# Patient Record
Sex: MALE | Race: WHITE | NOT HISPANIC OR LATINO | Employment: OTHER | URBAN - METROPOLITAN AREA
[De-identification: names, ages, dates, MRNs, and addresses within clinical notes are randomized per-mention and may not be internally consistent; named-entity substitution may affect disease eponyms.]

---

## 2017-01-09 ENCOUNTER — AMBULATORY - HEALTHEAST (OUTPATIENT)
Dept: CARDIOLOGY | Facility: CLINIC | Age: 77
End: 2017-01-09

## 2017-01-09 DIAGNOSIS — I48.92 ATRIAL FLUTTER, UNSPECIFIED TYPE (H): ICD-10-CM

## 2017-01-21 ENCOUNTER — COMMUNICATION - HEALTHEAST (OUTPATIENT)
Dept: CARDIOLOGY | Facility: CLINIC | Age: 77
End: 2017-01-21

## 2017-01-21 DIAGNOSIS — I42.8 OTHER PRIMARY CARDIOMYOPATHIES: ICD-10-CM

## 2017-02-07 ENCOUNTER — AMBULATORY - HEALTHEAST (OUTPATIENT)
Dept: CARDIOLOGY | Facility: CLINIC | Age: 77
End: 2017-02-07

## 2017-02-07 DIAGNOSIS — I48.92 ATRIAL FLUTTER, UNSPECIFIED TYPE (H): ICD-10-CM

## 2017-03-01 ENCOUNTER — AMBULATORY - HEALTHEAST (OUTPATIENT)
Dept: CARDIOLOGY | Facility: CLINIC | Age: 77
End: 2017-03-01

## 2017-03-01 DIAGNOSIS — Z95.0 PACEMAKER: ICD-10-CM

## 2017-03-02 ENCOUNTER — COMMUNICATION - HEALTHEAST (OUTPATIENT)
Dept: CARDIOLOGY | Facility: CLINIC | Age: 77
End: 2017-03-02

## 2017-03-02 ENCOUNTER — AMBULATORY - HEALTHEAST (OUTPATIENT)
Dept: CARDIOLOGY | Facility: CLINIC | Age: 77
End: 2017-03-02

## 2017-03-03 ENCOUNTER — AMBULATORY - HEALTHEAST (OUTPATIENT)
Dept: CARDIOLOGY | Facility: CLINIC | Age: 77
End: 2017-03-03

## 2017-03-03 DIAGNOSIS — I48.0 PAROXYSMAL ATRIAL FIBRILLATION (H): ICD-10-CM

## 2017-03-03 DIAGNOSIS — I34.0 MITRAL VALVE INSUFFICIENCY, UNSPECIFIED ETIOLOGY: ICD-10-CM

## 2017-03-03 DIAGNOSIS — Z95.0 BIVENTRICULAR CARDIAC PACEMAKER IN SITU: ICD-10-CM

## 2017-03-07 ENCOUNTER — AMBULATORY - HEALTHEAST (OUTPATIENT)
Dept: CARDIOLOGY | Facility: CLINIC | Age: 77
End: 2017-03-07

## 2017-03-07 DIAGNOSIS — I48.92 ATRIAL FLUTTER, UNSPECIFIED TYPE (H): ICD-10-CM

## 2017-04-04 ENCOUNTER — AMBULATORY - HEALTHEAST (OUTPATIENT)
Dept: CARDIOLOGY | Facility: CLINIC | Age: 77
End: 2017-04-04

## 2017-04-04 DIAGNOSIS — I48.92 ATRIAL FLUTTER, UNSPECIFIED TYPE (H): ICD-10-CM

## 2017-04-24 ENCOUNTER — COMMUNICATION - HEALTHEAST (OUTPATIENT)
Dept: CARDIOLOGY | Facility: CLINIC | Age: 77
End: 2017-04-24

## 2017-04-24 DIAGNOSIS — I48.91 ATRIAL FIBRILLATION (H): ICD-10-CM

## 2017-05-02 ENCOUNTER — AMBULATORY - HEALTHEAST (OUTPATIENT)
Dept: CARDIOLOGY | Facility: CLINIC | Age: 77
End: 2017-05-02

## 2017-05-02 DIAGNOSIS — I48.92 ATRIAL FLUTTER, UNSPECIFIED TYPE (H): ICD-10-CM

## 2017-05-30 ENCOUNTER — AMBULATORY - HEALTHEAST (OUTPATIENT)
Dept: CARDIOLOGY | Facility: CLINIC | Age: 77
End: 2017-05-30

## 2017-05-30 DIAGNOSIS — I48.92 ATRIAL FLUTTER, UNSPECIFIED TYPE (H): ICD-10-CM

## 2017-05-31 ENCOUNTER — HOSPITAL ENCOUNTER (OUTPATIENT)
Dept: CARDIOLOGY | Facility: HOSPITAL | Age: 77
Discharge: HOME | End: 2017-05-31
Attending: INTERNAL MEDICINE

## 2017-05-31 DIAGNOSIS — I34.0 MITRAL VALVE INSUFFICIENCY, UNSPECIFIED ETIOLOGY: ICD-10-CM

## 2017-05-31 DIAGNOSIS — Z95.0 BIVENTRICULAR CARDIAC PACEMAKER IN SITU: ICD-10-CM

## 2017-05-31 DIAGNOSIS — I48.0 PAROXYSMAL ATRIAL FIBRILLATION (H): ICD-10-CM

## 2017-05-31 LAB
AORTIC ROOT: 3.4 CM
AORTIC VALVE MEAN VELOCITY: 87.2 CM/S
AV DIMENSIONLESS INDEX VTI: 0.6
AV MEAN GRADIENT: 3 MMHG
AV PEAK GRADIENT: 6.9 MMHG
AV VALVE AREA: 2.7 CM2
AV VELOCITY RATIO: 0.6
BSA FOR ECHO PROCEDURE: 2.29 M2
CV BLOOD PRESSURE: NORMAL MMHG
CV ECHO HEIGHT: 71.5 IN
CV ECHO WEIGHT: 230 LBS
DOP CALC AO PEAK VEL: 131 CM/S
DOP CALC AO VTI: 26.6 CM
DOP CALC LVOT AREA: 4.15 CM2
DOP CALC LVOT DIAMETER: 2.3 CM
DOP CALC LVOT PEAK VEL: 84.1 CM/S
DOP CALC LVOT STROKE VOLUME: 71.4 CM3
DOP CALCLVOT PEAK VEL VTI: 17.2 CM
EJECTION FRACTION: 60 % (ref 55–75)
FRACTIONAL SHORTENING: 37.1 % (ref 28–44)
INTERVENTRICULAR SEPTUM IN END DIASTOLE: 1.4 CM (ref 0.6–1)
IVS/PW RATIO: 1
LA AREA 1: 28.2 CM2
LA AREA 2: 29.7 CM2
LEFT ATRIUM LENGTH: 5.88 CM
LEFT ATRIUM SIZE: 5.6 CM
LEFT ATRIUM TO AORTIC ROOT RATIO: 1.65 NO UNITS
LEFT ATRIUM VOLUME INDEX: 52.9 ML/M2
LEFT ATRIUM VOLUME: 121.1 CM3
LEFT VENTRICLE CARDIAC INDEX: 2.3 L/MIN/M2
LEFT VENTRICLE CARDIAC OUTPUT: 5.4 L/MIN
LEFT VENTRICLE DIASTOLIC VOLUME INDEX: 46.3 CM3/M2 (ref 34–74)
LEFT VENTRICLE DIASTOLIC VOLUME: 106 CM3 (ref 62–150)
LEFT VENTRICLE HEART RATE: 75 BPM
LEFT VENTRICLE MASS INDEX: 178.5 G/M2
LEFT VENTRICLE SYSTOLIC VOLUME INDEX: 18.3 CM3/M2 (ref 11–31)
LEFT VENTRICLE SYSTOLIC VOLUME: 42 CM3 (ref 21–61)
LEFT VENTRICULAR INTERNAL DIMENSION IN DIASTOLE: 6.2 CM (ref 4.2–5.8)
LEFT VENTRICULAR INTERNAL DIMENSION IN SYSTOLE: 3.9 CM (ref 2.5–4)
LEFT VENTRICULAR MASS: 408.8 G
LEFT VENTRICULAR OUTFLOW TRACT MEAN GRADIENT: 1 MMHG
LEFT VENTRICULAR OUTFLOW TRACT MEAN VELOCITY: 54.3 CM/S
LEFT VENTRICULAR OUTFLOW TRACT PEAK GRADIENT: 3 MMHG
LEFT VENTRICULAR POSTERIOR WALL IN END DIASTOLE: 1.4 CM (ref 0.6–1)
LV STROKE VOLUME INDEX: 31.2 ML/M2
MITRAL REGURGITANT VELOCITY TIME INTEGRAL: 123 CM
MR FLOW: 28 CM3
MR MEAN GRADIENT: 40 MMHG
MR MEAN VELOCITY: 307 CM/S
MR PEAK GRADIENT: 58.4 MMHG
MR PISA EROA: 0.2 CM2
MR PISA RADIUS: 0.6 CM
MR PISA VN NYQUIST: 38.8 CM/S
MV DECELERATION TIME: 296 MS
MV PEAK E VELOCITY: 189 CM/S
MV REGURGITANT VOLUME: 28.2 CC
NUC REST DIASTOLIC VOLUME INDEX: 3680 LBS
NUC REST SYSTOLIC VOLUME INDEX: 71.5 IN
PISA MR PEAK VEL: 382 CM/S
TRICUSPID REGURGITATION PEAK PRESSURE GRADIENT: 39.4 MMHG
TRICUSPID VALVE ANULAR PLANE SYSTOLIC EXCURSION: 1.2 CM
TRICUSPID VALVE PEAK REGURGITANT VELOCITY: 314 CM/S

## 2017-05-31 ASSESSMENT — MIFFLIN-ST. JEOR: SCORE: 1788.33

## 2017-06-06 ENCOUNTER — AMBULATORY - HEALTHEAST (OUTPATIENT)
Dept: CARDIOLOGY | Facility: CLINIC | Age: 77
End: 2017-06-06

## 2017-06-07 ENCOUNTER — AMBULATORY - HEALTHEAST (OUTPATIENT)
Dept: CARDIOLOGY | Facility: CLINIC | Age: 77
End: 2017-06-07

## 2017-06-07 ENCOUNTER — OFFICE VISIT - HEALTHEAST (OUTPATIENT)
Dept: CARDIOLOGY | Facility: CLINIC | Age: 77
End: 2017-06-07

## 2017-06-07 DIAGNOSIS — I47.29 NSVT (NONSUSTAINED VENTRICULAR TACHYCARDIA) (H): ICD-10-CM

## 2017-06-07 DIAGNOSIS — R94.31 ABNORMAL EKG: ICD-10-CM

## 2017-06-07 ASSESSMENT — MIFFLIN-ST. JEOR: SCORE: 1752.05

## 2017-06-13 ENCOUNTER — AMBULATORY - HEALTHEAST (OUTPATIENT)
Dept: CARDIOLOGY | Facility: CLINIC | Age: 77
End: 2017-06-13

## 2017-06-13 ENCOUNTER — COMMUNICATION - HEALTHEAST (OUTPATIENT)
Dept: CARDIOLOGY | Facility: CLINIC | Age: 77
End: 2017-06-13

## 2017-06-13 DIAGNOSIS — Z95.0 PACEMAKER: ICD-10-CM

## 2017-06-14 LAB — HCC DEVICE COMMENTS: NORMAL

## 2017-06-20 ENCOUNTER — HOSPITAL ENCOUNTER (OUTPATIENT)
Dept: NUCLEAR MEDICINE | Facility: HOSPITAL | Age: 77
Discharge: HOME | End: 2017-06-20
Attending: INTERNAL MEDICINE

## 2017-06-20 ENCOUNTER — HOSPITAL ENCOUNTER (OUTPATIENT)
Dept: CARDIOLOGY | Facility: HOSPITAL | Age: 77
Discharge: HOME | End: 2017-06-20
Attending: INTERNAL MEDICINE

## 2017-06-20 DIAGNOSIS — I47.29 NSVT (NONSUSTAINED VENTRICULAR TACHYCARDIA) (H): ICD-10-CM

## 2017-06-20 DIAGNOSIS — R94.31 ABNORMAL EKG: ICD-10-CM

## 2017-06-20 LAB
CV STRESS CURRENT BP HE: NORMAL
CV STRESS CURRENT HR HE: 71
CV STRESS CURRENT HR HE: 73
CV STRESS CURRENT HR HE: 75
CV STRESS CURRENT HR HE: 76
CV STRESS CURRENT HR HE: 76
CV STRESS CURRENT HR HE: 78
CV STRESS CURRENT HR HE: 81
CV STRESS CURRENT HR HE: 81
CV STRESS CURRENT HR HE: 85
CV STRESS CURRENT HR HE: 87
CV STRESS DEVIATION TIME HE: NORMAL
CV STRESS ECHO PERCENT HR HE: NORMAL
CV STRESS EXERCISE STAGE HE: NORMAL
CV STRESS FINAL RESTING BP HE: NORMAL
CV STRESS FINAL RESTING HR HE: 76
CV STRESS MAX HR HE: 82
CV STRESS MAX TREADMILL GRADE HE: 0
CV STRESS MAX TREADMILL SPEED HE: 0
CV STRESS PEAK DIA BP HE: NORMAL
CV STRESS PEAK SYS BP HE: NORMAL
CV STRESS PHASE HE: NORMAL
CV STRESS PROTOCOL HE: NORMAL
CV STRESS RESTING PT POSITION HE: NORMAL
CV STRESS ST DEVIATION AMOUNT HE: NORMAL
CV STRESS ST DEVIATION ELEVATION HE: NORMAL
CV STRESS ST EVELATION AMOUNT HE: NORMAL
CV STRESS TEST TYPE HE: NORMAL
CV STRESS TOTAL STAGE TIME MIN 1 HE: NORMAL
NUC STRESS EJECTION FRACTION: 52 %
STRESS ECHO BASELINE BP: NORMAL
STRESS ECHO BASELINE HR: 83
STRESS ECHO CALCULATED PERCENT HR: 57 %
STRESS ECHO LAST STRESS BP: NORMAL
STRESS ECHO LAST STRESS HR: 71

## 2017-06-27 ENCOUNTER — AMBULATORY - HEALTHEAST (OUTPATIENT)
Dept: CARDIOLOGY | Facility: CLINIC | Age: 77
End: 2017-06-27

## 2017-06-27 DIAGNOSIS — I48.92 ATRIAL FLUTTER, UNSPECIFIED TYPE (H): ICD-10-CM

## 2017-07-11 ENCOUNTER — AMBULATORY - HEALTHEAST (OUTPATIENT)
Dept: CARDIOLOGY | Facility: CLINIC | Age: 77
End: 2017-07-11

## 2017-07-11 DIAGNOSIS — I48.92 ATRIAL FLUTTER, UNSPECIFIED TYPE (H): ICD-10-CM

## 2017-07-24 ENCOUNTER — COMMUNICATION - HEALTHEAST (OUTPATIENT)
Dept: CARDIOLOGY | Facility: CLINIC | Age: 77
End: 2017-07-24

## 2017-07-24 DIAGNOSIS — I48.91 ATRIAL FIBRILLATION (H): ICD-10-CM

## 2017-08-08 ENCOUNTER — AMBULATORY - HEALTHEAST (OUTPATIENT)
Dept: CARDIOLOGY | Facility: CLINIC | Age: 77
End: 2017-08-08

## 2017-08-08 DIAGNOSIS — I48.92 ATRIAL FLUTTER, UNSPECIFIED TYPE (H): ICD-10-CM

## 2017-09-05 ENCOUNTER — AMBULATORY - HEALTHEAST (OUTPATIENT)
Dept: CARDIOLOGY | Facility: CLINIC | Age: 77
End: 2017-09-05

## 2017-09-05 DIAGNOSIS — I48.92 ATRIAL FLUTTER, UNSPECIFIED TYPE (H): ICD-10-CM

## 2017-09-20 ENCOUNTER — AMBULATORY - HEALTHEAST (OUTPATIENT)
Dept: CARDIOLOGY | Facility: CLINIC | Age: 77
End: 2017-09-20

## 2017-09-20 DIAGNOSIS — Z95.0 PACEMAKER: ICD-10-CM

## 2017-09-22 LAB — HCC DEVICE COMMENTS: NORMAL

## 2017-10-03 ENCOUNTER — AMBULATORY - HEALTHEAST (OUTPATIENT)
Dept: CARDIOLOGY | Facility: CLINIC | Age: 77
End: 2017-10-03

## 2017-10-03 DIAGNOSIS — I48.92 ATRIAL FLUTTER, UNSPECIFIED TYPE (H): ICD-10-CM

## 2017-10-14 ENCOUNTER — COMMUNICATION - HEALTHEAST (OUTPATIENT)
Dept: CARDIOLOGY | Facility: CLINIC | Age: 77
End: 2017-10-14

## 2017-10-14 DIAGNOSIS — I25.10 CAD (CORONARY ARTERY DISEASE): ICD-10-CM

## 2017-10-17 ENCOUNTER — COMMUNICATION - HEALTHEAST (OUTPATIENT)
Dept: CARDIOLOGY | Facility: CLINIC | Age: 77
End: 2017-10-17

## 2017-10-17 DIAGNOSIS — I48.91 ATRIAL FIBRILLATION (H): ICD-10-CM

## 2017-10-31 ENCOUNTER — AMBULATORY - HEALTHEAST (OUTPATIENT)
Dept: CARDIOLOGY | Facility: CLINIC | Age: 77
End: 2017-10-31

## 2017-10-31 DIAGNOSIS — I48.92 ATRIAL FLUTTER, UNSPECIFIED TYPE (H): ICD-10-CM

## 2017-11-20 ENCOUNTER — RECORDS - HEALTHEAST (OUTPATIENT)
Dept: LAB | Facility: CLINIC | Age: 77
End: 2017-11-20

## 2017-11-20 LAB
CHOLEST SERPL-MCNC: 152 MG/DL
FASTING STATUS PATIENT QL REPORTED: YES
HDLC SERPL-MCNC: 36 MG/DL
LDLC SERPL CALC-MCNC: 91 MG/DL
PSA SERPL-MCNC: 2.6 NG/ML (ref 0–6.5)
TRIGL SERPL-MCNC: 123 MG/DL

## 2017-11-28 ENCOUNTER — AMBULATORY - HEALTHEAST (OUTPATIENT)
Dept: CARDIOLOGY | Facility: CLINIC | Age: 77
End: 2017-11-28

## 2017-11-28 DIAGNOSIS — I48.92 ATRIAL FLUTTER, UNSPECIFIED TYPE (H): ICD-10-CM

## 2017-11-29 ENCOUNTER — AMBULATORY - HEALTHEAST (OUTPATIENT)
Dept: CARDIOLOGY | Facility: CLINIC | Age: 77
End: 2017-11-29

## 2017-11-29 ENCOUNTER — RECORDS - HEALTHEAST (OUTPATIENT)
Dept: ADMINISTRATIVE | Facility: OTHER | Age: 77
End: 2017-11-29

## 2017-12-04 ENCOUNTER — AMBULATORY - HEALTHEAST (OUTPATIENT)
Dept: CARDIOLOGY | Facility: CLINIC | Age: 77
End: 2017-12-04

## 2017-12-04 DIAGNOSIS — I48.0 PAROXYSMAL ATRIAL FIBRILLATION (H): ICD-10-CM

## 2017-12-04 DIAGNOSIS — Z95.0 PRESENCE OF BIVENTRICULAR CARDIAC PACEMAKER: ICD-10-CM

## 2017-12-04 DIAGNOSIS — Z86.79 STATUS POST ABLATION OF ATRIAL FIBRILLATION: ICD-10-CM

## 2017-12-04 DIAGNOSIS — Z98.890 STATUS POST ABLATION OF ATRIAL FIBRILLATION: ICD-10-CM

## 2017-12-04 DIAGNOSIS — I10 ESSENTIAL HYPERTENSION: ICD-10-CM

## 2017-12-04 DIAGNOSIS — I42.8 NONISCHEMIC CARDIOMYOPATHY (H): ICD-10-CM

## 2017-12-04 DIAGNOSIS — I48.92 ATRIAL FLUTTER, UNSPECIFIED TYPE (H): ICD-10-CM

## 2017-12-04 LAB — HCC DEVICE COMMENTS: NORMAL

## 2017-12-04 ASSESSMENT — MIFFLIN-ST. JEOR: SCORE: 1761.12

## 2017-12-27 ENCOUNTER — AMBULATORY - HEALTHEAST (OUTPATIENT)
Dept: CARDIOLOGY | Facility: CLINIC | Age: 77
End: 2017-12-27

## 2017-12-27 DIAGNOSIS — I48.92 ATRIAL FLUTTER, UNSPECIFIED TYPE (H): ICD-10-CM

## 2018-01-17 ENCOUNTER — COMMUNICATION - HEALTHEAST (OUTPATIENT)
Dept: CARDIOLOGY | Facility: CLINIC | Age: 78
End: 2018-01-17

## 2018-01-17 DIAGNOSIS — I48.91 ATRIAL FIBRILLATION (H): ICD-10-CM

## 2018-01-24 ENCOUNTER — AMBULATORY - HEALTHEAST (OUTPATIENT)
Dept: CARDIOLOGY | Facility: CLINIC | Age: 78
End: 2018-01-24

## 2018-01-24 DIAGNOSIS — I48.92 ATRIAL FLUTTER, UNSPECIFIED TYPE (H): ICD-10-CM

## 2018-01-24 LAB — INTERNATIONAL NORMALIZATION RATIO, POC - HISTORICAL: 2.3

## 2018-02-27 ENCOUNTER — AMBULATORY - HEALTHEAST (OUTPATIENT)
Dept: CARDIOLOGY | Facility: CLINIC | Age: 78
End: 2018-02-27

## 2018-02-27 DIAGNOSIS — I48.92 ATRIAL FLUTTER, UNSPECIFIED TYPE (H): ICD-10-CM

## 2018-02-27 LAB — INTERNATIONAL NORMALIZATION RATIO, POC - HISTORICAL: 2.1

## 2018-03-02 ENCOUNTER — COMMUNICATION - HEALTHEAST (OUTPATIENT)
Dept: CARDIOLOGY | Facility: CLINIC | Age: 78
End: 2018-03-02

## 2018-03-02 DIAGNOSIS — I48.91 ATRIAL FIBRILLATION (H): ICD-10-CM

## 2018-03-06 ENCOUNTER — COMMUNICATION - HEALTHEAST (OUTPATIENT)
Dept: CARDIOLOGY | Facility: CLINIC | Age: 78
End: 2018-03-06

## 2018-03-06 DIAGNOSIS — I48.91 ATRIAL FIBRILLATION (H): ICD-10-CM

## 2018-03-07 ENCOUNTER — AMBULATORY - HEALTHEAST (OUTPATIENT)
Dept: CARDIOLOGY | Facility: CLINIC | Age: 78
End: 2018-03-07

## 2018-03-07 DIAGNOSIS — Z95.0 PACEMAKER: ICD-10-CM

## 2018-03-08 ENCOUNTER — COMMUNICATION - HEALTHEAST (OUTPATIENT)
Dept: CARDIOLOGY | Facility: CLINIC | Age: 78
End: 2018-03-08

## 2018-03-08 LAB — HCC DEVICE COMMENTS: NORMAL

## 2018-03-12 ENCOUNTER — COMMUNICATION - HEALTHEAST (OUTPATIENT)
Dept: CARDIOLOGY | Facility: CLINIC | Age: 78
End: 2018-03-12

## 2018-03-16 ENCOUNTER — COMMUNICATION - HEALTHEAST (OUTPATIENT)
Dept: CARDIOLOGY | Facility: CLINIC | Age: 78
End: 2018-03-16

## 2018-03-16 DIAGNOSIS — I48.91 ATRIAL FIBRILLATION (H): ICD-10-CM

## 2018-03-27 ENCOUNTER — AMBULATORY - HEALTHEAST (OUTPATIENT)
Dept: CARDIOLOGY | Facility: CLINIC | Age: 78
End: 2018-03-27

## 2018-03-27 DIAGNOSIS — I48.92 ATRIAL FLUTTER, UNSPECIFIED TYPE (H): ICD-10-CM

## 2018-03-27 LAB — INTERNATIONAL NORMALIZATION RATIO, POC - HISTORICAL: 2.6

## 2018-04-24 ENCOUNTER — AMBULATORY - HEALTHEAST (OUTPATIENT)
Dept: CARDIOLOGY | Facility: CLINIC | Age: 78
End: 2018-04-24

## 2018-04-24 DIAGNOSIS — I48.92 ATRIAL FLUTTER, UNSPECIFIED TYPE (H): ICD-10-CM

## 2018-04-24 LAB — INTERNATIONAL NORMALIZATION RATIO, POC - HISTORICAL: 2

## 2018-05-22 ENCOUNTER — COMMUNICATION - HEALTHEAST (OUTPATIENT)
Dept: CARDIOLOGY | Facility: CLINIC | Age: 78
End: 2018-05-22

## 2018-05-22 DIAGNOSIS — I48.91 ATRIAL FIBRILLATION (H): ICD-10-CM

## 2018-05-29 ENCOUNTER — AMBULATORY - HEALTHEAST (OUTPATIENT)
Dept: CARDIOLOGY | Facility: CLINIC | Age: 78
End: 2018-05-29

## 2018-05-29 DIAGNOSIS — I48.92 ATRIAL FLUTTER, UNSPECIFIED TYPE (H): ICD-10-CM

## 2018-05-29 LAB — INTERNATIONAL NORMALIZATION RATIO, POC - HISTORICAL: 2

## 2018-06-01 ENCOUNTER — RECORDS - HEALTHEAST (OUTPATIENT)
Dept: ADMINISTRATIVE | Facility: OTHER | Age: 78
End: 2018-06-01

## 2018-06-08 ENCOUNTER — OFFICE VISIT - HEALTHEAST (OUTPATIENT)
Dept: CARDIOLOGY | Facility: CLINIC | Age: 78
End: 2018-06-08

## 2018-06-08 DIAGNOSIS — Z98.890 HISTORY OF MITRAL VALVE REPAIR: ICD-10-CM

## 2018-06-08 ASSESSMENT — MIFFLIN-ST. JEOR: SCORE: 1788.33

## 2018-06-12 ENCOUNTER — AMBULATORY - HEALTHEAST (OUTPATIENT)
Dept: CARDIOLOGY | Facility: CLINIC | Age: 78
End: 2018-06-12

## 2018-06-12 ENCOUNTER — COMMUNICATION - HEALTHEAST (OUTPATIENT)
Dept: CARDIOLOGY | Facility: CLINIC | Age: 78
End: 2018-06-12

## 2018-06-12 DIAGNOSIS — Z95.0 PACEMAKER: ICD-10-CM

## 2018-06-13 LAB
HCC DEVICE COMMENTS: NORMAL
HCC DEVICE IMPLANTING PROVIDER: NORMAL
HCC DEVICE MANUFACTURE: NORMAL
HCC DEVICE MODEL: NORMAL
HCC DEVICE SERIAL NUMBER: NORMAL
HCC DEVICE TYPE: NORMAL

## 2018-06-26 ENCOUNTER — AMBULATORY - HEALTHEAST (OUTPATIENT)
Dept: CARDIOLOGY | Facility: CLINIC | Age: 78
End: 2018-06-26

## 2018-06-26 DIAGNOSIS — I48.92 ATRIAL FLUTTER, UNSPECIFIED TYPE (H): ICD-10-CM

## 2018-06-26 LAB — INTERNATIONAL NORMALIZATION RATIO, POC - HISTORICAL: 2.5

## 2018-07-23 ENCOUNTER — AMBULATORY - HEALTHEAST (OUTPATIENT)
Dept: CARDIOLOGY | Facility: CLINIC | Age: 78
End: 2018-07-23

## 2018-07-23 DIAGNOSIS — Z95.0 PRESENCE OF BIVENTRICULAR CARDIAC PACEMAKER: ICD-10-CM

## 2018-07-23 DIAGNOSIS — I48.0 PAROXYSMAL ATRIAL FIBRILLATION (H): ICD-10-CM

## 2018-07-23 DIAGNOSIS — I48.92 ATRIAL FLUTTER, UNSPECIFIED TYPE (H): ICD-10-CM

## 2018-07-23 DIAGNOSIS — I10 ESSENTIAL HYPERTENSION: ICD-10-CM

## 2018-07-23 LAB
ATRIAL RATE - MUSE: 47 BPM
DIASTOLIC BLOOD PRESSURE - MUSE: NORMAL MMHG
HCC DEVICE COMMENTS: NORMAL
HCC DEVICE IMPLANTING PROVIDER: NORMAL
HCC DEVICE MANUFACTURE: NORMAL
HCC DEVICE MODEL: NORMAL
HCC DEVICE SERIAL NUMBER: NORMAL
HCC DEVICE TYPE: NORMAL
INTERPRETATION ECG - MUSE: NORMAL
P AXIS - MUSE: NORMAL DEGREES
POC INR - HE - HISTORICAL: 2.8 (ref 0.9–1.1)
PR INTERVAL - MUSE: NORMAL MS
QRS DURATION - MUSE: 154 MS
QT - MUSE: 478 MS
QTC - MUSE: 519 MS
R AXIS - MUSE: 135 DEGREES
SYSTOLIC BLOOD PRESSURE - MUSE: NORMAL MMHG
T AXIS - MUSE: 57 DEGREES
VENTRICULAR RATE- MUSE: 71 BPM

## 2018-07-23 ASSESSMENT — MIFFLIN-ST. JEOR: SCORE: 1762.26

## 2018-07-24 ENCOUNTER — AMBULATORY - HEALTHEAST (OUTPATIENT)
Dept: CARDIOLOGY | Facility: CLINIC | Age: 78
End: 2018-07-24

## 2018-07-24 ENCOUNTER — ANESTHESIA - HEALTHEAST (OUTPATIENT)
Dept: CARDIOLOGY | Facility: CLINIC | Age: 78
End: 2018-07-24

## 2018-07-24 DIAGNOSIS — Z95.0 PRESENCE OF BIVENTRICULAR CARDIAC PACEMAKER: ICD-10-CM

## 2018-08-03 ENCOUNTER — RECORDS - HEALTHEAST (OUTPATIENT)
Dept: LAB | Facility: CLINIC | Age: 78
End: 2018-08-03

## 2018-08-03 LAB
ALBUMIN SERPL-MCNC: 4.3 G/DL (ref 3.5–5)
ALP SERPL-CCNC: 66 U/L (ref 45–120)
ALT SERPL W P-5'-P-CCNC: 12 U/L (ref 0–45)
ANION GAP SERPL CALCULATED.3IONS-SCNC: 10 MMOL/L (ref 5–18)
AST SERPL W P-5'-P-CCNC: 17 U/L (ref 0–40)
BILIRUB SERPL-MCNC: 1.3 MG/DL (ref 0–1)
BUN SERPL-MCNC: 10 MG/DL (ref 8–28)
CALCIUM SERPL-MCNC: 9.3 MG/DL (ref 8.5–10.5)
CHLORIDE BLD-SCNC: 108 MMOL/L (ref 98–107)
CHOLEST SERPL-MCNC: 140 MG/DL
CO2 SERPL-SCNC: 23 MMOL/L (ref 22–31)
CREAT SERPL-MCNC: 1.18 MG/DL (ref 0.7–1.3)
FASTING STATUS PATIENT QL REPORTED: YES
GFR SERPL CREATININE-BSD FRML MDRD: 60 ML/MIN/1.73M2
GLUCOSE BLD-MCNC: 91 MG/DL (ref 70–125)
HDLC SERPL-MCNC: 34 MG/DL
LDLC SERPL CALC-MCNC: 88 MG/DL
POTASSIUM BLD-SCNC: 3.9 MMOL/L (ref 3.5–5)
PROT SERPL-MCNC: 7.3 G/DL (ref 6–8)
SODIUM SERPL-SCNC: 141 MMOL/L (ref 136–145)
TRIGL SERPL-MCNC: 91 MG/DL
TSH SERPL DL<=0.005 MIU/L-ACNC: 2.52 UIU/ML (ref 0.3–5)

## 2018-08-08 ENCOUNTER — COMMUNICATION - HEALTHEAST (OUTPATIENT)
Dept: CARDIOLOGY | Facility: CLINIC | Age: 78
End: 2018-08-08

## 2018-08-08 DIAGNOSIS — I48.91 ATRIAL FIBRILLATION (H): ICD-10-CM

## 2018-08-16 ENCOUNTER — RECORDS - HEALTHEAST (OUTPATIENT)
Dept: ADMINISTRATIVE | Facility: OTHER | Age: 78
End: 2018-08-16

## 2018-08-20 ENCOUNTER — AMBULATORY - HEALTHEAST (OUTPATIENT)
Dept: CARDIOLOGY | Facility: CLINIC | Age: 78
End: 2018-08-20

## 2018-08-20 DIAGNOSIS — Z79.01 LONG-TERM (CURRENT) USE OF ANTICOAGULANTS: ICD-10-CM

## 2018-08-20 DIAGNOSIS — I25.10 ATHEROSCLEROSIS OF CORONARY ARTERY OF NATIVE HEART WITHOUT ANGINA PECTORIS, UNSPECIFIED VESSEL OR LESION TYPE: ICD-10-CM

## 2018-08-20 DIAGNOSIS — I10 ESSENTIAL HYPERTENSION: ICD-10-CM

## 2018-08-20 DIAGNOSIS — I48.92 ATRIAL FLUTTER, UNSPECIFIED TYPE (H): ICD-10-CM

## 2018-08-20 DIAGNOSIS — Z95.0 PRESENCE OF BIVENTRICULAR CARDIAC PACEMAKER: ICD-10-CM

## 2018-08-20 DIAGNOSIS — I48.0 PAROXYSMAL ATRIAL FIBRILLATION (H): ICD-10-CM

## 2018-08-20 LAB
HCC DEVICE COMMENTS: NORMAL
HCC DEVICE IMPLANTING PROVIDER: NORMAL
HCC DEVICE MANUFACTURE: NORMAL
HCC DEVICE MODEL: NORMAL
HCC DEVICE SERIAL NUMBER: NORMAL
HCC DEVICE TYPE: NORMAL
POC INR - HE - HISTORICAL: 2.2 (ref 0.9–1.1)

## 2018-08-20 ASSESSMENT — MIFFLIN-ST. JEOR: SCORE: 1771.33

## 2018-09-18 ENCOUNTER — AMBULATORY - HEALTHEAST (OUTPATIENT)
Dept: CARDIOLOGY | Facility: CLINIC | Age: 78
End: 2018-09-18

## 2018-09-18 DIAGNOSIS — I48.92 ATRIAL FLUTTER, UNSPECIFIED TYPE (H): ICD-10-CM

## 2018-09-18 LAB — INTERNATIONAL NORMALIZATION RATIO, POC - HISTORICAL: 2.4

## 2018-10-16 ENCOUNTER — AMBULATORY - HEALTHEAST (OUTPATIENT)
Dept: CARDIOLOGY | Facility: CLINIC | Age: 78
End: 2018-10-16

## 2018-10-16 ENCOUNTER — COMMUNICATION - HEALTHEAST (OUTPATIENT)
Dept: CARDIOLOGY | Facility: CLINIC | Age: 78
End: 2018-10-16

## 2018-10-16 DIAGNOSIS — I48.91 ATRIAL FIBRILLATION (H): ICD-10-CM

## 2018-10-16 DIAGNOSIS — I48.92 ATRIAL FLUTTER, UNSPECIFIED TYPE (H): ICD-10-CM

## 2018-10-16 LAB — INTERNATIONAL NORMALIZATION RATIO, POC - HISTORICAL: 1.8

## 2018-10-30 ENCOUNTER — AMBULATORY - HEALTHEAST (OUTPATIENT)
Dept: CARDIOLOGY | Facility: CLINIC | Age: 78
End: 2018-10-30

## 2018-10-30 DIAGNOSIS — I48.92 ATRIAL FLUTTER, UNSPECIFIED TYPE (H): ICD-10-CM

## 2018-10-30 LAB — INTERNATIONAL NORMALIZATION RATIO, POC - HISTORICAL: 2.2

## 2018-11-20 ENCOUNTER — AMBULATORY - HEALTHEAST (OUTPATIENT)
Dept: CARDIOLOGY | Facility: CLINIC | Age: 78
End: 2018-11-20

## 2018-11-20 DIAGNOSIS — Z95.0 BIVENTRICULAR CARDIAC PACEMAKER IN SITU: ICD-10-CM

## 2018-11-27 ENCOUNTER — AMBULATORY - HEALTHEAST (OUTPATIENT)
Dept: CARDIOLOGY | Facility: CLINIC | Age: 78
End: 2018-11-27

## 2018-11-27 DIAGNOSIS — I48.92 ATRIAL FLUTTER, UNSPECIFIED TYPE (H): ICD-10-CM

## 2018-11-27 LAB — INTERNATIONAL NORMALIZATION RATIO, POC - HISTORICAL: 2.3

## 2018-12-12 ENCOUNTER — RECORDS - HEALTHEAST (OUTPATIENT)
Dept: LAB | Facility: CLINIC | Age: 78
End: 2018-12-12

## 2018-12-12 LAB
ALBUMIN SERPL-MCNC: 4.2 G/DL (ref 3.5–5)
ALP SERPL-CCNC: 71 U/L (ref 45–120)
ALT SERPL W P-5'-P-CCNC: 19 U/L (ref 0–45)
ANION GAP SERPL CALCULATED.3IONS-SCNC: 10 MMOL/L (ref 5–18)
AST SERPL W P-5'-P-CCNC: 19 U/L (ref 0–40)
BILIRUB SERPL-MCNC: 1.2 MG/DL (ref 0–1)
BUN SERPL-MCNC: 10 MG/DL (ref 8–28)
CALCIUM SERPL-MCNC: 9.3 MG/DL (ref 8.5–10.5)
CHLORIDE BLD-SCNC: 107 MMOL/L (ref 98–107)
CHOLEST SERPL-MCNC: 138 MG/DL
CO2 SERPL-SCNC: 24 MMOL/L (ref 22–31)
CREAT SERPL-MCNC: 1.13 MG/DL (ref 0.7–1.3)
FASTING STATUS PATIENT QL REPORTED: YES
GFR SERPL CREATININE-BSD FRML MDRD: >60 ML/MIN/1.73M2
GLUCOSE BLD-MCNC: 108 MG/DL (ref 70–125)
HDLC SERPL-MCNC: 38 MG/DL
LDLC SERPL CALC-MCNC: 82 MG/DL
POTASSIUM BLD-SCNC: 4 MMOL/L (ref 3.5–5)
PROT SERPL-MCNC: 7.2 G/DL (ref 6–8)
PSA SERPL-MCNC: 2.3 NG/ML (ref 0–6.5)
SODIUM SERPL-SCNC: 141 MMOL/L (ref 136–145)
TRIGL SERPL-MCNC: 90 MG/DL
TSH SERPL DL<=0.005 MIU/L-ACNC: 6.62 UIU/ML (ref 0.3–5)

## 2018-12-27 ENCOUNTER — AMBULATORY - HEALTHEAST (OUTPATIENT)
Dept: CARDIOLOGY | Facility: CLINIC | Age: 78
End: 2018-12-27

## 2018-12-27 DIAGNOSIS — I48.92 ATRIAL FLUTTER, UNSPECIFIED TYPE (H): ICD-10-CM

## 2018-12-27 LAB — INTERNATIONAL NORMALIZATION RATIO, POC - HISTORICAL: 2.4

## 2019-01-06 ENCOUNTER — COMMUNICATION - HEALTHEAST (OUTPATIENT)
Dept: CARDIOLOGY | Facility: CLINIC | Age: 79
End: 2019-01-06

## 2019-01-06 DIAGNOSIS — I42.8 NONISCHEMIC CARDIOMYOPATHY (H): ICD-10-CM

## 2019-01-24 ENCOUNTER — COMMUNICATION - HEALTHEAST (OUTPATIENT)
Dept: CARDIOLOGY | Facility: CLINIC | Age: 79
End: 2019-01-24

## 2019-01-24 DIAGNOSIS — I48.91 ATRIAL FIBRILLATION (H): ICD-10-CM

## 2019-01-29 ENCOUNTER — AMBULATORY - HEALTHEAST (OUTPATIENT)
Dept: CARDIOLOGY | Facility: CLINIC | Age: 79
End: 2019-01-29

## 2019-01-29 DIAGNOSIS — I48.92 ATRIAL FLUTTER, UNSPECIFIED TYPE (H): ICD-10-CM

## 2019-01-29 LAB — INTERNATIONAL NORMALIZATION RATIO, POC - HISTORICAL: 2.1

## 2019-02-07 ENCOUNTER — RECORDS - HEALTHEAST (OUTPATIENT)
Dept: ADMINISTRATIVE | Facility: OTHER | Age: 79
End: 2019-02-07

## 2019-02-07 ENCOUNTER — AMBULATORY - HEALTHEAST (OUTPATIENT)
Dept: CARDIOLOGY | Facility: CLINIC | Age: 79
End: 2019-02-07

## 2019-02-10 ENCOUNTER — AMBULATORY - HEALTHEAST (OUTPATIENT)
Dept: CARDIOLOGY | Facility: CLINIC | Age: 79
End: 2019-02-10

## 2019-02-10 DIAGNOSIS — Z95.0 BIVENTRICULAR CARDIAC PACEMAKER IN SITU: ICD-10-CM

## 2019-02-25 ENCOUNTER — RECORDS - HEALTHEAST (OUTPATIENT)
Dept: ADMINISTRATIVE | Facility: OTHER | Age: 79
End: 2019-02-25

## 2019-02-25 ENCOUNTER — AMBULATORY - HEALTHEAST (OUTPATIENT)
Dept: CARDIOLOGY | Facility: CLINIC | Age: 79
End: 2019-02-25

## 2019-02-26 ENCOUNTER — AMBULATORY - HEALTHEAST (OUTPATIENT)
Dept: CARDIOLOGY | Facility: CLINIC | Age: 79
End: 2019-02-26

## 2019-02-26 DIAGNOSIS — I48.92 ATRIAL FLUTTER, UNSPECIFIED TYPE (H): ICD-10-CM

## 2019-02-26 LAB — INTERNATIONAL NORMALIZATION RATIO, POC - HISTORICAL: 2.6

## 2019-02-28 ENCOUNTER — AMBULATORY - HEALTHEAST (OUTPATIENT)
Dept: CARDIOLOGY | Facility: CLINIC | Age: 79
End: 2019-02-28

## 2019-02-28 ENCOUNTER — OFFICE VISIT - HEALTHEAST (OUTPATIENT)
Dept: CARDIOLOGY | Facility: CLINIC | Age: 79
End: 2019-02-28

## 2019-02-28 DIAGNOSIS — I50.30 HEART FAILURE WITH PRESERVED EJECTION FRACTION (H): ICD-10-CM

## 2019-02-28 DIAGNOSIS — I48.19 PERSISTENT ATRIAL FIBRILLATION (H): ICD-10-CM

## 2019-02-28 DIAGNOSIS — I50.41 ACUTE COMBINED SYSTOLIC AND DIASTOLIC CONGESTIVE HEART FAILURE (H): ICD-10-CM

## 2019-02-28 DIAGNOSIS — I10 PRIMARY HYPERTENSION: ICD-10-CM

## 2019-02-28 LAB
ANION GAP SERPL CALCULATED.3IONS-SCNC: 9 MMOL/L (ref 5–18)
BUN SERPL-MCNC: 11 MG/DL (ref 8–28)
CALCIUM SERPL-MCNC: 9.1 MG/DL (ref 8.5–10.5)
CHLORIDE BLD-SCNC: 106 MMOL/L (ref 98–107)
CO2 SERPL-SCNC: 25 MMOL/L (ref 22–31)
CREAT SERPL-MCNC: 1.22 MG/DL (ref 0.7–1.3)
GFR SERPL CREATININE-BSD FRML MDRD: 58 ML/MIN/1.73M2
GLUCOSE BLD-MCNC: 94 MG/DL (ref 70–125)
POTASSIUM BLD-SCNC: 4 MMOL/L (ref 3.5–5)
SODIUM SERPL-SCNC: 140 MMOL/L (ref 136–145)

## 2019-02-28 ASSESSMENT — MIFFLIN-ST. JEOR: SCORE: 1762.26

## 2019-03-26 ENCOUNTER — AMBULATORY - HEALTHEAST (OUTPATIENT)
Dept: CARDIOLOGY | Facility: CLINIC | Age: 79
End: 2019-03-26

## 2019-03-26 ENCOUNTER — OFFICE VISIT - HEALTHEAST (OUTPATIENT)
Dept: CARDIOLOGY | Facility: CLINIC | Age: 79
End: 2019-03-26

## 2019-03-26 DIAGNOSIS — I48.19 PERSISTENT ATRIAL FIBRILLATION (H): ICD-10-CM

## 2019-03-26 DIAGNOSIS — I48.92 ATRIAL FLUTTER, UNSPECIFIED TYPE (H): ICD-10-CM

## 2019-03-26 DIAGNOSIS — I25.10 CORONARY ARTERY DISEASE INVOLVING NATIVE CORONARY ARTERY OF NATIVE HEART WITHOUT ANGINA PECTORIS: ICD-10-CM

## 2019-03-26 DIAGNOSIS — I50.32 CHRONIC DIASTOLIC HEART FAILURE (H): ICD-10-CM

## 2019-03-26 LAB — INTERNATIONAL NORMALIZATION RATIO, POC - HISTORICAL: 3

## 2019-03-26 ASSESSMENT — MIFFLIN-ST. JEOR: SCORE: 1744.8

## 2019-04-03 ENCOUNTER — AMBULATORY - HEALTHEAST (OUTPATIENT)
Dept: CARDIOLOGY | Facility: CLINIC | Age: 79
End: 2019-04-03

## 2019-04-03 DIAGNOSIS — Z95.0 PRESENCE OF BIVENTRICULAR CARDIAC PACEMAKER: ICD-10-CM

## 2019-04-03 DIAGNOSIS — I34.0 NON-RHEUMATIC MITRAL REGURGITATION: ICD-10-CM

## 2019-04-03 ASSESSMENT — MIFFLIN-ST. JEOR: SCORE: 1740.71

## 2019-04-14 ENCOUNTER — COMMUNICATION - HEALTHEAST (OUTPATIENT)
Dept: CARDIOLOGY | Facility: CLINIC | Age: 79
End: 2019-04-14

## 2019-04-14 DIAGNOSIS — I48.91 ATRIAL FIBRILLATION (H): ICD-10-CM

## 2019-04-23 ENCOUNTER — AMBULATORY - HEALTHEAST (OUTPATIENT)
Dept: CARDIOLOGY | Facility: CLINIC | Age: 79
End: 2019-04-23

## 2019-04-23 DIAGNOSIS — I48.92 ATRIAL FLUTTER, UNSPECIFIED TYPE (H): ICD-10-CM

## 2019-04-23 LAB — INTERNATIONAL NORMALIZATION RATIO, POC - HISTORICAL: 2.5

## 2019-05-21 ENCOUNTER — AMBULATORY - HEALTHEAST (OUTPATIENT)
Dept: LAB | Facility: CLINIC | Age: 79
End: 2019-05-21

## 2019-05-21 DIAGNOSIS — I48.92 ATRIAL FLUTTER, UNSPECIFIED TYPE (H): ICD-10-CM

## 2019-05-21 LAB — INR PPP: 2.3 (ref 0.9–1.1)

## 2019-05-22 ENCOUNTER — COMMUNICATION - HEALTHEAST (OUTPATIENT)
Dept: SCHEDULING | Facility: CLINIC | Age: 79
End: 2019-05-22

## 2019-05-22 ENCOUNTER — COMMUNICATION - HEALTHEAST (OUTPATIENT)
Dept: ANTICOAGULATION | Facility: CLINIC | Age: 79
End: 2019-05-22

## 2019-05-22 DIAGNOSIS — I48.92 ATRIAL FLUTTER, UNSPECIFIED TYPE (H): ICD-10-CM

## 2019-05-22 DIAGNOSIS — I48.19 PERSISTENT ATRIAL FIBRILLATION (H): ICD-10-CM

## 2019-05-31 ENCOUNTER — OFFICE VISIT - HEALTHEAST (OUTPATIENT)
Dept: SLEEP MEDICINE | Facility: CLINIC | Age: 79
End: 2019-05-31

## 2019-05-31 DIAGNOSIS — E66.09 CLASS 1 OBESITY DUE TO EXCESS CALORIES WITH SERIOUS COMORBIDITY AND BODY MASS INDEX (BMI) OF 31.0 TO 31.9 IN ADULT: ICD-10-CM

## 2019-05-31 DIAGNOSIS — I48.19 PERSISTENT ATRIAL FIBRILLATION (H): ICD-10-CM

## 2019-05-31 DIAGNOSIS — E03.9 HYPOTHYROIDISM, UNSPECIFIED TYPE: ICD-10-CM

## 2019-05-31 DIAGNOSIS — E66.811 CLASS 1 OBESITY DUE TO EXCESS CALORIES WITH SERIOUS COMORBIDITY AND BODY MASS INDEX (BMI) OF 31.0 TO 31.9 IN ADULT: ICD-10-CM

## 2019-05-31 DIAGNOSIS — I27.20 PULMONARY HYPERTENSION (H): ICD-10-CM

## 2019-05-31 ASSESSMENT — MIFFLIN-ST. JEOR: SCORE: 1754.32

## 2019-06-11 ENCOUNTER — HOSPITAL ENCOUNTER (OUTPATIENT)
Dept: CARDIOLOGY | Facility: HOSPITAL | Age: 79
Discharge: HOME | End: 2019-06-11
Attending: INTERNAL MEDICINE

## 2019-06-11 DIAGNOSIS — Z98.890 HISTORY OF MITRAL VALVE REPAIR: ICD-10-CM

## 2019-06-11 LAB
AORTIC ROOT: 4.1 CM
AORTIC VALVE MEAN VELOCITY: 90.8 CM/S
ASCENDING AORTA: 3.9 CM
AV DIMENSIONLESS INDEX VTI: 0.8
AV MEAN GRADIENT: 4 MMHG
AV PEAK GRADIENT: 8.8 MMHG
AV VALVE AREA: 3.7 CM2
BSA FOR ECHO PROCEDURE: 2.26 M2
CV BLOOD PRESSURE: ABNORMAL MMHG
CV ECHO HEIGHT: 70.5 IN
CV ECHO WEIGHT: 226 LBS
DOP CALC AO PEAK VEL: 148 CM/S
DOP CALC AO VTI: 31.2 CM
DOP CALC LVOT AREA: 4.91 CM2
DOP CALC LVOT DIAMETER: 2.5 CM
DOP CALC LVOT STROKE VOLUME: 116.8 CM3
DOP CALC MV VTI: 52.1 CM
DOP CALCLVOT PEAK VEL VTI: 23.8 CM
EJECTION FRACTION: 53 % (ref 55–75)
FRACTIONAL SHORTENING: 27.6 % (ref 28–44)
INTERVENTRICULAR SEPTUM IN END DIASTOLE: 1.3 CM (ref 0.6–1)
IVS/PW RATIO: 1.1
LA AREA 1: 34.1 CM2
LA AREA 2: 32.3 CM2
LEFT ATRIUM LENGTH: 6.84 CM
LEFT ATRIUM SIZE: 4.6 CM
LEFT ATRIUM VOLUME INDEX: 60.6 ML/M2
LEFT ATRIUM VOLUME: 136.9 ML
LEFT VENTRICLE CARDIAC INDEX: 2.7 L/MIN/M2
LEFT VENTRICLE CARDIAC OUTPUT: 6.2 L/MIN
LEFT VENTRICLE DIASTOLIC VOLUME INDEX: 76.5 CM3/M2 (ref 34–74)
LEFT VENTRICLE DIASTOLIC VOLUME: 173 CM3 (ref 62–150)
LEFT VENTRICLE HEART RATE: 53 BPM
LEFT VENTRICLE MASS INDEX: 138.9 G/M2
LEFT VENTRICLE SYSTOLIC VOLUME INDEX: 36.3 CM3/M2 (ref 11–31)
LEFT VENTRICLE SYSTOLIC VOLUME: 82 CM3 (ref 21–61)
LEFT VENTRICULAR INTERNAL DIMENSION IN DIASTOLE: 5.8 CM (ref 4.2–5.8)
LEFT VENTRICULAR INTERNAL DIMENSION IN SYSTOLE: 4.2 CM (ref 2.5–4)
LEFT VENTRICULAR MASS: 314 G
LEFT VENTRICULAR OUTFLOW TRACT MEAN GRADIENT: 2 MMHG
LEFT VENTRICULAR OUTFLOW TRACT MEAN VELOCITY: 64.9 CM/S
LEFT VENTRICULAR POSTERIOR WALL IN END DIASTOLE: 1.2 CM (ref 0.6–1)
LV STROKE VOLUME INDEX: 51.7 ML/M2
MITRAL REGURGITANT VELOCITY TIME INTEGRAL: 136 CM
MITRAL VALVE MEAN INFLOW VELOCITY: 109 CM/S
MITRAL VALVE PEAK VELOCITY: 223 CM/S
MR MEAN GRADIENT: 48 MMHG
MR MEAN VELOCITY: 324 CM/S
MR PEAK GRADIENT: 71.2 MMHG
MV AREA VTI: 2.24 CM2
MV AVERAGE E/E' RATIO: 20.8 CM/S
MV DECELERATION TIME: 303 MS
MV E'TISSUE VEL-LAT: 11.4 CM/S
MV E'TISSUE VEL-MED: 7.8 CM/S
MV LATERAL E/E' RATIO: 17.5
MV MEAN GRADIENT: 6 MMHG
MV MEDIAL E/E' RATIO: 25.6
MV PEAK E VELOCITY: 200 CM/S
MV PEAK GRADIENT: 19.9 MMHG
MV VALVE AREA BY CONTINUITY EQUATION: 2.2 CM2
NUC REST DIASTOLIC VOLUME INDEX: 3616 LBS
NUC REST SYSTOLIC VOLUME INDEX: 70.5 IN
PISA MR PEAK VEL: 422 CM/S
PR MAX PG: 4 MMHG
PR PEAK VELOCITY: 103 CM/S
TRICUSPID REGURGITATION PEAK PRESSURE GRADIENT: 34.8 MMHG
TRICUSPID VALVE ANULAR PLANE SYSTOLIC EXCURSION: 1 CM
TRICUSPID VALVE PEAK REGURGITANT VELOCITY: 295 CM/S

## 2019-06-11 ASSESSMENT — MIFFLIN-ST. JEOR: SCORE: 1754.32

## 2019-06-17 ENCOUNTER — OFFICE VISIT - HEALTHEAST (OUTPATIENT)
Dept: CARDIOLOGY | Facility: CLINIC | Age: 79
End: 2019-06-17

## 2019-06-17 DIAGNOSIS — I48.19 PERSISTENT ATRIAL FIBRILLATION (H): ICD-10-CM

## 2019-06-17 DIAGNOSIS — I25.10 CORONARY ARTERY DISEASE INVOLVING NATIVE CORONARY ARTERY OF NATIVE HEART WITHOUT ANGINA PECTORIS: ICD-10-CM

## 2019-06-17 ASSESSMENT — MIFFLIN-ST. JEOR: SCORE: 1740.71

## 2019-06-22 ENCOUNTER — COMMUNICATION - HEALTHEAST (OUTPATIENT)
Dept: CARDIOLOGY | Facility: CLINIC | Age: 79
End: 2019-06-22

## 2019-06-22 DIAGNOSIS — I25.10 CAD (CORONARY ARTERY DISEASE): ICD-10-CM

## 2019-06-25 ENCOUNTER — AMBULATORY - HEALTHEAST (OUTPATIENT)
Dept: CARDIOLOGY | Facility: CLINIC | Age: 79
End: 2019-06-25

## 2019-06-25 DIAGNOSIS — Z95.0 PRESENCE OF BIVENTRICULAR CARDIAC PACEMAKER: ICD-10-CM

## 2019-06-28 ENCOUNTER — RECORDS - HEALTHEAST (OUTPATIENT)
Dept: SLEEP MEDICINE | Facility: CLINIC | Age: 79
End: 2019-06-28

## 2019-06-28 DIAGNOSIS — I27.20 PULMONARY HYPERTENSION, UNSPECIFIED (H): ICD-10-CM

## 2019-06-28 DIAGNOSIS — I48.19 PERSISTENT ATRIAL FIBRILLATION (H): ICD-10-CM

## 2019-06-28 DIAGNOSIS — E66.09 OTHER OBESITY DUE TO EXCESS CALORIES: ICD-10-CM

## 2019-06-28 DIAGNOSIS — E03.9 HYPOTHYROIDISM, UNSPECIFIED: ICD-10-CM

## 2019-07-02 ENCOUNTER — COMMUNICATION - HEALTHEAST (OUTPATIENT)
Dept: ANTICOAGULATION | Facility: CLINIC | Age: 79
End: 2019-07-02

## 2019-07-02 ENCOUNTER — AMBULATORY - HEALTHEAST (OUTPATIENT)
Dept: CARDIOLOGY | Facility: CLINIC | Age: 79
End: 2019-07-02

## 2019-07-02 DIAGNOSIS — I48.92 ATRIAL FLUTTER, UNSPECIFIED TYPE (H): ICD-10-CM

## 2019-07-02 DIAGNOSIS — I48.19 PERSISTENT ATRIAL FIBRILLATION (H): ICD-10-CM

## 2019-07-02 LAB — POC INR - HE - HISTORICAL: 2.1 (ref 0.9–1.1)

## 2019-07-13 ENCOUNTER — COMMUNICATION - HEALTHEAST (OUTPATIENT)
Dept: CARDIOLOGY | Facility: CLINIC | Age: 79
End: 2019-07-13

## 2019-07-13 ENCOUNTER — COMMUNICATION - HEALTHEAST (OUTPATIENT)
Dept: SLEEP MEDICINE | Facility: CLINIC | Age: 79
End: 2019-07-13

## 2019-07-13 DIAGNOSIS — I48.91 ATRIAL FIBRILLATION (H): ICD-10-CM

## 2019-08-13 ENCOUNTER — AMBULATORY - HEALTHEAST (OUTPATIENT)
Dept: CARDIOLOGY | Facility: CLINIC | Age: 79
End: 2019-08-13

## 2019-08-13 ENCOUNTER — COMMUNICATION - HEALTHEAST (OUTPATIENT)
Dept: ANTICOAGULATION | Facility: CLINIC | Age: 79
End: 2019-08-13

## 2019-08-13 DIAGNOSIS — I48.92 ATRIAL FLUTTER, UNSPECIFIED TYPE (H): ICD-10-CM

## 2019-08-13 DIAGNOSIS — I48.19 PERSISTENT ATRIAL FIBRILLATION (H): ICD-10-CM

## 2019-08-13 LAB — POC INR - HE - HISTORICAL: 2.4 (ref 0.9–1.1)

## 2019-09-13 ENCOUNTER — COMMUNICATION - HEALTHEAST (OUTPATIENT)
Dept: SLEEP MEDICINE | Facility: CLINIC | Age: 79
End: 2019-09-13

## 2019-09-24 ENCOUNTER — AMBULATORY - HEALTHEAST (OUTPATIENT)
Dept: CARDIOLOGY | Facility: CLINIC | Age: 79
End: 2019-09-24

## 2019-09-24 ENCOUNTER — COMMUNICATION - HEALTHEAST (OUTPATIENT)
Dept: ANTICOAGULATION | Facility: CLINIC | Age: 79
End: 2019-09-24

## 2019-09-24 DIAGNOSIS — I48.92 ATRIAL FLUTTER, UNSPECIFIED TYPE (H): ICD-10-CM

## 2019-09-24 DIAGNOSIS — I48.91 ATRIAL FIBRILLATION (H): ICD-10-CM

## 2019-09-24 DIAGNOSIS — I48.19 PERSISTENT ATRIAL FIBRILLATION (H): ICD-10-CM

## 2019-09-24 LAB — POC INR - HE - HISTORICAL: 2.3 (ref 0.9–1.1)

## 2019-10-03 ENCOUNTER — AMBULATORY - HEALTHEAST (OUTPATIENT)
Dept: CARDIOLOGY | Facility: CLINIC | Age: 79
End: 2019-10-03

## 2019-10-03 DIAGNOSIS — Z95.0 PRESENCE OF BIVENTRICULAR CARDIAC PACEMAKER: ICD-10-CM

## 2019-10-03 DIAGNOSIS — I50.41 ACUTE COMBINED SYSTOLIC AND DIASTOLIC CONGESTIVE HEART FAILURE (H): ICD-10-CM

## 2019-10-03 DIAGNOSIS — I48.92 ATRIAL FLUTTER, UNSPECIFIED TYPE (H): ICD-10-CM

## 2019-10-03 DIAGNOSIS — I50.32 CHRONIC HEART FAILURE WITH PRESERVED EJECTION FRACTION (H): ICD-10-CM

## 2019-10-03 DIAGNOSIS — I48.0 PAROXYSMAL ATRIAL FIBRILLATION (H): ICD-10-CM

## 2019-10-03 DIAGNOSIS — I47.29 NSVT (NONSUSTAINED VENTRICULAR TACHYCARDIA) (H): ICD-10-CM

## 2019-10-03 LAB
ANION GAP SERPL CALCULATED.3IONS-SCNC: 11 MMOL/L (ref 5–18)
BUN SERPL-MCNC: 14 MG/DL (ref 8–28)
CALCIUM SERPL-MCNC: 9.2 MG/DL (ref 8.5–10.5)
CHLORIDE BLD-SCNC: 106 MMOL/L (ref 98–107)
CO2 SERPL-SCNC: 24 MMOL/L (ref 22–31)
CREAT SERPL-MCNC: 1.23 MG/DL (ref 0.7–1.3)
GFR SERPL CREATININE-BSD FRML MDRD: 57 ML/MIN/1.73M2
GLUCOSE BLD-MCNC: 101 MG/DL (ref 70–125)
HCC DEVICE COMMENTS: NORMAL
HCC DEVICE IMPLANTING PROVIDER: NORMAL
HCC DEVICE MANUFACTURE: NORMAL
HCC DEVICE MODEL: NORMAL
HCC DEVICE SERIAL NUMBER: NORMAL
HCC DEVICE TYPE: NORMAL
POTASSIUM BLD-SCNC: 4.1 MMOL/L (ref 3.5–5)
SODIUM SERPL-SCNC: 141 MMOL/L (ref 136–145)

## 2019-10-03 ASSESSMENT — MIFFLIN-ST. JEOR: SCORE: 1740.71

## 2019-10-04 ENCOUNTER — COMMUNICATION - HEALTHEAST (OUTPATIENT)
Dept: CARDIOLOGY | Facility: CLINIC | Age: 79
End: 2019-10-04

## 2019-10-07 ENCOUNTER — COMMUNICATION - HEALTHEAST (OUTPATIENT)
Dept: CARDIOLOGY | Facility: CLINIC | Age: 79
End: 2019-10-07

## 2019-10-07 DIAGNOSIS — I25.83 CORONARY ATHEROSCLEROSIS DUE TO LIPID RICH PLAQUE: ICD-10-CM

## 2019-10-07 DIAGNOSIS — I47.29 NSVT (NONSUSTAINED VENTRICULAR TACHYCARDIA) (H): ICD-10-CM

## 2019-11-05 ENCOUNTER — AMBULATORY - HEALTHEAST (OUTPATIENT)
Dept: ANTICOAGULATION | Facility: CLINIC | Age: 79
End: 2019-11-05

## 2019-11-05 ENCOUNTER — AMBULATORY - HEALTHEAST (OUTPATIENT)
Dept: CARDIOLOGY | Facility: CLINIC | Age: 79
End: 2019-11-05

## 2019-11-05 ENCOUNTER — COMMUNICATION - HEALTHEAST (OUTPATIENT)
Dept: ANTICOAGULATION | Facility: CLINIC | Age: 79
End: 2019-11-05

## 2019-11-05 DIAGNOSIS — I48.92 ATRIAL FLUTTER, UNSPECIFIED TYPE (H): ICD-10-CM

## 2019-11-05 DIAGNOSIS — I48.19 PERSISTENT ATRIAL FIBRILLATION (H): ICD-10-CM

## 2019-11-05 DIAGNOSIS — I48.0 PAROXYSMAL ATRIAL FIBRILLATION (H): ICD-10-CM

## 2019-11-05 LAB — POC INR - HE - HISTORICAL: 2.3 (ref 0.9–1.1)

## 2019-12-16 ENCOUNTER — RECORDS - HEALTHEAST (OUTPATIENT)
Dept: LAB | Facility: CLINIC | Age: 79
End: 2019-12-16

## 2019-12-16 LAB
ALBUMIN SERPL-MCNC: 4.1 G/DL (ref 3.5–5)
ALP SERPL-CCNC: 76 U/L (ref 45–120)
ALT SERPL W P-5'-P-CCNC: 17 U/L (ref 0–45)
ANION GAP SERPL CALCULATED.3IONS-SCNC: 11 MMOL/L (ref 5–18)
AST SERPL W P-5'-P-CCNC: 16 U/L (ref 0–40)
BILIRUB SERPL-MCNC: 1.5 MG/DL (ref 0–1)
BUN SERPL-MCNC: 12 MG/DL (ref 8–28)
CALCIUM SERPL-MCNC: 9.4 MG/DL (ref 8.5–10.5)
CHLORIDE BLD-SCNC: 107 MMOL/L (ref 98–107)
CHOLEST SERPL-MCNC: 139 MG/DL
CO2 SERPL-SCNC: 24 MMOL/L (ref 22–31)
CREAT SERPL-MCNC: 1.28 MG/DL (ref 0.7–1.3)
FASTING STATUS PATIENT QL REPORTED: YES
GFR SERPL CREATININE-BSD FRML MDRD: 54 ML/MIN/1.73M2
GLUCOSE BLD-MCNC: 100 MG/DL (ref 70–125)
HDLC SERPL-MCNC: 36 MG/DL
LDLC SERPL CALC-MCNC: 86 MG/DL
POTASSIUM BLD-SCNC: 4.2 MMOL/L (ref 3.5–5)
PROT SERPL-MCNC: 7.3 G/DL (ref 6–8)
PSA SERPL-MCNC: 1.9 NG/ML (ref 0–6.5)
SODIUM SERPL-SCNC: 142 MMOL/L (ref 136–145)
TRIGL SERPL-MCNC: 87 MG/DL
TSH SERPL DL<=0.005 MIU/L-ACNC: 6.75 UIU/ML (ref 0.3–5)

## 2019-12-17 ENCOUNTER — COMMUNICATION - HEALTHEAST (OUTPATIENT)
Dept: ANTICOAGULATION | Facility: CLINIC | Age: 79
End: 2019-12-17

## 2019-12-17 ENCOUNTER — AMBULATORY - HEALTHEAST (OUTPATIENT)
Dept: CARDIOLOGY | Facility: CLINIC | Age: 79
End: 2019-12-17

## 2019-12-17 DIAGNOSIS — I48.0 PAROXYSMAL ATRIAL FIBRILLATION (H): ICD-10-CM

## 2019-12-17 DIAGNOSIS — I48.19 PERSISTENT ATRIAL FIBRILLATION (H): ICD-10-CM

## 2019-12-17 DIAGNOSIS — I48.92 ATRIAL FLUTTER, UNSPECIFIED TYPE (H): ICD-10-CM

## 2019-12-17 LAB — POC INR - HE - HISTORICAL: 2.1 (ref 0.9–1.1)

## 2019-12-30 ENCOUNTER — COMMUNICATION - HEALTHEAST (OUTPATIENT)
Dept: CARDIOLOGY | Facility: CLINIC | Age: 79
End: 2019-12-30

## 2019-12-30 DIAGNOSIS — I25.10 CAD (CORONARY ARTERY DISEASE): ICD-10-CM

## 2019-12-31 ENCOUNTER — AMBULATORY - HEALTHEAST (OUTPATIENT)
Dept: CARDIOLOGY | Facility: CLINIC | Age: 79
End: 2019-12-31

## 2019-12-31 DIAGNOSIS — Z95.0 PRESENCE OF BIVENTRICULAR CARDIAC PACEMAKER: ICD-10-CM

## 2019-12-31 DIAGNOSIS — I48.19 PERSISTENT ATRIAL FIBRILLATION (H): ICD-10-CM

## 2020-01-28 ENCOUNTER — AMBULATORY - HEALTHEAST (OUTPATIENT)
Dept: CARDIOLOGY | Facility: CLINIC | Age: 80
End: 2020-01-28

## 2020-01-28 ENCOUNTER — COMMUNICATION - HEALTHEAST (OUTPATIENT)
Dept: ANTICOAGULATION | Facility: CLINIC | Age: 80
End: 2020-01-28

## 2020-01-28 DIAGNOSIS — I48.92 ATRIAL FLUTTER, UNSPECIFIED TYPE (H): ICD-10-CM

## 2020-01-28 DIAGNOSIS — I48.19 PERSISTENT ATRIAL FIBRILLATION (H): ICD-10-CM

## 2020-01-28 DIAGNOSIS — I48.0 PAROXYSMAL ATRIAL FIBRILLATION (H): ICD-10-CM

## 2020-01-28 LAB — POC INR - HE - HISTORICAL: 2.2 (ref 0.9–1.1)

## 2020-03-10 ENCOUNTER — COMMUNICATION - HEALTHEAST (OUTPATIENT)
Dept: ANTICOAGULATION | Facility: CLINIC | Age: 80
End: 2020-03-10

## 2020-03-10 ENCOUNTER — AMBULATORY - HEALTHEAST (OUTPATIENT)
Dept: CARDIOLOGY | Facility: CLINIC | Age: 80
End: 2020-03-10

## 2020-03-10 DIAGNOSIS — I48.0 PAROXYSMAL ATRIAL FIBRILLATION (H): ICD-10-CM

## 2020-03-10 DIAGNOSIS — I48.19 PERSISTENT ATRIAL FIBRILLATION (H): ICD-10-CM

## 2020-03-10 DIAGNOSIS — I48.92 ATRIAL FLUTTER, UNSPECIFIED TYPE (H): ICD-10-CM

## 2020-03-10 LAB — POC INR - HE - HISTORICAL: 2.5 (ref 0.9–1.1)

## 2020-03-31 ENCOUNTER — AMBULATORY - HEALTHEAST (OUTPATIENT)
Dept: CARDIOLOGY | Facility: CLINIC | Age: 80
End: 2020-03-31

## 2020-03-31 DIAGNOSIS — I48.0 PAROXYSMAL ATRIAL FIBRILLATION (H): ICD-10-CM

## 2020-03-31 DIAGNOSIS — Z95.0 PRESENCE OF BIVENTRICULAR CARDIAC PACEMAKER: ICD-10-CM

## 2020-04-04 ENCOUNTER — COMMUNICATION - HEALTHEAST (OUTPATIENT)
Dept: CARDIOLOGY | Facility: CLINIC | Age: 80
End: 2020-04-04

## 2020-04-04 DIAGNOSIS — I48.91 ATRIAL FIBRILLATION (H): ICD-10-CM

## 2020-04-04 DIAGNOSIS — I50.41 ACUTE COMBINED SYSTOLIC AND DIASTOLIC CONGESTIVE HEART FAILURE (H): ICD-10-CM

## 2020-05-07 ENCOUNTER — AMBULATORY - HEALTHEAST (OUTPATIENT)
Dept: CARDIOLOGY | Facility: CLINIC | Age: 80
End: 2020-05-07

## 2020-05-07 ENCOUNTER — RECORDS - HEALTHEAST (OUTPATIENT)
Dept: ADMINISTRATIVE | Facility: OTHER | Age: 80
End: 2020-05-07

## 2020-05-08 ENCOUNTER — OFFICE VISIT - HEALTHEAST (OUTPATIENT)
Dept: CARDIOLOGY | Facility: CLINIC | Age: 80
End: 2020-05-08

## 2020-05-08 DIAGNOSIS — I47.29 NSVT (NONSUSTAINED VENTRICULAR TACHYCARDIA) (H): ICD-10-CM

## 2020-05-08 DIAGNOSIS — I25.83 CORONARY ATHEROSCLEROSIS DUE TO LIPID RICH PLAQUE: ICD-10-CM

## 2020-05-11 ENCOUNTER — COMMUNICATION - HEALTHEAST (OUTPATIENT)
Dept: CARDIOLOGY | Facility: CLINIC | Age: 80
End: 2020-05-11

## 2020-05-12 ENCOUNTER — AMBULATORY - HEALTHEAST (OUTPATIENT)
Dept: CARDIOLOGY | Facility: CLINIC | Age: 80
End: 2020-05-12

## 2020-05-12 ENCOUNTER — COMMUNICATION - HEALTHEAST (OUTPATIENT)
Dept: ANTICOAGULATION | Facility: CLINIC | Age: 80
End: 2020-05-12

## 2020-05-12 DIAGNOSIS — I48.19 PERSISTENT ATRIAL FIBRILLATION (H): ICD-10-CM

## 2020-05-12 DIAGNOSIS — I48.92 ATRIAL FLUTTER, UNSPECIFIED TYPE (H): ICD-10-CM

## 2020-05-12 DIAGNOSIS — I48.0 PAROXYSMAL ATRIAL FIBRILLATION (H): ICD-10-CM

## 2020-05-12 LAB — POC INR - HE - HISTORICAL: 3.2 (ref 0.9–1.1)

## 2020-06-02 ENCOUNTER — COMMUNICATION - HEALTHEAST (OUTPATIENT)
Dept: SCHEDULING | Facility: CLINIC | Age: 80
End: 2020-06-02

## 2020-06-02 ENCOUNTER — COMMUNICATION - HEALTHEAST (OUTPATIENT)
Dept: CARDIOLOGY | Facility: CLINIC | Age: 80
End: 2020-06-02

## 2020-06-03 ENCOUNTER — COMMUNICATION - HEALTHEAST (OUTPATIENT)
Dept: ANTICOAGULATION | Facility: CLINIC | Age: 80
End: 2020-06-03

## 2020-06-03 ENCOUNTER — AMBULATORY - HEALTHEAST (OUTPATIENT)
Dept: CARDIOLOGY | Facility: CLINIC | Age: 80
End: 2020-06-03

## 2020-06-03 DIAGNOSIS — Z92.29 HX OF LONG TERM USE OF BLOOD THINNERS: ICD-10-CM

## 2020-06-03 DIAGNOSIS — Z79.01 LONG TERM (CURRENT) USE OF ANTICOAGULANTS: ICD-10-CM

## 2020-06-03 DIAGNOSIS — I48.0 PAROXYSMAL ATRIAL FIBRILLATION (H): ICD-10-CM

## 2020-06-03 DIAGNOSIS — I48.92 ATRIAL FLUTTER, UNSPECIFIED TYPE (H): ICD-10-CM

## 2020-06-03 LAB — POC INR - HE - HISTORICAL: 2.8 (ref 0.9–1.1)

## 2020-06-27 ENCOUNTER — COMMUNICATION - HEALTHEAST (OUTPATIENT)
Dept: CARDIOLOGY | Facility: CLINIC | Age: 80
End: 2020-06-27

## 2020-06-27 DIAGNOSIS — Z79.01 LONG TERM (CURRENT) USE OF ANTICOAGULANTS: ICD-10-CM

## 2020-06-27 DIAGNOSIS — I50.41 ACUTE COMBINED SYSTOLIC AND DIASTOLIC CONGESTIVE HEART FAILURE (H): ICD-10-CM

## 2020-06-27 DIAGNOSIS — I48.91 ATRIAL FIBRILLATION (H): ICD-10-CM

## 2020-06-29 RX ORDER — WARFARIN SODIUM 1 MG/1
TABLET ORAL
Qty: 90 TABLET | Refills: 1 | Status: SHIPPED | OUTPATIENT
Start: 2020-06-29 | End: 2022-01-01

## 2020-06-30 ENCOUNTER — AMBULATORY - HEALTHEAST (OUTPATIENT)
Dept: CARDIOLOGY | Facility: CLINIC | Age: 80
End: 2020-06-30

## 2020-06-30 ENCOUNTER — COMMUNICATION - HEALTHEAST (OUTPATIENT)
Dept: ANTICOAGULATION | Facility: CLINIC | Age: 80
End: 2020-06-30

## 2020-06-30 DIAGNOSIS — Z79.01 LONG TERM (CURRENT) USE OF ANTICOAGULANTS: ICD-10-CM

## 2020-06-30 DIAGNOSIS — I48.0 PAROXYSMAL ATRIAL FIBRILLATION (H): ICD-10-CM

## 2020-06-30 DIAGNOSIS — I48.92 ATRIAL FLUTTER, UNSPECIFIED TYPE (H): ICD-10-CM

## 2020-06-30 DIAGNOSIS — Z92.29 HX OF LONG TERM USE OF BLOOD THINNERS: ICD-10-CM

## 2020-06-30 LAB — POC INR - HE - HISTORICAL: 2.5 (ref 0.9–1.1)

## 2020-07-02 ENCOUNTER — AMBULATORY - HEALTHEAST (OUTPATIENT)
Dept: CARDIOLOGY | Facility: CLINIC | Age: 80
End: 2020-07-02

## 2020-07-02 DIAGNOSIS — Z95.0 PRESENCE OF BIVENTRICULAR CARDIAC PACEMAKER: ICD-10-CM

## 2020-07-02 DIAGNOSIS — I42.8 NONISCHEMIC CARDIOMYOPATHY (H): ICD-10-CM

## 2020-07-07 ENCOUNTER — AMBULATORY - HEALTHEAST (OUTPATIENT)
Dept: ANTICOAGULATION | Facility: CLINIC | Age: 80
End: 2020-07-07

## 2020-07-07 DIAGNOSIS — I48.92 ATRIAL FLUTTER, UNSPECIFIED TYPE (H): ICD-10-CM

## 2020-07-07 DIAGNOSIS — I48.0 PAROXYSMAL ATRIAL FIBRILLATION (H): ICD-10-CM

## 2020-08-10 ENCOUNTER — COMMUNICATION - HEALTHEAST (OUTPATIENT)
Dept: CARDIOLOGY | Facility: CLINIC | Age: 80
End: 2020-08-10

## 2020-08-11 ENCOUNTER — AMBULATORY - HEALTHEAST (OUTPATIENT)
Dept: CARDIOLOGY | Facility: CLINIC | Age: 80
End: 2020-08-11

## 2020-08-11 ENCOUNTER — COMMUNICATION - HEALTHEAST (OUTPATIENT)
Dept: ANTICOAGULATION | Facility: CLINIC | Age: 80
End: 2020-08-11

## 2020-08-11 DIAGNOSIS — I48.92 ATRIAL FLUTTER, UNSPECIFIED TYPE (H): ICD-10-CM

## 2020-08-11 DIAGNOSIS — I48.0 PAROXYSMAL ATRIAL FIBRILLATION (H): ICD-10-CM

## 2020-08-11 LAB — POC INR - HE - HISTORICAL: 2.9 (ref 0.9–1.1)

## 2020-09-21 ENCOUNTER — COMMUNICATION - HEALTHEAST (OUTPATIENT)
Dept: CARDIOLOGY | Facility: CLINIC | Age: 80
End: 2020-09-21

## 2020-09-22 ENCOUNTER — AMBULATORY - HEALTHEAST (OUTPATIENT)
Dept: CARDIOLOGY | Facility: CLINIC | Age: 80
End: 2020-09-22

## 2020-09-22 ENCOUNTER — COMMUNICATION - HEALTHEAST (OUTPATIENT)
Dept: ANTICOAGULATION | Facility: CLINIC | Age: 80
End: 2020-09-22

## 2020-09-22 DIAGNOSIS — I48.92 ATRIAL FLUTTER, UNSPECIFIED TYPE (H): ICD-10-CM

## 2020-09-22 DIAGNOSIS — I48.91 ATRIAL FIBRILLATION (H): ICD-10-CM

## 2020-09-22 DIAGNOSIS — I48.0 PAROXYSMAL ATRIAL FIBRILLATION (H): ICD-10-CM

## 2020-09-22 LAB — POC INR - HE - HISTORICAL: 2.9 (ref 0.9–1.1)

## 2020-10-03 ENCOUNTER — COMMUNICATION - HEALTHEAST (OUTPATIENT)
Dept: CARDIOLOGY | Facility: CLINIC | Age: 80
End: 2020-10-03

## 2020-10-03 DIAGNOSIS — I25.10 CAD (CORONARY ARTERY DISEASE): ICD-10-CM

## 2020-10-03 DIAGNOSIS — I48.91 ATRIAL FIBRILLATION (H): ICD-10-CM

## 2020-10-07 ENCOUNTER — COMMUNICATION - HEALTHEAST (OUTPATIENT)
Dept: CARDIOLOGY | Facility: CLINIC | Age: 80
End: 2020-10-07

## 2020-10-08 ENCOUNTER — AMBULATORY - HEALTHEAST (OUTPATIENT)
Dept: CARDIOLOGY | Facility: CLINIC | Age: 80
End: 2020-10-08

## 2020-10-08 DIAGNOSIS — I48.92 ATRIAL FLUTTER, UNSPECIFIED TYPE (H): ICD-10-CM

## 2020-10-08 DIAGNOSIS — Z95.0 PRESENCE OF BIVENTRICULAR CARDIAC PACEMAKER: ICD-10-CM

## 2020-10-08 DIAGNOSIS — I47.29 NSVT (NONSUSTAINED VENTRICULAR TACHYCARDIA) (H): ICD-10-CM

## 2020-10-08 DIAGNOSIS — I48.19 PERSISTENT ATRIAL FIBRILLATION (H): ICD-10-CM

## 2020-10-08 DIAGNOSIS — I42.8 NONISCHEMIC CARDIOMYOPATHY (H): ICD-10-CM

## 2020-10-08 DIAGNOSIS — I50.30 HEART FAILURE WITH PRESERVED EJECTION FRACTION, UNSPECIFIED HF CHRONICITY (H): ICD-10-CM

## 2020-10-08 DIAGNOSIS — I48.0 PAROXYSMAL ATRIAL FIBRILLATION (H): ICD-10-CM

## 2020-10-08 ASSESSMENT — MIFFLIN-ST. JEOR: SCORE: 1745.25

## 2020-11-02 ENCOUNTER — COMMUNICATION - HEALTHEAST (OUTPATIENT)
Dept: CARDIOLOGY | Facility: CLINIC | Age: 80
End: 2020-11-02

## 2020-11-03 ENCOUNTER — COMMUNICATION - HEALTHEAST (OUTPATIENT)
Dept: ANTICOAGULATION | Facility: CLINIC | Age: 80
End: 2020-11-03

## 2020-11-03 ENCOUNTER — AMBULATORY - HEALTHEAST (OUTPATIENT)
Dept: CARDIOLOGY | Facility: CLINIC | Age: 80
End: 2020-11-03

## 2020-11-03 DIAGNOSIS — I48.0 PAROXYSMAL ATRIAL FIBRILLATION (H): ICD-10-CM

## 2020-11-03 DIAGNOSIS — I48.92 ATRIAL FLUTTER, UNSPECIFIED TYPE (H): ICD-10-CM

## 2020-11-03 LAB — POC INR - HE - HISTORICAL: 2.5 (ref 0.9–1.1)

## 2020-12-18 ENCOUNTER — RECORDS - HEALTHEAST (OUTPATIENT)
Dept: LAB | Facility: CLINIC | Age: 80
End: 2020-12-18

## 2020-12-18 LAB
ALBUMIN SERPL-MCNC: 4.3 G/DL (ref 3.5–5)
ALP SERPL-CCNC: 79 U/L (ref 45–120)
ALT SERPL W P-5'-P-CCNC: 14 U/L (ref 0–45)
ANION GAP SERPL CALCULATED.3IONS-SCNC: 12 MMOL/L (ref 5–18)
AST SERPL W P-5'-P-CCNC: 16 U/L (ref 0–40)
BILIRUB SERPL-MCNC: 1.2 MG/DL (ref 0–1)
BUN SERPL-MCNC: 17 MG/DL (ref 8–28)
CALCIUM SERPL-MCNC: 8.8 MG/DL (ref 8.5–10.5)
CHLORIDE BLD-SCNC: 106 MMOL/L (ref 98–107)
CHOLEST SERPL-MCNC: 127 MG/DL
CO2 SERPL-SCNC: 21 MMOL/L (ref 22–31)
CREAT SERPL-MCNC: 1.22 MG/DL (ref 0.7–1.3)
FASTING STATUS PATIENT QL REPORTED: YES
GFR SERPL CREATININE-BSD FRML MDRD: 57 ML/MIN/1.73M2
GLUCOSE BLD-MCNC: 96 MG/DL (ref 70–125)
HDLC SERPL-MCNC: 35 MG/DL
LDLC SERPL CALC-MCNC: 76 MG/DL
POTASSIUM BLD-SCNC: 4 MMOL/L (ref 3.5–5)
PROT SERPL-MCNC: 7.2 G/DL (ref 6–8)
PSA SERPL-MCNC: 2.7 NG/ML (ref 0–6.5)
SODIUM SERPL-SCNC: 139 MMOL/L (ref 136–145)
TRIGL SERPL-MCNC: 78 MG/DL
TSH SERPL DL<=0.005 MIU/L-ACNC: 1.24 UIU/ML (ref 0.3–5)

## 2020-12-30 ENCOUNTER — COMMUNICATION - HEALTHEAST (OUTPATIENT)
Dept: ANTICOAGULATION | Facility: CLINIC | Age: 80
End: 2020-12-30

## 2020-12-30 ENCOUNTER — AMBULATORY - HEALTHEAST (OUTPATIENT)
Dept: CARDIOLOGY | Facility: CLINIC | Age: 80
End: 2020-12-30

## 2020-12-30 DIAGNOSIS — I48.0 PAROXYSMAL ATRIAL FIBRILLATION (H): ICD-10-CM

## 2020-12-30 DIAGNOSIS — I48.92 ATRIAL FLUTTER, UNSPECIFIED TYPE (H): ICD-10-CM

## 2020-12-30 LAB — POC INR - HE - HISTORICAL: 3.5 (ref 0.9–1.1)

## 2021-01-05 ENCOUNTER — COMMUNICATION - HEALTHEAST (OUTPATIENT)
Dept: CARDIOLOGY | Facility: CLINIC | Age: 81
End: 2021-01-05

## 2021-01-05 DIAGNOSIS — I25.10 CAD (CORONARY ARTERY DISEASE): ICD-10-CM

## 2021-01-05 DIAGNOSIS — I50.41 ACUTE COMBINED SYSTOLIC AND DIASTOLIC CONGESTIVE HEART FAILURE (H): ICD-10-CM

## 2021-01-06 RX ORDER — LOSARTAN POTASSIUM 50 MG/1
TABLET ORAL
Qty: 90 TABLET | Refills: 1 | Status: SHIPPED | OUTPATIENT
Start: 2021-01-06 | End: 2021-12-20

## 2021-01-08 ENCOUNTER — AMBULATORY - HEALTHEAST (OUTPATIENT)
Dept: CARDIOLOGY | Facility: CLINIC | Age: 81
End: 2021-01-08

## 2021-01-08 DIAGNOSIS — Z95.0 CARDIAC PACEMAKER IN SITU: ICD-10-CM

## 2021-01-08 DIAGNOSIS — I42.8 NONISCHEMIC CARDIOMYOPATHY (H): ICD-10-CM

## 2021-01-08 DIAGNOSIS — I48.0 PAROXYSMAL ATRIAL FIBRILLATION (H): ICD-10-CM

## 2021-01-08 DIAGNOSIS — I48.19 PERSISTENT ATRIAL FIBRILLATION (H): ICD-10-CM

## 2021-01-08 DIAGNOSIS — I47.29 NSVT (NONSUSTAINED VENTRICULAR TACHYCARDIA) (H): ICD-10-CM

## 2021-01-18 ENCOUNTER — COMMUNICATION - HEALTHEAST (OUTPATIENT)
Dept: CARDIOLOGY | Facility: CLINIC | Age: 81
End: 2021-01-18

## 2021-01-19 ENCOUNTER — COMMUNICATION - HEALTHEAST (OUTPATIENT)
Dept: ANTICOAGULATION | Facility: CLINIC | Age: 81
End: 2021-01-19

## 2021-01-19 ENCOUNTER — AMBULATORY - HEALTHEAST (OUTPATIENT)
Dept: CARDIOLOGY | Facility: CLINIC | Age: 81
End: 2021-01-19

## 2021-01-19 DIAGNOSIS — I48.92 ATRIAL FLUTTER, UNSPECIFIED TYPE (H): ICD-10-CM

## 2021-01-19 DIAGNOSIS — I48.0 PAROXYSMAL ATRIAL FIBRILLATION (H): ICD-10-CM

## 2021-01-19 LAB — POC INR - HE - HISTORICAL: 2.8 (ref 0.9–1.1)

## 2021-02-08 ENCOUNTER — COMMUNICATION - HEALTHEAST (OUTPATIENT)
Dept: CARDIOLOGY | Facility: CLINIC | Age: 81
End: 2021-02-08

## 2021-02-09 ENCOUNTER — COMMUNICATION - HEALTHEAST (OUTPATIENT)
Dept: ANTICOAGULATION | Facility: CLINIC | Age: 81
End: 2021-02-09

## 2021-02-09 ENCOUNTER — AMBULATORY - HEALTHEAST (OUTPATIENT)
Dept: CARDIOLOGY | Facility: CLINIC | Age: 81
End: 2021-02-09

## 2021-02-09 DIAGNOSIS — I48.92 ATRIAL FLUTTER, UNSPECIFIED TYPE (H): ICD-10-CM

## 2021-02-09 DIAGNOSIS — I48.0 PAROXYSMAL ATRIAL FIBRILLATION (H): ICD-10-CM

## 2021-02-09 LAB — POC INR - HE - HISTORICAL: 3.2 (ref 0.9–1.1)

## 2021-02-22 ENCOUNTER — COMMUNICATION - HEALTHEAST (OUTPATIENT)
Dept: CARDIOLOGY | Facility: CLINIC | Age: 81
End: 2021-02-22

## 2021-02-23 ENCOUNTER — COMMUNICATION - HEALTHEAST (OUTPATIENT)
Dept: ANTICOAGULATION | Facility: CLINIC | Age: 81
End: 2021-02-23

## 2021-02-23 ENCOUNTER — AMBULATORY - HEALTHEAST (OUTPATIENT)
Dept: CARDIOLOGY | Facility: CLINIC | Age: 81
End: 2021-02-23

## 2021-02-23 DIAGNOSIS — I48.0 PAROXYSMAL ATRIAL FIBRILLATION (H): ICD-10-CM

## 2021-02-23 DIAGNOSIS — I48.92 ATRIAL FLUTTER, UNSPECIFIED TYPE (H): ICD-10-CM

## 2021-02-23 LAB — POC INR - HE - HISTORICAL: 2.3 (ref 0.9–1.1)

## 2021-03-08 ENCOUNTER — COMMUNICATION - HEALTHEAST (OUTPATIENT)
Dept: CARDIOLOGY | Facility: CLINIC | Age: 81
End: 2021-03-08

## 2021-03-09 ENCOUNTER — COMMUNICATION - HEALTHEAST (OUTPATIENT)
Dept: ANTICOAGULATION | Facility: CLINIC | Age: 81
End: 2021-03-09

## 2021-03-09 ENCOUNTER — AMBULATORY - HEALTHEAST (OUTPATIENT)
Dept: CARDIOLOGY | Facility: CLINIC | Age: 81
End: 2021-03-09

## 2021-03-09 DIAGNOSIS — I48.0 PAROXYSMAL ATRIAL FIBRILLATION (H): ICD-10-CM

## 2021-03-09 DIAGNOSIS — I48.92 ATRIAL FLUTTER, UNSPECIFIED TYPE (H): ICD-10-CM

## 2021-03-09 LAB — POC INR - HE - HISTORICAL: 2.2 (ref 0.9–1.1)

## 2021-04-03 ENCOUNTER — COMMUNICATION - HEALTHEAST (OUTPATIENT)
Dept: CARDIOLOGY | Facility: CLINIC | Age: 81
End: 2021-04-03

## 2021-04-03 DIAGNOSIS — I50.41 ACUTE COMBINED SYSTOLIC AND DIASTOLIC CONGESTIVE HEART FAILURE (H): ICD-10-CM

## 2021-04-03 DIAGNOSIS — I48.91 ATRIAL FIBRILLATION (H): ICD-10-CM

## 2021-04-03 DIAGNOSIS — I25.83 CORONARY ATHEROSCLEROSIS DUE TO LIPID RICH PLAQUE: ICD-10-CM

## 2021-04-03 DIAGNOSIS — I47.29 NSVT (NONSUSTAINED VENTRICULAR TACHYCARDIA) (H): ICD-10-CM

## 2021-04-05 RX ORDER — METOPROLOL SUCCINATE 50 MG/1
TABLET, EXTENDED RELEASE ORAL
Qty: 90 TABLET | Refills: 1 | Status: SHIPPED | OUTPATIENT
Start: 2021-04-05 | End: 2021-09-07

## 2021-04-05 RX ORDER — WARFARIN SODIUM 3 MG/1
TABLET ORAL
Qty: 90 TABLET | Refills: 1 | Status: SHIPPED | OUTPATIENT
Start: 2021-04-05 | End: 2021-09-07

## 2021-04-05 RX ORDER — FUROSEMIDE 20 MG
TABLET ORAL
Qty: 180 TABLET | Refills: 1 | Status: SHIPPED | OUTPATIENT
Start: 2021-04-05 | End: 2021-09-07

## 2021-04-09 ENCOUNTER — AMBULATORY - HEALTHEAST (OUTPATIENT)
Dept: CARDIOLOGY | Facility: CLINIC | Age: 81
End: 2021-04-09

## 2021-04-09 ENCOUNTER — RECORDS - HEALTHEAST (OUTPATIENT)
Dept: ADMINISTRATIVE | Facility: OTHER | Age: 81
End: 2021-04-09

## 2021-04-09 DIAGNOSIS — I48.0 PAROXYSMAL ATRIAL FIBRILLATION (H): ICD-10-CM

## 2021-04-09 DIAGNOSIS — Z95.0 PRESENCE OF BIVENTRICULAR CARDIAC PACEMAKER: ICD-10-CM

## 2021-04-14 ENCOUNTER — OFFICE VISIT - HEALTHEAST (OUTPATIENT)
Dept: CARDIOLOGY | Facility: CLINIC | Age: 81
End: 2021-04-14

## 2021-04-14 ENCOUNTER — AMBULATORY - HEALTHEAST (OUTPATIENT)
Dept: CARDIOLOGY | Facility: CLINIC | Age: 81
End: 2021-04-14

## 2021-04-14 ENCOUNTER — COMMUNICATION - HEALTHEAST (OUTPATIENT)
Dept: ANTICOAGULATION | Facility: CLINIC | Age: 81
End: 2021-04-14

## 2021-04-14 DIAGNOSIS — I42.9 SECONDARY CARDIOMYOPATHY (H): ICD-10-CM

## 2021-04-14 DIAGNOSIS — I48.0 PAROXYSMAL ATRIAL FIBRILLATION (H): ICD-10-CM

## 2021-04-14 DIAGNOSIS — I48.92 ATRIAL FLUTTER, UNSPECIFIED TYPE (H): ICD-10-CM

## 2021-04-14 LAB — POC INR - HE - HISTORICAL: 2.5 (ref 0.9–1.1)

## 2021-04-14 RX ORDER — LEVOTHYROXINE SODIUM 175 UG/1
175 TABLET ORAL
Status: SHIPPED | COMMUNITY
Start: 2020-11-20

## 2021-04-14 ASSESSMENT — MIFFLIN-ST. JEOR: SCORE: 1756.32

## 2021-05-05 ENCOUNTER — HOSPITAL ENCOUNTER (OUTPATIENT)
Dept: CARDIOLOGY | Facility: HOSPITAL | Age: 81
Discharge: HOME | End: 2021-05-05
Attending: INTERNAL MEDICINE

## 2021-05-05 LAB
AORTIC ROOT: 3.1 CM
AORTIC VALVE MEAN VELOCITY: 106 CM/S
ASCENDING AORTA: 3.7 CM
AV DIMENSIONLESS INDEX VTI: 0.7
AV MEAN GRADIENT: 5 MMHG
AV PEAK GRADIENT: 10.5 MMHG
AV VALVE AREA: 3.2 CM2
AV VELOCITY RATIO: 0.6
BSA FOR ECHO PROCEDURE: 2.25 M2
CV BLOOD PRESSURE: ABNORMAL MMHG
CV ECHO HEIGHT: 70 IN
CV ECHO WEIGHT: 226 LBS
DOP CALC AO PEAK VEL: 162 CM/S
DOP CALC AO VTI: 30.8 CM
DOP CALC LVOT AREA: 4.52 CM2
DOP CALC LVOT DIAMETER: 2.4 CM
DOP CALC LVOT PEAK VEL: 96.3 CM/S
DOP CALC LVOT STROKE VOLUME: 97.7 CM3
DOP CALC MV VTI: 49.7 CM
DOP CALCLVOT PEAK VEL VTI: 21.6 CM
EJECTION FRACTION: 61 % (ref 55–75)
FRACTIONAL SHORTENING: 32.7 % (ref 28–44)
INTERVENTRICULAR SEPTUM IN END DIASTOLE: 1.2 CM (ref 0.6–1)
IVS/PW RATIO: 0.8
LA AREA 1: 32.4 CM2
LA AREA 2: 33.9 CM2
LEFT ATRIUM LENGTH: 6.41 CM
LEFT ATRIUM SIZE: 6 CM
LEFT ATRIUM TO AORTIC ROOT RATIO: 1.94 NO UNITS
LEFT ATRIUM VOLUME INDEX: 64.7 ML/M2
LEFT ATRIUM VOLUME: 145.6 ML
LEFT VENTRICLE CARDIAC INDEX: 3.5 L/MIN/M2
LEFT VENTRICLE CARDIAC OUTPUT: 7.8 L/MIN
LEFT VENTRICLE DIASTOLIC VOLUME INDEX: 62.2 CM3/M2 (ref 34–74)
LEFT VENTRICLE DIASTOLIC VOLUME: 140 CM3 (ref 62–150)
LEFT VENTRICLE HEART RATE: 80 BPM
LEFT VENTRICLE MASS INDEX: 130.6 G/M2
LEFT VENTRICLE SYSTOLIC VOLUME INDEX: 24 CM3/M2 (ref 11–31)
LEFT VENTRICLE SYSTOLIC VOLUME: 54 CM3 (ref 21–61)
LEFT VENTRICULAR INTERNAL DIMENSION IN DIASTOLE: 5.2 CM (ref 4.2–5.8)
LEFT VENTRICULAR INTERNAL DIMENSION IN SYSTOLE: 3.5 CM (ref 2.5–4)
LEFT VENTRICULAR MASS: 293.8 G
LEFT VENTRICULAR OUTFLOW TRACT MEAN GRADIENT: 2 MMHG
LEFT VENTRICULAR OUTFLOW TRACT MEAN VELOCITY: 64.6 CM/S
LEFT VENTRICULAR OUTFLOW TRACT PEAK GRADIENT: 4 MMHG
LEFT VENTRICULAR POSTERIOR WALL IN END DIASTOLE: 1.5 CM (ref 0.6–1)
LV STROKE VOLUME INDEX: 43.4 ML/M2
MITRAL REGURGITANT VELOCITY TIME INTEGRAL: 132 CM
MITRAL VALVE DECELERATION SLOPE: 6210 MM/S2
MITRAL VALVE E/A RATIO: 5.8
MITRAL VALVE MEAN INFLOW VELOCITY: 98.6 CM/S
MITRAL VALVE PEAK VELOCITY: 212 CM/S
MITRAL VALVE PRESSURE HALF-TIME: 101 MS
MR MEAN GRADIENT: 46 MMHG
MR MEAN VELOCITY: 317 CM/S
MR PEAK GRADIENT: 66.6 MMHG
MV AREA VTI: 1.97 CM2
MV DECELERATION TIME: 294 MS
MV MEAN GRADIENT: 6 MMHG
MV PEAK A VELOCITY: 32.9 CM/S
MV PEAK E VELOCITY: 191 CM/S
MV PEAK GRADIENT: 18 MMHG
MV VALVE AREA BY CONTINUITY EQUATION: 2 CM2
MV VALVE AREA PRESSURE 1/2 METHOD: 2.2 CM2
NUC REST DIASTOLIC VOLUME INDEX: 3616 LBS
NUC REST SYSTOLIC VOLUME INDEX: 70 IN
PISA MR PEAK VEL: 408 CM/S
PV ACCELERATION TIME: 67 MS
TRICUSPID REGURGITATION PEAK PRESSURE GRADIENT: 55.1 MMHG
TRICUSPID VALVE ANULAR PLANE SYSTOLIC EXCURSION: 1.3 CM
TRICUSPID VALVE PEAK REGURGITANT VELOCITY: 371 CM/S

## 2021-05-05 ASSESSMENT — MIFFLIN-ST. JEOR: SCORE: 1746.38

## 2021-05-12 ENCOUNTER — COMMUNICATION - HEALTHEAST (OUTPATIENT)
Dept: ANTICOAGULATION | Facility: CLINIC | Age: 81
End: 2021-05-12

## 2021-05-12 ENCOUNTER — AMBULATORY - HEALTHEAST (OUTPATIENT)
Dept: CARDIOLOGY | Facility: CLINIC | Age: 81
End: 2021-05-12

## 2021-05-12 DIAGNOSIS — I48.0 PAROXYSMAL ATRIAL FIBRILLATION (H): ICD-10-CM

## 2021-05-12 DIAGNOSIS — I48.92 ATRIAL FLUTTER, UNSPECIFIED TYPE (H): ICD-10-CM

## 2021-05-12 DIAGNOSIS — I48.91 ATRIAL FIBRILLATION (H): ICD-10-CM

## 2021-05-12 LAB — POC INR - HE - HISTORICAL: 2.5 (ref 0.9–1.1)

## 2021-05-24 ENCOUNTER — RECORDS - HEALTHEAST (OUTPATIENT)
Dept: ADMINISTRATIVE | Facility: CLINIC | Age: 81
End: 2021-05-24

## 2021-05-27 VITALS — WEIGHT: 226 LBS | HEIGHT: 70 IN | BODY MASS INDEX: 32.35 KG/M2

## 2021-05-27 NOTE — PROGRESS NOTES
INR 3.0 increase greens. Continue current management dosing of Warfarin. Continue  diet of moderate Vitamin K intake. Discussed with pt the need to call with questions or concerns or any change in medication especially herbal medication or OTC. Call with increased bleeding or bruising or any upcoming procedures.

## 2021-05-27 NOTE — PATIENT INSTRUCTIONS - HE
INR 3.0 Continue current management dosing of Warfarin. Continue  diet of moderate Vitamin K intake. Discussed with pt the need to call with questions or concerns or any change in medication especially herbal medication or OTC. Call with increased bleeding or bruising or any upcoming procedures.

## 2021-05-27 NOTE — PROGRESS NOTES
In clinic device check with Device RN followed by office visit with Too Allen.  Please see link for full device report.  Patient was informed of results and next follow up during today's visit.

## 2021-05-27 NOTE — PROGRESS NOTES
"[facility]  Arrhythmia Clinic  Kyle Gage    Referring:      Assessment:         Paroxysmal atrial fibrillation: The patient is over 6 years out from his complex ablation procedure to treat his atrial fibrillation.  The patient atrial fibrillation burden currently is quite low (<1%).  Most recent sustained episode lasting 55 minutes occurred in the setting of diastolic heart failure exacerbation.  The patient remains off membrane active antiarrhythmic drug therapy.  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  This residual cuff may be amenable to closure and will be reviewed by the closure working group.    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    Pulmonary hypertension: As noted by Dr. Janis Boo the patient has moderate pulmonary hypertension which may be secondary to his left-sided diastolic heart disease but also may be in part due to obstructive sleep apnea.  The patient wanted my opinion about the necessity of moving forward with a formal sleep study.  I strongly endorse the need for that study and the full discussion of treatment options should he have significant obstructive sleep apnea.  Long-term the patient is also a potential candidate to be seen by Dr. Amato for for further evaluation of his right-sided heart disease    Coronary artery disease: No current signs or symptoms suggesting coronary ischemia.      Recommendations:    No change in the patient's device prescription today    No change in patient's current medications.    Strongly encouraged the patient to follow through with formal sleep study.    Follow-up in the device clinic with the EP nurse practitioner to reassess his atrial fibrillation in 6 months.      Patient Active Problem List   Diagnosis     Hypothyroidism     " Non-rheumatic mitral regurgitation     Paroxysmal Atrial Fibrillation     Premature Ventricular Contractions     Dyslipidemia     Primary hypertension     Coronary Artery Disease     Atrial flutter, unspecified type (H)     Peripheral Vascular Disease     Status post ablation of atrial fibrillation     Presence of biventricular cardiac pacemaker     Mitral regurgitation and mitral stenosis     Postablative hypothyroidism     Status post aorto-coronary artery bypass graft     Acute hypoxemic respiratory failure (H)     Heart failure with preserved ejection fraction (H)       Subjective:  Ady Farley (78 y.o. male) returns to the arrhythmia clinic for interval reevaluation of his atrial fibrillation, and device function.  The patient reports that following his recent hospitalization for exacerbation of his chronic diastolic heart failure he has done extremely well.  He is lost over 15 pounds and is being quite diligent about his sodium intake.  He is reading labels and avoiding salt rich foods.  The patient has resumed all of his normal activities and reports no dyspnea on exertion, orthopnea, PND or ankle edema.  He remains on diuretic therapy but this is been altered with his p.m. dose now as needed.  The patient was referred for formal sleep study given the findings on his recent echo particularly in regards to the pulmonary hypertension and right-sided changes.  Patient did want to discuss whether or not I felt that this was needed.  We had an extensive discussion regarding the rationale, data that would be obtained, and the therapeutic options should he have evidence of obstructive sleep apnea.    Current Outpatient Medications   Medication Sig Dispense Refill     amLODIPine (NORVASC) 10 MG tablet Take 10 mg by mouth daily.       aspirin 81 MG EC tablet Take 81 mg by mouth daily.       atorvastatin (LIPITOR) 20 MG tablet Take 20 mg by mouth bedtime.  1     docoshexanoic acid-eicosapent 500 mg (FISH OIL)  500-100 mg cap capsule Take 1,000 mg by mouth daily.       furosemide (LASIX) 20 MG tablet Take 1 tablet (20 mg total) by mouth 2 (two) times a day at 9am and 6pm. 180 tablet 3     KLOR-CON M20 20 mEq tablet Take 1 tablet by mouth daily.       levothyroxine (SYNTHROID, LEVOTHROID) 150 MCG tablet Take 150 mcg by mouth daily.       losartan (COZAAR) 50 MG tablet Take 50 mg by mouth daily.       MULTIVITAMIN ORAL Take 1 tablet by mouth daily.       warfarin (COUMADIN/JANTOVEN) 1 MG tablet Take 1 mg by mouth See Admin Instructions. Take 4 tablets (4 mg) by mouth daily on Tuesday, Thursday, and Saturday.   Take 3 tablets (3mg) by mouth daily on Monday, Wednesday, Friday, and Sunday.  Adjust dose based on INR results as directed.       No current facility-administered medications for this visit.        Review of Systems:   General: WNL  Eyes: WNL  Ears/Nose/Throat: WNL  Lungs: WNL  Heart: WNL  Stomach: WNL  Bladder: WNL  Muscle/Joints: WNL  Skin: WNL  Nervous System: WNL  Mental Health: WNL     Blood: WNL    Family History  Family History   Problem Relation Age of Onset     No Medical Problems Mother      No Medical Problems Father      No Medical Problems Sister      No Medical Problems Brother      No Medical Problems Daughter      No Medical Problems Son      Acute Myocardial Infarction Neg Hx      Alcohol abuse Neg Hx      Alzheimer's disease Neg Hx      Amputation of Leg Below Knee Neg Hx      Aortic Valve Replacement Neg Hx      Arrhythmogenic Right Ventricular Cardiomyopathy Neg Hx      Atrial fibrillation Neg Hx      Atrial Flutter Neg Hx      Breast cancer Neg Hx      CABG Neg Hx      Cancer Neg Hx      Cardiomyopathy Neg Hx      Carotid Endarterectomy Neg Hx      Cerebral aneurysm Neg Hx      Chronic Kidney Disease Neg Hx      COPD Neg Hx      Colon cancer Neg Hx      Congenital heart disease Neg Hx      Coronary artery disease Neg Hx      Coronary Stenting Neg Hx      Dementia Neg Hx      Diabetes type I Neg Hx  "     Diabetes type II Neg Hx      Dialysis Neg Hx      Dyslipidemia Neg Hx      Heart failure Neg Hx      Hypertension Neg Hx      Hypertrophic cardiomyopathy Neg Hx      ICD Neg Hx      Long QT syndrome Neg Hx      Mitral Regurgitation Neg Hx      Mitral Valve Replacement Neg Hx      Morbid Obesity Neg Hx      Heart attack Neg Hx      Obstructive Sleep Apnea Neg Hx      Ovarian cancer Neg Hx      Pacemaker Neg Hx      Pancreatic cancer Neg Hx      Peripheral vascular disease Neg Hx      Pulmonary embolism Neg Hx      Pulmonary Hypertension Neg Hx      Rheumatic Heart Disease Neg Hx      Stroke Neg Hx      Sudden death Neg Hx      Transient ischemic attack Neg Hx      Valvular heart disease Neg Hx        Social History   reports that he quit smoking about 29 years ago. He smoked 1.00 pack per day. he has never used smokeless tobacco. He reports that he drinks about 1.2 oz of alcohol per week. He reports that he does not use drugs.    Objective:   Vital signs in last 24 hours:  /52 (Patient Site: Left Arm, Patient Position: Sitting, Cuff Size: Adult Large)   Pulse 80   Resp 16   Ht 5' 10.5\" (1.791 m)   Wt (!) 223 lb (101.2 kg)   BMI 31.54 kg/m    Weight: Weight: (!) 223 lb (101.2 kg)     Physical Exam:  General: The patient is alert oriented to person place and situation.  The patient is in no acute distress at the time of my evaluation.  Eyes: Pupils are equal, round, and reactive to light.  Conjunctiva and sclera are clear.  ENT: Oral mucosa is moist and without redness. No evident nasal discharge.  Pulmonary: Lungs are clear bilaterally with no rales, rhonchi, or wheezes.    Cardiovascular exam: Rhythm is regular. S1 and S2 are normal.  There is a 3/6 holosystolic murmur heard at the apex.  There is no S3 gallop.  JVP is normal. Lower extremities demonstrate no significant edema. Distal pulses are intact bilaterally.  Abdomen is obese, soft, and nontender.  Musculoskeletal: Spine is straight. " Extremities without deformity.  Neuro: Gait is normal.   Skin is warm, dry, and otherwise intact.      Cardiographics:   CRT-P (biventricular) pacemaker interrogation: Normal battery monitoring voltage.  The right atrial, right ventricular and left ventricular lead status are all stable and normal.  Device diagnostics show 99% atrial pacing and 96% biventricular pacing.  Atrial fibrillation burden is 0.2%.  Longest episode was 55 minutes.  No significant ventricular arrhythmias.    Lab Results:   Lab Results   Component Value Date     02/28/2019    K 4.0 02/28/2019     02/28/2019    CO2 25 02/28/2019    BUN 11 02/28/2019    CREATININE 1.22 02/28/2019    CALCIUM 9.1 02/28/2019     Lab Results   Component Value Date    WBC 9.1 02/10/2019    HGB 13.7 (L) 02/10/2019    HCT 41.3 02/10/2019    MCV 95 02/10/2019     02/10/2019     Lab Results   Component Value Date    INR 3.0 03/26/2019    INR 2.18 (H) 02/13/2019    INR 2.20 (!) 08/20/2018         Outside record review:

## 2021-05-28 NOTE — PROGRESS NOTES
INR 2.5 Continue current management dosing of Warfarin. Continue  diet of moderate Vitamin K intake. Discussed with pt the need to call with questions or concerns or any change in medication especially herbal medication or OTC. Call with increased bleeding or bruising or any upcoming procedures.  Pt will return to PCP.

## 2021-05-29 NOTE — TELEPHONE ENCOUNTER
FYI - Status Update  Who is Calling: Patient  Update: Patient said that they had their INR done yesterday but has received no call as to when they should come back in for another INR draw. Please call the patient back, to assist in scheduling, thank you.  Okay to leave a detailed message?:  Yes

## 2021-05-29 NOTE — TELEPHONE ENCOUNTER
Triage call:   Calling to set up an appointment for his next INR. Drawn yesterday at 11:10am- was never called to schedule next appointment. Antcoag managed through Cardiology. Transferred to anticoag line to assist with scheduling.     Piper Britt RN BS [facility] Triage/Med Refill 5/22/2019 4:16 PM

## 2021-05-29 NOTE — TELEPHONE ENCOUNTER
Anticoagulation Transition of Care    Ady Farley was referred to transition management to [facility].    Warfarin indication: Atrial Fibrillation and Atrial Flutter   Current INR goal 2.0-3.0   Date of last cardiology office visit 3/26/19     INR goal range standard for indication(s). Updated INR standing orders and anticoagulation referral renewal placed. Please contact the anticoagulation clinic if you have questions on the parameters of the renewal.    Salma Marques RN

## 2021-05-29 NOTE — PROGRESS NOTES
Dear  Janis Townsend Md  [address]  [address]  [place] [address]    Thank you for the opportunity to participate in the care of  Ady Farley.    Ady Farley is sent by Janis Townsend,* for a sleep consultation regarding elevated right ventricular pressure.     He has a history of atrial flutter s/p multiple procedures, CAD s/p 1vCABG, PPM, hypothyroidism, mitral repair, and obesity.  Patient is confident all of his heart and blood pressure issues are related to fluid consumption.      Schedule - Retired [employer].  In bed around 10:45 and asleep within 30 minutes.  Up for the day at 6:30 and feels ok.    Sleep Disordered Breathing - Wife is adamant he doesn't snore or have witnessed apneas.     Has nocturia once per night (more with diuretic).   He denies morning headaches.  No morning dry mouth.  No morning sinus congestion.   Patient was counseled on the importance of driving while alert, to pull over if drowsy, or nap before getting into the vehicle if sleepy.    Movement/Behaviors - No somniloquy.  No somnambulism.  No sleep related eating.  No dream enactment behavior.   He denies bruxism.    Patient denies typical restless legs syndrome symptoms.    Alcohol use - Will have 2 beers on social occasions (about twice per month).  Caffeine intake - coffee 4 /day. Last caffeine intake is usually in the morning.  Tobacco exposure - Both remote former smokers.  Illicit substances - none    Lives with  60 years ago.  4 adult children.  Has no pets.     STOP BANG 5/31/2019   Do you snore loudly (louder than talking or loud enough to be heard through closed doors)? 0   Do you often feel tired, fatigued, or sleepy during daytime? 0   Has anyone observed you stop breathing in your sleep? 0   Do you have or are you being treated for high blood pressure? 1   BMI more than 35 kg/m2 0   Age over 50 years old? 1   Neck circumference greater than 16 inches? 1   Gender male? 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  Echo 2011 LVEF 45% and mild MR Echo 2012 LVEF 40% mild MS, Mod MR  NELY Jan 2013 mild MS and mild/mod MR Echo Jan 2015 Mild MS/ mild MR NELY 2016 small/mod cuff remains (recommend OAC) Replacement Utility updated for latest IMO load      PVC (premature ventricular contraction)      PVD (peripheral vascular disease) (H)      Status post ablation of atrial fibrillation 11/18/2015    MAZE May 2007 PVI May 31, 2011 (RF-PVI + TIAN line) PVI Oct 30, 2012 (Cryo-PVI + redo TIAN line + roof line + RA-scar flutter line)       Problem List:  Patient Active Problem List    Diagnosis Date Noted     Heart failure with preserved ejection fraction (H) 02/28/2019     Acute hypoxemic respiratory failure (H)      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     Overview Note:     Overview:   On treatment since 1998       Status post aorto-coronary artery bypass graft 02/10/2019     Overview Note:     Overview:   One Vs CABG in 05/2007       Presence of biventricular cardiac pacemaker 12/02/2016     Status post ablation of atrial fibrillation 11/18/2015     Overview Note:     MAZE May 2007  PVI May 31, 2011 (RF-PVI + TIAN line)  PVI Oct 30, 2012 (Cryo-PVI + redo TIAN line + roof line + RA-scar flutter line)       Hypothyroidism      Overview Note:     Treated Graves disease    Replacement Utility updated for latest IMO load       Non-rheumatic mitral  regurgitation      Overview Note:     MVP s/p Mitral valve repair May 15, 2007  MAZE   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   Echo 2011 LVEF 45% and mild MR  Echo 2012 LVEF 40% mild MS, Mod MR   NELY Jan 2013 mild MS and mild/mod MR  Echo Jan 2015 Mild MS/ mild MR  NELY 2016 small/mod cuff remains (recommend OAC)           Paroxysmal Atrial Fibrillation      Overview Note:     [id number] score = 4 (Age/ CAD/ HTN) and on warfarin  MAZE May 2007  FELIBERTO amputation May 2007 (NELY 2016 large FELIBERTO remains)  Redo PVI + TIAN May 31, 2011  ACT Jun 22 2011 neg for A fib  Pacer proven atypical flutter Feb 2012  Repeat PVI + LA lines + RA lines Oct 2012  Asymptomatic and having recurrence per device         Premature Ventricular Contractions      Overview Note:     Created by Conversion         Dyslipidemia      Overview Note:     Created by Conversion         Primary hypertension      Coronary Artery Disease      Overview Note:     Created by Conversion    Replacement Utility updated for latest IMO load       Atrial flutter, unspecified type (H)      Overview Note:     Sustained typical RA flutter s/p RFA Sept 2007  Atypical flutter confirmed Feb 2012  Redo LA and RA flutter lines Oct 2012         Peripheral Vascular Disease      Overview Note:     Clinical dx at time of EP/ablation arterial line L fem artery          Mitral regurgitation and mitral stenosis 08/10/2007     Overview Note:     Overview:   3/14/2007 NELY Marked Prolapse EF 65%, RBBB.  S/P Valve replacement.          Past Surgical History:  Past Surgical History:   Procedure Laterality Date     CARDIOVERSION  07/24/2018     INSERT / REPLACE / REMOVE PACEMAKER       MITRAL VALVE REPAIR       GA ABLATE HEART DYSRHYTHM FOCUS      Description: Catheter Ablation Atrial Flutter;  Recorded: 10/30/2012;  Comments: Typical RA CTI flutter Sept 2007; ; Multiple atypical flutter Oct 2012 (Roof line + reinforce TIAN line + RA scar line)     GA ABLATE HEART DYSRHYTHM FOCUS       Description: Catheter Ablation Atrial Fibrillation;  Recorded: 10/24/2013;  Comments: May 2011 (PVI > 90% recurrence post MAZE + TIAN line); ; Re do PVI Oct 2012 (Cryo PVI + roof line + TIAN touch up + RA scar flutter); ; No recurrence by device check at 12 months     VA ABLATE HEART DYSRHYTHM FOCUS      Description: Catheter Ablation Atrial Fibrillation;  Recorded: 10/30/2014;  Comments: May 2011 (PVI > 90% recurrence post MAZE + TIAN line); ; Re do PVI Oct 2012 (Cryo PVI + roof line + TIAN touch up + RA scar flutter); ; No recurrence by device check at 12 months     VA ABLATE HEART DYSRHYTHM FOCUS      Description: Catheter Ablation Atrial Flutter;  Recorded: 10/30/2014;  Comments: Typical RA CTI flutter Sept 2007; ; Multiple atypical flutter Oct 2012 (Roof line + reinforce TIAN line + RA scar line)     VA ABLATE/ RECONSTUCT ATRIA, LIMITED      Description: Maze Procedure;  Proc Date: 05/01/2007;  Comments: May 2007 at time of valve repair     VA CABG, VEIN, SINGLE      Description: CABG (CABG);  Recorded: 01/18/2012;  Comments: ONE VESSEL RCA     VA ICAR CATHETER ABLATION ATRIOVENTR NODE FUNCTION      Description: Catheter Ablation Atrioventricular Node;  Recorded: 08/13/2012;  Comments: Mar 2008 performed due to inability to biventricular with conduction abnormality     VA ICAR CATHETER ABLATION ATRIOVENTR NODE FUNCTION      Description: Catheter Ablation Atrioventricular Node;  Recorded: 10/30/2014;  Comments: Mar 2008 performed due to inability to biventricular with conduction abnormality        Meds:  Current Outpatient Medications   Medication Sig Dispense Refill     amLODIPine (NORVASC) 10 MG tablet Take 10 mg by mouth daily.       aspirin 81 MG EC tablet Take 81 mg by mouth daily.       atorvastatin (LIPITOR) 20 MG tablet Take 20 mg by mouth bedtime.  1     docoshexanoic acid-eicosapent 500 mg (FISH OIL) 500-100 mg cap capsule Take 1,000 mg by mouth daily.       furosemide (LASIX) 20 MG tablet Take 1 tablet (20  mg total) by mouth 2 (two) times a day at 9am and 6pm. 180 tablet 3     KLOR-CON M20 20 mEq tablet Take 1 tablet by mouth daily.       levothyroxine (SYNTHROID, LEVOTHROID) 150 MCG tablet Take 150 mcg by mouth daily.       losartan (COZAAR) 50 MG tablet Take 50 mg by mouth daily.       MULTIVITAMIN ORAL Take 1 tablet by mouth daily.       warfarin (COUMADIN/JANTOVEN) 1 MG tablet Take 1 mg by mouth See Admin Instructions. Take 4 tablets (4 mg) by mouth daily on Tuesday, Thursday, and Saturday.   Take 3 tablets (3mg) by mouth daily on Monday, Wednesday, Friday, and .  Adjust dose based on INR results as directed.       warfarin (COUMADIN/JANTOVEN) 3 MG tablet TAKE 1 TABLET WITH 1 MG TABLET ( 4 MG TOTAL ) ON TUESDAY, THURSDAY, AND SATURDAY AND TAKE 1 TABLET ALL OTHER DAYS 90 tablet 3     No current facility-administered medications for this visit.         Allergies:  Carvedilol and Lisinopril     Social History:  Social History     Socioeconomic History     Marital status:      Spouse name: Not on file     Number of children: Not on file     Years of education: Not on file     Highest education level: Not on file   Occupational History     Occupation: retired   Social Needs     Financial resource strain: Not on file     Food insecurity:     Worry: Not on file     Inability: Not on file     Transportation needs:     Medical: Not on file     Non-medical: Not on file   Tobacco Use     Smoking status: Former Smoker     Packs/day: 1.00     Last attempt to quit: 1990     Years since quittin.4     Smokeless tobacco: Never Used   Substance and Sexual Activity     Alcohol use: Yes     Alcohol/week: 1.2 oz     Types: 2 Cans of beer per week     Drug use: No     Sexual activity: Not on file   Lifestyle     Physical activity:     Days per week: Not on file     Minutes per session: Not on file     Stress: Not on file   Relationships     Social connections:     Talks on phone: Not on file     Gets together: Not  on file     Attends Hindu service: Not on file     Active member of club or organization: Not on file     Attends meetings of clubs or organizations: Not on file     Relationship status: Not on file     Intimate partner violence:     Fear of current or ex partner: Not on file     Emotionally abused: Not on file     Physically abused: Not on file     Forced sexual activity: Not on file   Other Topics Concern     Not on file   Social History Narrative     Not on file        Family History:  Family History   Problem Relation Age of Onset     No Medical Problems Mother      No Medical Problems Father      No Medical Problems Sister      No Medical Problems Brother      No Medical Problems Daughter      No Medical Problems Son      Acute Myocardial Infarction Neg Hx      Alcohol abuse Neg Hx      Alzheimer's disease Neg Hx      Amputation of Leg Below Knee Neg Hx      Aortic Valve Replacement Neg Hx      Arrhythmogenic Right Ventricular Cardiomyopathy Neg Hx      Atrial fibrillation Neg Hx      Atrial Flutter Neg Hx      Breast cancer Neg Hx      CABG Neg Hx      Cancer Neg Hx      Cardiomyopathy Neg Hx      Carotid Endarterectomy Neg Hx      Cerebral aneurysm Neg Hx      Chronic Kidney Disease Neg Hx      COPD Neg Hx      Colon cancer Neg Hx      Congenital heart disease Neg Hx      Coronary artery disease Neg Hx      Coronary Stenting Neg Hx      Dementia Neg Hx      Diabetes type I Neg Hx      Diabetes type II Neg Hx      Dialysis Neg Hx      Dyslipidemia Neg Hx      Heart failure Neg Hx      Hypertension Neg Hx      Hypertrophic cardiomyopathy Neg Hx      ICD Neg Hx      Long QT syndrome Neg Hx      Mitral Regurgitation Neg Hx      Mitral Valve Replacement Neg Hx      Morbid Obesity Neg Hx      Heart attack Neg Hx      Obstructive Sleep Apnea Neg Hx      Ovarian cancer Neg Hx      Pacemaker Neg Hx      Pancreatic cancer Neg Hx      Peripheral vascular disease Neg Hx      Pulmonary embolism Neg Hx      Pulmonary  "Hypertension Neg Hx      Rheumatic Heart Disease Neg Hx      Stroke Neg Hx      Sudden death Neg Hx      Transient ischemic attack Neg Hx      Valvular heart disease Neg Hx         Review of Systems:   A complete review of systems reviewed by me is negative with the exeption of what has been mentioned in the history of present illness.    Physical Exam:  /68   Pulse 70   Ht 5' 10.5\" (1.791 m)   Wt (!) 226 lb (102.5 kg)   SpO2 98%   BMI 31.97 kg/m    General appearance: No apparent distress, well groomed.    HEENT:   Head: Normocephalic, atraumatic.  Eyes: PERRL  Nose: Nares patent.  No exudate.  No septal deviation noted.  Mouth: Teeth: Some wear   Tongue: Normal  Oropharynx:  Mallampati Classification: III    Tonsils: Grade 0    Uvula: Elongated    Neck: Supple without bruit.      Cardiac: Regular rate and rhythm.  Slight murmur.  Radial pulses are strong and symmetric.  Pulmonary: Symmetric air movement, lungs clear to auscultation bilaterally.  Musculoskeletal: No edema noted.  No clubbing or cyanosis.  Skin: Warm, dry, intact.  Neurologic: Alert and oriented to person, place and time   Gait is normal.  Psychiatric: Affect pleasant.  Mood good.     Labs/Studies:  Lab Results   Component Value Date    WBC 9.1 02/10/2019    HGB 13.7 (L) 02/10/2019    HCT 41.3 02/10/2019    MCV 95 02/10/2019     02/10/2019         Chemistry        Component Value Date/Time     02/28/2019 1037    K 4.0 02/28/2019 1037     02/28/2019 1037    CO2 25 02/28/2019 1037    BUN 11 02/28/2019 1037    CREATININE 1.22 02/28/2019 1037    GLU 94 02/28/2019 1037        Component Value Date/Time    CALCIUM 9.1 02/28/2019 1037    ALKPHOS 72 02/10/2019 0754    AST 37 02/10/2019 0754    ALT 31 02/10/2019 0754    BILITOT 0.9 02/10/2019 0754            No results found for: FERRITIN  Lab Results   Component Value Date    TSH 6.62 (H) 12/12/2018     No results found for: HGBA1C    2/10/2019 2D ECHO reviewed:  eLVEF 45 - 50% " with elevated RV.      Assessment and Plan:  1. Pulmonary hypertension (H)  zolpidem (AMBIEN) 5 MG tablet    Split Night Sleep Study   2. Persistent atrial fibrillation (H)  zolpidem (AMBIEN) 5 MG tablet    Split Night Sleep Study   3. Hypothyroidism, unspecified type  zolpidem (AMBIEN) 5 MG tablet    Split Night Sleep Study   4. Class 1 obesity due to excess calories with serious comorbidity and body mass index (BMI) of 31.0 to 31.9 in adult  Split Night Sleep Study     I have a high suspicion for SUBHASH based on presence of obesity, elevated neck circumference, gender and HTN. We discussed pathophysiology of obstructive sleep apnea, testing with overnight polysomnography, and treatment modalities (CPAP only discussed at this visit). We discussed consequences of untreated Obstructive Sleep Apnea, such as markedly elevated risk for heart attack or stroke. Patient is interested in treatment and willing to undergo overnight sleep testing.  Home sleep testing is inappropriate for this patient due to reduced ejection fraction (45% on recent EF).  Study has been ordered today.      In case of insomnia during the study:  one-time medication has been ordered.     Patient verbalized understanding of these issues, agrees with the plan and all questions were answered today. Patient was given an opportuntity to voice any other symptoms or concerns not listed above. Patient did not have any other symptoms or concerns.      Tai Boudreaux MD  [facility] Board Certified in Internal Medicine and Sleep Medicine  [facility].

## 2021-05-29 NOTE — TELEPHONE ENCOUNTER
ACN called and spoke with patient.  See INR check encounter dated 5/22/2019      Salma Marques RN

## 2021-05-29 NOTE — TELEPHONE ENCOUNTER
ANTICOAGULATION  MANAGEMENT    Assessment     5/21/19INR result of 2.3 is Therapeutic (goal INR of 2.0-3.0)        Warfarin taken as previously instructed    No new diet changes affecting INR    No new medication/supplements affecting INR    Continues to tolerate warfarin with no reported s/s of bleeding or thromboembolism     Previous INR was Therapeutic     Patient stated that he wants to stay with [facility] to manage his anticoagulation.    Patient introduced to [facility].    Plan:     Spoke with Ady regarding INR result and instructed:     Warfarin Dosing Instructions:  Continue current warfarin dose    4 mg every Tue, Thu, Sat; 3 mg all other days      (0 % change)    Instructed patient to follow up no later than: 6 weeks, appointment made at [facility].    Education provided: importance of therapeutic range, target INR goal and significance of current INR result and importance of notifying clinic for changes in medications    Ady verbalizes understanding and agrees to warfarin dosing plan.    Instructed to call the [facility] for any changes, questions or concerns. (#[phone])   ?   Salma Marques RN    Subjective/Objective:      Ady BRE Farley, a 78 y.o. male is on warfarin.     Ady reports:     Home warfarin dose: as updated on anticoagulation calendar per template     Missed doses: No     Medication changes:  No     S/S of bleeding or thromboembolism:  No     New Injury or illness:  No     Changes in diet or alcohol consumption:  No     Upcoming surgery, procedure or cardioversion:  No    Anticoagulation Episode Summary     Current INR goal:   2.0-3.0   TTR:   97.1 % (4.4 y)   Next INR check:   7/2/2019   INR from last check:   2.30 (5/21/2019)   Weekly max warfarin dose:      Target end date:      INR check location:      Preferred lab:      Send INR reminders to:    [facility] SUPPORT POOL    Indications    Atrial flutter  unspecified type (H) [I48.92]           Comments:             Anticoagulation Care Providers     Provider Role Specialty Phone number    Janis Townsend MD Referring Cardiology [phone]

## 2021-05-30 NOTE — TELEPHONE ENCOUNTER
Lab Results   Component Value Date    INR 2.10 (!) 07/02/2019    INR 2.30 (H) 05/21/2019    INR 2.5 04/23/2019       Patient's current Warfarin doses:    4 mg every Tue, Thu, Sat; 3 mg all other days     Next INR check is on 8/13/19      Patient's last OV with PCP was on 6/17/19    Warfarin prescription 3 month supply and one refill sent to patient's pharmacy today.    Salma Marques RN

## 2021-05-30 NOTE — TELEPHONE ENCOUNTER
ANTICOAGULATION  MANAGEMENT    Assessment     Today's INR result of 2.1 is Therapeutic (goal INR of 2.0-3.0)        Warfarin taken as previously instructed    No new diet changes affecting INR    No new medication/supplements affecting INR    Continues to tolerate warfarin with no reported s/s of bleeding or thromboembolism     Previous INR was Therapeutic     Patient introduced to [facility] Program    Plan:     Spoke with Ady regarding INR result and instructed:     Warfarin Dosing Instructions:  Continue current warfarin dose    4 mg every Tue, Thu, Sat; 3 mg all other days      (0 % change)    Instructed patient to follow up no later than: 4-6 weeks - Appointment made    Education provided: importance of therapeutic range, target INR goal and significance of current INR result and importance of notifying clinic for changes in medications    Ady verbalizes understanding and agrees to warfarin dosing plan.    Instructed to call the [facility] for any changes, questions or concerns. (#[phone])   ?   Per [facility] Protocol/ Salma Marques RN ,ACN / Dr. Janis Townsend      Subjective/Objective:      Ady Farley, a 78 y.o. male is on warfarin.     Ady reports:     Home warfarin dose: as updated on anticoagulation calendar per template     Missed doses: No     Medication changes:  No     S/S of bleeding or thromboembolism:  No     New Injury or illness:  No     Changes in diet or alcohol consumption:  No     Upcoming surgery, procedure or cardioversion:  No    Anticoagulation Episode Summary     Current INR goal:   2.0-3.0   TTR:   97.2 % (4.5 y)   Next INR check:   8/13/2019   INR from last check:   2.10 (7/2/2019)   Weekly max warfarin dose:      Target end date:      INR check location:      Preferred lab:      Send INR reminders to:   [facility]    Indications    Atrial flutter  unspecified type (H) [I48.92]  Paroxysmal Atrial Fibrillation [I48.91]           Comments:             Anticoagulation Care Providers     Provider Role Specialty Phone number    Janis Townsend MD Referring Cardiology [phone]

## 2021-05-31 VITALS — BODY MASS INDEX: 31.15 KG/M2 | WEIGHT: 230 LBS | HEIGHT: 72 IN

## 2021-05-31 VITALS — HEIGHT: 72 IN | BODY MASS INDEX: 30.07 KG/M2 | WEIGHT: 222 LBS

## 2021-05-31 VITALS — HEIGHT: 72 IN | BODY MASS INDEX: 30.34 KG/M2 | WEIGHT: 224 LBS

## 2021-05-31 NOTE — TELEPHONE ENCOUNTER
ANTICOAGULATION  MANAGEMENT    Assessment     Today's INR result of 2.4 is Therapeutic (goal INR of 2.0-3.0)        Warfarin taken as previously instructed    No new diet changes affecting INR    No new medication/supplements affecting INR    Continues to tolerate warfarin with no reported s/s of bleeding or thromboembolism     Previous INR was Therapeutic    Plan:     Spoke with Ady regarding INR result and instructed:     Warfarin Dosing Instructions:  Continue current warfarin dose    4 mg every Tue, Thu, Sat; 3 mg all other days      (0 % change)    Instructed patient to follow up no later than: 6 weeks  - Appointment made.    Education provided: importance of therapeutic range and target INR goal and significance of current INR result    Ady verbalizes understanding and agrees to warfarin dosing plan.    Instructed to call the [facility] for any changes, questions or concerns. (#[phone])   ?   Salma Marques RN    Subjective/Objective:      Ady Farley, a 79 y.o. male is on warfarin.     Ady reports:     Home warfarin dose: as updated on anticoagulation calendar per template     Missed doses: No     Medication changes:  No     S/S of bleeding or thromboembolism:  No     New Injury or illness:  No     Changes in diet or alcohol consumption:  No     Upcoming surgery, procedure or cardioversion:  No    Anticoagulation Episode Summary     Current INR goal:   2.0-3.0   TTR:   97.3 % (4.6 y)   Next INR check:   9/24/2019   INR from last check:   2.40 (8/13/2019)   Weekly max warfarin dose:      Target end date:      INR check location:      Preferred lab:      Send INR reminders to:   [facility]    Indications    Atrial flutter  unspecified type (H) [I48.92]  Paroxysmal Atrial Fibrillation [I48.91]           Comments:            Anticoagulation Care Providers     Provider Role Specialty Phone number    Janis Townsend MD Referring Cardiology [phone]

## 2021-06-01 VITALS — WEIGHT: 226 LBS | BODY MASS INDEX: 31.64 KG/M2 | HEIGHT: 71 IN

## 2021-06-01 VITALS — BODY MASS INDEX: 31.15 KG/M2 | HEIGHT: 72 IN | WEIGHT: 230 LBS

## 2021-06-01 VITALS — WEIGHT: 228 LBS | BODY MASS INDEX: 31.92 KG/M2 | HEIGHT: 71 IN

## 2021-06-01 NOTE — TELEPHONE ENCOUNTER
ANTICOAGULATION  MANAGEMENT    Assessment     Today's INR result of 2.3 is Therapeutic (goal INR of 2.0-3.0)        Warfarin taken as previously instructed    No new diet changes affecting INR    No new medication/supplements affecting INR    Continues to tolerate warfarin with no reported s/s of bleeding or thromboembolism     Previous INR was Therapeutic    Plan:     Spoke with Ady regarding INR result and instructed:     Warfarin Dosing Instructions:  Continue current warfarin dose 4 mg daily on Tues/Thurs/Sat; and 3 mg daily rest of week  (0 % change)    Instructed patient to follow up no later than: 4-6 weeks    Education provided: importance of therapeutic range    Ady verbalizes understanding and agrees to warfarin dosing plan.    Instructed to call the [facility] for any changes, questions or concerns. (#[phone])   ?   Chelsy Joel RN    Subjective/Objective:      Ady Farley, a 79 y.o. male is on warfarin.     Ady reports:     Home warfarin dose: as updated on anticoagulation calendar per template     Missed doses: No     Medication changes:  No     S/S of bleeding or thromboembolism:  No     New Injury or illness:  No     Changes in diet or alcohol consumption:  No     Upcoming surgery, procedure or cardioversion:  No    Anticoagulation Episode Summary     Current INR goal:   2.0-3.0   TTR:   95.3 %   Next INR check:   11/5/2019   INR from last check:   2.30 (9/24/2019)   Weekly max warfarin dose:      Target end date:      INR check location:      Preferred lab:      Send INR reminders to:   [facility]    Indications    Atrial flutter  unspecified type (H) [I48.92]  Paroxysmal Atrial Fibrillation [I48.91]           Comments:            Anticoagulation Care Providers     Provider Role Specialty Phone number    Janis Townsend MD Referring Cardiology [phone]

## 2021-06-02 ENCOUNTER — RECORDS - HEALTHEAST (OUTPATIENT)
Dept: ADMINISTRATIVE | Facility: CLINIC | Age: 81
End: 2021-06-02

## 2021-06-02 VITALS — HEIGHT: 71 IN | BODY MASS INDEX: 31.22 KG/M2 | WEIGHT: 223 LBS

## 2021-06-02 VITALS — BODY MASS INDEX: 31.64 KG/M2 | HEIGHT: 71 IN | WEIGHT: 226 LBS

## 2021-06-02 VITALS — HEIGHT: 70 IN | WEIGHT: 224 LBS | BODY MASS INDEX: 32.07 KG/M2

## 2021-06-02 NOTE — PROGRESS NOTES
In clinic device check with Cisco Perez RN, CNP.  Please see link for full device report.  Patient was informed of results and next follow up during today's visit.     Type: In clinic CRT-P check. Seen with Cisco Perez RN, CNP.   Presenting Rhythm: AP BiVP 76bpm  Lead/Battery status: Stable  Arrhythmias: 61 mode switches, burden 0.7%. Longest~9hrs on 9/10/2019. V-rates >/=120bpm ~5%. 7 NSVT episodes, EGMs show 5-20bt in duration. Longest being 12sec VT (at least 20 bts) 180bpm. Asymptomatic. Reviewed with Cisco, previous cardiac work up for NSVTs completed.   Anticoagulant: warfarin.   Comments: Normal device function. BiV pacing 96.4%. Pt notes an increase in PNS over the last year, he said it used to be occassional and now it is every day, but positional. Threshold is stable. Tested other configurations (see below), but ultimately changed configuration from LVring >>Can to LVtip>>LVring, LV output from 2.75V to 2.5V  and atrial ATP updated to ALMITA protocol. BK  LVring to Can (current configuration) 1.75V@1.5ms, PNS stops at 2.25V  LVring to RVring 2.25V@1.5ms, PNS until 3V  LVtip to Can 1V@1.5ms, PNS until 2.5V  LVtip to RVring 1.5V@1.5ms, PNS until 3V  LVtip to LVring 1.5V@1.5ms, no PNS

## 2021-06-02 NOTE — TELEPHONE ENCOUNTER
----- Message from Chelsea Kennedy CNP sent at 10/4/2019  3:37 PM CDT -----  Patient was previously trialed on carvedilol but had severe shortness of breath, as patient if he is willing to try Toprol-XL 25 mg daily for nonsustained VT. please initiate if he is willing to do so and we can evaluate for improvement in nonsustained VT at time of next device interrogation which is 12/31/2019.  He should have a follow-up with either Dr. Boo or MICHAEL NICOLAS after that.  ----- Message -----  From: Janis Townsend MD  Sent: 10/4/2019   3:24 PM CDT  To: Victoria Perez CNP    Ischemic work-up was unremarkable.  Is there a reason he is not on a beta blocker?    ----- Message -----  From: Victoria Perez CNP  Sent: 10/3/2019  11:58 AM CDT  To: MD Janis Cunningham- I just wanted to let you know that Ady is still having short bursts of nonsustained VT.  You did a ischemic work-up in February 2019, which was negative.  Is there anything else you want?

## 2021-06-02 NOTE — TELEPHONE ENCOUNTER
Pt was called, corresponding information/recommendations reviewed, verbalized understanding, has no further questions at this time, contact information was given for further concerns/questions, scheduling notified to contact pt, order(s) were placed, RX was sent to pt pharmacy and medication list updated   10/8/2019 9:21 AM  Lili Fuentes RN

## 2021-06-02 NOTE — TELEPHONE ENCOUNTER
----- Message from Chelsea Kennedy CNP sent at 10/4/2019 11:40 AM CDT -----  Normal kidney function and electro lites, continue Lasix as discussed with Cisco at office visit yesterday.

## 2021-06-02 NOTE — PATIENT INSTRUCTIONS - HE
Ady Farley,    It was a pleasure to see you today at the [facility].     My recommendations after this visit include:    If weight is up, short of breath, or retaining fluid, take furosemide 20 mg 2 tab instead of 1 that day.    To followup with device check and see me in 1 year.  See Dr. Townsend in 6 months.  Will alternate visits between cardiology and EP.      My contact information:  Cisco Perez, DENA  After Hours or Scheduling  [phone]  My Nurse---Ana Hatfield [phone]

## 2021-06-02 NOTE — TELEPHONE ENCOUNTER
Pt called per Chelsea Kennedy CNP reviewed message for lab results with pt he understands and agrees to plan

## 2021-06-03 ENCOUNTER — RECORDS - HEALTHEAST (OUTPATIENT)
Dept: ADMINISTRATIVE | Facility: CLINIC | Age: 81
End: 2021-06-03

## 2021-06-03 VITALS
WEIGHT: 223 LBS | DIASTOLIC BLOOD PRESSURE: 56 MMHG | RESPIRATION RATE: 16 BRPM | HEART RATE: 84 BPM | BODY MASS INDEX: 31.22 KG/M2 | HEIGHT: 71 IN | SYSTOLIC BLOOD PRESSURE: 138 MMHG

## 2021-06-03 VITALS — WEIGHT: 226 LBS | HEIGHT: 71 IN | BODY MASS INDEX: 31.64 KG/M2

## 2021-06-03 VITALS — BODY MASS INDEX: 31.22 KG/M2 | HEIGHT: 71 IN | WEIGHT: 223 LBS

## 2021-06-03 VITALS — HEIGHT: 71 IN | WEIGHT: 226 LBS | BODY MASS INDEX: 31.64 KG/M2

## 2021-06-03 NOTE — TELEPHONE ENCOUNTER
ANTICOAGULATION  MANAGEMENT    Assessment     Today's INR result of 2.3 is Therapeutic (goal INR of 2.0-3.0)        Warfarin taken as previously instructed    No new diet changes affecting INR    No interaction expected between metoprolol and warfarin    Continues to tolerate warfarin with no reported s/s of bleeding or thromboembolism     Previous INR was Therapeutic    Plan:     Spoke with Ady regarding INR result and instructed:     Warfarin Dosing Instructions:  Continue current warfarin dose    4 mg every Tue, Thu, Sat; 3 mg all other days        (0 % change)    Instructed patient to follow up no later than: 6 weeks - appointment made.    Education provided: importance of therapeutic range and target INR goal and significance of current INR result    Ady verbalizes understanding and agrees to warfarin dosing plan.    Instructed to call the [facility] for any changes, questions or concerns. (#[phone])   ?   Salma Marques RN    Subjective/Objective:      Ady Farley, a 79 y.o. male is on warfarin.     Ady reports:     Home warfarin dose: as updated on anticoagulation calendar per template     Missed doses: No     Medication changes:  Yes, metoprolol added     S/S of bleeding or thromboembolism:  No     New Injury or illness:  No     Changes in diet or alcohol consumption:  No     Upcoming surgery, procedure or cardioversion:  No    Anticoagulation Episode Summary     Current INR goal:   2.0-3.0   TTR:   100.0 %   Next INR check:   12/17/2019   INR from last check:   2.30 (11/5/2019)   Weekly max warfarin dose:      Target end date:      INR check location:      Preferred lab:      Send INR reminders to:   [facility]    Indications    Atrial flutter  unspecified type (H) [I48.92]  Paroxysmal Atrial Fibrillation [I48.91]           Comments:            Anticoagulation Care Providers     Provider Role Specialty Phone number    Janis Townsend MD Referring Cardiology  [phone]

## 2021-06-04 ENCOUNTER — AMBULATORY - HEALTHEAST (OUTPATIENT)
Dept: ANTICOAGULATION | Facility: CLINIC | Age: 81
End: 2021-06-04

## 2021-06-04 VITALS — DIASTOLIC BLOOD PRESSURE: 58 MMHG | BODY MASS INDEX: 31.43 KG/M2 | SYSTOLIC BLOOD PRESSURE: 118 MMHG | WEIGHT: 222.2 LBS

## 2021-06-04 DIAGNOSIS — I48.92 ATRIAL FLUTTER, UNSPECIFIED TYPE (H): ICD-10-CM

## 2021-06-04 DIAGNOSIS — I48.91 ATRIAL FIBRILLATION (H): ICD-10-CM

## 2021-06-04 NOTE — TELEPHONE ENCOUNTER
ANTICOAGULATION  MANAGEMENT    Assessment     Today's INR result of 2.1 is Therapeutic (goal INR of 2.0-3.0)        Warfarin taken as previously instructed    No new diet changes affecting INR    No new medication/supplements affecting INR    Continues to tolerate warfarin with no reported s/s of bleeding or thromboembolism     Previous INR was Therapeutic    Plan:     Spoke with Ady regarding INR result and instructed:     Warfarin Dosing Instructions:  Continue current warfarin dose    4 mg every Tue, Thu, Sat; 3 mg all other days      (0 % change)    Instructed patient to follow up no later than: 6 weeks, appointment made.    Education provided: importance of therapeutic range, target INR goal and significance of current INR result and importance of following up for INR monitoring at instructed interval    Ady verbalizes understanding and agrees to warfarin dosing plan.    Instructed to call the [facility] for any changes, questions or concerns. (#[phone])   ?   Salma Marques RN    Subjective/Objective:      Ady Farley, a 79 y.o. male is on warfarin.     Ady reports:     Home warfarin dose: as updated on anticoagulation calendar per template     Missed doses: No     Medication changes:  No     S/S of bleeding or thromboembolism:  No     New Injury or illness:  No     Changes in diet or alcohol consumption:  No     Upcoming surgery, procedure or cardioversion:  No    Anticoagulation Episode Summary     Current INR goal:   2.0-3.0   TTR:   100.0 % (11.6 mo)   Next INR check:   1/28/2020   INR from last check:   2.10 (12/17/2019)   Weekly max warfarin dose:      Target end date:      INR check location:      Preferred lab:      Send INR reminders to:   [facility]    Indications    Atrial flutter  unspecified type (H) [I48.92]  Paroxysmal Atrial Fibrillation [I48.91]           Comments:            Anticoagulation Care Providers     Provider Role Specialty Phone number     Janis Townsend MD Referring Cardiology [phone]

## 2021-06-05 VITALS
SYSTOLIC BLOOD PRESSURE: 124 MMHG | BODY MASS INDEX: 31.36 KG/M2 | HEIGHT: 71 IN | WEIGHT: 224 LBS | RESPIRATION RATE: 14 BRPM | DIASTOLIC BLOOD PRESSURE: 54 MMHG | HEART RATE: 77 BPM

## 2021-06-05 VITALS
WEIGHT: 226.4 LBS | RESPIRATION RATE: 12 BRPM | SYSTOLIC BLOOD PRESSURE: 140 MMHG | HEIGHT: 71 IN | BODY MASS INDEX: 31.69 KG/M2 | DIASTOLIC BLOOD PRESSURE: 64 MMHG | HEART RATE: 80 BPM

## 2021-06-05 NOTE — TELEPHONE ENCOUNTER
ANTICOAGULATION  MANAGEMENT    Assessment     Today's INR result of 2.2 is Therapeutic (goal INR of 2.0-3.0)        Warfarin taken as previously instructed    No new diet changes affecting INR    No new medication/supplements affecting INR    Continues to tolerate warfarin with no reported s/s of bleeding or thromboembolism     Previous INR was Therapeutic    Plan:     Left a detailed message for Ady regarding INR result and instructed:     Warfarin Dosing Instructions:  Continue current warfarin dose    4 mg every Tue, Thu, Sat; 3 mg all other days       (0 % change)    Instructed patient to follow up no later than: 6 weeks    Education provided: importance of therapeutic range and target INR goal and significance of current INR result        Instructed to call the [facility] for any changes, questions or concerns. (#[phone])   ?   Salma Marques RN    Subjective/Objective:      Ady Farley, a 79 y.o. male is on warfarin.     Ady reports:     Home warfarin dose: as updated on anticoagulation calendar per template     Missed doses: No     Medication changes:  No     S/S of bleeding or thromboembolism:  No     New Injury or illness:  No     Changes in diet or alcohol consumption:  No     Upcoming surgery, procedure or cardioversion:  No    Anticoagulation Episode Summary     Current INR goal:   2.0-3.0   TTR:   100.0 % (11.6 mo)   Next INR check:   3/10/2020   INR from last check:   2.20 (1/28/2020)   Weekly max warfarin dose:      Target end date:      INR check location:      Preferred lab:      Send INR reminders to:   [facility]    Indications    Atrial flutter  unspecified type (H) [I48.92]  Paroxysmal Atrial Fibrillation [I48.91]           Comments:            Anticoagulation Care Providers     Provider Role Specialty Phone number    Janis Townsend MD Referring Cardiology [phone]

## 2021-06-06 NOTE — TELEPHONE ENCOUNTER
ANTICOAGULATION  MANAGEMENT    Assessment     Today's INR result of 2.5 is Therapeutic (goal INR of 2.0-3.0)        Warfarin taken as previously instructed    No new diet changes affecting INR    No new medication/supplements affecting INR    Continues to tolerate warfarin with no reported s/s of bleeding or thromboembolism     Previous INR was Therapeutic     Tentatively going on a bus trip 4/13-4/28.    Plan:     Spoke with Ady regarding INR result and instructed:     Warfarin Dosing Instructions:  Continue current warfarin dose    4 mg every Tue, Thu, Sat; 3 mg all other days     (0 % change)    Instructed patient to follow up no later than: 6-8 weeks made aware that INR will be checked with OV with [facility] on 5/8    Education provided: importance of therapeutic range and target INR goal and significance of current INR result    Ady verbalizes understanding and agrees to warfarin dosing plan.    Instructed to call the [facility] for any changes, questions or concerns. (#[phone])   ?   Salma Marques RN    Subjective/Objective:      Ady Farley, a 79 y.o. male is on warfarin.     Ady reports: bus trip might be cancelled depending on covid    Home warfarin dose: as updated on anticoagulation calendar per template     Missed doses: No     Medication changes:  No     S/S of bleeding or thromboembolism:  No     New Injury or illness:  No     Changes in diet or alcohol consumption:  No     Upcoming surgery, procedure or cardioversion:  No    Anticoagulation Episode Summary     Current INR goal:   2.0-3.0   TTR:   100.0 % (1 y)   Next INR check:   5/5/2020   INR from last check:   2.50 (3/10/2020)   Weekly max warfarin dose:      Target end date:      INR check location:      Preferred lab:      Send INR reminders to:   [facility]    Indications    Atrial flutter  unspecified type (H) [I48.92]  Paroxysmal Atrial Fibrillation [I48.91]           Comments:            Anticoagulation Care  Providers     Provider Role Specialty Phone number    Janis Townsend MD Referring Cardiology [phone]

## 2021-06-07 NOTE — TELEPHONE ENCOUNTER
Lab Results   Component Value Date    INR 2.50 (!) 03/10/2020    INR 2.20 (!) 01/28/2020    INR 2.10 (!) 12/17/2019       Patient's current Warfarin doses:     4 mg every Tue, Thu, Sat; 3 mg all other days           Next INR check is on 5/5      Patient's last OV with [facility] was on 6/17/2019    Warfarin prescription 3 month supply and one refill sent to patient's pharmacy today.    Salma Marques RN

## 2021-06-08 NOTE — TELEPHONE ENCOUNTER
Wellness Screening Tool  Symptom Screening:  Do you have one of the following NEW symptoms:    Fever (subjective or >100.0)?  No    A new cough?  No    Shortness of breath?  No     Chills? No     New loss of taste or smell? No     Generalized body aches? No     New persistent headache? No     New sore throat? No     Nausea, vomiting, or diarrhea?  No    Within the past 3 weeks, have you been exposed to someone with a known positive illness below:    COVID-19 (known or suspected)?  No    Chicken pox?  No    Mealses?  No    Pertussis?  No    Patient notified of visitor restrictions: Yes  Pt informed to wear a mask: Yes  Patient's appointment status: Patient will be seen in clinic as scheduled on 6/3/20

## 2021-06-08 NOTE — PROGRESS NOTES
Discussed continuing dose of Warfarin and returning in one month . No changes to diet. Continue moderate intake of Vitamin K and call if increase bruising or unexplained bleeding. Call with medication changes or upcoming procedures.

## 2021-06-08 NOTE — TELEPHONE ENCOUNTER
ANTICOAGULATION  MANAGEMENT    Assessment     Today's INR result of 2.8 is Therapeutic (goal INR of 2.0-3.0)        Warfarin taken as previously instructed    No new diet changes affecting INR    No new medication/supplements affecting INR    Continues to tolerate warfarin with no reported s/s of bleeding or thromboembolism     Previous INR was Supratherapeutic at 3.2 on 5/12 most likely due to interaction between warfarin and alcohol - patient had a drink of beer 2 days prior to appointment.    Plan:     Spoke with Ady regarding INR result and instructed:     Warfarin Dosing Instructions:  Continue current warfarin dose    4 mg every Tue, Thu, Sat; 3 mg all other days      (0 % change)    Instructed patient to follow up no later than:  4 weeks appointment made.    Education provided: impact of vitamin K foods on INR, potential interaction between warfarin and alcohol, importance of therapeutic range and importance of following up for INR monitoring at instructed interval    Ady verbalizes understanding and agrees to warfarin dosing plan.    Instructed to call the [facility] for any changes, questions or concerns. (#[phone])   ?   Salma Marques RN    Subjective/Objective:      Ady Farley, a 79 y.o. male is on warfarin.     Ady reports:     Home warfarin dose: as updated on anticoagulation calendar per template     Missed doses: No     Medication changes:  No     S/S of bleeding or thromboembolism:  No     New Injury or illness:  No     Changes in diet or alcohol consumption:  No     Upcoming surgery, procedure or cardioversion:  No    Anticoagulation Episode Summary     Current INR goal:   2.0-3.0   TTR:   92.1 % (1 y)   Next INR check:   7/1/2020   INR from last check:   2.80 (6/3/2020)   Weekly max warfarin dose:      Target end date:      INR check location:      Preferred lab:      Send INR reminders to:   [facility]    Indications    Atrial flutter  unspecified type (H)  [I48.92]  Paroxysmal Atrial Fibrillation [I48.91]           Comments:            Anticoagulation Care Providers     Provider Role Specialty Phone number    Janis Townsend MD Referring Cardiology [phone]

## 2021-06-08 NOTE — TELEPHONE ENCOUNTER
Who is calling:  Patient  Reason for Call:  Please see INR encounter from 05/12/20. FYI - Patient states he checked in for INR appointment at [facility] clinic today 06/02/20 and was advised that he did not have appointment scheduled , patient thought [facility] had scheduled this appointment for him on 05/12. Writer assisted patient with scheduling for 06/03. No need for call back, patient just wanted to let [facility] know that he was not scheduled.  Date of last appointment with primary care: na  Okay to leave a detailed message: Yes

## 2021-06-08 NOTE — TELEPHONE ENCOUNTER
Wellness Screening Tool  Symptom Screening:  Do you have one of the following new symptoms:    Fever or reported chills?  No    A new cough (started within the past 14 days)?  No    Shortness of breath (started within the past 14 days) ?  No     Nausea, vomiting, or diarrhea?  No    Within the past 3 weeks, have you been exposed to someone with a known positive illness below:    COVID-19 (known or suspected)?  No    Chicken pox?  No    Mealses?  No    Pertussis?  No    Patient notified of visitor restrictions: Yes  If pt asymptomatic, please remind patient to bring in and wear their own mask if they have one available to their appointment: Yes  Patient's appointment status: Patient will be seen in clinic as scheduled on 5/12/20

## 2021-06-08 NOTE — TELEPHONE ENCOUNTER
ANTICOAGULATION  MANAGEMENT    Assessment     Today's INR result of 3.2 is Supratherapeutic (goal INR of 2.0-3.0)        Warfarin taken as previously instructed    Change in alcohol intake - had beer on 5/10 may be affecting INR     No new medication/supplements affecting INR    Continues to tolerate warfarin with no reported s/s of bleeding or thromboembolism     Previous INR was Therapeutic    Plan:     Spoke with Ady regarding INR result and instructed:     Warfarin Dosing Instructions:  Continue current warfarin dose    4 mg every Tue, Thu, Sat; 3 mg all other days      (0 % change)  Patient stated that he will eat greens today to help bring INR down.    Instructed patient to follow up no later than: 2-3 weeks appointment made.    Education provided: impact of vitamin K foods on INR, potential interaction between warfarin and alcohol, importance of therapeutic range and target INR goal and significance of current INR result    Ady verbalizes understanding and agrees to warfarin dosing plan.    Instructed to call the [facility] for any changes, questions or concerns. (#[phone])   ?   Salma Marques RN    Subjective/Objective:      Ady Farley, a 79 y.o. male is on warfarin.     Ady reports:     Home warfarin dose: as updated on anticoagulation calendar per template     Missed doses: No     Medication changes:  No     S/S of bleeding or thromboembolism:  No     New Injury or illness:  No     Changes in diet or alcohol consumption:  Yes- had beer on Sunday to celebrate mothers day.     Upcoming surgery, procedure or cardioversion:  No    Anticoagulation Episode Summary     Current INR goal:   2.0-3.0   TTR:   95.1 % (1 y)   Next INR check:   6/2/2020   INR from last check:   3.20! (5/12/2020)   Weekly max warfarin dose:      Target end date:      INR check location:      Preferred lab:      Send INR reminders to:   [facility]    Indications    Atrial flutter  unspecified type  (H) [I48.92]  Paroxysmal Atrial Fibrillation [I48.91]           Comments:            Anticoagulation Care Providers     Provider Role Specialty Phone number    Janis Townsend MD Referring Cardiology [phone]

## 2021-06-08 NOTE — PROGRESS NOTES
Review of Systems - ALL WNL    NO OTHER CONCERNS    QUESTIONS ABOUT HEART MEDICATION    PREFERS PHONE VISIT    Cira Mack, CMA

## 2021-06-09 NOTE — TELEPHONE ENCOUNTER
Wellness Screening Tool  Symptom Screening:  Do you have one of the following NEW symptoms:    Fever (subjective or >100.0)?  No    A new cough?  No    Shortness of breath?  No     Chills? No     New loss of taste or smell? No     Generalized body aches? No     New persistent headache? No     New sore throat? No     Nausea, vomiting, or diarrhea?  No    Within the past 3 weeks, have you been exposed to someone with a known positive illness below:    COVID-19 (known or suspected)?  No    Chicken pox?  No    Mealses?  No    Pertussis?  No    Patient notified of visitor policy- They may have one person accompany them to their appointment, but they will need to wear a mask and will be screened upon arrival for symptoms: Yes  Pt informed to wear a mask: Yes  Pt notified if they develop any symptoms listed above, prior to their appointment, they are to call the clinic directly at [phone] for further instructions.  Yes  Patient's appointment status: Patient will be seen in clinic as scheduled on 6/30/20

## 2021-06-09 NOTE — PROGRESS NOTES
Anticoagulation Annual Referral Renewal Review    Ady Farley's chart reviewed for annual renewal of referral to anticoagulation monitoring.        Criteria for anticoagulation nurse and/or pharmacist renewal met   Warfarin indication: Atrial Fibrillation and Atrial Flutter Yes, per indication   Current with INR monitoring/compliant Yes Yes   Date of last office visit 5/8/2020 Yes, had office visit within last year   Time in Therapeutic Range (TTR) 92 % Yes, TTR > 60%       Ady Farley met all criteria for anticoagulation management program initiated renewal.  New INR standing orders and anticoagulation referral renewal placed.      Salma Marques, RN  3:44 PM

## 2021-06-09 NOTE — TELEPHONE ENCOUNTER
ANTICOAGULATION  MANAGEMENT    Assessment     Today's INR result of 2.5 is Therapeutic (goal INR of 2.0-3.0)        Warfarin taken as previously instructed    No new diet changes affecting INR    No new medication/supplements affecting INR    Continues to tolerate warfarin with no reported s/s of bleeding or thromboembolism     Previous INR was Therapeutic    Plan:     Spoke with Ady regarding INR result and instructed:     Warfarin Dosing Instructions:  Continue current warfarin dose    4 mg every Tue, Thu, Sat; 3 mg all other days      (0 % change)    Instructed patient to follow up no later than: 4-6 weeks - appointment made    Education provided: potential interaction between warfarin and alcohol, importance of therapeutic range and importance of notifying clinic for changes in medications    Ady verbalizes understanding and agrees to warfarin dosing plan.    Instructed to call the [facility] for any changes, questions or concerns. (#[phone])   ?   Samla Marques RN    Subjective/Objective:      Ady Farley, a 79 y.o. male is on warfarin.     Ady reports:     Home warfarin dose: as updated on anticoagulation calendar per template     Missed doses: No     Medication changes:  No     S/S of bleeding or thromboembolism:  No     New Injury or illness:  No     Changes in diet or alcohol consumption:  No     Upcoming surgery, procedure or cardioversion:  No    Anticoagulation Episode Summary     Current INR goal:   2.0-3.0   TTR:   92.1 % (1 y)   Next INR check:   8/11/2020   INR from last check:   2.50 (6/30/2020)   Weekly max warfarin dose:      Target end date:      INR check location:      Preferred lab:      Send INR reminders to:   [facility]    Indications    Atrial flutter  unspecified type (H) [I48.92]  Paroxysmal Atrial Fibrillation [I48.91]           Comments:            Anticoagulation Care Providers     Provider Role Specialty Phone number    Janis Townsend,  MD Referring Cardiology [phone]

## 2021-06-10 NOTE — TELEPHONE ENCOUNTER
Wellness Screening Tool  Symptom Screening:  Do you have one of the following NEW symptoms:    Fever (subjective or >100.0)?  No    A new cough?  No    Shortness of breath?  No     Chills? No     New loss of taste or smell? No     Generalized body aches? No     New persistent headache? No     New sore throat? No     Nausea, vomiting, or diarrhea?  No    Within the past 3 weeks, have you been exposed to someone with a known positive illness below:    COVID-19 (known or suspected)?  No    Chicken pox?  No    Mealses?  No    Pertussis?  No    Patient notified of visitor policy- They may have one person accompany them to their appointment, but they will need to wear a mask and will be screened upon arrival for symptoms: Yes  Pt informed to wear a mask: Yes  Pt notified if they develop any symptoms listed above, prior to their appointment, they are to call the clinic directly at [phone] for further instructions.  Yes  Patient's appointment status: Patient will be seen in clinic as scheduled on 8/10

## 2021-06-10 NOTE — TELEPHONE ENCOUNTER
ANTICOAGULATION  MANAGEMENT    Assessment     Today's INR result of 2.9 is Therapeutic (goal INR of 2.0-3.0)        Warfarin taken as previously instructed    No new diet changes affecting INR    No new medication/supplements affecting INR    Continues to tolerate warfarin with no reported s/s of bleeding or thromboembolism     Previous INR was Therapeutic    Plan:     Spoke on phone with Ady regarding INR result and instructed:     Warfarin Dosing Instructions:  Continue current warfarin dose 4 mg daily on tue/thu/sat; and 3 mg daily rest of week  (0 % change)    Instructed patient to follow up no later than: 6 weeks    Ady verbalizes understanding and agrees to warfarin dosing plan.    Instructed to call the [facility] for any changes, questions or concerns. (#[phone])   ?   Randal Grubbs RN    Subjective/Objective:      Ady Farley, a 80 y.o. male is on warfarin.     Ady reports:     Home warfarin dose: verbally confirmed home dose with Ady and updated on anticoagulation calendar     Missed doses: No     Medication changes:  No     S/S of bleeding or thromboembolism:  No     New Injury or illness:  No     Changes in diet or alcohol consumption:  No     Upcoming surgery, procedure or cardioversion:  No    Anticoagulation Episode Summary     Current INR goal:   2.0-3.0   TTR:   92.1 % (1 y)   Next INR check:   9/22/2020   INR from last check:   2.90 (8/11/2020)   Weekly max warfarin dose:      Target end date:      INR check location:      Preferred lab:      Send INR reminders to:   [facility]    Indications    Atrial flutter  unspecified type (H) [I48.92]  Paroxysmal Atrial Fibrillation [I48.91]           Comments:            Anticoagulation Care Providers     Provider Role Specialty Phone number    Janis Townsend MD Referring Cardiology [phone]

## 2021-06-11 NOTE — PROGRESS NOTES
INR 2.3 INR stable. Discussed continuing management of dose of Warfarin and returning in one month . No changes to diet needed at this time. Continue moderate intake of Vitamin K and call if increase bruising or unexplained bleeding. Call with medication changes or upcoming procedures.

## 2021-06-11 NOTE — TELEPHONE ENCOUNTER
ANTICOAGULATION  MANAGEMENT    Assessment     Today's INR result of 2.9 is Therapeutic (goal INR of 2.0-3.0)        Warfarin taken as previously instructed    No new diet changes affecting INR    No new medication/supplements affecting INR    Continues to tolerate warfarin with no reported s/s of bleeding or thromboembolism     Previous INR was Therapeutic    Plan:     Spoke on phone with Ady regarding INR result and instructed:     Warfarin Dosing Instructions:  Continue current warfarin dose    4 mg every Tue, Thu, Sat; 3 mg all other days        (0 % change)    Instructed patient to follow up no later than: 6 weeks - appointment made.    Education provided: importance of consistent vitamin K intake, impact of vitamin K foods on INR, vitamin K content of foods ( grapes has medium amount of vit k per serving ), importance of notifying clinic for changes in medications and monitoring for bleeding signs and symptoms    Ady verbalizes understanding and agrees to warfarin dosing plan.    Instructed to call the [facility] for any changes, questions or concerns. (#[phone])   ?   Salma Marques RN    Subjective/Objective:      Adydarryl Farley, a 80 y.o. male is on warfarin.     Ady reports:     Home warfarin dose: as updated on anticoagulation calendar per template     Missed doses: No     Medication changes:  No     S/S of bleeding or thromboembolism:  No     New Injury or illness:  No     Changes in diet or alcohol consumption:  No     Upcoming surgery, procedure or cardioversion:  No    Anticoagulation Episode Summary     Current INR goal:   2.0-3.0   TTR:   92.1 % (1 y)   Next INR check:   11/3/2020   INR from last check:   2.90 (9/22/2020)   Weekly max warfarin dose:      Target end date:      INR check location:      Preferred lab:      Send INR reminders to:   [facility]    Indications    Atrial flutter  unspecified type (H) [I48.92]  Paroxysmal Atrial Fibrillation [I48.91]            Comments:            Anticoagulation Care Providers     Provider Role Specialty Phone number    Janis Townsend MD Referring Cardiology [phone]

## 2021-06-11 NOTE — TELEPHONE ENCOUNTER
Wellness Screening Tool  Symptom Screening:  Do you have one of the following NEW symptoms:    Fever (subjective or >100.0)?  No    A new cough?  No    Shortness of breath?  No     Chills? No     New loss of taste or smell? No     Generalized body aches? No     New persistent headache? No     New sore throat? No     Nausea, vomiting, or diarrhea?  No    Within the past 2 weeks, have you been exposed to someone with a known positive illness below:    COVID-19 (known or suspected)?  No    Chicken pox?  No    Mealses?  No    Pertussis?  No    Patient notified of visitor policy- They may have one person accompany them to their appointment, but they will need to wear a mask and will be screened upon arrival for symptoms: Yes  Pt informed to wear a mask: Yes  Pt notified if they develop any symptoms listed above, prior to their appointment, they are to call the clinic directly at [phone] for further instructions.  Yes  Patient's appointment status: Patient will be seen in clinic as scheduled on 9/22

## 2021-06-11 NOTE — PROGRESS NOTES
INR 1.8 had a cold and missed one dose. Will continue on current dose and retest in 2 week. After talking with pt and discussing history of greens/salads and medication change. Pt will  continue  with current diet and dosing of Warfarin.  Continue with moderation of Vit K and green leafy vegetables. Cautioned to call with increase bruising or bleeding. Reminded to call with medication change especially antibiotic. Call with any questions or concerns or any up coming procedures. Cautioned about using Herbal medication.

## 2021-06-12 NOTE — PROGRESS NOTES
Remote device check.  Please see link for full device report.  Patient was informed of results and next follow up during today's visit.

## 2021-06-12 NOTE — TELEPHONE ENCOUNTER
Wellness Screening Tool  Symptom Screening:  Do you have one of the following NEW symptoms:    Fever (subjective or >100.0)?  No    A new cough?  No    Shortness of breath?  No     Chills? No     New loss of taste or smell? No     Generalized body aches? No     New persistent headache? No     New sore throat? No     Nausea, vomiting, or diarrhea?  No    Within the past 2 weeks, have you been exposed to someone with a known positive illness below:    COVID-19 (known or suspected)?  No    Chicken pox?  No    Mealses?  No    Pertussis?  No    Patient notified of visitor policy- They may have one person accompany them to their appointment, but they will need to wear a mask and will be screened upon arrival for symptoms: Yes  Pt informed to wear a mask: Yes  Pt notified if they develop any symptoms listed above, prior to their appointment, they are to call the clinic directly at [phone] for further instructions.  Yes  Patient's appointment status: Patient will be seen in clinic as scheduled on 11/3

## 2021-06-12 NOTE — TELEPHONE ENCOUNTER
ANTICOAGULATION  MANAGEMENT    Assessment     Today's INR result of 2.5 is Therapeutic (goal INR of 2.0-3.0)        Warfarin taken as previously instructed    No new diet changes affecting INR    No new medication/supplements affecting INR    Continues to tolerate warfarin with no reported s/s of bleeding or thromboembolism     Previous INR was Therapeutic    Plan:     Spoke on phone with Ady regarding INR result and instructed:     Warfarin Dosing Instructions:  Continue current warfarin dose    4 mg every Tue, Thu, Sat; 3 mg all other days      (0 % change)    Instructed patient to follow up no later than: 6 - 8  Weeks - appointment made.    Education provided: importance of taking warfarin as instructed, importance of notifying clinic for changes in medications and importance of notifying clinic for diarrhea, nausea/vomiting, reduced intake and/or illness    Ady verbalizes understanding and agrees to warfarin dosing plan.    Instructed to call the [facility] for any changes, questions or concerns. (#[phone])   ?   Salma Marques RN    Subjective/Objective:      Ady Farley, a 80 y.o. male is on warfarin.     Ady reports:     Home warfarin dose: as updated on anticoagulation calendar per template     Missed doses: No     Medication changes:  No     S/S of bleeding or thromboembolism:  No     New Injury or illness:  No     Changes in diet or alcohol consumption:  No     Upcoming surgery, procedure or cardioversion:  No    Anticoagulation Episode Summary     Current INR goal:  2.0-3.0   TTR:  92.1 % (1 y)   Next INR check:  12/29/2020   INR from last check:  2.50 (11/3/2020)   Weekly max warfarin dose:     Target end date:     INR check location:     Preferred lab:     Send INR reminders to:  [facility]    Indications    Atrial flutter  unspecified type (H) [I48.92]  Paroxysmal Atrial Fibrillation [I48.91]           Comments:           Anticoagulation Care Providers      Provider Role Specialty Phone number    Janis Townsend MD Referring Cardiology [phone]

## 2021-06-12 NOTE — PROGRESS NOTES
INR 2.2 INR stable. Discussed continuing management of dose of Warfarin and returning in one month . No changes to diet needed at this time. Continue moderate intake of Vitamin K and call if increase bruising or unexplained bleeding. Call with medication changes or upcoming procedures.

## 2021-06-12 NOTE — PATIENT INSTRUCTIONS - HE
Ady Farley,    It was a pleasure to see you today at the [facility].     My recommendations after this visit include:    Apixaban is generic for Eliquis and will be coming out in the next year and you could switch to generic when available.  I will help you switch over if you need it.      To followup with See Dr. Townsend in the Spring with remote device check before visit and device check in clinic and see me in 1 year.        My contact information:  Cisco Perez CNP  After Hours or Scheduling  [phone]  My Nurse---Ana Hatfield [phone]

## 2021-06-12 NOTE — TELEPHONE ENCOUNTER
Wellness Screening Tool  Symptom Screening:  Do you have one of the following NEW symptoms:    Fever (subjective or >100.0)?  No    A new cough?  No    Shortness of breath?  No     Chills? No     New loss of taste or smell? No     Generalized body aches? No     New persistent headache? No     New sore throat? No     Nausea, vomiting, or diarrhea?  No    Within the past 2 weeks, have you been exposed to someone with a known positive illness below:    COVID-19 (known or suspected)?  No    Chicken pox?  No    Mealses?  No    Pertussis?  No    Patient notified of visitor policy- They may have one person accompany them to their appointment, but they will need to wear a mask and will be screened upon arrival for symptoms: Yes  Pt informed to wear a mask: Yes  Pt notified if they develop any symptoms listed above, prior to their appointment, they are to call the clinic directly at [phone] for further instructions.  Yes  Patient's appointment status: Patient will be seen in clinic as scheduled on 10/8

## 2021-06-14 ENCOUNTER — AMBULATORY - HEALTHEAST (OUTPATIENT)
Dept: CARDIOLOGY | Facility: CLINIC | Age: 81
End: 2021-06-14

## 2021-06-14 ENCOUNTER — COMMUNICATION - HEALTHEAST (OUTPATIENT)
Dept: CARDIOLOGY | Facility: CLINIC | Age: 81
End: 2021-06-14

## 2021-06-14 DIAGNOSIS — Z95.0 PRESENCE OF BIVENTRICULAR CARDIAC PACEMAKER: ICD-10-CM

## 2021-06-14 DIAGNOSIS — I48.0 PAROXYSMAL ATRIAL FIBRILLATION (H): ICD-10-CM

## 2021-06-14 NOTE — PROGRESS NOTES
In clinic device check with Dr. Gage.  Please see link for full device report.  Patient was informed of results and next follow up during today's visit.

## 2021-06-14 NOTE — TELEPHONE ENCOUNTER
ANTICOAGULATION  MANAGEMENT    Assessment     Today's INR result of 2.9 is Therapeutic (goal INR of 2.0-3.0)        Warfarin taken as previously instructed    No new diet changes affecting INR    No new medication/supplements affecting INR    Continues to tolerate warfarin with no reported s/s of bleeding or thromboembolism     Previous INR was Supratherapeutic    Plan:     Spoke on phone with Ady regarding INR result and instructed:     Warfarin Dosing Instructions:  Continue current warfarin dose    4 mg every Tue, Thu, Sat; 3 mg all other days        (0 % change)    Instructed patient to follow up no later than: 2-3 weeks - appointment made.    Education provided: importance of therapeutic range    Ady verbalizes understanding and agrees to warfarin dosing plan.    Instructed to call the [facility] for any changes, questions or concerns. (#[phone])   ?   Salma Marques RN    Subjective/Objective:      Ady Farley, a 80 y.o. male is on warfarin.     Ady reports:     Home warfarin dose: as updated on anticoagulation calendar per template     Missed doses: No     Medication changes:  No     S/S of bleeding or thromboembolism:  No     New Injury or illness:  No     Changes in diet or alcohol consumption:  No     Upcoming surgery, procedure or cardioversion:  No    Anticoagulation Episode Summary     Current INR goal:  2.0-3.0   TTR:  80.3 % (1 y)   Next INR check:  2/9/2021   INR from last check:  2.80 (1/19/2021)   Weekly max warfarin dose:     Target end date:     INR check location:     Preferred lab:     Send INR reminders to:  [facility]    Indications    Atrial flutter  unspecified type (H) [I48.92]  Paroxysmal Atrial Fibrillation [I48.91]           Comments:           Anticoagulation Care Providers     Provider Role Specialty Phone number    Janis Townsend MD Referring Cardiology [phone]

## 2021-06-14 NOTE — PROGRESS NOTES
[facility]  Arrhythmia Clinic  Kyle Gage    Referring:      Assessment:         Atrial fibrillation and atypical atrial flutter: The patient is 5 years out from his ablation procedure to treat his atrial fibrillation and atypical flutter.  The patient remains asymptomatic.  His device continues to demonstrate a very low burden of recurrent atrial fibrillation/atrial tachycardia (less than 0.1%).  The patient's device is functioning normally and at times the ATP is successful at terminating his episodes.    Nonsustained ventricular tachycardia: The patient had a short-lived episode of nonsustained VT last year and underwent ischemic reevaluation at that time.  The patient demonstrated normal LV function and no evidence of ischemia.  His more recent check shows rare slow episodes of nonsustained VT.    Status post mitral valve repair: The patient continues to do well.  His most recent echo from this year demonstrates only mild mitral regurgitation.  No significant stenosis.    Hypertension: The patient's blood pressure is at target on his current medical therapy.      Recommendations:    No change in the patient's device prescription today.    No change in his current medication recommendations.    Follow-up with Dr. Janis Townsend regarding his valvular heart disease and coronary disease as scheduled.    Follow-up the device clinic per routine and with me in 1 year.      Patient Active Problem List   Diagnosis     Hypothyroidism     Mitral Regurgitation     Paroxysmal Atrial Fibrillation     Premature Ventricular Contractions     Hyperlipidemia     Essential hypertension     Coronary Artery Disease     Nonischemic cardiomyopathy     Atrial flutter, unspecified type     Peripheral Vascular Disease     Status post ablation of atrial fibrillation     Presence of biventricular cardiac pacemaker       Subjective:  Ady Farley (77 y.o. male) returns to the arrhythmia clinic for interval  reevaluation of his rhythm status post ablation.  The patient continues to do well with no symptomatic palpitations.  Overall he continues to be active and exercising on a regular basis.  He reports no lightheadedness presyncope or syncope.  He is not having any exertional dyspnea or chest pain.  He reports no orthopnea, PND, or ankle edema.  He has had no other new change in his general health care status.    Current Outpatient Prescriptions   Medication Sig Dispense Refill     amLODIPine (NORVASC) 10 MG tablet Take 10 mg by mouth daily.       amoxicillin (AMOXIL) 500 MG capsule Prior to the Dentist       aspirin 81 MG EC tablet Take 81 mg by mouth daily.       atorvastatin (LIPITOR) 20 MG tablet Take 20 mg by mouth bedtime.  1     docoshexanoic acid-eicosapent 500 mg (FISH OIL) 500-100 mg cap capsule Take 1,000 mg by mouth daily.       furosemide (LASIX) 20 MG tablet Take 20 mg by mouth daily.       KLOR-CON M20 20 mEq tablet Take 1 tablet by mouth daily.       levothyroxine (SYNTHROID, LEVOTHROID) 150 MCG tablet Take 150 mcg by mouth daily.       MULTIVITAMIN ORAL Take 1 tablet by mouth daily.       warfarin (COUMADIN) 1 MG tablet TAKE ONE TABLET DAILY OR AS DIRECTED. 60 tablet 0     warfarin (COUMADIN) 3 MG tablet TAKE 1 TABLET WITH A 1 MG TABLET (4 MG) BRENNON GAMEZ, SAT; TAKE 1 TABLET ALONE (3 MG) ALL OTHER DAYS. 90 tablet 0     losartan (COZAAR) 50 MG tablet Take 1 tablet (50 mg total) by mouth daily. 90 tablet 4     simvastatin (ZOCOR) 40 MG tablet 20 mg bedtime.       No current facility-administered medications for this visit.        Review of Systems:   General: WNL  Eyes: WNL  Ears/Nose/Throat: WNL  Lungs: WNL  Heart: WNL  Stomach: WNL  Bladder: WNL  Muscle/Joints: WNL  Skin: WNL  Nervous System: WNL  Mental Health: WNL     Blood: WNL    Family History  Family History   Problem Relation Age of Onset     No Medical Problems Mother      No Medical Problems Father      No Medical Problems Sister      No Medical  "Problems Brother      No Medical Problems Daughter      No Medical Problems Son      Acute Myocardial Infarction Neg Hx      Alcohol abuse Neg Hx      Alzheimer's disease Neg Hx      Amputation of Leg Below Knee Neg Hx      Aortic Valve Replacement Neg Hx      Arrhythmogenic Right Ventricular Cardiomyopathy Neg Hx      Atrial fibrillation Neg Hx      Atrial Flutter Neg Hx      Breast cancer Neg Hx      CABG Neg Hx      Cancer Neg Hx      Cardiomyopathy Neg Hx      Carotid Endarterectomy Neg Hx      Cerebral aneurysm Neg Hx      Chronic Kidney Disease Neg Hx      COPD Neg Hx      Colon cancer Neg Hx      Congenital heart disease Neg Hx      Coronary artery disease Neg Hx      Coronary Stenting Neg Hx      Dementia Neg Hx      Diabetes type I Neg Hx      Diabetes type II Neg Hx      Dialysis Neg Hx      Dyslipidemia Neg Hx      Heart failure Neg Hx      Hypertension Neg Hx      Hypertrophic cardiomyopathy Neg Hx      ICD Neg Hx      Long QT syndrome Neg Hx      Mitral Regurgitation Neg Hx      Mitral Valve Replacement Neg Hx      Morbid Obesity Neg Hx      Heart attack Neg Hx      Obstructive Sleep Apnea Neg Hx      Ovarian cancer Neg Hx      Pacemaker Neg Hx      Pancreatic cancer Neg Hx      Peripheral vascular disease Neg Hx      Pulmonary embolism Neg Hx      Pulmonary Hypertension Neg Hx      Rheumatic Heart Disease Neg Hx      Stroke Neg Hx      Sudden death Neg Hx      Transient ischemic attack Neg Hx      Valvular heart disease Neg Hx        Social History   reports that he quit smoking about 27 years ago. He smoked 1.00 pack per day. He has never used smokeless tobacco. He reports that he drinks about 1.2 oz of alcohol per week  He reports that he does not use illicit drugs.    Objective:   Vital signs in last 24 hours:  /42 (Patient Site: Left Arm, Patient Position: Sitting, Cuff Size: Adult Large)  Pulse 72  Resp 16  Ht 5' 11.5\" (1.816 m)  Wt (!) 224 lb (101.6 kg)  BMI 30.81 kg/m2  Weight: " Weight: (!) 224 lb (101.6 kg)     Physical Exam:  General: The patient is alert oriented to person place and situation.  The patient is in no acute distress at the time of my evaluation.  Eyes: Pupils are equal, round, and reactive to light.  Conjunctiva and sclera are clear.  ENT: Oral mucosa is moist and without redness. No evident nasal discharge.  Pulmonary: Lungs are clear bilaterally with no rales, rhonchi, or wheezes.    Cardiovascular exam: Rhythm is regular. S1 and S2 are normal.  There is a 2/6 systolic murmur heard at the apex.  JVP is normal. Lower extremities demonstrate no significant edema. Distal pulses are intact bilaterally.  Abdomen is Flat, soft, and nontender.  Musculoskeletal: Spine is straight. Extremities without deformity.  Neuro: Gait is normal.   Skin is warm, dry, and otherwise intact.      Cardiographics:   Device interrogation today shows normal battery monitoring voltage.  The patient's right atrial right ventricular left ventricular lead status all remain stable and normal.  Device diagnostics show 99% right atrial pacing and 98% biventricular pacing.  Atrial fibrillation has been seen with one episode lasting up to 3 and half hours in August of this year.  The patient did have examples of successful ATP of a more organized apparent atrial tachycardia.      Lab Results:   Lab Results   Component Value Date     11/20/2017    K 3.9 11/20/2017     (H) 11/20/2017    CO2 21 (L) 11/20/2017    BUN 13 11/20/2017    CREATININE 1.15 11/20/2017    CALCIUM 9.3 11/20/2017     Lab Results   Component Value Date    WBC 6.3 04/19/2016    HGB 14.9 04/19/2016    HCT 46.0 04/19/2016    MCV 95 04/19/2016     04/19/2016     Lab Results   Component Value Date    INR 2.3 11/28/2017    INR 2.30 (!) 02/07/2017         Outside record review:

## 2021-06-14 NOTE — TELEPHONE ENCOUNTER
Who is calling:  Patient   Reason for Call:  Returning call   Date of last appointment with primary care:   Okay to leave a detailed message: Yes    Patient apologizes. He was shoveling snow.

## 2021-06-15 NOTE — TELEPHONE ENCOUNTER

## 2021-06-15 NOTE — TELEPHONE ENCOUNTER
ANTICOAGULATION  MANAGEMENT    Assessment     Today's INR result of 2.2 is Therapeutic (goal INR of 2.0-3.0)        Warfarin taken as previously instructed    No new diet changes affecting INR    No new medication/supplements affecting INR    Continues to tolerate warfarin with no reported s/s of bleeding or thromboembolism     Previous INR was Therapeutic    Plan:     Spoke on phone with Ady regarding INR result and instructed:      Warfarin Dosing Instructions:  Continue current warfarin dose    4 mg every Thu, Sat; 3 mg all other days       (0 % change)    Instructed patient to follow up no later than: 4 weeks - patient prefers to have it done with scheduled OV with [facility] on 3/14/2021.    Education provided: target INR goal and significance of current INR result, importance of following up for INR monitoring at instructed interval, importance of notifying clinic for changes in medications and monitoring for bleeding signs and symptoms    Ady verbalizes understanding and agrees to warfarin dosing plan.    Instructed to call the [facility] for any changes, questions or concerns. (#[phone])   ?   Salma Marques RN    Subjective/Objective:      Adydarryl Farley, a 80 y.o. male is on warfarin. Ady Garsia reports:     Home warfarin dose: as updated on anticoagulation calendar per template     Missed doses: No     Medication changes:  No     S/S of bleeding or thromboembolism:  No     New Injury or illness:  No     Changes in diet or alcohol consumption:  No     Upcoming surgery, procedure or cardioversion:  No    Anticoagulation Episode Summary     Current INR goal:  2.0-3.0   TTR:  76.6 % (1 y)   Next INR check:  4/6/2021   INR from last check:  2.20 (3/9/2021)   Weekly max warfarin dose:     Target end date:     INR check location:     Preferred lab:     Send INR reminders to:  [facility]    Indications    Atrial flutter  unspecified type (H) [I48.92]  Paroxysmal Atrial  Fibrillation [I48.91]           Comments:           Anticoagulation Care Providers     Provider Role Specialty Phone number    Janis Townsend MD Referring Cardiology [phone]

## 2021-06-15 NOTE — TELEPHONE ENCOUNTER

## 2021-06-15 NOTE — TELEPHONE ENCOUNTER

## 2021-06-15 NOTE — TELEPHONE ENCOUNTER
ANTICOAGULATION  MANAGEMENT    Assessment     Today's INR result of 2.3 is Therapeutic (goal INR of 2.0-3.0)        Warfarin taken as previously instructed    No new diet changes affecting INR    No new medication/supplements affecting INR    Continues to tolerate warfarin with no reported s/s of bleeding or thromboembolism     Previous INR was Supratherapeutic     Patient is scheduled to have first covid vaccine ( moderna )on 3/2     Plan:     Spoke on phone with Ady regarding INR result and instructed:      Warfarin Dosing Instructions:  Continue current warfarin dose    4 mg every Thu, Sat; 3 mg all other days     (0 % change)    Instructed patient to follow up no later than: 2 weeks, appointment made.    Education provided: importance of therapeutic range and importance of following up for INR monitoring at instructed interval    Ady verbalizes understanding and agrees to warfarin dosing plan.    Instructed to call the [facility] for any changes, questions or concerns. (#[phone])   ?   Salma Marques RN    Subjective/Objective:      Ady Farley, a 80 y.o. male is on warfarin. Ady Garsia reports:     Home warfarin dose: as updated on anticoagulation calendar per template     Missed doses: No     Medication changes:  No     S/S of bleeding or thromboembolism:  No     New Injury or illness:  No     Changes in diet or alcohol consumption:  No     Upcoming surgery, procedure or cardioversion:  No    Anticoagulation Episode Summary     Current INR goal:  2.0-3.0   TTR:  76.6 % (1 y)   Next INR check:  3/9/2021   INR from last check:  2.30 (2/23/2021)   Weekly max warfarin dose:     Target end date:     INR check location:     Preferred lab:     Send INR reminders to:  [facility]    Indications    Atrial flutter  unspecified type (H) [I48.92]  Paroxysmal Atrial Fibrillation [I48.91]           Comments:           Anticoagulation Care Providers     Provider Role Specialty Phone  number    Janis Townsend MD Referring Cardiology [phone]

## 2021-06-16 PROBLEM — I47.29 NSVT (NONSUSTAINED VENTRICULAR TACHYCARDIA) (H): Status: ACTIVE | Noted: 2019-10-03

## 2021-06-16 PROBLEM — I50.30 HEART FAILURE WITH PRESERVED EJECTION FRACTION (H): Status: ACTIVE | Noted: 2019-02-28

## 2021-06-16 PROBLEM — G47.33 OSA (OBSTRUCTIVE SLEEP APNEA): Status: ACTIVE | Noted: 2019-07-13

## 2021-06-16 PROBLEM — E89.0 POSTABLATIVE HYPOTHYROIDISM: Status: ACTIVE | Noted: 2019-02-10

## 2021-06-16 PROBLEM — Z95.1 STATUS POST AORTO-CORONARY ARTERY BYPASS GRAFT: Status: ACTIVE | Noted: 2019-02-10

## 2021-06-16 NOTE — PROGRESS NOTES
2.6 INR stable. Discussed continuing management of dose of Warfarin and returning in one month . No changes to diet needed at this time. Continue moderate intake of Vitamin K and call if increase bruising or unexplained bleeding. Call with medication changes or upcoming procedures.

## 2021-06-16 NOTE — TELEPHONE ENCOUNTER
ANTICOAGULATION  MANAGEMENT    Assessment     Today's INR result of 2.5 is Therapeutic (goal INR of 2.0-3.0)        Warfarin taken as previously instructed    No new diet changes affecting INR    No new medication/supplements affecting INR. Office visit with cardiology today, no changes made.    Continues to tolerate warfarin with no reported s/s of bleeding or thromboembolism     Previous INR was Therapeutic    Plan:     Spoke on phone with Ady regarding INR result and instructed:      Warfarin Dosing Instructions:  Continue current warfarin dose 4 mg daily on Thurs, Sat; and 3 mg daily rest of week  (0 % change)    Instructed patient to follow up no later than: 4 weeks     Ady verbalizes understanding and agrees to warfarin dosing plan.    Instructed to call the [facility] for any changes, questions or concerns. (#[phone])   ?   Loly Nelson RN    Subjective/Objective:      Ady Farley, a 80 y.o. male is on warfarin. Ady Garsia reports:     Home warfarin dose: verbally confirmed home dose with Ady and updated on anticoagulation calendar     Missed doses: No     Medication changes:  No     S/S of bleeding or thromboembolism:  No     New Injury or illness:  No     Changes in diet or alcohol consumption:  No     Upcoming surgery, procedure or cardioversion:  No    Anticoagulation Episode Summary     Current INR goal:  2.0-3.0   TTR:  76.6 % (1 y)   Next INR check:  5/12/2021   INR from last check:  2.50 (4/14/2021)   Weekly max warfarin dose:     Target end date:     INR check location:     Preferred lab:     Send INR reminders to:  [facility]    Indications    Atrial flutter  unspecified type (H) [I48.92]  Paroxysmal Atrial Fibrillation [I48.91]           Comments:           Anticoagulation Care Providers     Provider Role Specialty Phone number    Janis Townsend MD Referring Cardiology [phone]

## 2021-06-17 NOTE — TELEPHONE ENCOUNTER
ANTICOAGULATION  MANAGEMENT    Assessment     Today's INR result of 2.5 is Therapeutic (goal INR of 2.0-3.0)        Warfarin taken as previously instructed    No new diet changes affecting INR    No new medication/supplements affecting INR    Continues to tolerate warfarin with no reported s/s of bleeding or thromboembolism     Previous INR was Therapeutic    Plan:     Spoke on phone with Ady regarding INR result and instructed:      Warfarin Dosing Instructions:  Continue current warfarin dose    4 mg every Thu, Sat; 3 mg all other days        (0 % change)    Instructed patient to follow up no later than: 6 weeks, appointment made    Education provided: importance of therapeutic range, target INR goal and significance of current INR result, importance of following up for INR monitoring at instructed interval, importance of notifying clinic for changes in medications and monitoring for bleeding signs and symptoms    Ady verbalizes understanding and agrees to warfarin dosing plan.    Instructed to call the [facility] for any changes, questions or concerns. (#[phone])   ?   Salma Marques RN    Subjective/Objective:      Ady Farley, a 80 y.o. male is on warfarin. Ady Garsia reports:     Home warfarin dose: as updated on anticoagulation calendar per template     Missed doses: No     Medication changes:  No     S/S of bleeding or thromboembolism:  No     New Injury or illness:  No     Changes in diet or alcohol consumption:  No     Upcoming surgery, procedure or cardioversion:  No    Anticoagulation Episode Summary     Current INR goal:  2.0-3.0   TTR:  81.5 % (1 y)   Next INR check:  6/23/2021   INR from last check:  2.50 (5/12/2021)   Weekly max warfarin dose:     Target end date:     INR check location:     Preferred lab:     Send INR reminders to:  [facility]ART [facility]    Indications    Atrial flutter  unspecified type (H) [I48.92]  Paroxysmal Atrial Fibrillation [I48.91]            Comments:           Anticoagulation Care Providers     Provider Role Specialty Phone number    Janis Townsend MD Referring Cardiology [phone]

## 2021-06-17 NOTE — TELEPHONE ENCOUNTER
Telephone Encounter by Salma Marques RN at 2/9/2021  1:13 PM     Author: Salma Marques RN Service: -- Author Type: Registered Nurse    Filed: 2/9/2021  1:42 PM Encounter Date: 2/9/2021 Status: Signed    : Salma Marques RN (Registered Nurse)       ANTICOAGULATION  MANAGEMENT    Assessment     Today's INR result of 3.2 is Supratherapeutic (goal INR of 2.0-3.0)        Patient reported that he most likely took extra dose yesterday. He stated that he thought he took a 3 mg dose , then found 3 mg on the floor and took it too.Explained to patient that dose taken yesterday does not reflect on INR done today yet.    No new diet changes affecting INR    No new medication/supplements affecting INR    Continues to tolerate warfarin with no reported s/s of bleeding or thromboembolism     Previous INR was Therapeutic    Plan:     Spoke on phone with Ady regarding INR result and instructed:      Warfarin Dosing Instructions:  Change warfarin dose to    4 mg every Thu, Sat; 3 mg all other days      (4 % change)  Instructed patient to eat extra greens today.    Instructed patient to follow up no later than: 2 weeks - appointment made.    Education provided: importance of therapeutic range, target INR goal and significance of current INR result and importance of taking warfarin as instructed    Ady verbalizes understanding and agrees to warfarin dosing plan.    Instructed to call the [facility] for any changes, questions or concerns. (#[phone])   ?   Salma Marques RN    Subjective/Objective:      Ady Farley, a 80 y.o. male is on warfarin. Ady Garsia reports:     Home warfarin dose: as updated on anticoagulation calendar per template   See above.    Missed doses: No     Medication changes:  No     S/S of bleeding or thromboembolism:  No     New Injury or illness:  No     Changes in diet or alcohol consumption:  No     Upcoming surgery, procedure or cardioversion:  No    Anticoagulation  Episode Summary     Current INR goal:  2.0-3.0   TTR:  77.5 % (1 y)   Next INR check:  2/23/2021   INR from last check:  3.20 (2/9/2021)   Weekly max warfarin dose:     Target end date:     INR check location:     Preferred lab:     Send INR reminders to:  [facility]    Indications    Atrial flutter  unspecified type (H) [I48.92]  Paroxysmal Atrial Fibrillation [I48.91]           Comments:           Anticoagulation Care Providers     Provider Role Specialty Phone number    Janis Townsend MD Referring Cardiology [phone]

## 2021-06-17 NOTE — TELEPHONE ENCOUNTER
Telephone Encounter by Salma Marques RN at 12/30/2020 11:15 AM     Author: Salma Marques RN Service: -- Author Type: Registered Nurse    Filed: 12/30/2020  1:12 PM Encounter Date: 12/30/2020 Status: Signed    : Salma Marques RN (Registered Nurse)       ANTICOAGULATION  MANAGEMENT    Assessment     Today's INR result of 3.5 is Supratherapeutic (goal INR of 2.0-3.0)        Warfarin taken as previously instructed    Decreased greens/vitamin K intake may be affecting INR due to holiday    No new medication/supplements affecting INR    Continues to tolerate warfarin with no reported s/s of bleeding or thromboembolism     Previous INR was Therapeutic    Plan:     Spoke on phone with Ady regarding INR result and instructed:     Warfarin Dosing Instructions:  1.5 mg lower dose today then continue current warfarin dose    4 mg every Tue, Thu, Sat; 3 mg all other days     (0 % change)    Instructed patient to follow up no later than: 2 -3weeks    Education provided: importance of consistent vitamin K intake, impact of vitamin K foods on INR, vitamin K content of foods and importance of therapeutic range    Ady verbalizes understanding and agrees to warfarin dosing plan.    Instructed to call the [facility] for any changes, questions or concerns. (#[phone])   ?   Salma Marques RN    Subjective/Objective:      Ady Farley, a 80 y.o. male is on warfarin.     Ady reports:     Home warfarin dose: as updated on anticoagulation calendar per template     Missed doses: No     Medication changes:  No     S/S of bleeding or thromboembolism:  No     New Injury or illness:  No     Changes in diet or alcohol consumption:  Yes: diet different during the holiday but is planning to go back to his routine.     Upcoming surgery, procedure or cardioversion:  No    Anticoagulation Episode Summary     Current INR goal:  2.0-3.0   TTR:  84.3 % (1 y)   Next INR check:  1/20/2021   INR from last check:  3.50  (12/30/2020)   Weekly max warfarin dose:     Target end date:     INR check location:     Preferred lab:     Send INR reminders to:  [facility] HEART [facility]    Indications    Atrial flutter  unspecified type (H) [I48.92]  Paroxysmal Atrial Fibrillation [I48.91]           Comments:           Anticoagulation Care Providers     Provider Role Specialty Phone number    Janis Townsend MD Referring Cardiology [phone]

## 2021-06-17 NOTE — PROGRESS NOTES
INR 2.0 decrease greens. INR stable. Discussed continuing management of dose of Warfarin and returning in one month . No changes to diet needed at this time. Continue moderate intake of Vitamin K and call if increase bruising or unexplained bleeding. Call with medication changes or upcoming procedures.

## 2021-06-18 NOTE — PROGRESS NOTES
INR 2.0 pt had been off for Colonoscopy for 6 days and now back on and doing well. After talking with pt and discussing history of greens/salads and medication change. Pt will  continue  with current diet and dosing of Warfarin.  Continue with moderation of Vit K and green leafy vegetables. Cautioned to call with increase bruising or bleeding. Reminded to call with medication change especially antibiotic. Call with any questions or concerns or any up coming procedures. Cautioned about using Herbal medication.

## 2021-06-19 NOTE — PROGRESS NOTES
1940  Home:[phone] (home) Cell:There is no such number on file (mobile).  Emergency Contact: Yulia Farley [phone]    +++Important patient information for CSC/Cath Lab staff : PT HAS A [employer] PACEMAKER+++    [facility] EP Cath Lab Procedure Order     Cardioversion:      Cardioversion     INR'S ARE IN CHART UNDER LAB TAB AND WERE CLEARED BY CISCO PEREZ NP    7/23=2.3  6/26=2.5  5/29=2.0  4/24=2.0  3/27=2.6    Diagnosis:  AF  Anticipated Case Duration:  Standard  Scheduling Needs/Timeframe:  PT IS SET FOR TUSDAY 7/24/2018 AT 12 00 WITH DR MUNOZ    Current Device: Dual Pacemaker  Device Company/Device Rep Needed for Procedure: [employer]    Anesthesia:  General-CV Only  Research Protocol:  No    [facility] EP Cath Lab Prep   Ordering Provider: Cisco Perez NP  Ordering Date: 7/23/2018  Orders Status: Intial order placed and Order set placed  EP NC Contact: Ana Hatfield LPN    H&P:  Compled by CISCO PEREZ CNP on 7/23/2018 if scheduled within 30 days, pt to schedule with PMD if procedure outside of this timeframe  PCP: Martín Ross MD, [phone]    Pre-op Labs: N/A for procedure    Medical Records Pertinent for Procedure:  N/A    Patient Education:    PT HAS A  FOR PROCEDURE WHO WILL STAY WITH HIM AND BE PRESENT FOR DISCHARGE AND 24 HR MONITORING AT HOME AFTER CV  PT INSTRUCTED TO HOLD ANY VITAMINS, MINERALS, CALCIUM, IRON OR SUPPLEMENTS THE MORNING OF CV  PT INSTRUCTED TO BATHE OR SHOWER BEFORE COMING IN  PT INSTRUCTED TO LEAVE JEWELRY AT HOME  PT IS ON COUMADIN, INR'S CLEARED BY CISCO PEREZ CNP  PT HAS [employer] PACEMAKER  PT HAS A FOLLOW UP WITH JAN 8/24 AND IS TO KEEP THAT      Teach with Patient: Completed via phone on 7/23/2018    Risks Reviewed:     Cardioversion    >90% acute success rate, <10% failure to convert or   reverts shortly after cardioversion.    <1% embolic event of (CVA, pulmonary embolism, or   other site).    75% risk for superficial burn.  Risks associated with  general anesthesia will be addressed by the Anesthesiology Department    Consent: Will be obtained in [facility] day of procedure    Pre-Procedure Instructions that were Reviewed with Patient:  NPO after midnight, Remove all jewelry prior to coming in for procedure, Shower prior to arrival, Transportation arrangements needed s/p procedure, Post-procedure follow up process, Sedation plan/orders and Pre-procedure letter was sent to pt by CV     Pre-Procedure Medication Instructions:  Instructions given to pt regarding anticoagulants: Coumadin- instructed to continue anticoagulation uninterrupted through their procedure  Instructions given to pt regarding antiarrhythmic medication: None; N/A  Instructions for medication, other than anticoagulants/antiarrhythmics listed above, given to pt: to take all morning medications with small sips of water, with the exception of OTC supplements and MVI    Allergies   Allergen Reactions     Carvedilol Shortness Of Breath     Lisinopril Cough       Current Outpatient Prescriptions:      amLODIPine (NORVASC) 10 MG tablet, Take 10 mg by mouth daily., Disp: , Rfl:      amoxicillin (AMOXIL) 500 MG capsule, Prior to the Dentist, Disp: , Rfl:      aspirin 81 MG EC tablet, Take 81 mg by mouth daily., Disp: , Rfl:      atorvastatin (LIPITOR) 20 MG tablet, Take 20 mg by mouth bedtime., Disp: , Rfl: 1     docoshexanoic acid-eicosapent 500 mg (FISH OIL) 500-100 mg cap capsule, Take 1,000 mg by mouth daily., Disp: , Rfl:      furosemide (LASIX) 20 MG tablet, Take 20 mg by mouth daily., Disp: , Rfl:      KLOR-CON M20 20 mEq tablet, Take 1 tablet by mouth daily., Disp: , Rfl:      levothyroxine (SYNTHROID, LEVOTHROID) 150 MCG tablet, Take 150 mcg by mouth daily., Disp: , Rfl:      losartan (COZAAR) 50 MG tablet, Take 1 tablet (50 mg total) by mouth daily., Disp: 90 tablet, Rfl: 4     MULTIVITAMIN ORAL, Take 1 tablet by mouth daily., Disp: , Rfl:      simvastatin (ZOCOR) 40 MG tablet, Take 20 mg  by mouth at bedtime. , Disp: , Rfl:      warfarin (COUMADIN) 1 MG tablet, With 3 mg tab take 4 mg T, thur and sat and 3 mg all other days., Disp: 90 tablet, Rfl: 0     warfarin (COUMADIN) 3 MG tablet, With 1 mg tab take 4 mg t, thur and sat and 3 mg all other days., Disp: 90 tablet, Rfl: 0    Documentation Date:7/23/2018 1:21 PM  Kelly Hatfield LPN

## 2021-06-19 NOTE — ANESTHESIA PREPROCEDURE EVALUATION
Anesthesia Evaluation      Patient summary reviewed   No history of anesthetic complications     Airway   Mallampati: II  Neck ROM: full   Pulmonary - normal exam    breath sounds clear to auscultation  (-) not a smoker (Former)                         Cardiovascular - normal exam  (+) pacemaker (Pacemaker, complete heart block), hypertension, valvular problems/murmurs (s/p mitral valve repair 2007), CAD, CABG/stent (s/p CABG x 1 vessels 2007), dysrhythmias (a.fib), cardiomyopathy, hypercholesterolemia, PVD    ECG reviewed        Neuro/Psych - negative ROS     Endo/Other    (+) hypothyroidism, obesity (BMI 32),      GI/Hepatic/Renal - negative ROS      Other findings: 5/31/17 Echo  Summary      When compared to the previous study dated 1/26/2015, no significant change    Left ventricle ejection fraction is normal. The calculated left ventricular ejection fraction is 60%.    Mitral valve repair with mild mitral regurgitation    Pacemaker lead in right heart chambers          Dental - normal exam                        Anesthesia Plan  Planned anesthetic: total IV anesthesia and general mask    ASA 3   Induction: intravenous   Anesthetic plan and risks discussed with: patient    Post-op plan: routine recovery

## 2021-06-19 NOTE — PROGRESS NOTES
In clinic device check with Device Nurse followed by office visit with Cisco Perez NP.  Please see link for full device report.  Patient was informed of results and next follow up during today's visit.

## 2021-06-19 NOTE — ANESTHESIA CARE TRANSFER NOTE
Last vitals:   Vitals:    07/24/18 1353   BP: 148/71   Pulse: 70   Resp: 16   Temp:    SpO2: 95%     Patient's level of consciousness is awake  Spontaneous respirations: yes  Maintains airway independently: yes  Dentition unchanged: yes  Oropharynx: oropharynx clear of all foreign objects    QCDR Measures:  ASA# 20 - Surgical Safety Checklist: WHO surgical safety checklist completed prior to induction  PQRS# 430 - Adult PONV Prevention: NA - Not adult patient, not GA or 3 or more risk factors NOT present  ASA# 8 - Peds PONV Prevention: NA - Not pediatric patient, not GA or 2 or more risk factors NOT present  PQRS# 424 - Chantelle-op Temp Management: NA - MAC anesthesia or case < 60 minutes  PQRS# 426 - PACU Transfer Protocol: - Transfer of care checklist used  ASA# 14 - Acute Post-op Pain: ASA14B - Patient did NOT experience pain >= 7 out of 10

## 2021-06-19 NOTE — ANESTHESIA POSTPROCEDURE EVALUATION
Patient: Ady Farley  * No procedures listed *  Anesthesia type: general    Patient location: Telemetry/Step Down Unit  Last vitals:   Vitals:    07/24/18 1400   BP: 138/64   Pulse: 69   Resp: 21   Temp:    SpO2: 95%     Post vital signs: stable  Level of consciousness: awake, alert and oriented  Post-anesthesia pain: pain controlled  Post-anesthesia nausea and vomiting: no  Pulmonary: unassisted, return to baseline  Cardiovascular: stable and blood pressure at baseline  Hydration: adequate  Anesthetic events: no    QCDR Measures:  ASA# 11 - Chantelle-op Cardiac Arrest: ASA11B - Patient did NOT experience unanticipated cardiac arrest  ASA# 12 - Chantelle-op Mortality Rate: ASA12B - Patient did NOT die  ASA# 13 - PACU Re-Intubation Rate: NA - No ETT / LMA used for case  ASA# 10 - Composite Anes Safety: ASA10A - No serious adverse event    Additional Notes:

## 2021-06-19 NOTE — LETTER
Letter by Yessy Lainez RDCS at      Author: Yessy Lainez RDCS Service: -- Author Type: --    Filed:  Encounter Date: 6/25/2019 Status: (Other)         Ady Farley  [address]  [facility] [address]      June 25, 2019      Dear Mr. Farley,    RE: Remote Results    We are writing to you regarding your recent Remote Pacemaker check from home. Your transmission was received successfully. Battery status is satisfactory at this time.     Your results are showing no significant changes.    Your next device appointment will be a remote check on September 26, 2019.  You can choose the time of day you wish to transmit.    To schedule or reschedule, please call [phone] and press 1.    NOTE: If you would like to do an extra transmission, please call [phone] and press 3 to speak to a nurse BEFORE transmitting. This ensures that the Device Clinic staff is aware of the reason you are sending a transmission, and can follow-up with you after it has been reviewed.    We will be checking your implanted device from home (remotely) every three months unless otherwise instructed. We will need to see you in the clinic at least once a year. You may need to be seen in the clinic sooner depending on the results of your check.    Please be aware:    The follow-up schedule is like a Physician prescription.    Your remote monitor is paired to your specific implanted device.      Sincerely,    [facility]

## 2021-06-19 NOTE — LETTER
Letter by Tai Boudreaux MD at      Author: Tai Boudreaux MD Service: -- Author Type: --    Filed:  Encounter Date: 7/13/2019 Status: (Other)         Ady Farley  [address] [facility] [address]             July 13, 2019         Dear Mr. Farley,    Your test results from your recent sleep study are attached. Of note, you did not sleep much and it appears you did not take the prescribed sleep aid.  I also noticed you canceled your follow up. Please contact at your earliest convenience to schedule a follow up and discuss next steps in your care.    Sincerely,        Tai Boudreaux MD

## 2021-06-19 NOTE — LETTER
Letter by Tai Boudreaux MD at      Author: Tai Boudreaux MD Service: -- Author Type: --    Filed:  Encounter Date: 9/13/2019 Status: (Other)         Ady Farley  [address]  [facility] [address]               September 13, 2019      Dear Ady:    We are sorry that you missed your appointment with us. Attached is a copy of your sleep study for your records. If you would like to get your mild obstructive sleep apnea treated, please call our office as soon as possible so that we may reschedule your appointment.    Sincerely,        Tai Boudreaux MD

## 2021-06-19 NOTE — PROGRESS NOTES
INR 2.2 spoke with pt. Continue current management dosing of Warfarin. Continue  diet of moderate Vitamin K intake. Discussed with pt the need to call with questions or concerns or any change in medication especially herbal medication or OTC. Call with increased bleeding or bruising or any upcoming procedures.

## 2021-06-20 NOTE — LETTER
Letter by Kishore Hogan RN at      Author: Kishore Hogan RN Service: -- Author Type: --    Filed:  Encounter Date: 7/2/2020 Status: (Other)         Ady Farley  [address] [facility] [address]      July 2, 2020      Dear Mr. Farley,    RE: Remote Results    We are writing to you regarding your recent Remote Pacemaker check from home. Your transmission was received successfully. Battery status is satisfactory at this time.     Your results are unchanged and have been discussed over the telephone with you.    Your next device appointment will be a clinic visit.  Please call in August to schedule.      To schedule or reschedule, please call [phone] and press 1.    NOTE: If you would like to do an extra transmission, please call [phone] and press 3 to speak to a nurse BEFORE transmitting. This ensures that the Device Clinic staff is aware of the reason you are sending a transmission, and can follow-up with you after it has been reviewed.    We will be checking your implanted device from home (remotely) every three months unless otherwise instructed. We will need to see you in the clinic at least once a year. You may need to be seen in the clinic sooner depending on the results of your check.    Please be aware:    The follow-up schedule is like a Physician prescription.    Your remote monitor is paired to your specific implanted device.      Sincerely,    [facility]

## 2021-06-20 NOTE — LETTER
Letter by Yessy Lainez RDCS at      Author: Yessy Lainez RDCS Service: -- Author Type: --    Filed:  Encounter Date: 3/31/2020 Status: (Other)         Ady Farley  [address]  [facility] [address]      March 31, 2020      Dear Mr. Farley,    RE: Remote Results    We are writing to you regarding your recent Remote Pacemaker check from home. Your transmission was received successfully. Battery status is satisfactory at this time.     Your results are showing no significant changes.    Your next device appointment will be a remote check on July 1, 2020.  You can choose the time of day you wish to transmit.    To schedule or reschedule, please call [phone] and press 1.    NOTE: If you would like to do an extra transmission, please call [phone] and press 3 to speak to a nurse BEFORE transmitting. This ensures that the Device Clinic staff is aware of the reason you are sending a transmission, and can follow-up with you after it has been reviewed.    We will be checking your implanted device from home (remotely) every three months unless otherwise instructed. We will need to see you in the clinic at least once a year. You may need to be seen in the clinic sooner depending on the results of your check.    Please be aware:    The follow-up schedule is like a Physician prescription.    Your remote monitor is paired to your specific implanted device.      Sincerely,    [facility]

## 2021-06-20 NOTE — LETTER
Letter by Solange Field at      Author: Solange Field Service: -- Author Type: --    Filed:  Encounter Date: 12/31/2019 Status: Signed         Ady Farley  [address] [facility] [address]      December 31, 2019      Dear Mr. Farley,    RE: Remote Results    We are writing to you regarding your recent Remote Pacemaker check from home. Your transmission was received successfully. Battery status is satisfactory at this time.     Your results are showing no significant changes.    Your next device appointment will be a remote check on March 31, 2020.  You can choose the time of day you wish to transmit.    To schedule or reschedule, please call [phone] and press 1.    NOTE: If you would like to do an extra transmission, please call [phone] and press 3 to speak to a nurse BEFORE transmitting. This ensures that the Device Clinic staff is aware of the reason you are sending a transmission, and can follow-up with you after it has been reviewed.    We will be checking your implanted device from home (remotely) every three months unless otherwise instructed. We will need to see you in the clinic at least once a year. You may need to be seen in the clinic sooner depending on the results of your check.    Please be aware:    The follow-up schedule is like a Physician prescription.    Your remote monitor is paired to your specific implanted device.      Sincerely,    [facility]

## 2021-06-21 ENCOUNTER — COMMUNICATION - HEALTHEAST (OUTPATIENT)
Dept: CARDIOLOGY | Facility: CLINIC | Age: 81
End: 2021-06-21

## 2021-06-21 DIAGNOSIS — I25.10 CAD (CORONARY ARTERY DISEASE): ICD-10-CM

## 2021-06-21 DIAGNOSIS — Z79.01 LONG TERM (CURRENT) USE OF ANTICOAGULANTS: ICD-10-CM

## 2021-06-21 DIAGNOSIS — I48.91 ATRIAL FIBRILLATION (H): ICD-10-CM

## 2021-06-21 RX ORDER — LOSARTAN POTASSIUM 50 MG/1
50 TABLET ORAL DAILY
Qty: 90 TABLET | Refills: 2 | Status: SHIPPED | OUTPATIENT
Start: 2021-06-21 | End: 2021-12-20

## 2021-06-21 RX ORDER — WARFARIN SODIUM 1 MG/1
TABLET ORAL
Qty: 90 TABLET | Refills: 1 | Status: SHIPPED | OUTPATIENT
Start: 2021-06-21 | End: 2021-12-20

## 2021-06-21 NOTE — PROGRESS NOTES
INR 1.8 may have missed a dose. Continue current management dosing of Warfarin. Continue  diet of moderate Vitamin K intake. Discussed with pt the need to call with questions or concerns or any change in medication especially herbal medication or OTC. Call with increased bleeding or bruising or any upcoming procedures.  Retest in 2 weeks.

## 2021-06-21 NOTE — LETTER
Letter by Sruthi Fry RN at      Author: Sruthi Fry RN Service: -- Author Type: --    Filed:  Encounter Date: 1/8/2021 Status: (Other)         Ady Farley  [address]  [facility] [address]      January 8, 2021      Dear Mr. Farley,    RE: Remote Results    We are writing to you regarding your recent Remote Pacemaker check from home. Your transmission was received successfully. Battery status is satisfactory at this time.     Your results are within normal limits.    Your next device appointment will be a remote check on 4/9/21.  You can choose the time of day you wish to transmit.    To schedule or reschedule, please call [phone] and press 1.    NOTE: If you would like to do an extra transmission, please call [phone] and press 3 to speak to a nurse BEFORE transmitting. This ensures that the Device Clinic staff is aware of the reason you are sending a transmission, and can follow-up with you after it has been reviewed.    We will be checking your implanted device from home (remotely) every three months unless otherwise instructed. We will need to see you in the clinic at least once a year. You may need to be seen in the clinic sooner depending on the results of your check.    Please be aware:    The follow-up schedule is like a Physician prescription.    Your remote monitor is paired to your specific implanted device.      Sincerely,    [facility]

## 2021-06-23 ENCOUNTER — AMBULATORY - HEALTHEAST (OUTPATIENT)
Dept: CARDIOLOGY | Facility: CLINIC | Age: 81
End: 2021-06-23

## 2021-06-23 ENCOUNTER — COMMUNICATION - HEALTHEAST (OUTPATIENT)
Dept: ANTICOAGULATION | Facility: CLINIC | Age: 81
End: 2021-06-23

## 2021-06-23 DIAGNOSIS — I48.92 ATRIAL FLUTTER, UNSPECIFIED TYPE (H): ICD-10-CM

## 2021-06-23 DIAGNOSIS — I48.91 ATRIAL FIBRILLATION (H): ICD-10-CM

## 2021-06-23 LAB — POC INR - HE - HISTORICAL: 2.7 (ref 0.9–1.1)

## 2021-06-24 NOTE — PROGRESS NOTES
Patient seen in clinic for HF education s/p recent hospital discharge  Reviewed HF Binder that includes the  HF Sx Awareness & Action plan  handout and  A Stronger Pump  booklet and Weight log booklet highlighting :  __X_patient s type of heart failure _X__Na management in diet  __X_importance of daily wts    __X_medication review and importance of compliance     Instructed patient in signs and sx of heart failure, reiterated when to call clinic - reviewed HF hotline # [phone] and after hours call # [phone].  Majority of time was spent reviewing: symptom management and low sodium diet  Patient verbalized understanding of HF discussion.  Plan for f/u with continued HF education reviewed.  Will continue to reinforce HF management education.

## 2021-06-24 NOTE — PATIENT INSTRUCTIONS - HE
Ady Farley,    It was a pleasure to see you today at the [facility].     My recommendations after this visit include:  - Please follow up with Dr Townsend March 26   - Please follow up with Dr Gage with a device check on April 3  - Please follow up with Abby Johnson in 3 months  - I have checked labs and will contact you with the results  - No changes to your medications    Abby Johnson CNP    What is the [facility] Failure Program?     The [facility] Failure Program is a heart failure specialty clinic within [facility].  You will work with your cardiologist, nurse practitioner, and nurses to carefully adjust medications and learn how to live with heart failure.  The Heart Failure Program will help you:      Better understand your chronic heart condition    Feel better and avoid hospital stays    Monitoring for Symptoms      Call the Heart Failure Phone Line ([phone]) if you have any of these symptoms:     Increased shortness of breath/shortness of breath at rest    Waking up at night with difficulty breathing    Unable to lie down for sleep due to symptoms or needing to sit upright for sleep    Weight gain of 2 pounds a day for 2 days in a row OR 5 pounds in 1 week    Increased swelling in your ankles or legs    Dizziness or lightheadedness    Medications       Take your medications as prescribed    Bring all your medications in their original bottles to every appointment    Avoid non-steroidal anti-inflammatory medications (Advil, Aleve, Ibuprofen, Naprosyn, Naproxen, Celebrex)    Do not stop taking your medications or begin taking over-the-counter or herbal medications without first talking to your doctor or nurse practitioner    Diet and Lifestyle       Limit sodium/salt to 2000 mg daily   o Read food labels for sodium content  o Do not add salt when cooking or add salt at the table    Weigh yourself every day and record in your daily weight log    o Call if you gain 2 pounds a day for 2 days in a row OR 5 pounds in 1 week  o Bring daily weight log to every appointment    Stay active, pace yourself, listen to your body, and rest when tired    Elevate your legs if they are swollen. Ask about using compression/support stockings    Stop smoking    Lose weight if you are overweight    Avoid drinking alcohol or limit amount    Stay updated on your immunizations including flu and pneumonia vaccines

## 2021-06-25 NOTE — PROGRESS NOTES
Progress Notes by Janis Townsend MD at 6/7/2017  8:30 AM     Author: Janis Townsend MD Service: -- Author Type: Physician    Filed: 6/7/2017  9:26 AM Encounter Date: 6/7/2017 Status: Signed    : Janis Townsend MD (Physician)           Click to link to [facility] HEART CARE NOTE    Thank you, Dr. Ross, for asking us to see Ady Farley at the [facility].      Assessment/Recommendations   Assessment:    1.  Nonsustained ventricular tachycardia: History of coronary artery disease status post SVG to RCA when he underwent mitral valve repair in 2007.  No anginal complaints however will proceed with Lexiscan nuclear stress test with further evaluation given the findings on his device check.  2.  Atrial dysrhythmia: Atrial fibrillation/flutter status post PVI and flutter ablation without recurrent arrhythmias.  3.  Hypertension: Well controlled.  4.  Follow-up in 1 year.       History of Present Illness    Mr. Ady Farley is a 76 y.o. male with a known history of coronary artery disease and mitral regurgitation status post SVG to his RCA and mitral valve repair in 2007, atrial fibrillation/flutter status post PVI and flutter ablation, status post biventricular pacemaker who I am seeing today in follow up.   He has been doing well since I last saw him.  He has been playing golf and has not noted any problems with chest pain or breathing difficulty.  He denies any palpitations.  His last device check in March showed 3 runs of nonsustained ventricular tachycardia for 8-11 beats.          Physical Examination Review of Systems   Vitals:    06/07/17 0835   BP: 122/52   Pulse: 76   Resp: 16     Body mass index is 30.53 kg/(m^2).  Wt Readings from Last 3 Encounters:   06/07/17 222 lb (100.7 kg)   05/31/17 (!) 230 lb (104.3 kg)   12/02/16 (!) 230 lb (104.3 kg)       General Appearance:   alert, no apparent distress   HEENT:  no scleral  icterus; the mucous membranes are pink and moist                                  Neck: jugular venous pressure normal   Chest: the spine is straight and the chest is symmetric   Lungs:   respirations unlabored; the lungs are clear to auscultation   Cardiovascular:   regular rhythm with normal first and second heart sounds and no murmurs or gallops; carotid pulses are intact and there are no carotid  bruits.   Abdomen:  no organomegaly, masses, bruits, or tenderness; bowel sounds are present   Extremities: no cyanosis, clubbing, or edema   Skin: no xanthelasma    General: WNL  Eyes: WNL  Ears/Nose/Throat: WNL  Lungs: WNL  Heart: WNL  Stomach: WNL  Bladder: WNL  Muscle/Joints: WNL  Skin: WNL  Nervous System: WNL  Mental Health: WNL     Blood: WNL     Medical History  Surgical History Family History Social History   Past Medical History:   Diagnosis Date   ? Atrial fibrillation    ? Atrial flutter    ? Cardiomyopathy    ? 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 Coronary artery disease    ? Disease of thyroid gland    ? Disorder of phrenic nerve    ? Hyperlipidemia    ? Hypertension    ? Malfunction of biventricular cardiac pacemaker battery 12/1/2015    AdorStyle device recall   ? PVC (premature ventricular contraction)    ? PVD (peripheral vascular disease)    ? Status post ablation of atrial fibrillation 11/18/2015    MAZE May 2007 PVI May 31, 2011 (RF-PVI + TIAN line) PVI Oct 30, 2012 (Cryo-PVI + redo TIAN line + roof line + RA-scar flutter line)    Past Surgical History:   Procedure Laterality Date   ? MITRAL VALVE REPAIR     ? DE ABLATE HEART DYSRHYTHM FOCUS      Description: Catheter Ablation Atrial Flutter;  Recorded: 10/30/2012;  Comments: Typical RA CTI flutter Sept 2007; ; Multiple atypical flutter Oct 2012 (Roof line + reinforce TIAN line + RA scar line)   ? DE ABLATE HEART DYSRHYTHM FOCUS      Description: Catheter Ablation Atrial Fibrillation;  Recorded: 10/24/2013;  Comments: May 2011 (PVI  > 90% recurrence post MAZE + TINA line); ; Re do PVI Oct 2012 (Cryo PVI + roof line + TIAN touch up + RA scar flutter); ; No recurrence by device check at 12 months   ? DE ABLATE HEART DYSRHYTHM FOCUS      Description: Catheter Ablation Atrial Fibrillation;  Recorded: 10/30/2014;  Comments: May 2011 (PVI > 90% recurrence post MAZE + TIAN line); ; Re do PVI Oct 2012 (Cryo PVI + roof line + TIAN touch up + RA scar flutter); ; No recurrence by device check at 12 months   ? DE ABLATE HEART DYSRHYTHM FOCUS      Description: Catheter Ablation Atrial Flutter;  Recorded: 10/30/2014;  Comments: Typical RA CTI flutter Sept 2007; ; Multiple atypical flutter Oct 2012 (Roof line + reinforce TIAN line + RA scar line)   ? DE ABLATE/ RECONSTUCT ATRIA, LIMITED      Description: Maze Procedure;  Proc Date: 05/01/2007;  Comments: May 2007 at time of valve repair   ? DE CABG, VEIN, SINGLE      Description: CABG (CABG);  Recorded: 01/18/2012;  Comments: ONE VESSEL RCA   ? DE ICAR CATHETER ABLATION ATRIOVENTR NODE FUNCTION      Description: Catheter Ablation Atrioventricular Node;  Recorded: 08/13/2012;  Comments: Mar 2008 performed due to inability to biventricular with conduction abnormality   ? DE ICAR CATHETER ABLATION ATRIOVENTR NODE FUNCTION      Description: Catheter Ablation Atrioventricular Node;  Recorded: 10/30/2014;  Comments: Mar 2008 performed due to inability to biventricular with conduction abnormality    Family History   Problem Relation Age of Onset   ? No Medical Problems Mother    ? No Medical Problems Father    ? No Medical Problems Sister    ? No Medical Problems Brother    ? No Medical Problems Daughter    ? No Medical Problems Son    ? Acute Myocardial Infarction Neg Hx    ? Alcohol abuse Neg Hx    ? Alzheimer's disease Neg Hx    ? Amputation of Leg Below Knee Neg Hx    ? Aortic Valve Replacement Neg Hx    ? Arrhythmogenic Right Ventricular Cardiomyopathy Neg Hx    ? Atrial fibrillation Neg Hx    ? Atrial Flutter Neg  Hx    ? Breast cancer Neg Hx    ? CABG Neg Hx    ? Cancer Neg Hx    ? Cardiomyopathy Neg Hx    ? Carotid Endarterectomy Neg Hx    ? Cerebral aneurysm Neg Hx    ? Chronic Kidney Disease Neg Hx    ? COPD Neg Hx    ? Colon cancer Neg Hx    ? Congenital heart disease Neg Hx    ? Coronary artery disease Neg Hx    ? Coronary Stenting Neg Hx    ? Dementia Neg Hx    ? Diabetes type I Neg Hx    ? Diabetes type II Neg Hx    ? Dialysis Neg Hx    ? Dyslipidemia Neg Hx    ? Heart failure Neg Hx    ? Hypertension Neg Hx    ? Hypertrophic cardiomyopathy Neg Hx    ? ICD Neg Hx    ? Long QT syndrome Neg Hx    ? Mitral Regurgitation Neg Hx    ? Mitral Valve Replacement Neg Hx    ? Morbid Obesity Neg Hx    ? Heart attack Neg Hx    ? Obstructive Sleep Apnea Neg Hx    ? Ovarian cancer Neg Hx    ? Pacemaker Neg Hx    ? Pancreatic cancer Neg Hx    ? Peripheral vascular disease Neg Hx    ? Pulmonary embolism Neg Hx    ? Pulmonary Hypertension Neg Hx    ? Rheumatic Heart Disease Neg Hx    ? Stroke Neg Hx    ? Sudden death Neg Hx    ? Transient ischemic attack Neg Hx    ? Valvular heart disease Neg Hx     Social History     Social History   ? Marital status:      Spouse name: N/A   ? Number of children: N/A   ? Years of education: N/A     Occupational History   ? Not on file.     Social History Main Topics   ? Smoking status: Former Smoker     Packs/day: 1.00     Quit date: 1/1/1990   ? Smokeless tobacco: Not on file   ? Alcohol use 1.2 oz/week     2 Cans of beer per week   ? Drug use: No   ? Sexual activity: Not on file     Other Topics Concern   ? Not on file     Social History Narrative          Medications  Allergies   Current Outpatient Prescriptions   Medication Sig Dispense Refill   ? amLODIPine (NORVASC) 10 MG tablet Take 10 mg by mouth daily.     ? aspirin 81 MG EC tablet Take 81 mg by mouth daily.     ? atorvastatin (LIPITOR) 20 MG tablet Take 20 mg by mouth bedtime.  1   ? docoshexanoic acid-eicosapent 500 mg (FISH OIL)  500-100 mg cap capsule Take 1,000 mg by mouth daily.     ? furosemide (LASIX) 20 MG tablet Take 20 mg by mouth daily.     ? KLOR-CON M20 20 mEq tablet Take 1 tablet by mouth daily.     ? levothyroxine (SYNTHROID, LEVOTHROID) 150 MCG tablet Take 150 mcg by mouth daily.     ? losartan (COZAAR) 50 MG tablet Take 1 tablet by mouth daily.     ? MULTIVITAMIN ORAL Take 1 tablet by mouth daily.     ? warfarin (COUMADIN) 1 MG tablet TAKE ONE TABLET DAILY OR AS DIRECTED. 60 tablet 0   ? warfarin (COUMADIN) 3 MG tablet TAKE 1 TABLET WITH A 1 MG TABLET (4 MG) BRENNON GAMEZ, SAT; TAKE 1 TABLET ALONE (3 MG) ALL OTHER DAYS. 90 tablet 0   ? amoxicillin (AMOXIL) 500 MG capsule Prior to the Dentist     ? losartan (COZAAR) 50 MG tablet TAKE 1 TABLET DAILY 90 tablet 2   ? simvastatin (ZOCOR) 40 MG tablet 20 mg bedtime.       No current facility-administered medications for this visit.       Allergies   Allergen Reactions   ? Carvedilol Shortness Of Breath   ? Lisinopril Cough         Lab Results    Chemistry/lipid CBC Cardiac Enzymes/BNP/TSH/INR   Lab Results   Component Value Date    CHOL 168 11/01/2016    HDL 41 11/01/2016    LDLCALC 102 11/01/2016    TRIG 126 11/01/2016    CREATININE 1.17 11/01/2016    BUN 10 11/01/2016    K 3.7 11/01/2016     11/01/2016     11/01/2016    CO2 25 11/01/2016    Lab Results   Component Value Date    WBC 6.3 04/19/2016    HGB 14.9 04/19/2016    HCT 46.0 04/19/2016    MCV 95 04/19/2016     04/19/2016    Lab Results   Component Value Date    CKTOTAL 111 09/10/2015     (H) 11/06/2012    TSH 2.81 09/10/2015    INR 2.7 (!) 05/30/2017

## 2021-06-25 NOTE — TELEPHONE ENCOUNTER
Patient called back to discuss results on remote- he doesn't recall feeling any of the NSVT episodes. He thinks he was on [place] way [place] from the [employer] festival riding in his daughter's car.  Connie L Helppi Device RN

## 2021-06-25 NOTE — TELEPHONE ENCOUNTER
Type: routine CRT-P remote transmission.   Presenting rhythm: AP-biVP 73 bpm.  Battery/lead status: stable.  Arrhythmias: since 4/9/21; 22 mode switch episodes longest lasting 2hrs EGMs confirm AF, AF burden 0.4%. Two ventricular high rate episodes, both appear to be NSVT, episode #466 occurring on 5/15/21 lasting ~12sec, 28bts 150's.  Comments: normal pacemaker function. Routed to device RN for review. ALEX    Addendum: Transmission and chart reviewed. BiVp 98.5%. Stored EGM does not capture onset of VT Episode #466, but on VT Episode #460 there is a Safety Pace ~110 ms following the first Vs beat of the NSVT. Echocardiogram on 05/05/21 shows LV EF 60%.  Routed to Dr. Townsend as Dr. Gage is out of the office this week.  Left VM for patient to assess for symptoms.  Tia Hicks, RN, MA  Device Nurse  [facility]

## 2021-06-26 NOTE — PROGRESS NOTES
Progress Notes by Victoria Perez CNP at 8/20/2018  8:30 AM     Author: Victoria Perez CNP Service: -- Author Type: Nurse Practitioner    Filed: 8/20/2018  9:19 AM Encounter Date: 8/20/2018 Status: Signed    : Victoria Perez CNP (Nurse Practitioner)          Click to link to [facility] Heart [facility] HEART [facility] ELECTROPHYSIOLOGY NOTE      Assessment/Recommendations   Assessment/Plan:    Diagnoses and all orders for this visit:    Atrial flutter, unspecified type (H) and appears mildly symptomatic with improvement in energy level since cardioversion.  He can only notice a difference out on the golf [place] and see [facility] for details.  I discussed with Ady if he does not maintain paced rhythm on his own, he may need to reconsider whether he wants to be on antiarrhythmics or can live with small change in energy level .  It appears to be a small improvement.    Paroxysmal atrial fibrillation (H) history with multiple ablations in the past including AV node ablation.    Essential hypertension and well-controlled.    Presence of biventricular cardiac pacemaker and device functioning appropriately.    Atherosclerosis of coronary artery of native heart without angina pectoris, unspecified vessel or lesion type  And no anginal symptoms.      [id number] score of 4 and on chronic warfarin.  He had an INR and reports it to be 2.2.  He is dosed by Didi Cuello RN in our clinic.  Follow up in 6 months with device check and see Dr. Gage.  To follow-up with Dr. Townsend in 1 year which should be next summer.     History of Present Illness    Mr. Ady Farley is a very pleasant 78 y.o. male who comes in today for EP follow-up after cardioversion on 7/24/2018.  Ady Farley has a known history of coronary artery disease with coronary artery bypass surgery, mitral valve disease and atrial arrhythmias.  In 2007 Ady had coronary artery bypass with maze and mitral valve repair.  He recently  "started having nonsustained VT and ischemic workup was negative.  He follows with Dr. Townsend for his valvular and coronary artery disease.  Ady has had multiple ablations in the past with a right CTI flutter ablation in 2007 and then in 2011 and 12 he had repeat PVI.  He has had reoccurrence since.  In 2012 he had an AV node ablation with CRT P placement.   He has had no significant arrhythmias until he was dog sledding in [facility] when he developed an episode of atrial flutter in February when the sled tipped over and scared him.  He then had a persistent episode of atrial flutter which started in June and ongoing.  He was questioning some dyspnea on exertion.    Ady had a device check prior to this visit and it shows that he is maintaining paced rhythm since cardioversion.  He states he has not noticed any change in in his breathing with activity but has noticed that he has more energy on the golf course.  He complains of his legs being weak when he is  standing at the hole and this is much better now since cardioversion.  He says \"I do not know if I am imagining this in my head \".  He denies any cardiac symptoms on questioning.  He golfs twice a week.  He is continued to be active.      Cardiographics (personally reviewed):  Results for orders placed during the hospital encounter of 05/31/17   Echo Complete [ECH10] 05/31/2017    Narrative   When compared to the previous study dated 1/26/2015, no significant   change    Left ventricle ejection fraction is normal. The calculated left   ventricular ejection fraction is 60%.    Mitral valve repair with mild mitral regurgitation    Pacemaker lead in right heart chambers          Results for orders placed during the hospital encounter of 06/20/17   NM Pharmacologic Stress Test    Narrative   1.The pharmacologic nuclear stress test is abnormal.    2.There is a small area of nontransmural infarction in the lateral   segment(s) of the left ventricle which extends " slightly to the anterior   wall. No ischemia is suggested.    3.The left ventricular ejection fraction is 52%. Septal hypokinesis seen   which is consistent with prior open thoracotomy.    4.Incidental note is made of some right ventricular uptake.    5.There is no prior study available.        Device check shows leads stable and battery ok.  Device functioning appropriately.   Atrial pacing 99% and BiVentricular glpydc10%.  AT/AFib burden none since cardioversion and apparently device was not cleared at time of cardioversion. No ventr arrhythmias.      Results for orders placed or performed during the hospital encounter of 07/24/18   ECG 12 lead   Result Value Ref Range    SYSTOLIC BLOOD PRESSURE  mmHg    DIASTOLIC BLOOD PRESSURE  mmHg    VENTRICULAR RATE 78 BPM    ATRIAL RATE 55 BPM    P-R INTERVAL  ms    QRS DURATION 118 ms    Q-T INTERVAL 416 ms    QTC CALCULATION (BEZET) 474 ms    P Axis  degrees    R AXIS -52 degrees    T AXIS 136 degrees    MUSE DIAGNOSIS       AV sequential or dual chamber electronic pacemaker with frequent Premature ventricular complexes  Abnormal ECG  When compared with ECG of 23-JUL-2018 10:32,  AV sequential or dual chamber electronic pacemaker has replaced Atrial fibrillation with a demand pacemaker  Confirmed by BRIDGER SIMMONS MD LOC:JN ([id number]) on 7/24/2018 4:55:29 PM            Problem List:  Patient Active Problem List   Diagnosis   ? Hypothyroidism   ? Mitral Regurgitation   ? Paroxysmal Atrial Fibrillation   ? Premature Ventricular Contractions   ? Hyperlipidemia   ? Essential hypertension   ? Coronary Artery Disease   ? Atrial flutter, unspecified type (H)   ? Peripheral Vascular Disease   ? Status post ablation of atrial fibrillation   ? Presence of biventricular cardiac pacemaker       Physical Examination Review of Systems   Vitals:    08/20/18 0758   BP: 130/58   Pulse: 72   Resp: 16     Body mass index is 31.8 kg/(m^2).  Wt Readings from Last 3 Encounters:   08/20/18 (!) 228  lb (103.4 kg)   07/24/18 (!) 228 lb 6.3 oz (103.6 kg)   07/23/18 (!) 226 lb (102.5 kg)     General Appearance:   Alert, well-appearing and in no acute distress.   HEENT: Atraumatic, normocephalic.  No scleral icterus, normal conjunctivae; mucous membranes pink and moist.     Chest: Chest symmetric, spine straight.   Lungs:   Respirations unlabored: Lungs are clear to auscultation.   Cardiovascular:   Normal first and second heart sounds with no murmurs, rubs, or gallops.  Regular, regular.  Radial and posterior tibial pulses are intact.  Normal JVD, no edema.       Extremities: No cyanosis or clubbing   Musculoskeletal: Moves all extremities   Skin: Warm, dry, intact.    Neurologic: Mood and affect are appropriate, alert and oriented to person, place, time, and situation    General: WNL  Eyes: WNL  Ears/Nose/Throat: WNL  Lungs: WNL  Heart: WNL  Stomach: WNL  Bladder: WNL  Muscle/Joints: WNL  Skin: WNL  Nervous System: WNL  Mental Health: WNL     Blood: WNL       Medical History  Surgical History Family History Social History   Past Medical History:   Diagnosis Date   ? Atrial fibrillation (H)    ? Atrial flutter (H)    ? Cardiomyopathy (H)    ? 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 Coronary artery disease    ? Disease of thyroid gland    ? Disorder of phrenic nerve    ? Hyperlipidemia    ? Hypertension    ? Malfunction of biventricular cardiac pacemaker battery 12/1/2015    [employer] device recall   ? PVC (premature ventricular contraction)    ? PVD (peripheral vascular disease) (H)    ? Status post ablation of atrial fibrillation 11/18/2015    MAZE May 2007 PVI May 31, 2011 (RF-PVI + TIAN line) PVI Oct 30, 2012 (Cryo-PVI + redo TIAN line + roof line + RA-scar flutter line)    Past Surgical History:   Procedure Laterality Date   ? CARDIOVERSION  07/24/2018   ? INSERT / REPLACE / REMOVE PACEMAKER     ? MITRAL VALVE REPAIR     ? [place] ABLATE HEART DYSRHYTHM FOCUS      Description: Catheter Ablation  Atrial Flutter;  Recorded: 10/30/2012;  Comments: Typical RA CTI flutter Sept 2007; ; Multiple atypical flutter Oct 2012 (Roof line + reinforce TIAN line + RA scar line)   ? [place] ABLATE HEART DYSRHYTHM FOCUS      Description: Catheter Ablation Atrial Fibrillation;  Recorded: 10/24/2013;  Comments: May 2011 (PVI > 90% recurrence post MAZE + TIAN line); ; Re do PVI Oct 2012 (Cryo PVI + roof line + TIAN touch up + RA scar flutter); ; No recurrence by device check at 12 months   ? [place] ABLATE HEART DYSRHYTHM FOCUS      Description: Catheter Ablation Atrial Fibrillation;  Recorded: 10/30/2014;  Comments: May 2011 (PVI > 90% recurrence post MAZE + TIAN line); ; Re do PVI Oct 2012 (Cryo PVI + roof line + TIAN touch up + RA scar flutter); ; No recurrence by device check at 12 months   ? [place] ABLATE HEART DYSRHYTHM FOCUS      Description: Catheter Ablation Atrial Flutter;  Recorded: 10/30/2014;  Comments: Typical RA CTI flutter Sept 2007; ; Multiple atypical flutter Oct 2012 (Roof line + reinforce TIAN line + RA scar line)   ? [place] ABLATE/ RECONSTUCT ATRIA, LIMITED      Description: Maze Procedure;  Proc Date: 05/01/2007;  Comments: May 2007 at time of valve repair   ? [place] CABG, VEIN, SINGLE      Description: CABG (CABG);  Recorded: 01/18/2012;  Comments: ONE VESSEL RCA   ? [place] ICAR CATHETER ABLATION ATRIOVENTR NODE FUNCTION      Description: Catheter Ablation Atrioventricular Node;  Recorded: 08/13/2012;  Comments: Mar 2008 performed due to inability to biventricular with conduction abnormality   ? [place] ICAR CATHETER ABLATION ATRIOVENTR NODE FUNCTION      Description: Catheter Ablation Atrioventricular Node;  Recorded: 10/30/2014;  Comments: Mar 2008 performed due to inability to biventricular with conduction abnormality    Family History   Problem Relation Age of Onset   ? No Medical Problems Mother    ? No Medical Problems Father    ? No Medical Problems Sister    ? No Medical Problems Brother    ? No Medical Problems Daughter    ? No  Medical Problems Son    ? Acute Myocardial Infarction Neg Hx    ? Alcohol abuse Neg Hx    ? Alzheimer's disease Neg Hx    ? Amputation of Leg Below Knee Neg Hx    ? Aortic Valve Replacement Neg Hx    ? Arrhythmogenic Right Ventricular Cardiomyopathy Neg Hx    ? Atrial fibrillation Neg Hx    ? Atrial Flutter Neg Hx    ? Breast cancer Neg Hx    ? CABG Neg Hx    ? Cancer Neg Hx    ? Cardiomyopathy Neg Hx    ? Carotid Endarterectomy Neg Hx    ? Cerebral aneurysm Neg Hx    ? Chronic Kidney Disease Neg Hx    ? COPD Neg Hx    ? Colon cancer Neg Hx    ? Congenital heart disease Neg Hx    ? Coronary artery disease Neg Hx    ? Coronary Stenting Neg Hx    ? Dementia Neg Hx    ? Diabetes type I Neg Hx    ? Diabetes type II Neg Hx    ? Dialysis Neg Hx    ? Dyslipidemia Neg Hx    ? Heart failure Neg Hx    ? Hypertension Neg Hx    ? Hypertrophic cardiomyopathy Neg Hx    ? ICD Neg Hx    ? Long QT syndrome Neg Hx    ? Mitral Regurgitation Neg Hx    ? Mitral Valve Replacement Neg Hx    ? Morbid Obesity Neg Hx    ? Heart attack Neg Hx    ? Obstructive Sleep Apnea Neg Hx    ? Ovarian cancer Neg Hx    ? Pacemaker Neg Hx    ? Pancreatic cancer Neg Hx    ? Peripheral vascular disease Neg Hx    ? Pulmonary embolism Neg Hx    ? Pulmonary Hypertension Neg Hx    ? Rheumatic Heart Disease Neg Hx    ? Stroke Neg Hx    ? Sudden death Neg Hx    ? Transient ischemic attack Neg Hx    ? Valvular heart disease Neg Hx     Social History     Social History   ? Marital status:      Spouse name: N/A   ? Number of children: N/A   ? Years of education: N/A     Occupational History   ? retired      Social History Main Topics   ? Smoking status: Former Smoker     Packs/day: 1.00     Quit date: 1/1/1990   ? Smokeless tobacco: Never Used   ? Alcohol use 1.2 oz/week     2 Cans of beer per week   ? Drug use: No   ? Sexual activity: Not on file     Other Topics Concern   ? Not on file     Social History Narrative          Medications  Allergies   Current  Outpatient Prescriptions   Medication Sig Dispense Refill   ? amLODIPine (NORVASC) 10 MG tablet Take 10 mg by mouth daily.     ? aspirin 81 MG EC tablet Take 81 mg by mouth daily.     ? atorvastatin (LIPITOR) 20 MG tablet Take 20 mg by mouth bedtime.  1   ? docoshexanoic acid-eicosapent 500 mg (FISH OIL) 500-100 mg cap capsule Take 1,000 mg by mouth daily.     ? furosemide (LASIX) 20 MG tablet Take 20 mg by mouth daily.     ? KLOR-CON M20 20 mEq tablet Take 1 tablet by mouth daily.     ? levothyroxine (SYNTHROID, LEVOTHROID) 150 MCG tablet Take 150 mcg by mouth daily.     ? losartan (COZAAR) 50 MG tablet Take 1 tablet (50 mg total) by mouth daily. 90 tablet 4   ? MULTIVITAMIN ORAL Take 1 tablet by mouth daily.     ? warfarin (COUMADIN) 1 MG tablet With 3 mg tab take 4 mg T, thur and sat and 3 mg all other days. 90 tablet 0   ? warfarin (COUMADIN) 3 MG tablet TAKE 1 TABLET WITH 1 MG TABLET (4 MG TOTAL) ON TUESDAY, THURSDAY AND SATURDAY AND TAKE 1 TABLET (3 MG) ALL OTHER DAYS 90 tablet 0     No current facility-administered medications for this visit.       Allergies   Allergen Reactions   ? Carvedilol Shortness Of Breath   ? Lisinopril Cough      Medical, surgical, family, social history, and medications were all reviewed and updated as necessary.   Lab Results    Chemistry CBC/INR CHOLESTROL   Lab Results   Component Value Date    CREATININE 1.18 08/03/2018    BUN 10 08/03/2018     08/03/2018    K 3.9 08/03/2018     (H) 08/03/2018    CO2 23 08/03/2018     Creatinine (mg/dL)   Date Value   08/03/2018 1.18   11/20/2017 1.15   11/01/2016 1.17   04/19/2016 1.23     Lab Results   Component Value Date     (H) 11/06/2012    Lab Results   Component Value Date    WBC 6.3 04/19/2016    HGB 14.9 04/19/2016    HCT 46.0 04/19/2016    MCV 95 04/19/2016     04/19/2016     Lab Results   Component Value Date    INR 2.70 (!) 07/24/2018      Lab Results   Component Value Date    CHOL 140 08/03/2018    HDL 34  (L) 08/03/2018    LDLCALC 88 08/03/2018    TRIG 91 08/03/2018          Total Time- 25 minutes with greater than 50% spent talking to patient and family regarding patient's relevant diagnoses.  This note has been dictated using voice recognition software. Any grammatical, typographical, or context distortions are unintentional and inherent to the software.    Victoria Perez RN,  [facility], [facility] Heart [facility]   Electrophysiology  [phone]

## 2021-06-26 NOTE — TELEPHONE ENCOUNTER
Lab Results   Component Value Date    INR 2.50 (!) 05/12/2021    INR 2.50 (!) 04/14/2021    INR 2.20 (!) 03/09/2021       Patient's current Warfarin doses:     4 mg every Thu, Sat; 3 mg all other days           Next INR check is on 6/23      Patient's last OV with HCC was on 4/14/2021    Warfarin prescription 3 month supply and one refill sent to patient's pharmacy today.    Salma Marques RN

## 2021-06-26 NOTE — TELEPHONE ENCOUNTER
ANTICOAGULATION MANAGEMENT     Ady DÍAZ Ilirjhonathan 80 y.o., male is on warfarin with Therapeutic INR result (goal range 2.0-3.0)    Recent labs: (last 7 days)     06/23/21  0838   INR 2.70*       ASSESSMENT     Source: Template      Warfarin dosing taken: Warfarin taken as instructed    Diet: No new diet changes affecting INR    Illness, Injury or hospitalization: No    Medication changes: None    Signs or symptoms of bleeding or clotting: No    Previous INR: therapeutic last 2(+) visits    Additional findings: None     PLAN     Recommended plan for no diet, medication or health factor changes affecting INR:     Dosing instructions: Continue your current warfarin dose 4 mg daily on Thurs,Sat; and 3 mg daily rest of week (0% change)    Follow up no later than: 6 weeks     Telephone call with Ady who verbalizes understanding and agrees to plan    Lab visit scheduled    Education provided: importance of therapeutic range, target INR goal and significance of current INR result, importance of following up for INR monitoring at instructed interval, importance of taking warfarin as instructed and importance of notifying clinic for changes in medications    Plan made per [facility] anticoagulation protocol    Salma Marques  Anticoagulation Clinic   [phone]    Anticoagulation Episode Summary     Current INR goal:  2.0-3.0   TTR:  84.6 % (1 y)   Next INR check:  8/4/2021   INR from last check:  2.70 (6/23/2021)   Weekly max warfarin dose:     Target end date:     INR check location:     Preferred lab:     Send INR reminders to:  [facility] CARE    Indications    Atrial flutter  unspecified type (H) [I48.92]  Paroxysmal Atrial Fibrillation [I48.91]           Comments:           Anticoagulation Care Providers     Provider Role Specialty Phone number    Janis Townsend MD Referring Cardiology [phone]

## 2021-06-26 NOTE — PROGRESS NOTES
Progress Notes by Janis Townsend MD at 6/8/2018  8:30 AM     Author: Janis Townsend MD Service: -- Author Type: Physician    Filed: 6/8/2018  9:25 AM Encounter Date: 6/8/2018 Status: Signed    : Janis Townsend MD (Physician)           Click to link to [facility] HEART CARE NOTE    Thank you, Dr. Ross, for asking us to see Ady Farley at the [facility].      Assessment/Recommendations   Assessment:    1.    Coronary artery disease: History of coronary artery disease status post SVG to RCA when he underwent mitral valve repair in 2007.  No anginal complaints.  Nonsustained ventricular tachycardia with no ischemia on stress testing.  2.  Atrial dysrhythmia: Atrial fibrillation/atypical flutter status post PVI and flutter ablation 5 years prior.  Only one significant episode of atrial fibrillation detected on his device check.  Continue on Coumadin for anticoagulation.  3.  Hypertension: Mild elevated today however normally better controlled.  4.    Valvular heart disease: Status post mitral valve repair for mitral regurgitation in 2007   follow-up in 1 year.  Repeat echocardiogram prior to his next visit.     History of Present Illness    Mr. Ady Farley is a 77 y.o. male with a known history of coronary artery disease and mitral regurgitation status post SVG to his RCA and mitral valve repair in 2007, atrial fibrillation/atypical flutter status post PVI and flutter ablation 5 years prior, status post biventricular pacemaker who I am seeing today in follow up.  His device checks have shown a few episodes of nonsustained ventricular tachycardia.  Ischemic workup was unremarkable showing a possible small lateral wall infarction without evidence of ischemia.  No complaints of chest pain or breathing difficulty.  Blood pressure elevated today however blood pressure is normally well controlled with systolics in the 120s.  He admits to  not following a good diet.  Is planning on trying to exercise more.  He had one significant episode of atrial fibrillation on 2/10/2018 for 70 hours.      Lexiscan nuclear stress test 6/20/2017    1.The pharmacologic nuclear stress test is abnormal.    2.There is a small area of nontransmural infarction in the lateral segment(s) of the left ventricle which extends slightly to the anterior wall. No ischemia is suggested.    3.The left ventricular ejection fraction is 52%. Septal hypokinesis seen which is consistent with prior open thoracotomy.    4.Incidental note is made of some right ventricular uptake.    5.There is no prior study available.    Echocardiogram 5/31/2017    When compared to the previous study dated 1/26/2015, no significant change    Left ventricle ejection fraction is normal. The calculated left ventricular ejection fraction is 60%.    Mitral valve repair with mild mitral regurgitation    Pacemaker lead in right heart chambers       Physical Examination Review of Systems   Vitals:    06/08/18 0842   BP: 142/54   Pulse: 80   Resp: 16     Body mass index is 31.63 kg/(m^2).  Wt Readings from Last 3 Encounters:   06/08/18 (!) 230 lb (104.3 kg)   12/04/17 (!) 224 lb (101.6 kg)   06/07/17 222 lb (100.7 kg)       General Appearance:   alert, no apparent distress   HEENT:  no scleral icterus; the mucous membranes are pink and moist                                  Neck: jugular venous pressure normal, no thyromegaly   Chest: the spine is straight and the chest is symmetric   Lungs:   respirations unlabored; the lungs are clear to auscultation   Cardiovascular:   regular rhythm with normal first and second heart sounds and systolic murmur   Abdomen:  no organomegaly, masses, bruits, or tenderness; bowel sounds are present   Extremities: no cyanosis, clubbing, or edema   Skin: no xanthelasma                                              Medical History  Surgical History Family History Social History   Past  Medical History:   Diagnosis Date   ? Atrial fibrillation    ? Atrial flutter    ? Cardiomyopathy    ? 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 Coronary artery disease    ? Disease of thyroid gland    ? Disorder of phrenic nerve    ? Hyperlipidemia    ? Hypertension    ? Malfunction of biventricular cardiac pacemaker battery 12/1/2015    [employer] device recall   ? PVC (premature ventricular contraction)    ? PVD (peripheral vascular disease)    ? Status post ablation of atrial fibrillation 11/18/2015    MAZE May 2007 PVI May 31, 2011 (RF-PVI + TIAN line) PVI Oct 30, 2012 (Cryo-PVI + redo TIAN line + roof line + RA-scar flutter line)    Past Surgical History:   Procedure Laterality Date   ? INSERT / REPLACE / REMOVE PACEMAKER     ? MITRAL VALVE REPAIR     ? IN ABLATE HEART DYSRHYTHM FOCUS      Description: Catheter Ablation Atrial Flutter;  Recorded: 10/30/2012;  Comments: Typical RA CTI flutter Sept 2007; ; Multiple atypical flutter Oct 2012 (Roof line + reinforce TIAN line + RA scar line)   ? IN ABLATE HEART DYSRHYTHM FOCUS      Description: Catheter Ablation Atrial Fibrillation;  Recorded: 10/24/2013;  Comments: May 2011 (PVI > 90% recurrence post MAZE + TIAN line); ; Re do PVI Oct 2012 (Cryo PVI + roof line + TIAN touch up + RA scar flutter); ; No recurrence by device check at 12 months   ? IN ABLATE HEART DYSRHYTHM FOCUS      Description: Catheter Ablation Atrial Fibrillation;  Recorded: 10/30/2014;  Comments: May 2011 (PVI > 90% recurrence post MAZE + TIAN line); ; Re do PVI Oct 2012 (Cryo PVI + roof line + TIAN touch up + RA scar flutter); ; No recurrence by device check at 12 months   ? IN ABLATE HEART DYSRHYTHM FOCUS      Description: Catheter Ablation Atrial Flutter;  Recorded: 10/30/2014;  Comments: Typical RA CTI flutter Sept 2007; ; Multiple atypical flutter Oct 2012 (Roof line + reinforce TIAN line + RA scar line)   ? IN ABLATE/ RECONSTUCT ATRIA, LIMITED      Description: Maze Procedure;  Proc  Date: 05/01/2007;  Comments: May 2007 at time of valve repair   ? MA CABG, VEIN, SINGLE      Description: CABG (CABG);  Recorded: 01/18/2012;  Comments: ONE VESSEL RCA   ? MA ICAR CATHETER ABLATION ATRIOVENTR NODE FUNCTION      Description: Catheter Ablation Atrioventricular Node;  Recorded: 08/13/2012;  Comments: Mar 2008 performed due to inability to biventricular with conduction abnormality   ? MA ICAR CATHETER ABLATION ATRIOVENTR NODE FUNCTION      Description: Catheter Ablation Atrioventricular Node;  Recorded: 10/30/2014;  Comments: Mar 2008 performed due to inability to biventricular with conduction abnormality    Family History   Problem Relation Age of Onset   ? No Medical Problems Mother    ? No Medical Problems Father    ? No Medical Problems Sister    ? No Medical Problems Brother    ? No Medical Problems Daughter    ? No Medical Problems Son    ? Acute Myocardial Infarction Neg Hx    ? Alcohol abuse Neg Hx    ? Alzheimer's disease Neg Hx    ? Amputation of Leg Below Knee Neg Hx    ? Aortic Valve Replacement Neg Hx    ? Arrhythmogenic Right Ventricular Cardiomyopathy Neg Hx    ? Atrial fibrillation Neg Hx    ? Atrial Flutter Neg Hx    ? Breast cancer Neg Hx    ? CABG Neg Hx    ? Cancer Neg Hx    ? Cardiomyopathy Neg Hx    ? Carotid Endarterectomy Neg Hx    ? Cerebral aneurysm Neg Hx    ? Chronic Kidney Disease Neg Hx    ? COPD Neg Hx    ? Colon cancer Neg Hx    ? Congenital heart disease Neg Hx    ? Coronary artery disease Neg Hx    ? Coronary Stenting Neg Hx    ? Dementia Neg Hx    ? Diabetes type I Neg Hx    ? Diabetes type II Neg Hx    ? Dialysis Neg Hx    ? Dyslipidemia Neg Hx    ? Heart failure Neg Hx    ? Hypertension Neg Hx    ? Hypertrophic cardiomyopathy Neg Hx    ? ICD Neg Hx    ? Long QT syndrome Neg Hx    ? Mitral Regurgitation Neg Hx    ? Mitral Valve Replacement Neg Hx    ? Morbid Obesity Neg Hx    ? Heart attack Neg Hx    ? Obstructive Sleep Apnea Neg Hx    ? Ovarian cancer Neg Hx    ?  Pacemaker Neg Hx    ? Pancreatic cancer Neg Hx    ? Peripheral vascular disease Neg Hx    ? Pulmonary embolism Neg Hx    ? Pulmonary Hypertension Neg Hx    ? Rheumatic Heart Disease Neg Hx    ? Stroke Neg Hx    ? Sudden death Neg Hx    ? Transient ischemic attack Neg Hx    ? Valvular heart disease Neg Hx     Social History     Social History   ? Marital status:      Spouse name: N/A   ? Number of children: N/A   ? Years of education: N/A     Occupational History   ? Not on file.     Social History Main Topics   ? Smoking status: Former Smoker     Packs/day: 1.00     Quit date: 1/1/1990   ? Smokeless tobacco: Never Used   ? Alcohol use 1.2 oz/week     2 Cans of beer per week   ? Drug use: No   ? Sexual activity: Not on file     Other Topics Concern   ? Not on file     Social History Narrative          Medications  Allergies   Current Outpatient Prescriptions   Medication Sig Dispense Refill   ? amLODIPine (NORVASC) 10 MG tablet Take 10 mg by mouth daily.     ? amoxicillin (AMOXIL) 500 MG capsule Prior to the Dentist     ? aspirin 81 MG EC tablet Take 81 mg by mouth daily.     ? atorvastatin (LIPITOR) 20 MG tablet Take 20 mg by mouth bedtime.  1   ? docoshexanoic acid-eicosapent 500 mg (FISH OIL) 500-100 mg cap capsule Take 1,000 mg by mouth daily.     ? furosemide (LASIX) 20 MG tablet Take 20 mg by mouth daily.     ? KLOR-CON M20 20 mEq tablet Take 1 tablet by mouth daily.     ? levothyroxine (SYNTHROID, LEVOTHROID) 150 MCG tablet Take 150 mcg by mouth daily.     ? losartan (COZAAR) 50 MG tablet Take 1 tablet (50 mg total) by mouth daily. 90 tablet 4   ? MULTIVITAMIN ORAL Take 1 tablet by mouth daily.     ? warfarin (COUMADIN) 1 MG tablet With 3 mg tab take 4 mg T, thur and sat and 3 mg all other days. 90 tablet 0   ? warfarin (COUMADIN) 3 MG tablet With 1 mg tab take 4 mg t, thur and sat and 3 mg all other days. 90 tablet 0   ? simvastatin (ZOCOR) 40 MG tablet Take 20 mg by mouth at bedtime.        No current  facility-administered medications for this visit.       Allergies   Allergen Reactions   ? Carvedilol Shortness Of Breath   ? Lisinopril Cough         Lab Results    Chemistry/lipid CBC Cardiac Enzymes/BNP/TSH/INR   Lab Results   Component Value Date    CHOL 152 11/20/2017    HDL 36 (L) 11/20/2017    LDLCALC 91 11/20/2017    TRIG 123 11/20/2017    CREATININE 1.15 11/20/2017    BUN 13 11/20/2017    K 3.9 11/20/2017     11/20/2017     (H) 11/20/2017    CO2 21 (L) 11/20/2017    Lab Results   Component Value Date    WBC 6.3 04/19/2016    HGB 14.9 04/19/2016    HCT 46.0 04/19/2016    MCV 95 04/19/2016     04/19/2016    Lab Results   Component Value Date    CKTOTAL 111 09/10/2015     (H) 11/06/2012    TSH 2.81 09/10/2015    INR 2.0 05/29/2018

## 2021-06-26 NOTE — PROGRESS NOTES
Anticoagulation Annual Referral Renewal Review    Ady Farley's chart reviewed for annual renewal of referral to anticoagulation monitoring.        Criteria for anticoagulation nurse and/or pharmacist renewal met   Warfarin indication: Atrial Fibrillation and Atrial Flutter Yes, per indication   Current with INR monitoring/compliant Yes Yes   Date of last office visit 4/21/2021 Yes, had office visit within last year   Time in Therapeutic Range (TTR) 82 % Yes, TTR > 60%       Ady Farley met all criteria for anticoagulation management program initiated renewal.  New INR standing orders and anticoagulation referral renewal placed.      Salma Marques RN  11:01 AM

## 2021-06-26 NOTE — PROGRESS NOTES
Progress Notes by Victoria Perez CNP at 7/23/2018  9:50 AM     Author: Victoria Perez CNP Service: -- Author Type: Nurse Practitioner    Filed: 7/23/2018  4:22 PM Encounter Date: 7/23/2018 Status: Signed    : Victoria Perez CNP (Nurse Practitioner)          Click to link to [facility] HEART CARE ELECTROPHYSIOLOGY NOTE      Assessment/Recommendations   Assessment/Plan:    Diagnoses and all orders for this visit:    Paroxysmal atrial fibrillation (H) history with multiple ablations in the past for both atrial fib and flutter.    Essential hypertension and well-controlled.    Atrial flutter, unspecified type (H) and persistent since mid June and Ady is questioning indirect symptoms of fatigue and some dyspnea on exertion.  Recommended cardioversion then for symptom clarification.  Discussed rate versus rhythm control is decided on symptoms.  I reviewed cardioversion and prep for it.  See after visit summary.  Previous INRs have been monthly and therapeutic.  Therapeutic today.  Cardioversion likely within the next week or so.  I discussed natural progression with atrial fib and flutter and how he is experiencing a change over the last 6 months as he has had several 2-3 a day episodes in the past 6 months as well.  -     ECG 12 lead with MUSE  -     POCT INR  -     EP Cardioversion External; Future; Expected date: 7/23/18    Presence of biventricular cardiac pacemaker and high percentage of bi-V pacing despite atrial flutter and good rate control due to AV node ablation in the past.    Other orders  -     Verify informed consent for Elective Cardioversion; Standing  -     Vital signs; Standing  -     NPO 8 hours prior to procedure; Standing  -     Notify Anesthesiologist -; Standing  -     Verify 100% compliance with oral anti-coagulant over the past 3 weeks verbally with the patient prior to cardioversion. Notify procedularlist if missed doses.; Standing  -      Emergency equipment at bedside; Standing  -     Oxygen nasal cannula; Standing  -     Inpatient consult to Anesthesiology Reason for Consult? ICD/DFT; Did you contact the consulting MD? Yes; Consult priority: Today (routine); Communication for MD: No phone communication necessary for now; Standing  -     POCT INR (if on warfarin); Standing  -     Insert and maintain IV; Standing  -     lidocaine (PF) 10 mg/mL (1 %) injection 0.1-0.3 mL (XYLOCAINE-MPF); Inject 0.1-0.3 mL under the skin as needed (for IV insertion).  -     sodium chloride 0.9%; Infuse 25 mL/hr into a venous catheter continuous.  -     Saline lock IV; Standing  -     sodium chloride flush 3 mL (NS); Infuse 3 mL into a venous catheter Line Care.  -     Notify device company representative; Standing  -     Potassium; Standing    [id number] score of 4 and on chronic warfarin.  INR 2.9 and on warfarin 4 mg on Tuesday Thursday Saturday and 3 mg the other 4 of 7 days.  Continue same warfarin dosing and repeat INR in 1 month when he sees me.  Follow up in clinic with me in 1 month and to do remote device check prior to visit.     History of Present Illness    Mr. Ady Farley is a very pleasant 78 y.o. male who comes in today for urgent EP appointment due to persistent atrial flutter episode noted on device since mid June and had device check prior to visit.  dAy Farley has a known history of coronary artery disease with coronary artery bypass, mitral valve disease and atrial arrhythmias.  In 2007 Ady had coronary artery bypass with maze and mitral valve repair.  He recently started having nonsustained VT's and ischemic workup was negative.  He follows with Dr. Townsend for his valvular and coronary heart disease.  Ady is had multiple ablations in the past with right CTI flutter ablation in 2007 and then in 2011 and 12 he had repeat PVI.  He has had reoccurrence since.  He had another PVI ablation in 2014.  In August 2012 he had AV node ablation  with CRT P.  He is currently on no antiarrhythmics.  He tells me that he was dog sledding in [facility] when he had an episode of atrial flutter in February.  The dog sled tipped and he was very concerned about keeping control of the team and his wife was on the sled.  There is been no particular trigger for occurrence of persistent atrial flutter since mid June.  He is questioning fatigue and does not know if it is related to his age or not.  He is also had some dyspnea with more exertion and unsure if this is new.  He is very vague about these symptoms.  He denies any peripheral edema or PND.  This visit will serve as history and physical for cardioversion.  See history below for more details.    Cardiographics (personally reviewed):  Results for orders placed during the hospital encounter of 05/31/17   Echo Complete [ECH10] 05/31/2017    Narrative   When compared to the previous study dated 1/26/2015, no significant   change    Left ventricle ejection fraction is normal. The calculated left   ventricular ejection fraction is 60%.    Mitral valve repair with mild mitral regurgitation    Pacemaker lead in right heart chambers          Results for orders placed during the hospital encounter of 06/20/17   NM Pharmacologic Stress Test    Narrative   1.The pharmacologic nuclear stress test is abnormal.    2.There is a small area of nontransmural infarction in the lateral   segment(s) of the left ventricle which extends slightly to the anterior   wall. No ischemia is suggested.    3.The left ventricular ejection fraction is 52%. Septal hypokinesis seen   which is consistent with prior open thoracotomy.    4.Incidental note is made of some right ventricular uptake.    5.There is no prior study available.         Device check shows leads stable and battery ok.  Device functioning appropriately.   Atrial pacing 82% and BiVentricular pacing 99%.  AT/AFib burden 18% and persistent a flutter since mid June. 10 NSVT.       Results for orders placed or performed in visit on 07/23/18   ECG 12 lead with MUSE   Result Value Ref Range    SYSTOLIC BLOOD PRESSURE  mmHg    DIASTOLIC BLOOD PRESSURE  mmHg    VENTRICULAR RATE 71 BPM    ATRIAL RATE 47 BPM    P-R INTERVAL  ms    QRS DURATION 154 ms    Q-T INTERVAL 478 ms    QTC CALCULATION (BEZET) 519 ms    P Axis  degrees    R AXIS 135 degrees    T AXIS 57 degrees    MUSE DIAGNOSIS       Electronic ventricular pacemaker  When compared with ECG of 30-OCT-2012 07:03,  Vent. rate has decreased BY   4 BPM            Problem List:  Patient Active Problem List   Diagnosis   ? Hypothyroidism   ? Mitral Regurgitation   ? Paroxysmal Atrial Fibrillation   ? Premature Ventricular Contractions   ? Hyperlipidemia   ? Essential hypertension   ? Coronary Artery Disease   ? Nonischemic cardiomyopathy (H)   ? Atrial flutter, unspecified type (H)   ? Peripheral Vascular Disease   ? Status post ablation of atrial fibrillation   ? Presence of biventricular cardiac pacemaker       Physical Examination Review of Systems   Vitals:    07/23/18 0934   BP: 140/82   Pulse: 84   Resp: 16     Body mass index is 31.52 kg/(m^2).  Wt Readings from Last 3 Encounters:   07/23/18 (!) 226 lb (102.5 kg)   06/08/18 (!) 230 lb (104.3 kg)   12/04/17 (!) 224 lb (101.6 kg)     General Appearance:   Alert, well-appearing and in no acute distress.   HEENT: Atraumatic, normocephalic.  No scleral icterus, normal conjunctivae; mucous membranes pink and moist.     Chest: Chest symmetric, spine straight.   Lungs:   Respirations unlabored: Lungs are clear to auscultation.   Cardiovascular:   Normal first and second heart sounds with no murmurs, rubs, or gallops.  Slightly irregular.  Radial and posterior tibial pulses are intact.  Normal JVD, no edema.       Extremities: No cyanosis or clubbing   Musculoskeletal: Moves all extremities   Skin: Warm, dry, intact.    Neurologic: Mood and affect are appropriate, alert and oriented to person,  place, time, and situation    General: WNL  Eyes: WNL  Ears/Nose/Throat: WNL  Lungs: WNL  Heart: WNL  Stomach: WNL  Bladder: WNL  Muscle/Joints: WNL  Skin: WNL  Nervous System: WNL  Mental Health: WNL     Blood: WNL       Medical History  Surgical History Family History Social History   Past Medical History:   Diagnosis Date   ? Atrial fibrillation (H)    ? Atrial flutter (H)    ? Cardiomyopathy (H)    ? 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 Coronary artery disease    ? Disease of thyroid gland    ? Disorder of phrenic nerve    ? Hyperlipidemia    ? Hypertension    ? Malfunction of biventricular cardiac pacemaker battery 12/1/2015    Translimittronic device recall   ? PVC (premature ventricular contraction)    ? PVD (peripheral vascular disease) (H)    ? Status post ablation of atrial fibrillation 11/18/2015    MAZE May 2007 PVI May 31, 2011 (RF-PVI + TIAN line) PVI Oct 30, 2012 (Cryo-PVI + redo TIAN line + roof line + RA-scar flutter line)    Past Surgical History:   Procedure Laterality Date   ? INSERT / REPLACE / REMOVE PACEMAKER     ? MITRAL VALVE REPAIR     ? NE ABLATE HEART DYSRHYTHM FOCUS      Description: Catheter Ablation Atrial Flutter;  Recorded: 10/30/2012;  Comments: Typical RA CTI flutter Sept 2007; ; Multiple atypical flutter Oct 2012 (Roof line + reinforce TIAN line + RA scar line)   ? NE ABLATE HEART DYSRHYTHM FOCUS      Description: Catheter Ablation Atrial Fibrillation;  Recorded: 10/24/2013;  Comments: May 2011 (PVI > 90% recurrence post MAZE + TIAN line); ; Re do PVI Oct 2012 (Cryo PVI + roof line + TIAN touch up + RA scar flutter); ; No recurrence by device check at 12 months   ? NE ABLATE HEART DYSRHYTHM FOCUS      Description: Catheter Ablation Atrial Fibrillation;  Recorded: 10/30/2014;  Comments: May 2011 (PVI > 90% recurrence post MAZE + TIAN line); ; Re do PVI Oct 2012 (Cryo PVI + roof line + TIAN touch up + RA scar flutter); ; No recurrence by device check at 12 months   ? NE  ABLATE HEART DYSRHYTHM FOCUS      Description: Catheter Ablation Atrial Flutter;  Recorded: 10/30/2014;  Comments: Typical RA CTI flutter Sept 2007; ; Multiple atypical flutter Oct 2012 (Roof line + reinforce TIAN line + RA scar line)   ? NC ABLATE/ RECONSTUCT ATRIA, LIMITED      Description: Maze Procedure;  Proc Date: 05/01/2007;  Comments: May 2007 at time of valve repair   ? NC CABG, VEIN, SINGLE      Description: CABG (CABG);  Recorded: 01/18/2012;  Comments: ONE VESSEL RCA   ? NC ICAR CATHETER ABLATION ATRIOVENTR NODE FUNCTION      Description: Catheter Ablation Atrioventricular Node;  Recorded: 08/13/2012;  Comments: Mar 2008 performed due to inability to biventricular with conduction abnormality   ? NC ICAR CATHETER ABLATION ATRIOVENTR NODE FUNCTION      Description: Catheter Ablation Atrioventricular Node;  Recorded: 10/30/2014;  Comments: Mar 2008 performed due to inability to biventricular with conduction abnormality    Family History   Problem Relation Age of Onset   ? No Medical Problems Mother    ? No Medical Problems Father    ? No Medical Problems Sister    ? No Medical Problems Brother    ? No Medical Problems Daughter    ? No Medical Problems Son    ? Acute Myocardial Infarction Neg Hx    ? Alcohol abuse Neg Hx    ? Alzheimer's disease Neg Hx    ? Amputation of Leg Below Knee Neg Hx    ? Aortic Valve Replacement Neg Hx    ? Arrhythmogenic Right Ventricular Cardiomyopathy Neg Hx    ? Atrial fibrillation Neg Hx    ? Atrial Flutter Neg Hx    ? Breast cancer Neg Hx    ? CABG Neg Hx    ? Cancer Neg Hx    ? Cardiomyopathy Neg Hx    ? Carotid Endarterectomy Neg Hx    ? Cerebral aneurysm Neg Hx    ? Chronic Kidney Disease Neg Hx    ? COPD Neg Hx    ? Colon cancer Neg Hx    ? Congenital heart disease Neg Hx    ? Coronary artery disease Neg Hx    ? Coronary Stenting Neg Hx    ? Dementia Neg Hx    ? Diabetes type I Neg Hx    ? Diabetes type II Neg Hx    ? Dialysis Neg Hx    ? Dyslipidemia Neg Hx    ? Heart  failure Neg Hx    ? Hypertension Neg Hx    ? Hypertrophic cardiomyopathy Neg Hx    ? ICD Neg Hx    ? Long QT syndrome Neg Hx    ? Mitral Regurgitation Neg Hx    ? Mitral Valve Replacement Neg Hx    ? Morbid Obesity Neg Hx    ? Heart attack Neg Hx    ? Obstructive Sleep Apnea Neg Hx    ? Ovarian cancer Neg Hx    ? Pacemaker Neg Hx    ? Pancreatic cancer Neg Hx    ? Peripheral vascular disease Neg Hx    ? Pulmonary embolism Neg Hx    ? Pulmonary Hypertension Neg Hx    ? Rheumatic Heart Disease Neg Hx    ? Stroke Neg Hx    ? Sudden death Neg Hx    ? Transient ischemic attack Neg Hx    ? Valvular heart disease Neg Hx     Social History     Social History   ? Marital status:      Spouse name: N/A   ? Number of children: N/A   ? Years of education: N/A     Occupational History   ? retired      Social History Main Topics   ? Smoking status: Former Smoker     Packs/day: 1.00     Quit date: 1/1/1990   ? Smokeless tobacco: Never Used   ? Alcohol use 1.2 oz/week     2 Cans of beer per week   ? Drug use: No   ? Sexual activity: Not on file     Other Topics Concern   ? Not on file     Social History Narrative          Medications  Allergies   Current Outpatient Prescriptions   Medication Sig Dispense Refill   ? amLODIPine (NORVASC) 10 MG tablet Take 10 mg by mouth daily.     ? aspirin 81 MG EC tablet Take 81 mg by mouth daily.     ? atorvastatin (LIPITOR) 20 MG tablet Take 20 mg by mouth bedtime.  1   ? docoshexanoic acid-eicosapent 500 mg (FISH OIL) 500-100 mg cap capsule Take 1,000 mg by mouth daily.     ? furosemide (LASIX) 20 MG tablet Take 20 mg by mouth daily.     ? KLOR-CON M20 20 mEq tablet Take 1 tablet by mouth daily.     ? levothyroxine (SYNTHROID, LEVOTHROID) 150 MCG tablet Take 150 mcg by mouth daily.     ? losartan (COZAAR) 50 MG tablet Take 1 tablet (50 mg total) by mouth daily. 90 tablet 4   ? MULTIVITAMIN ORAL Take 1 tablet by mouth daily.     ? warfarin (COUMADIN) 1 MG tablet With 3 mg tab take 4 mg T,  thur and sat and 3 mg all other days. 90 tablet 0   ? warfarin (COUMADIN) 3 MG tablet With 1 mg tab take 4 mg t, thur and sat and 3 mg all other days. 90 tablet 0   ? amoxicillin (AMOXIL) 500 MG capsule Prior to the Dentist     ? simvastatin (ZOCOR) 40 MG tablet Take 20 mg by mouth at bedtime.        No current facility-administered medications for this visit.       Allergies   Allergen Reactions   ? Carvedilol Shortness Of Breath   ? Lisinopril Cough      Medical, surgical, family, social history, and medications were all reviewed and updated as necessary.   Lab Results    Chemistry CBC/INR CHOLESTROL   Lab Results   Component Value Date    CREATININE 1.15 11/20/2017    BUN 13 11/20/2017     11/20/2017    K 3.9 11/20/2017     (H) 11/20/2017    CO2 21 (L) 11/20/2017     Creatinine (mg/dL)   Date Value   11/20/2017 1.15   11/01/2016 1.17   04/19/2016 1.23   12/01/2015 1.08     Lab Results   Component Value Date     (H) 11/06/2012    Lab Results   Component Value Date    WBC 6.3 04/19/2016    HGB 14.9 04/19/2016    HCT 46.0 04/19/2016    MCV 95 04/19/2016     04/19/2016     Lab Results   Component Value Date    INR 2.5 06/26/2018      Lab Results   Component Value Date    CHOL 152 11/20/2017    HDL 36 (L) 11/20/2017    LDLCALC 91 11/20/2017    TRIG 123 11/20/2017          Greater than than 45 minutes were spent face to face in this visit discussing diagnoses as listed above, counseling, and coordination of care.    This note has been dictated using voice recognition software. Any grammatical, typographical, or context distortions are unintentional and inherent to the software.    Victoria Perez RN,  [facility]   Electrophysiology  [phone]

## 2021-06-27 NOTE — PROGRESS NOTES
Progress Notes by Janis Townsend MD at 3/26/2019  8:10 AM     Author: Janis Townsend MD Service: -- Author Type: Physician    Filed: 3/26/2019  8:54 AM Encounter Date: 3/26/2019 Status: Signed    : Janis Townsend MD (Physician)           Click to link to [facility] HEART CARE NOTE    Thank you, Dr. Ross, for asking us to see Ady Farley at the [facility].      Assessment/Recommendations   Assessment:    1.    Coronary artery disease: History of coronary artery disease status post SVG to RCA when he underwent mitral valve repair in 2007.  No anginal complaints and recent stress testing was negative for inducible ischemia.  Continue on daily aspirin.  2.  Atrial dysrhythmia: Atrial fibrillation/atypical flutter status post PVI and flutter ablation 5 years prior.  Continue on Coumadin for anticoagulation.  3.  Chronic diastolic congestive heart failure: Continue on Lasix on a daily basis and may take the second dose on as-needed basis.  He is now following a stricter low-sodium diet and is watching his weights on a daily basis.  4.    Valvular heart disease: Status post mitral valve repair for mitral regurgitation in 2007 with residual mild to moderate mitral stenosis mild to moderate mitral regurgitation.  5.  Pulmonary hypertension and RV dysfunction: Likely secondary to left-sided heart disease/venous pulmonary hypertension.  Recommend sleep study evaluation and will repeat limited echocardiogram in a few months prior to his next follow-up.  Follow-up in 3 months     History of Present Illness    Mr. Ady Farley is a 78 y.o. male with a known history of coronary artery disease and mitral regurgitation status post SVG to his RCA and mitral valve repair in 2007, atrial fibrillation/atypical flutter status post PVI and flutter ablation 5 years prior, status post biventricular pacemaker who I am seeing today in follow up.    He  was recently hospitalized in February with acute diastolic congestive heart failure exacerbation.  Echocardiogram demonstrated a left ventricular ejection fraction mildly reduced at 45-50%, mitral valve repair with mild to moderate mitral stenosis with a mean gradient of 7 mmHg, mild to moderate mitral regurgitation, moderate pulmonary hypertension with moderate reduction in RV function.  He reports that he was not watching the salt in his diet and was eating high sodium foods.  He responded well to diuresis and feels much better.  Previously had been on Lasix 20 mg once a day now he is on 20 mg twice a day and has continued to lose weight.  No lightheadedness.  Denies any palpitations.  He denies any chest pain.  Stress testing during his hospitalization was unremarkable.     Lexiscan nuclear stress test 2/11/2019    The pharmacologic nuclear stress test is negative for inducible myocardial ischemia or infarction.    The left ventricular ejection fraction is 56% with abnormal septal motion consistent with paced rhythm.    When compared to the images of 6/20/2017, area of nontransmural infarction in the lateral wall is not identified on the current study.    The findings of this examination were communicated to the nursing staff at [facility].    Echocardiogram 2/10/2019    Atrial fibrillation with controlled ventricular response.    Technically difficult study.    Normal left ventricular size. Moderate left ventricular hypertrophy.    Left ventricle ejection fraction is mildly decreased. Left ventricular ejection fraction estimated at 45-50%.    E/e' > 15, suggesting elevated LV filling pressures.    Flattening of the left ventricular septum suggesting right ventricular pressure and volume overload    Mild right ventricular enlargement. Moderate reduction in right ventricular function suggested.    History of mitral valve repair with an annuloplasty ring and fixed posterior leaflet. Mean gradient of 7  mmHg suggesting mild to moderate mitral stenosis.    Mitral regurgitation difficult to quantify due to eccentric jet. Possible mild to moderate mitral regurgitation.    Mild tricuspid insufficiency. Moderate pulmonary hypertension. Estimate of RV systolic pressure 56 mmHg plus right atrial pressure.    Mild enlargement of the ascending aorta.    When compared to the previous study dated 5/31/2017, estimate of RV systolic pressure is higher. Left ventricular systolic function appears mildly reduced on the current study.          Physical Examination Review of Systems   Vitals:    03/26/19 0814   BP: 120/56   Pulse: 76   Resp: 16     Body mass index is 31.71 kg/m .  Wt Readings from Last 3 Encounters:   03/26/19 (!) 224 lb (101.6 kg)   02/28/19 (!) 226 lb (102.5 kg)   02/13/19 (!) 227 lb 11.2 oz (103.3 kg)       General Appearance:   alert, no apparent distress   HEENT:  no scleral icterus; the mucous membranes are pink and moist                                  Neck: jugular venous pressure normal   Chest: the spine is straight and the chest is symmetric   Lungs:   respirations unlabored; the lungs are clear to auscultation   Cardiovascular:   regular rhythm with normal first and second heart sounds and no murmurs or gallops   Abdomen:  no organomegaly, masses, bruits, or tenderness; bowel sounds are present   Extremities: Trace edema   Skin: no xanthelasma    General: WNL  Eyes: WNL  Ears/Nose/Throat: WNL  Lungs: WNL  Heart: WNL  Stomach: WNL  Bladder: WNL  Muscle/Joints: WNL  Skin: WNL  Nervous System: WNL  Mental Health: WNL     Blood: WNL     Medical History  Surgical History Family History Social History   Past Medical History:   Diagnosis Date   ? Atrial fibrillation (H)    ? Atrial flutter (H)    ? Cardiomyopathy (H)    ? CHF (congestive heart failure) (H)    ? 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 Coronary artery disease    ? Disease of thyroid gland    ? Disorder of phrenic nerve    ?  Hyperlipidemia    ? Hypertension    ? Malfunction of biventricular cardiac pacemaker battery 12/1/2015    [employer] device recall   ? PVC (premature ventricular contraction)    ? PVD (peripheral vascular disease) (H)    ? Status post ablation of atrial fibrillation 11/18/2015    MAZE May 2007 PVI May 31, 2011 (RF-PVI + TIAN line) PVI Oct 30, 2012 (Cryo-PVI + redo TIAN line + roof line + RA-scar flutter line)    Past Surgical History:   Procedure Laterality Date   ? CARDIOVERSION  07/24/2018   ? INSERT / REPLACE / REMOVE PACEMAKER     ? MITRAL VALVE REPAIR     ? SC ABLATE HEART DYSRHYTHM FOCUS      Description: Catheter Ablation Atrial Flutter;  Recorded: 10/30/2012;  Comments: Typical RA CTI flutter Sept 2007; ; Multiple atypical flutter Oct 2012 (Roof line + reinforce TIAN line + RA scar line)   ? SC ABLATE HEART DYSRHYTHM FOCUS      Description: Catheter Ablation Atrial Fibrillation;  Recorded: 10/24/2013;  Comments: May 2011 (PVI > 90% recurrence post MAZE + TIAN line); ; Re do PVI Oct 2012 (Cryo PVI + roof line + TIAN touch up + RA scar flutter); ; No recurrence by device check at 12 months   ? SC ABLATE HEART DYSRHYTHM FOCUS      Description: Catheter Ablation Atrial Fibrillation;  Recorded: 10/30/2014;  Comments: May 2011 (PVI > 90% recurrence post MAZE + TIAN line); ; Re do PVI Oct 2012 (Cryo PVI + roof line + TIAN touch up + RA scar flutter); ; No recurrence by device check at 12 months   ? SC ABLATE HEART DYSRHYTHM FOCUS      Description: Catheter Ablation Atrial Flutter;  Recorded: 10/30/2014;  Comments: Typical RA CTI flutter Sept 2007; ; Multiple atypical flutter Oct 2012 (Roof line + reinforce TIAN line + RA scar line)   ? SC ABLATE/ RECONSTUCT ATRIA, LIMITED      Description: Maze Procedure;  Proc Date: 05/01/2007;  Comments: May 2007 at time of valve repair   ? SC CABG, VEIN, SINGLE      Description: CABG (CABG);  Recorded: 01/18/2012;  Comments: ONE VESSEL RCA   ? SC ICAR CATHETER ABLATION ATRIOVENTR NODE  FUNCTION      Description: Catheter Ablation Atrioventricular Node;  Recorded: 08/13/2012;  Comments: Mar 2008 performed due to inability to biventricular with conduction abnormality   ? MS ICAR CATHETER ABLATION ATRIOVENTR NODE FUNCTION      Description: Catheter Ablation Atrioventricular Node;  Recorded: 10/30/2014;  Comments: Mar 2008 performed due to inability to biventricular with conduction abnormality    Family History   Problem Relation Age of Onset   ? No Medical Problems Mother    ? No Medical Problems Father    ? No Medical Problems Sister    ? No Medical Problems Brother    ? No Medical Problems Daughter    ? No Medical Problems Son    ? Acute Myocardial Infarction Neg Hx    ? Alcohol abuse Neg Hx    ? Alzheimer's disease Neg Hx    ? Amputation of Leg Below Knee Neg Hx    ? Aortic Valve Replacement Neg Hx    ? Arrhythmogenic Right Ventricular Cardiomyopathy Neg Hx    ? Atrial fibrillation Neg Hx    ? Atrial Flutter Neg Hx    ? Breast cancer Neg Hx    ? CABG Neg Hx    ? Cancer Neg Hx    ? Cardiomyopathy Neg Hx    ? Carotid Endarterectomy Neg Hx    ? Cerebral aneurysm Neg Hx    ? Chronic Kidney Disease Neg Hx    ? COPD Neg Hx    ? Colon cancer Neg Hx    ? Congenital heart disease Neg Hx    ? Coronary artery disease Neg Hx    ? Coronary Stenting Neg Hx    ? Dementia Neg Hx    ? Diabetes type I Neg Hx    ? Diabetes type II Neg Hx    ? Dialysis Neg Hx    ? Dyslipidemia Neg Hx    ? Heart failure Neg Hx    ? Hypertension Neg Hx    ? Hypertrophic cardiomyopathy Neg Hx    ? ICD Neg Hx    ? Long QT syndrome Neg Hx    ? Mitral Regurgitation Neg Hx    ? Mitral Valve Replacement Neg Hx    ? Morbid Obesity Neg Hx    ? Heart attack Neg Hx    ? Obstructive Sleep Apnea Neg Hx    ? Ovarian cancer Neg Hx    ? Pacemaker Neg Hx    ? Pancreatic cancer Neg Hx    ? Peripheral vascular disease Neg Hx    ? Pulmonary embolism Neg Hx    ? Pulmonary Hypertension Neg Hx    ? Rheumatic Heart Disease Neg Hx    ? Stroke Neg Hx    ?  Sudden death Neg Hx    ? Transient ischemic attack Neg Hx    ? Valvular heart disease Neg Hx     Social History     Socioeconomic History   ? Marital status:      Spouse name: Not on file   ? Number of children: Not on file   ? Years of education: Not on file   ? Highest education level: Not on file   Occupational History   ? Occupation: retired   Social Needs   ? Financial resource strain: Not on file   ? Food insecurity:     Worry: Not on file     Inability: Not on file   ? Transportation needs:     Medical: Not on file     Non-medical: Not on file   Tobacco Use   ? Smoking status: Former Smoker     Packs/day: 1.00     Last attempt to quit: 1990     Years since quittin.2   ? Smokeless tobacco: Never Used   Substance and Sexual Activity   ? Alcohol use: Yes     Alcohol/week: 1.2 oz     Types: 2 Cans of beer per week   ? Drug use: No   ? Sexual activity: Not on file   Lifestyle   ? Physical activity:     Days per week: Not on file     Minutes per session: Not on file   ? Stress: Not on file   Relationships   ? Social connections:     Talks on phone: Not on file     Gets together: Not on file     Attends Orthodox service: Not on file     Active member of club or organization: Not on file     Attends meetings of clubs or organizations: Not on file     Relationship status: Not on file   ? Intimate partner violence:     Fear of current or ex partner: Not on file     Emotionally abused: Not on file     Physically abused: Not on file     Forced sexual activity: Not on file   Other Topics Concern   ? Not on file   Social History Narrative   ? Not on file          Medications  Allergies   Current Outpatient Medications   Medication Sig Dispense Refill   ? amLODIPine (NORVASC) 10 MG tablet Take 10 mg by mouth daily.     ? aspirin 81 MG EC tablet Take 81 mg by mouth daily.     ? atorvastatin (LIPITOR) 20 MG tablet Take 20 mg by mouth bedtime.  1   ? docoshexanoic acid-eicosapent 500 mg (FISH OIL) 500-100 mg  cap capsule Take 1,000 mg by mouth daily.     ? furosemide (LASIX) 20 MG tablet Take 1 tablet (20 mg total) by mouth 2 (two) times a day at 9am and 6pm. 180 tablet 3   ? KLOR-CON M20 20 mEq tablet Take 1 tablet by mouth daily.     ? levothyroxine (SYNTHROID, LEVOTHROID) 150 MCG tablet Take 150 mcg by mouth daily.     ? losartan (COZAAR) 50 MG tablet Take 50 mg by mouth daily.     ? MULTIVITAMIN ORAL Take 1 tablet by mouth daily.     ? warfarin (COUMADIN/JANTOVEN) 1 MG tablet Take 1 mg by mouth See Admin Instructions. Take 4 tablets (4 mg) by mouth daily on Tuesday, Thursday, and Saturday.   Take 3 tablets (3mg) by mouth daily on Monday, Wednesday, Friday, and Sunday.  Adjust dose based on INR results as directed.       No current facility-administered medications for this visit.       Allergies   Allergen Reactions   ? Carvedilol Shortness Of Breath   ? Lisinopril Cough         Lab Results    Chemistry/lipid CBC Cardiac Enzymes/BNP/TSH/INR   Lab Results   Component Value Date    CHOL 138 12/12/2018    HDL 38 (L) 12/12/2018    LDLCALC 82 12/12/2018    TRIG 90 12/12/2018    CREATININE 1.22 02/28/2019    BUN 11 02/28/2019    K 4.0 02/28/2019     02/28/2019     02/28/2019    CO2 25 02/28/2019    Lab Results   Component Value Date    WBC 9.1 02/10/2019    HGB 13.7 (L) 02/10/2019    HCT 41.3 02/10/2019    MCV 95 02/10/2019     02/10/2019    Lab Results   Component Value Date    CKTOTAL 111 09/10/2015    TROPONINI 0.03 02/10/2019     (H) 02/10/2019    TSH 6.62 (H) 12/12/2018    INR 3.0 03/26/2019

## 2021-06-27 NOTE — PROGRESS NOTES
Progress Notes by Janis Townsend MD at 6/17/2019  3:30 PM     Author: Janis Townsend MD Service: -- Author Type: Physician    Filed: 6/20/2019  1:43 PM Encounter Date: 6/17/2019 Status: Signed    : Janis Townsend MD (Physician)           Click to link to [facility] HEART CARE NOTE    Thank you, Dr. Ross, for asking us to see Ady Farley at the [facility].      Assessment/Recommendations   Assessment:    1.    Coronary artery disease: History of coronary artery disease status post SVG to RCA when he underwent mitral valve repair in 2007.  No anginal complaints and recent stress testing was negative for inducible ischemia.  Continue on daily aspirin.  2.  Atrial dysrhythmia: Atrial fibrillation/atypical flutter status post PVI and flutter ablation 5 years prior.  Continue on Coumadin for anticoagulation.  3.  Chronic diastolic congestive heart failure: Weights have remained stable and shortness of breath improved.  Continue on Lasix 20 mg daily with an extra dose as needed.  4.    Valvular heart disease: Status post mitral valve repair for mitral regurgitation in 2007 with residual mild to moderate mitral stenosis mild to moderate mitral regurgitation.  5.  Pulmonary hypertension and RV dysfunction: Likely secondary to left-sided heart disease/venous pulmonary hypertension.    Sleep study is scheduled for later this month.    Follow-up in 6 months     History of Present Illness    Mr. Ady Farley is a 78 y.o. male with a known history of coronary artery disease and mitral regurgitation status post SVG to his RCA and mitral valve repair in 2007, atrial fibrillation/atypical flutter status post PVI and flutter ablation 5 years prior, status post biventricular pacemaker who I am seeing today in follow up.    He was hospitalized in February with acute diastolic congestive heart failure exacerbation.  Echocardiogram demonstrated a  left ventricular ejection fraction mildly reduced at 45-50%, mitral valve repair with mild to moderate mitral stenosis with a mean gradient of 7 mmHg, mild to moderate mitral regurgitation, moderate pulmonary hypertension with moderate reduction in RV function.  He has been doing better on a low-sodium diet and compliant with his medications.  He is currently taking Lasix 20 mg daily with an extra dose as needed.  Weights have remained stable.  Denies any orthopnea no chest pain.  He is scheduled for a sleep study on 6/28/2019.  Overall feeling very well.     Lexiscan nuclear stress test 2/11/2019    The pharmacologic nuclear stress test is negative for inducible myocardial ischemia or infarction.    The left ventricular ejection fraction is 56% with abnormal septal motion consistent with paced rhythm.    When compared to the images of 6/20/2017, area of nontransmural infarction in the lateral wall is not identified on the current study.    The findings of this examination were communicated to the nursing staff at [facility].     Echocardiogram 2/10/2019    Atrial fibrillation with controlled ventricular response.    Technically difficult study.    Normal left ventricular size. Moderate left ventricular hypertrophy.    Left ventricle ejection fraction is mildly decreased. Left ventricular ejection fraction estimated at 45-50%.    E/e' > 15, suggesting elevated LV filling pressures.    Flattening of the left ventricular septum suggesting right ventricular pressure and volume overload    Mild right ventricular enlargement. Moderate reduction in right ventricular function suggested.    History of mitral valve repair with an annuloplasty ring and fixed posterior leaflet. Mean gradient of 7 mmHg suggesting mild to moderate mitral stenosis.    Mitral regurgitation difficult to quantify due to eccentric jet. Possible mild to moderate mitral regurgitation.    Mild tricuspid insufficiency. Moderate pulmonary  hypertension. Estimate of RV systolic pressure 56 mmHg plus right atrial pressure.    Mild enlargement of the ascending aorta.  When compared to the previous study dated 5/31/2017, estimate of RV systolic pressure is higher. Left ventricular systolic function appears mildly reduced on the current study       Physical Examination Review of Systems   Vitals:    06/17/19 1535   BP: 142/54   Pulse: 74   Resp: 18     Body mass index is 31.54 kg/m .  Wt Readings from Last 3 Encounters:   06/17/19 (!) 223 lb (101.2 kg)   06/11/19 (!) 226 lb (102.5 kg)   05/31/19 (!) 226 lb (102.5 kg)       General Appearance:   alert, no apparent distress   HEENT:  no scleral icterus; the mucous membranes are pink and moist                                  Neck: No JVD   Chest: the spine is straight and the chest is symmetric   Lungs:   respirations unlabored; the lungs are clear to auscultation   Cardiovascular:   regular rhythm with normal first and second heart sounds and no murmurs or gallops   Abdomen:  no organomegaly, masses, bruits, or tenderness; bowel sounds are present   Extremities: no cyanosis, clubbing, or edema   Skin: no xanthelasma    General: WNL  Eyes: WNL  Ears/Nose/Throat: WNL  Lungs: WNL  Heart: WNL  Stomach: WNL  Bladder: WNL  Muscle/Joints: WNL  Skin: WNL  Nervous System: WNL  Mental Health: WNL     Blood: WNL     Medical History  Surgical History Family History Social History   Past Medical History:   Diagnosis Date   ? Atrial fibrillation (H)    ? Atrial flutter (H)    ? Cardiomyopathy (H)    ? CHF (congestive heart failure) (H)    ? 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 Coronary artery disease    ? Disease of thyroid gland    ? Disorder of phrenic nerve    ? Hyperlipidemia    ? Hypertension    ? Malfunction of biventricular cardiac pacemaker battery 12/1/2015    Everspringtronic device recall   ? 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 PVC (premature ventricular contraction)    ? PVD (peripheral vascular disease) (H)    ? Status post ablation of atrial fibrillation 11/18/2015    MAZE May 2007 PVI May 31, 2011 (RF-PVI + TIAN line) PVI Oct 30, 2012 (Cryo-PVI + redo TIAN line + roof line + RA-scar flutter line)    Past Surgical History:   Procedure Laterality Date   ? CARDIOVERSION  07/24/2018   ? INSERT / REPLACE / REMOVE PACEMAKER     ? MITRAL VALVE REPAIR     ? WA ABLATE HEART DYSRHYTHM FOCUS      Description: Catheter Ablation Atrial Flutter;  Recorded: 10/30/2012;  Comments: Typical RA CTI flutter Sept 2007; ; Multiple atypical flutter Oct 2012 (Roof line + reinforce TIAN line + RA scar line)   ? WA ABLATE HEART DYSRHYTHM FOCUS      Description: Catheter Ablation Atrial Fibrillation;  Recorded: 10/24/2013;  Comments: May 2011 (PVI > 90% recurrence post MAZE + TIAN line); ; Re do PVI Oct 2012 (Cryo PVI + roof line + TIAN touch up + RA scar flutter); ; No recurrence by device check at 12 months   ? WA ABLATE HEART DYSRHYTHM FOCUS      Description: Catheter Ablation Atrial Fibrillation;  Recorded: 10/30/2014;  Comments: May 2011 (PVI > 90% recurrence post MAZE + TIAN line); ; Re do PVI Oct 2012 (Cryo PVI + roof line + TIAN touch up + RA scar flutter); ; No recurrence by device check at 12 months   ? WA ABLATE HEART DYSRHYTHM FOCUS      Description: Catheter Ablation Atrial Flutter;  Recorded: 10/30/2014;  Comments: Typical RA CTI flutter Sept 2007; ; Multiple atypical flutter Oct 2012 (Roof line + reinforce TIAN line + RA scar line)   ? WA ABLATE/ RECONSTUCT ATRIA, LIMITED      Description: Maze Procedure;  Proc Date: 05/01/2007;  Comments: May 2007 at time of valve repair   ? WA CABG, VEIN, SINGLE       Description: CABG (CABG);  Recorded: 01/18/2012;  Comments: ONE VESSEL RCA   ? MI ICAR CATHETER ABLATION ATRIOVENTR NODE FUNCTION      Description: Catheter Ablation Atrioventricular Node;  Recorded: 08/13/2012;  Comments: Mar 2008 performed due to inability to biventricular with conduction abnormality   ? MI ICAR CATHETER ABLATION ATRIOVENTR NODE FUNCTION      Description: Catheter Ablation Atrioventricular Node;  Recorded: 10/30/2014;  Comments: Mar 2008 performed due to inability to biventricular with conduction abnormality    Family History   Problem Relation Age of Onset   ? No Medical Problems Mother    ? No Medical Problems Father    ? No Medical Problems Sister    ? No Medical Problems Brother    ? No Medical Problems Daughter    ? No Medical Problems Son    ? Acute Myocardial Infarction Neg Hx    ? Alcohol abuse Neg Hx    ? Alzheimer's disease Neg Hx    ? Amputation of Leg Below Knee Neg Hx    ? Aortic Valve Replacement Neg Hx    ? Arrhythmogenic Right Ventricular Cardiomyopathy Neg Hx    ? Atrial fibrillation Neg Hx    ? Atrial Flutter Neg Hx    ? Breast cancer Neg Hx    ? CABG Neg Hx    ? Cancer Neg Hx    ? Cardiomyopathy Neg Hx    ? Carotid Endarterectomy Neg Hx    ? Cerebral aneurysm Neg Hx    ? Chronic Kidney Disease Neg Hx    ? COPD Neg Hx    ? Colon cancer Neg Hx    ? Congenital heart disease Neg Hx    ? Coronary artery disease Neg Hx    ? Coronary Stenting Neg Hx    ? Dementia Neg Hx    ? Diabetes type I Neg Hx    ? Diabetes type II Neg Hx    ? Dialysis Neg Hx    ? Dyslipidemia Neg Hx    ? Heart failure Neg Hx    ? Hypertension Neg Hx    ? Hypertrophic cardiomyopathy Neg Hx    ? ICD Neg Hx    ? Long QT syndrome Neg Hx    ? Mitral Regurgitation Neg Hx    ? Mitral Valve Replacement Neg Hx    ? Morbid Obesity Neg Hx    ? Heart attack Neg Hx    ? Obstructive Sleep Apnea Neg Hx    ? Ovarian cancer Neg Hx    ? Pacemaker Neg Hx    ? Pancreatic cancer Neg Hx    ? Peripheral vascular disease Neg Hx    ?  Pulmonary embolism Neg Hx    ? Pulmonary Hypertension Neg Hx    ? Rheumatic Heart Disease Neg Hx    ? Stroke Neg Hx    ? Sudden death Neg Hx    ? Transient ischemic attack Neg Hx    ? Valvular heart disease Neg Hx     Social History     Socioeconomic History   ? Marital status:      Spouse name: Not on file   ? Number of children: Not on file   ? Years of education: Not on file   ? Highest education level: Not on file   Occupational History   ? Occupation: retired   Social Needs   ? Financial resource strain: Not on file   ? Food insecurity:     Worry: Not on file     Inability: Not on file   ? Transportation needs:     Medical: Not on file     Non-medical: Not on file   Tobacco Use   ? Smoking status: Former Smoker     Packs/day: 1.00     Last attempt to quit: 1990     Years since quittin.4   ? Smokeless tobacco: Never Used   Substance and Sexual Activity   ? Alcohol use: Yes     Alcohol/week: 1.2 oz     Types: 2 Cans of beer per week   ? Drug use: No   ? Sexual activity: Not on file   Lifestyle   ? Physical activity:     Days per week: Not on file     Minutes per session: Not on file   ? Stress: Not on file   Relationships   ? Social connections:     Talks on phone: Not on file     Gets together: Not on file     Attends [employer] service: Not on file     Active member of club or organization: Not on file     Attends meetings of clubs or organizations: Not on file     Relationship status: Not on file   ? Intimate partner violence:     Fear of current or ex partner: Not on file     Emotionally abused: Not on file     Physically abused: Not on file     Forced sexual activity: Not on file   Other Topics Concern   ? Not on file   Social History Narrative   ? Not on file          Medications  Allergies   Current Outpatient Medications   Medication Sig Dispense Refill   ? amLODIPine (NORVASC) 10 MG tablet Take 10 mg by mouth daily.     ? aspirin 81 MG EC tablet Take 81 mg by mouth daily.     ?  atorvastatin (LIPITOR) 20 MG tablet Take 20 mg by mouth bedtime.  1   ? docoshexanoic acid-eicosapent 500 mg (FISH OIL) 500-100 mg cap capsule Take 1,000 mg by mouth daily.     ? furosemide (LASIX) 20 MG tablet Take 1 tablet (20 mg total) by mouth 2 (two) times a day at 9am and 6pm. 180 tablet 3   ? KLOR-CON M20 20 mEq tablet Take 1 tablet by mouth daily.     ? levothyroxine (SYNTHROID, LEVOTHROID) 150 MCG tablet Take 150 mcg by mouth daily.     ? losartan (COZAAR) 50 MG tablet Take 50 mg by mouth daily.     ? MULTIVITAMIN ORAL Take 1 tablet by mouth daily.     ? warfarin (COUMADIN/JANTOVEN) 1 MG tablet Take 1 mg by mouth See Admin Instructions. Take 4 tablets (4 mg) by mouth daily on Tuesday, Thursday, and Saturday.   Take 3 tablets (3mg) by mouth daily on Monday, Wednesday, Friday, and Sunday.  Adjust dose based on INR results as directed.     ? warfarin (COUMADIN/JANTOVEN) 3 MG tablet TAKE 1 TABLET WITH 1 MG TABLET ( 4 MG TOTAL ) ON TUESDAY, THURSDAY, AND SATURDAY AND TAKE 1 TABLET ALL OTHER DAYS 90 tablet 3   ? zolpidem (AMBIEN) 5 MG tablet Do not take this at home.  For use if needed during the sleep study. May take one tablet by mouth as directed once you are in the Sleep Lab. 1 tablet 0     No current facility-administered medications for this visit.       Allergies   Allergen Reactions   ? Carvedilol Shortness Of Breath   ? Lisinopril Cough         Lab Results    Chemistry/lipid CBC Cardiac Enzymes/BNP/TSH/INR   Lab Results   Component Value Date    CHOL 138 12/12/2018    HDL 38 (L) 12/12/2018    LDLCALC 82 12/12/2018    TRIG 90 12/12/2018    CREATININE 1.22 02/28/2019    BUN 11 02/28/2019    K 4.0 02/28/2019     02/28/2019     02/28/2019    CO2 25 02/28/2019    Lab Results   Component Value Date    WBC 9.1 02/10/2019    HGB 13.7 (L) 02/10/2019    HCT 41.3 02/10/2019    MCV 95 02/10/2019     02/10/2019    Lab Results   Component Value Date    CKTOTAL 111 09/10/2015    TROPONINI 0.03  02/10/2019     (H) 02/10/2019    TSH 6.62 (H) 12/12/2018    INR 2.30 (H) 05/21/2019          Janis Townsend MD  [facility]

## 2021-06-27 NOTE — PROGRESS NOTES
Progress Notes by Abby Johnson CNP at 2/28/2019  9:50 AM     Author: Abby Johnson CNP Service: -- Author Type: Nurse Practitioner    Filed: 2/28/2019 11:27 AM Encounter Date: 2/28/2019 Status: Signed    : Abby Johnson CNP (Nurse Practitioner)           Click to link to [facility] Heart [facility] HEART [facility] NOTE      Assessment/Recommendations   Assessment:    1.  Heart failure with preserved ejection fraction, NYHA class II: Compensated.  He continues to have mild fatigue.  He states his symptoms of dyspnea on exertion and abdominal bloating have resolved since hospitalization.  His weights are stable at home.  We discussed monitoring symptoms, following a low-sodium diet, monitoring daily weights, and heart failure treatment.  He met with the nurse clinician for further heart failure education.  On his Optivol his fluid index is below the threshold indicating normal fluid status.    2.  Hypertension: Slightly elevated today 136/52.  He states he normally runs 120s systolically.    3.  Permanent atrial fibrillation: Rate controlled.  He is on warfarin for anticoagulation.    Plan:  1.  Continue current medications  2.  BMP pending  3.  Continue daily weights and low-sodium diet  4.  Continue regular exercise    Ady Farley will follow up with Dr. Townsend March 26, Dr. Gage April 3, and in the heart failure clinic in 3 months.     History of Present Illness    Ady Farley is seen at [facility] heart failure clinic today for post-hospitalization follow-up.  He was hospitalized at [facility] from February 10 - February 13, 2019 with shortness of breath and abdominal bloating.  His BNP was 175 on admission.  He received IV diuretics which improved symptoms.  He had a nuclear stress test in February 11, 2019 which showed an ejection fraction of 56% and was negative for inducible myocardial ischemia or infarction.  The most recent evaluation of His ejection  fraction was 45-50% from an  echo on 2/10/2019.  His echocardiogram also showed moderate right ventricular dysfunction and moderate pulmonary hypertension. His past medical history is also significant for hypertension, coronary artery disease, peripheral vascular disease, atrial fibrillation and dyslipidemia.  History of coronary artery bypass graft in 2007.  Status post MVR.  He had a PVI in May 2011.  He had a CRT P placed December 2015.    Since being discharged from the hospital, Ady feels that he is improving.  He was having abdominal bloating and dyspnea on exertion prior to hospitalization.  He states his symptoms have improved.  He has mild fatigue.  He denies dyspnea on exertion, orthopnea, or PND.  He denies abdominal bloating or lower extremity edema. He denies lightheadedness, shortness of breath, dyspnea on exertion, orthopnea, PND, palpitations, chest pain, abdominal fullness/bloating and lower extremity edema.      Ady Rodriguez weight at discharge was 227 pounds.  He is monitoring home weights which are stable around 221 pounds.  He is following a low sodium diet.  He participates in regular physical activity.      ECHO 2/10/2019:   Summary       Atrial fibrillation with controlled ventricular response.    Technically difficult study.    Normal left ventricular size. Moderate left ventricular hypertrophy.    Left ventricle ejection fraction is mildly decreased. Left ventricular ejection fraction estimated at 45-50%.    E/e' > 15, suggesting elevated LV filling pressures.    Flattening of the left ventricular septum suggesting right ventricular pressure and volume overload    Mild right ventricular enlargement. Moderate reduction in right ventricular function suggested.    History of mitral valve repair with an annuloplasty ring and fixed posterior leaflet. Mean gradient of 7 mmHg suggesting mild to moderate mitral stenosis.    Mitral regurgitation difficult to quantify due to eccentric jet.  Possible mild to moderate mitral regurgitation.    Mild tricuspid insufficiency. Moderate pulmonary hypertension. Estimate of RV systolic pressure 56 mmHg plus right atrial pressure.    Mild enlargement of the ascending aorta.    When compared to the previous study dated 5/31/2017, estimate of RV systolic pressure is higher. Left ventricular systolic function appears mildly reduced on the current study.        NM pharmacological stress test 2/11/2019       Physical Examination Review of Systems   Vitals:    02/28/19 0950   BP: 136/52   Pulse: 70   Resp: 18     Body mass index is 31.52 kg/m .  Wt Readings from Last 3 Encounters:   02/28/19 (!) 226 lb (102.5 kg)   02/13/19 (!) 227 lb 11.2 oz (103.3 kg)   08/20/18 (!) 228 lb (103.4 kg)       General Appearance:     Alert, cooperative and in no acute distress.   ENT/Mouth: membranes moist, no oral lesions or bleeding gums.      EYES:  no scleral icterus, normal conjunctivae   Neck: no carotid bruits or thyromegaly   Chest/Lungs:   lungs are clear to auscultation, no rales or wheezing, respirations unlabored   Cardiovascular:   Regular. Normal first and second heart sounds with no murmurs, rubs, or gallops; the carotid, radial and posterior tibial pulses are intact, Jugular venous pressure normal, trace edema bilateral lower extremities    Abdomen:  Soft, nontender, nondistended, bowel sounds present   Extremities: no cyanosis or clubbing   Skin: warm, dry.    Neurologic: mood and affect are appropriate, alert and oriented x3      General: WNL  Eyes: WNL  Ears/Nose/Throat: WNL  Lungs: WNL  Heart: WNL  Stomach: WNL  Bladder: WNL  Muscle/Joints: WNL  Skin: WNL  Nervous System: WNL  Mental Health: WNL     Blood: WNL     Medical History  Surgical History Family History Social History   Past Medical History:   Diagnosis Date   ? Atrial fibrillation (H)    ? Atrial flutter (H)    ? Cardiomyopathy (H)    ? CHF (congestive heart failure) (H)    ? 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 Coronary artery disease    ? Disease of thyroid gland    ? Disorder of phrenic nerve    ? Hyperlipidemia    ? Hypertension    ? Malfunction of biventricular cardiac pacemaker battery 12/1/2015    [employer] device recall   ? PVC (premature ventricular contraction)    ? PVD (peripheral vascular disease) (H)    ? Status post ablation of atrial fibrillation 11/18/2015    MAZE May 2007 PVI May 31, 2011 (RF-PVI + TIAN line) PVI Oct 30, 2012 (Cryo-PVI + redo TIAN line + roof line + RA-scar flutter line)    Past Surgical History:   Procedure Laterality Date   ? CARDIOVERSION  07/24/2018   ? INSERT / REPLACE / REMOVE PACEMAKER     ? MITRAL VALVE REPAIR     ? NC ABLATE HEART DYSRHYTHM FOCUS      Description: Catheter Ablation Atrial Flutter;  Recorded: 10/30/2012;  Comments: Typical RA CTI flutter Sept 2007; ; Multiple atypical flutter Oct 2012 (Roof line + reinforce TIAN line + RA scar line)   ? NC ABLATE HEART DYSRHYTHM FOCUS      Description: Catheter Ablation Atrial Fibrillation;  Recorded: 10/24/2013;  Comments: May 2011 (PVI > 90% recurrence post MAZE + TIAN line); ; Re do PVI Oct 2012 (Cryo PVI + roof line + TIAN touch up + RA scar flutter); ; No recurrence by device check at 12 months   ? NC ABLATE HEART DYSRHYTHM FOCUS      Description: Catheter Ablation Atrial Fibrillation;  Recorded: 10/30/2014;  Comments: May 2011 (PVI > 90% recurrence post MAZE + TIAN line); ; Re do PVI Oct 2012 (Cryo PVI + roof line + TIAN touch up + RA scar flutter); ; No recurrence by device check at 12 months   ? NC ABLATE HEART DYSRHYTHM FOCUS      Description: Catheter Ablation Atrial Flutter;  Recorded: 10/30/2014;  Comments: Typical RA CTI flutter Sept 2007; ; Multiple atypical flutter Oct 2012 (Roof line + reinforce TIAN line + RA scar line)   ? NC ABLATE/ RECONSTUCT ATRIA, LIMITED      Description: Maze Procedure;  Proc Date: 05/01/2007;  Comments: May 2007 at time of valve repair   ? NC CABG, VEIN, SINGLE       Description: CABG (CABG);  Recorded: 01/18/2012;  Comments: ONE VESSEL RCA   ? NM ICAR CATHETER ABLATION ATRIOVENTR NODE FUNCTION      Description: Catheter Ablation Atrioventricular Node;  Recorded: 08/13/2012;  Comments: Mar 2008 performed due to inability to biventricular with conduction abnormality   ? NM ICAR CATHETER ABLATION ATRIOVENTR NODE FUNCTION      Description: Catheter Ablation Atrioventricular Node;  Recorded: 10/30/2014;  Comments: Mar 2008 performed due to inability to biventricular with conduction abnormality    Family History   Problem Relation Age of Onset   ? No Medical Problems Mother    ? No Medical Problems Father    ? No Medical Problems Sister    ? No Medical Problems Brother    ? No Medical Problems Daughter    ? No Medical Problems Son    ? Acute Myocardial Infarction Neg Hx    ? Alcohol abuse Neg Hx    ? Alzheimer's disease Neg Hx    ? Amputation of Leg Below Knee Neg Hx    ? Aortic Valve Replacement Neg Hx    ? Arrhythmogenic Right Ventricular Cardiomyopathy Neg Hx    ? Atrial fibrillation Neg Hx    ? Atrial Flutter Neg Hx    ? Breast cancer Neg Hx    ? CABG Neg Hx    ? Cancer Neg Hx    ? Cardiomyopathy Neg Hx    ? Carotid Endarterectomy Neg Hx    ? Cerebral aneurysm Neg Hx    ? Chronic Kidney Disease Neg Hx    ? COPD Neg Hx    ? Colon cancer Neg Hx    ? Congenital heart disease Neg Hx    ? Coronary artery disease Neg Hx    ? Coronary Stenting Neg Hx    ? Dementia Neg Hx    ? Diabetes type I Neg Hx    ? Diabetes type II Neg Hx    ? Dialysis Neg Hx    ? Dyslipidemia Neg Hx    ? Heart failure Neg Hx    ? Hypertension Neg Hx    ? Hypertrophic cardiomyopathy Neg Hx    ? ICD Neg Hx    ? Long QT syndrome Neg Hx    ? Mitral Regurgitation Neg Hx    ? Mitral Valve Replacement Neg Hx    ? Morbid Obesity Neg Hx    ? Heart attack Neg Hx    ? Obstructive Sleep Apnea Neg Hx    ? Ovarian cancer Neg Hx    ? Pacemaker Neg Hx    ? Pancreatic cancer Neg Hx    ? Peripheral vascular disease Neg Hx    ?  Pulmonary embolism Neg Hx    ? Pulmonary Hypertension Neg Hx    ? Rheumatic Heart Disease Neg Hx    ? Stroke Neg Hx    ? Sudden death Neg Hx    ? Transient ischemic attack Neg Hx    ? Valvular heart disease Neg Hx     Social History     Socioeconomic History   ? Marital status:      Spouse name: Not on file   ? Number of children: Not on file   ? Years of education: Not on file   ? Highest education level: Not on file   Occupational History   ? Occupation: retired   Social Needs   ? Financial resource strain: Not on file   ? Food insecurity:     Worry: Not on file     Inability: Not on file   ? Transportation needs:     Medical: Not on file     Non-medical: Not on file   Tobacco Use   ? Smoking status: Former Smoker     Packs/day: 1.00     Last attempt to quit: 1990     Years since quittin.1   ? Smokeless tobacco: Never Used   Substance and Sexual Activity   ? Alcohol use: Yes     Alcohol/week: 1.2 oz     Types: 2 Cans of beer per week   ? Drug use: No   ? Sexual activity: Not on file   Lifestyle   ? Physical activity:     Days per week: Not on file     Minutes per session: Not on file   ? Stress: Not on file   Relationships   ? Social connections:     Talks on phone: Not on file     Gets together: Not on file     Attends Christianity service: Not on file     Active member of club or organization: Not on file     Attends meetings of clubs or organizations: Not on file     Relationship status: Not on file   ? Intimate partner violence:     Fear of current or ex partner: Not on file     Emotionally abused: Not on file     Physically abused: Not on file     Forced sexual activity: Not on file   Other Topics Concern   ? Not on file   Social History Narrative   ? Not on file          Medications  Allergies   Current Outpatient Medications   Medication Sig Dispense Refill   ? amLODIPine (NORVASC) 10 MG tablet Take 10 mg by mouth daily.     ? aspirin 81 MG EC tablet Take 81 mg by mouth daily.     ?  atorvastatin (LIPITOR) 20 MG tablet Take 20 mg by mouth bedtime.  1   ? docoshexanoic acid-eicosapent 500 mg (FISH OIL) 500-100 mg cap capsule Take 1,000 mg by mouth daily.     ? furosemide (LASIX) 20 MG tablet Take 1 tablet (20 mg total) by mouth 2 (two) times a day at 9am and 6pm. 180 tablet 3   ? KLOR-CON M20 20 mEq tablet Take 1 tablet by mouth daily.     ? levothyroxine (SYNTHROID, LEVOTHROID) 150 MCG tablet Take 150 mcg by mouth daily.     ? losartan (COZAAR) 50 MG tablet Take 50 mg by mouth daily.     ? MULTIVITAMIN ORAL Take 1 tablet by mouth daily.     ? warfarin (COUMADIN/JANTOVEN) 1 MG tablet Take 1 mg by mouth See Admin Instructions. Take 4 tablets (4 mg) by mouth daily on Tuesday, Thursday, and Saturday.   Take 3 tablets (3mg) by mouth daily on Monday, Wednesday, Friday, and Sunday.  Adjust dose based on INR results as directed.       No current facility-administered medications for this visit.       Allergies   Allergen Reactions   ? Carvedilol Shortness Of Breath   ? Lisinopril Cough         Lab Results    Chemistry CBC BNP   Lab Results   Component Value Date    CREATININE 1.16 02/12/2019    BUN 11 02/10/2019     02/10/2019    K 4.0 02/13/2019     (H) 02/10/2019    CO2 28 02/10/2019     Creatinine (mg/dL)   Date Value   02/12/2019 1.16   02/10/2019 1.15   02/10/2019 1.15   12/12/2018 1.13    Lab Results   Component Value Date    WBC 9.1 02/10/2019    HGB 13.7 (L) 02/10/2019    HCT 41.3 02/10/2019    MCV 95 02/10/2019     02/10/2019    Lab Results   Component Value Date     (H) 02/10/2019     BNP (pg/mL)   Date Value   02/10/2019 175 (H)   11/06/2012 168 (H)          40 minutes were spent with the patient with greater than 50% spent on education and counseling.      Abby Johnson, [facility]   Heart Failure Clinic

## 2021-06-28 NOTE — PROGRESS NOTES
Progress Notes by Victoria Perez CNP at 10/3/2019  9:50 AM     Author: Victoria Perez CNP Service: -- Author Type: Nurse Practitioner    Filed: 10/3/2019 12:48 PM Encounter Date: 10/3/2019 Status: Signed    : Victoria Perez CNP (Nurse Practitioner)          Click to link to [facility]     [facility] HEART CARE ELECTROPHYSIOLOGY NOTE      Assessment/Recommendations   Assessment/Plan:    Diagnoses and all orders for this visit:    Paroxysmal atrial fibrillation (H) and Atrial flutter, unspecified type (H) and reassured that his overall burden is very low.  He has AV node ablation and high percentage of bi V pacing.  He is asymptomatic.    Presence of biventricular cardiac pacemaker and device functioning appropriately.  Device shows 1.5-year battery life.    NSVT (nonsustained ventricular tachycardia) (H) and continued to have short episodes of nonsustained VT, which are asymptomatic.  He was worked up in February for ischemia and ruled out.  He was not able to do sleep study because he could not sleep in the sleep clinic.  He assures me he does not have SUBHASH and is not willing to do anything further regarding this.  He is a very restless sleeper and tells me that he would never tolerate CPAP.    Chronic heart failure with preserved ejection fraction (H) and hospitalized in February 2019 with acute heart failure.  Probably related to increased salt intake and tells me that he is acutely aware that he is much more sensitive to salt intake than he was in the past.  Takes furosemide 20 mg p.o. twice daily probably every other day now and has been for months.  He is taking it just before bedtime.  I recommended that if his weight is up, short of breath or noting edema to take furosemide 20 mg 2 tablets in the morning rather than 1.  To continue to adjust dose of furosemide.  Agreeable to lab work today on diuretic.  No signs or symptoms of decompensated heart failure.  OptiVol shows  "good volume control.  -     Basic Metabolic Panel  -     furosemide (LASIX) 20 MG tablet; Take 1-2 tab orally every morning.  Follow instructions given in clinic.  Dispense: 180 tablet; Refill: 1    [id number] score of 4 and on chronic warfarin and dose has been very stable so he is out at INR  every 6 weeks now.  Follow up in clinic with Dr. Townsend in 6 months and device check and see me in clinic in 1 year.  If he has diaphragmatic stim he is to call device clinic and come back in for reprogramming.     History of Present Illness    Mr. Ady Farley is a very pleasant 79 y.o. male who comes in today for EP follow-up for paroxysmal atrial fibrillation and had device check prior to visit.  Ady Farley has a known history of coronary artery disease with coronary artery bypass surgery, mitral valve disease and atrial arrhythmias.  In 2007, Ady had coronary artery bypass with maze and mitral valve repair.  He recently started having nonsustained VT and ischemic workup was negative.  He follows with Dr. Townsend for his valvular and coronary artery disease.  Ady has had multiple ablations in the past with a right CTI flutter ablation in 2007 and then PVI in 2011 and in 2012 he had repeat ablation.  He has had reoccurrence since.  In 2012 he had an AV node ablation with CRT P placement.     Ady is asymptomatic with atrial fib and nonsustained VT.    He reports that he has had increased tolerance of activity and is more active.  He has been \"very good\" about watching salt intake, daily weights and adjusting dose of diuretic according to symptoms and weight since acute heart failure admission in February 2019.  He takes furosemide 20 mg p.o. twice daily about every other day and then the other days it is furosemide 20 mg every day.  He is taking a second dose of furosemide at bedtime.  He denies any cardiac symptoms on questioning.  Complaining of diaphragmatic stim with certain position " changes.    Cardiographics (personally reviewed):  Results for orders placed during the hospital encounter of 06/11/19   Echo Complete [ECH10] 06/11/2019    Narrative   Left ventricle ejection fraction is mildly decreased. The calculated   left ventricular ejection fraction is 53%.    When compared to the previous study dated 2/10/2019, no significant   change.    Normal right ventricular size and systolic function.    Mitral valve repair with mild residual mitral stenosis and mild mitral   regurgitation    Pacemaker lead in right heart chambers           Results for orders placed during the hospital encounter of 02/10/19   NM Pharmacologic Stress Test    Narrative   The pharmacologic nuclear stress test is negative for inducible   myocardial ischemia or infarction.    The left ventricular ejection fraction is 56% with abnormal septal   motion consistent with paced rhythm.    When compared to the images of 6/20/2017, area of nontransmural   infarction in the lateral wall is not identified on the current study.    The findings of this examination were communicated to the nursing staff   at [facility].        Device check shows leads stable and battery ok.  Device functioning appropriately.   Atrial pacing 99 % and BiVentricular pacing 96 %.  AT/AFib burden 0.7% .  7 episodes of nonsustained VT with VT being 5-20 beats and longest episode was 12 seconds.  See device note regarding reconfiguration of LV pacing due to diaphragmatic stimulation with certain motions.    Results for orders placed or performed during the hospital encounter of 07/24/18   ECG 12 lead   Result Value Ref Range    SYSTOLIC BLOOD PRESSURE  mmHg    DIASTOLIC BLOOD PRESSURE  mmHg    VENTRICULAR RATE 78 BPM    ATRIAL RATE 55 BPM    P-R INTERVAL  ms    QRS DURATION 118 ms    Q-T INTERVAL 416 ms    QTC CALCULATION (BEZET) 474 ms    P Axis  degrees    R AXIS -52 degrees    T AXIS 136 degrees    MUSE DIAGNOSIS       AV sequential or dual  chamber electronic pacemaker with frequent Premature ventricular complexes  Abnormal ECG  When compared with ECG of 23-JUL-2018 10:32,  AV sequential or dual chamber electronic pacemaker has replaced Atrial fibrillation with a demand pacemaker  Confirmed by BRIDGER SIMMONS MD LOC:[facility] ([id number]) on 7/24/2018 4:55:29 PM            Problem List:  Patient Active Problem List   Diagnosis   ? Hypothyroidism   ? Non-rheumatic mitral regurgitation   ? Paroxysmal Atrial Fibrillation   ? Premature Ventricular Contractions   ? Dyslipidemia   ? Primary hypertension   ? Coronary Artery Disease   ? Atrial flutter, unspecified type (H)   ? Peripheral Vascular Disease   ? Status post ablation of atrial fibrillation   ? Presence of biventricular cardiac pacemaker   ? Mitral regurgitation and mitral stenosis   ? Postablative hypothyroidism   ? Status post aorto-coronary artery bypass graft   ? Heart failure with preserved ejection fraction (H)   ? SUBHASH (obstructive sleep apnea)   ? NSVT (nonsustained ventricular tachycardia) (H)       Physical Examination Review of Systems   Vitals:    10/03/19 0925   BP: 138/56   Pulse: 84   Resp: 16     Body mass index is 31.54 kg/m .  Wt Readings from Last 3 Encounters:   10/03/19 (!) 223 lb (101.2 kg)   06/17/19 (!) 223 lb (101.2 kg)   06/11/19 (!) 226 lb (102.5 kg)     General Appearance:   Alert, well-appearing and in no acute distress.   HEENT: Atraumatic, normocephalic.  No scleral icterus, normal conjunctivae; mucous membranes pink and moist.     Chest: Chest symmetric, spine straight.   Lungs:   Respirations unlabored: Lungs are clear to auscultation.   Cardiovascular:   Normal first and second heart sounds with no murmurs, rubs, or gallops.  Regular, regular.  Radial and posterior tibial pulses are intact.  Normal JVD, no edema.       Extremities: No cyanosis or clubbing   Musculoskeletal: Moves all extremities   Skin: Warm, dry, intact.    Neurologic: Mood and affect are appropriate, alert and  oriented to person, place, time, and situation    General: WNL  Eyes: WNL  Ears/Nose/Throat: WNL  Lungs: WNL  Heart: WNL  Stomach: WNL  Bladder: WNL  Muscle/Joints: WNL  Skin: WNL  Nervous System: WNL  Mental Health: WNL     Blood: WNL       Medical History  Surgical History Family History Social History   Past Medical History:   Diagnosis Date   ? Atrial fibrillation (H)    ? Atrial flutter (H)    ? Cardiomyopathy (H)    ? CHF (congestive heart failure) (H)    ? 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 Coronary artery disease    ? Disease of thyroid gland    ? Disorder of phrenic nerve    ? Hyperlipidemia    ? Hypertension    ? Malfunction of biventricular cardiac pacemaker battery 12/1/2015    [employer] device recall   ? 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 PVC (premature ventricular contraction)    ? PVD (peripheral vascular disease) (H)    ? Status post ablation of atrial fibrillation 11/18/2015    MAZE May 2007 PVI May 31, 2011 (RF-PVI + TIAN line) PVI Oct 30, 2012 (Cryo-PVI + redo TIAN line + roof line + RA-scar flutter line)    Past Surgical History:   Procedure Laterality Date   ? CARDIOVERSION  07/24/2018   ? INSERT / REPLACE / REMOVE PACEMAKER     ? MITRAL VALVE REPAIR     ? IL ABLATE HEART DYSRHYTHM FOCUS      Description: Catheter Ablation Atrial Flutter;  Recorded: 10/30/2012;  Comments: Typical RA CTI flutter Sept 2007; ; Multiple atypical flutter Oct 2012 (Roof line + reinforce TIAN line + RA scar line)   ? IL ABLATE HEART DYSRHYTHM FOCUS      Description: Catheter Ablation Atrial Fibrillation;  Recorded: 10/24/2013;  Comments: May 2011 (PVI > 90% recurrence post MAZE  + TIAN line); ; Re do PVI Oct 2012 (Cryo PVI + roof line + TIAN touch up + RA scar flutter); ; No recurrence by device check at 12 months   ? MI ABLATE HEART DYSRHYTHM FOCUS      Description: Catheter Ablation Atrial Fibrillation;  Recorded: 10/30/2014;  Comments: May 2011 (PVI > 90% recurrence post MAZE + TIAN line); ; Re do PVI Oct 2012 (Cryo PVI + roof line + TIAN touch up + RA scar flutter); ; No recurrence by device check at 12 months   ? MI ABLATE HEART DYSRHYTHM FOCUS      Description: Catheter Ablation Atrial Flutter;  Recorded: 10/30/2014;  Comments: Typical RA CTI flutter Sept 2007; ; Multiple atypical flutter Oct 2012 (Roof line + reinforce TIAN line + RA scar line)   ? MI ABLATE/ RECONSTUCT ATRIA, LIMITED      Description: Maze Procedure;  Proc Date: 05/01/2007;  Comments: May 2007 at time of valve repair   ? MI CABG, VEIN, SINGLE      Description: CABG (CABG);  Recorded: 01/18/2012;  Comments: ONE VESSEL RCA   ? MI ICAR CATHETER ABLATION ATRIOVENTR NODE FUNCTION      Description: Catheter Ablation Atrioventricular Node;  Recorded: 08/13/2012;  Comments: Mar 2008 performed due to inability to biventricular with conduction abnormality   ? MI ICAR CATHETER ABLATION ATRIOVENTR NODE FUNCTION      Description: Catheter Ablation Atrioventricular Node;  Recorded: 10/30/2014;  Comments: Mar 2008 performed due to inability to biventricular with conduction abnormality    Family History   Problem Relation Age of Onset   ? No Medical Problems Mother    ? No Medical Problems Father    ? No Medical Problems Sister    ? No Medical Problems Brother    ? No Medical Problems Daughter    ? No Medical Problems Son    ? Acute Myocardial Infarction Neg Hx    ? Alcohol abuse Neg Hx    ? Alzheimer's disease Neg Hx    ? Amputation of Leg Below Knee Neg Hx    ? Aortic Valve Replacement Neg Hx    ? Arrhythmogenic Right Ventricular Cardiomyopathy Neg Hx    ? Atrial fibrillation Neg Hx    ? Atrial Flutter Neg Hx    ? Breast cancer Neg Hx     ? CABG Neg Hx    ? Cancer Neg Hx    ? Cardiomyopathy Neg Hx    ? Carotid Endarterectomy Neg Hx    ? Cerebral aneurysm Neg Hx    ? Chronic Kidney Disease Neg Hx    ? COPD Neg Hx    ? Colon cancer Neg Hx    ? Congenital heart disease Neg Hx    ? Coronary artery disease Neg Hx    ? Coronary Stenting Neg Hx    ? Dementia Neg Hx    ? Diabetes type I Neg Hx    ? Diabetes type II Neg Hx    ? Dialysis Neg Hx    ? Dyslipidemia Neg Hx    ? Heart failure Neg Hx    ? Hypertension Neg Hx    ? Hypertrophic cardiomyopathy Neg Hx    ? ICD Neg Hx    ? Long QT syndrome Neg Hx    ? Mitral Regurgitation Neg Hx    ? Mitral Valve Replacement Neg Hx    ? Morbid Obesity Neg Hx    ? Heart attack Neg Hx    ? Obstructive Sleep Apnea Neg Hx    ? Ovarian cancer Neg Hx    ? Pacemaker Neg Hx    ? Pancreatic cancer Neg Hx    ? Peripheral vascular disease Neg Hx    ? Pulmonary embolism Neg Hx    ? Pulmonary Hypertension Neg Hx    ? Rheumatic Heart Disease Neg Hx    ? Stroke Neg Hx    ? Sudden death Neg Hx    ? Transient ischemic attack Neg Hx    ? Valvular heart disease Neg Hx     Social History     Tobacco Use   Smoking Status Former Smoker   ? Packs/day: 1.00   ? Last attempt to quit: 1990   ? Years since quittin.7   Smokeless Tobacco Never Used     Social History     Substance and Sexual Activity   Alcohol Use Yes   ? Alcohol/week: 2.0 standard drinks   ? Types: 2 Cans of beer per week          Medications  Allergies   Current Outpatient Medications   Medication Sig Dispense Refill   ? amLODIPine (NORVASC) 10 MG tablet Take 10 mg by mouth daily.     ? aspirin 81 MG EC tablet Take 81 mg by mouth daily.     ? atorvastatin (LIPITOR) 20 MG tablet Take 20 mg by mouth bedtime.  1   ? docoshexanoic acid-eicosapent 500 mg (FISH OIL) 500-100 mg cap capsule Take 1,000 mg by mouth daily.     ? furosemide (LASIX) 20 MG tablet Take 1-2 tab orally every morning.  Follow instructions given in clinic. 180 tablet 1   ? KLOR-CON M20 20 mEq tablet Take  1 tablet by mouth daily.     ? levothyroxine (SYNTHROID, LEVOTHROID) 150 MCG tablet Take 150 mcg by mouth daily.     ? losartan (COZAAR) 50 MG tablet TAKE 1 TABLET DAILY 90 tablet 1   ? MULTIVITAMIN ORAL Take 1 tablet by mouth daily.     ? warfarin (COUMADIN/JANTOVEN) 1 MG tablet Take 1 mg by mouth See Admin Instructions. Take 4 tablets (4 mg) by mouth daily on Tuesday, Thursday, and Saturday.   Take 3 tablets (3mg) by mouth daily on Monday, Wednesday, Friday, and Sunday.  Adjust dose based on INR results as directed.     ? warfarin (COUMADIN/JANTOVEN) 1 MG tablet TAKE 1 TABLET WITH 3MG TABLET (TOTAL 4 MG) ON TUESDAY, THURSDAY, AND SATURDAY AND TAKE 3 MG ALL OTHER DAYS 90 tablet 1   ? warfarin (COUMADIN/JANTOVEN) 3 MG tablet TAKE 1 TABLET WITH 1 MG TABLET ( 4 MG TOTAL ) ON TUESDAY, THURSDAY, AND SATURDAY AND TAKE 1 TABLET ALL OTHER DAYS 90 tablet 3   ? zolpidem (AMBIEN) 5 MG tablet Do not take this at home.  For use if needed during the sleep study. May take one tablet by mouth as directed once you are in the Sleep Lab. 1 tablet 0     No current facility-administered medications for this visit.       Allergies   Allergen Reactions   ? Carvedilol Shortness Of Breath   ? Lisinopril Cough      Medical, surgical, family, social history, and medications were all reviewed and updated as necessary.   Lab Results    Chemistry CBC/INR CHOLESTROL   Lab Results   Component Value Date    CREATININE 1.22 02/28/2019    BUN 11 02/28/2019     02/28/2019    K 4.0 02/28/2019     02/28/2019    CO2 25 02/28/2019     Creatinine (mg/dL)   Date Value   02/28/2019 1.22   02/12/2019 1.16   02/10/2019 1.15   02/10/2019 1.15     Lab Results   Component Value Date     (H) 02/10/2019    Lab Results   Component Value Date    WBC 9.1 02/10/2019    HGB 13.7 (L) 02/10/2019    HCT 41.3 02/10/2019    MCV 95 02/10/2019     02/10/2019     Lab Results   Component Value Date    INR 2.30 (!) 09/24/2019      Lab Results    Component Value Date    CHOL 138 12/12/2018    HDL 38 (L) 12/12/2018    LDLCALC 82 12/12/2018    TRIG 90 12/12/2018          Victoria Perez RN,  formerly [facility]   Electrophysiology  [phone]

## 2021-06-29 NOTE — PROGRESS NOTES
Progress Notes by Victoria ePrez CNP at 10/8/2020  3:30 PM     Author: Victoria Perez CNP Service: -- Author Type: Nurse Practitioner    Filed: 10/8/2020  4:56 PM Encounter Date: 10/8/2020 Status: Signed    : Victoria Perez CNP (Nurse Practitioner)            Thank you, Dr. Ross, for asking the [facility] team to see Mr. Ady Farley to evaluate atrial arrhythmias and nonsustained VT.    Assessment/Recommendations     Assessment/Plan:    Diagnoses and all orders for this visit:    Paroxysmal atrial fibrillation (H)  And Atrial flutter, unspecified type (H) with burden less than 1% and not on antiarrhythmics.  No changes recommended.    NSVT (nonsustained ventricular tachycardia) (H) and asymptomatic but likely so because they are short.  Reviewed what to watch for and actions to take if symptomatic.  Had ischemic work-up which was negative in February of 2020.    Heart failure with preserved ejection fraction, unspecified HF chronicity (H) and OptiVol shows no crossing of thresholds.  He came close to threshold last year in late September early October when he was in [place] and had sodium indiscretion.  He watches salt intake closely now over the last year and notices if he has increased intake that he will retain fluid and needs extra diuretic then.  These episodes are rare especially since COVID as he is not eating out.    Presence of biventricular cardiac pacemaker and device functioning appropriately.  Good bi V pacing seen.      [id number] score of 4 and on chronic warfarin due to expense of fixed dose anticoagulation.  Discussed Eliquis will go generic within the next year and okay to switch to Eliquis 5 mg p.o. every 12 hours if  affordable.  Follow up in clinic with Dr. Townsend with remote device check prior to visit and in clinic device check and see me in 1 year.     History of Present Illness/Subjective     Ady Farley is a very pleasant 80 y.o.  male who comes in today for EP follow-up for arrhythmias and had device check prior to visit.  Ady Farley has a known history of coronary artery disease with coronary artery bypass surgery, mitral valve disease and atrial arrhythmias.  In 2007, Ady had coronary artery bypass with maze and mitral valve repair.  He  started having nonsustained VT last fall and ischemic workup was negative.  He follows with Dr. Townsend for his valvular and coronary artery disease.  Ady has had multiple ablations in the past with a right CTI flutter ablation in 2007 and then PVI in 2011 and in 2012 he had repeat ablation.  He has had reoccurrence since.  In 2012 he had an AV node ablation with CRT P placement.     Ady remains asymptomatic with atrial fib and nonsustained VT.    Ady denies any cardiac symptoms on questioning.  He is done a lot of home remodeling because they are not able to travel or do much outside of the home due to COVID restrictions.  He complains of salt sensitivity noting fluid retention in his abdomen if he eats something salty.  They eat low-salt diet.  He has been weighing himself faithfully every day and not noticed any increase in weight.  He is trying to lose some weight.      Cardiographics (reviewed):  Results for orders placed during the hospital encounter of 06/11/19   Echo Complete [ECH10] 06/11/2019    Narrative   Left ventricle ejection fraction is mildly decreased. The calculated   left ventricular ejection fraction is 53%.    When compared to the previous study dated 2/10/2019, no significant   change.    Normal right ventricular size and systolic function.    Mitral valve repair with mild residual mitral stenosis and mild mitral   regurgitation    Pacemaker lead in right heart chambers           Results for orders placed during the hospital encounter of 02/10/19   NM Pharmacologic Stress Test    Narrative   The pharmacologic nuclear stress test is negative for inducible   myocardial  ischemia or infarction.    The left ventricular ejection fraction is 56% with abnormal septal   motion consistent with paced rhythm.    When compared to the images of 6/20/2017, area of nontransmural   infarction in the lateral wall is not identified on the current study.    The findings of this examination were communicated to the nursing staff   at [facility].             Cardiac testing personally reviewed:  Device check shows leads stable and battery ok.  Device functioning appropriately.   Atrial pacing 99% and BiVentricular pacing 98%.  AT/AFib burden 0.7% and similar burden in the past.  16 nonsustained VT's and 13 and 16 beat nonsustained VT seen in September.  Longest episode was 26 beats.    Results for orders placed or performed during the hospital encounter of 07/24/18   ECG 12 lead   Result Value Ref Range    SYSTOLIC BLOOD PRESSURE  mmHg    DIASTOLIC BLOOD PRESSURE  mmHg    VENTRICULAR RATE 78 BPM    ATRIAL RATE 55 BPM    P-R INTERVAL  ms    QRS DURATION 118 ms    Q-T INTERVAL 416 ms    QTC CALCULATION (BEZET) 474 ms    P Axis  degrees    R AXIS -52 degrees    T AXIS 136 degrees    MUSE DIAGNOSIS       AV sequential or dual chamber electronic pacemaker with frequent Premature ventricular complexes  Abnormal ECG  When compared with ECG of 23-JUL-2018 10:32,  AV sequential or dual chamber electronic pacemaker has replaced Atrial fibrillation with a demand pacemaker  Confirmed by BRIDGER SIMMONS MD LOC:JN ([id number]) on 7/24/2018 4:55:29 PM            Problem List:  Patient Active Problem List   Diagnosis   ? Hypothyroidism   ? Non-rheumatic mitral regurgitation   ? Paroxysmal Atrial Fibrillation   ? Premature Ventricular Contractions   ? Dyslipidemia   ? Primary hypertension   ? Coronary Artery Disease   ? Atrial flutter, unspecified type (H)   ? Peripheral Vascular Disease   ? Status post ablation of atrial fibrillation   ? Presence of biventricular cardiac pacemaker   ? Mitral regurgitation and  mitral stenosis   ? Postablative hypothyroidism   ? Status post aorto-coronary artery bypass graft   ? Heart failure with preserved ejection fraction (H)   ? SUBHASH (obstructive sleep apnea)   ? NSVT (nonsustained ventricular tachycardia) (H)     Revi  e  Physical Examination Review of Systems   milla roblero  Vitals:    10/08/20 1504   BP: 124/54   Pulse: 77   Resp: 14     Body mass index is 31.69 kg/m .  Wt Readings from Last 3 Encounters:   10/08/20 (!) 224 lb (101.6 kg)   05/08/20 (!) 222 lb 3.2 oz (100.8 kg)   10/03/19 (!) 223 lb (101.2 kg)     General Appearance:   Alert, well-appearing and in no acute distress.   HEENT: Atraumatic, normocephalic.  No scleral icterus, normal conjunctivae; mucous membranes pink and moist.     Chest: Chest symmetric, spine straight.   Lungs:   Respirations unlabored: Lungs are clear to auscultation.   Cardiovascular:   Normal first and second heart sounds with no rubs, or gallops.  2/6 systolic murmur.  Slightly irregular.  Radial and posterior tibial pulses are intact.  Normal JVD, no edema.       Extremities: No cyanosis or clubbing   Musculoskeletal: Moves all extremities   Skin: Warm, dry, intact.    Neurologic: Mood and affect are appropriate, alert and oriented to person, place, time, and situation    General: WNL  Eyes: WNL  Ears/Nose/Throat: WNL  Lungs: WNL  Heart: WNL  Stomach: WNL  Bladder: WNL  Muscle/Joints: WNL  Skin: WNL  Nervous System: WNL  Mental Health: WNL     Blood: WNL       Medical History  Surgical History Family History Social History     Past Medical History:   Diagnosis Date   ? Atrial fibrillation (H)    ? Atrial flutter (H)    ? Cardiomyopathy (H)    ? CHF (congestive heart failure) (H)    ? 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 Coronary artery disease    ? Disease of thyroid gland    ? Disorder of phrenic nerve    ? Hyperlipidemia    ? Hypertension    ? Malfunction of biventricular cardiac pacemaker battery 12/1/2015    [employer]  device recall   ? 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 PVC (premature ventricular contraction)    ? PVD (peripheral vascular disease) (H)    ? Status post ablation of atrial fibrillation 11/18/2015    MAZE May 2007 PVI May 31, 2011 (RF-PVI + TIAN line) PVI Oct 30, 2012 (Cryo-PVI + redo TIAN line + roof line + RA-scar flutter line)    Past Surgical History:   Procedure Laterality Date   ? CARDIOVERSION  07/24/2018   ? INSERT / REPLACE / REMOVE PACEMAKER     ? MITRAL VALVE REPAIR     ? SC ABLATE HEART DYSRHYTHM FOCUS      Description: Catheter Ablation Atrial Flutter;  Recorded: 10/30/2012;  Comments: Typical RA CTI flutter Sept 2007; ; Multiple atypical flutter Oct 2012 (Roof line + reinforce TIAN line + RA scar line)   ? SC ABLATE HEART DYSRHYTHM FOCUS      Description: Catheter Ablation Atrial Fibrillation;  Recorded: 10/24/2013;  Comments: May 2011 (PVI > 90% recurrence post MAZE + TIAN line); ; Re do PVI Oct 2012 (Cryo PVI + roof line + TIAN touch up + RA scar flutter); ; No recurrence by device check at 12 months   ? SC ABLATE HEART DYSRHYTHM FOCUS      Description: Catheter Ablation Atrial Fibrillation;  Recorded: 10/30/2014;  Comments: May 2011 (PVI > 90% recurrence post MAZE + TIAN line); ; Re do PVI Oct 2012 (Cryo PVI + roof line + TIAN touch up + RA scar flutter); ; No recurrence by device check at 12 months   ? SC ABLATE HEART DYSRHYTHM FOCUS      Description: Catheter Ablation Atrial Flutter;  Recorded: 10/30/2014;  Comments: Typical RA CTI flutter Sept 2007; ; Multiple atypical flutter Oct 2012 (Roof line + reinforce TIAN line + RA scar line)   ? SC ABLATE/ RECONSTUCT ATRIA, LIMITED       Description: Maze Procedure;  Proc Date: 05/01/2007;  Comments: May 2007 at time of valve repair   ? NH CABG, VEIN, SINGLE      Description: CABG (CABG);  Recorded: 01/18/2012;  Comments: ONE VESSEL RCA   ? NH ICAR CATHETER ABLATION ATRIOVENTR NODE FUNCTION      Description: Catheter Ablation Atrioventricular Node;  Recorded: 08/13/2012;  Comments: Mar 2008 performed due to inability to biventricular with conduction abnormality   ? NH ICAR CATHETER ABLATION ATRIOVENTR NODE FUNCTION      Description: Catheter Ablation Atrioventricular Node;  Recorded: 10/30/2014;  Comments: Mar 2008 performed due to inability to biventricular with conduction abnormality    Family History   Problem Relation Age of Onset   ? No Medical Problems Mother    ? No Medical Problems Father    ? No Medical Problems Sister    ? No Medical Problems Brother    ? No Medical Problems Daughter    ? No Medical Problems Son    ? Acute Myocardial Infarction Neg Hx    ? Alcohol abuse Neg Hx    ? Alzheimer's disease Neg Hx    ? Amputation of Leg Below Knee Neg Hx    ? Aortic Valve Replacement Neg Hx    ? Arrhythmogenic Right Ventricular Cardiomyopathy Neg Hx    ? Atrial fibrillation Neg Hx    ? Atrial Flutter Neg Hx    ? Breast cancer Neg Hx    ? CABG Neg Hx    ? Cancer Neg Hx    ? Cardiomyopathy Neg Hx    ? Carotid Endarterectomy Neg Hx    ? Cerebral aneurysm Neg Hx    ? Chronic Kidney Disease Neg Hx    ? COPD Neg Hx    ? Colon cancer Neg Hx    ? Congenital heart disease Neg Hx    ? Coronary artery disease Neg Hx    ? Coronary Stenting Neg Hx    ? Dementia Neg Hx    ? Diabetes type I Neg Hx    ? Diabetes type II Neg Hx    ? Dialysis Neg Hx    ? Dyslipidemia Neg Hx    ? Heart failure Neg Hx    ? Hypertension Neg Hx    ? Hypertrophic cardiomyopathy Neg Hx    ? ICD Neg Hx    ? Long QT syndrome Neg Hx    ? Mitral Regurgitation Neg Hx    ? Mitral Valve Replacement Neg Hx    ? Morbid Obesity Neg Hx    ? Heart attack Neg Hx    ? Obstructive Sleep Apnea Neg Hx     ? Ovarian cancer Neg Hx    ? Pacemaker Neg Hx    ? Pancreatic cancer Neg Hx    ? Peripheral vascular disease Neg Hx    ? Pulmonary embolism Neg Hx    ? Pulmonary Hypertension Neg Hx    ? Rheumatic Heart Disease Neg Hx    ? Stroke Neg Hx    ? Sudden death Neg Hx    ? Transient ischemic attack Neg Hx    ? Valvular heart disease Neg Hx     Social History     Tobacco Use   Smoking Status Former Smoker   ? Packs/day: 1.00   ? Quit date: 1990   ? Years since quittin.7   Smokeless Tobacco Never Used     Social History     Substance and Sexual Activity   Alcohol Use Yes   ? Alcohol/week: 2.0 standard drinks   ? Types: 2 Cans of beer per week        Medications  Allergies     Current Outpatient Medications   Medication Sig Dispense Refill   ? amLODIPine (NORVASC) 10 MG tablet Take 10 mg by mouth daily.     ? aspirin 81 MG EC tablet Take 81 mg by mouth daily.     ? atorvastatin (LIPITOR) 20 MG tablet Take 20 mg by mouth bedtime.  1   ? docoshexanoic acid-eicosapent 500 mg (FISH OIL) 500-100 mg cap capsule Take 1,000 mg by mouth daily.     ? furosemide (LASIX) 20 MG tablet TAKE 1 TO 2 TABLETS EVERY MORNING. FOLLOW INSTRUCTIONS GIVEN IN CLINIC 180 tablet 1   ? KLOR-CON M20 20 mEq tablet Take 1 tablet by mouth daily.     ? levothyroxine (SYNTHROID, LEVOTHROID) 150 MCG tablet Take 175 mcg by mouth daily.      ? losartan (COZAAR) 50 MG tablet TAKE 1 TABLET DAILY 90 tablet 0   ? metoprolol succinate (TOPROL-XL) 50 MG 24 hr tablet Take 1 tablet (50 mg total) by mouth daily. 90 tablet 3   ? MULTIVITAMIN ORAL Take 1 tablet by mouth daily.     ? warfarin ANTICOAGULANT (COUMADIN/JANTOVEN) 1 MG tablet TAKE 1 TABLET WITH 3 MG TABLET (TOTAL 4 MG) ON TUESDAY, THURSDAY AND SATURDAY AND TAKE 3 MG ALL OTHER DAYS 90 tablet 1   ? warfarin ANTICOAGULANT (COUMADIN/JANTOVEN) 3 MG tablet TAKE 1 TABLET (3 MG) WITH 1 MG TABLET (4 MG DOSE) ON TUESDAY, THURSDAY AND SATURDAY. TAKE 1 TABLET (3 MG) ON ALL OTHER DAYS 90 tablet 1     No current  facility-administered medications for this visit.       Allergies   Allergen Reactions   ? Carvedilol Shortness Of Breath   ? Lisinopril Cough      Medical, surgical, family, social history, and medications were all reviewed and updated as necessary.   Lab Results    Chemistry/lipid CBC Cardiac Enzymes/BNP/TSH/INR     Lab Results   Component Value Date    CREATININE 1.28 12/16/2019    BUN 12 12/16/2019     12/16/2019    K 4.2 12/16/2019     12/16/2019    CO2 24 12/16/2019     Creatinine (mg/dL)   Date Value   12/16/2019 1.28   10/03/2019 1.23   02/28/2019 1.22   02/12/2019 1.16     Lab Results   Component Value Date     (H) 02/10/2019    Lab Results   Component Value Date    WBC 9.1 02/10/2019    HGB 13.7 (L) 02/10/2019    HCT 41.3 02/10/2019    MCV 95 02/10/2019     02/10/2019     Lab Results   Component Value Date    INR 2.90 (!) 09/22/2020      Lab Results   Component Value Date    CHOL 139 12/16/2019    HDL 36 (L) 12/16/2019    LDLCALC 86 12/16/2019    TRIG 87 12/16/2019

## 2021-06-29 NOTE — PROGRESS NOTES
"Progress Notes by Janis Townsend MD at 5/8/2020  8:10 AM     Author: Janis Townsend MD Service: -- Author Type: Physician    Filed: 5/8/2020  9:54 AM Encounter Date: 5/8/2020 Status: Signed    : Janis Townsend MD (Physician)           The patient has been notified of following:     \"This telephone visit will be conducted via a call between you and your physician/provider. We have found that certain health care needs can be provided without the need for a physical exam.  This service lets us provide the care you need with a phone conversation.  If a prescription is necessary we can send it directly to your pharmacy.  If lab work is needed we can place an order for that and you can then stop by our lab to have the test done at a later time. If during the course of the call the physician/provider feels a telephone visit is not appropriate, you will not be charged for this service.\" Verbal consent has been obtained for this service by care team member:         HEART CARE PHONE ENCOUNTER        The patient has chosen to have the visit conducted as a telephone visit, to reduce risk of exposure given the current status of Coronavirus in our community. This telephone visit is being conducted via a call between the patient and physician/provider. Health care needs are being provided without a physical exam.     Assessment/Recommendations   Assessment:    1.    Coronary artery disease: History of coronary artery disease status post SVG to RCA when he underwent mitral valve repair in 2007.  No anginal complaints.  2.  Atrial dysrhythmia: Atrial fibrillation/atypical flutter status post PVI and flutter ablation 5 years prior.  Continue on Coumadin for anticoagulation.  3.  Chronic diastolic congestive heart failure: Weights have remained stable  4.    Valvular heart disease: Status post mitral valve repair for mitral regurgitation in 2007 with residual mild to moderate mitral stenosis mild to " moderate mitral regurgitation.  5.  Pulmonary hypertension and RV dysfunction    Plan:  1. continue aspirin and coumadin  2. Continue lasix 20 mg for anticoagulation and daily weights  3. Follow up in 6 months      Follow Up Plan: Follow up in 6 months  I have reviewed the note as documented.  This accurately captures the substance of my conversation with the patient.    Total time of call between patient and provider was 13 minutes   Start Time:8:41a  Stop Time:8:54a       History of Present Illness/Subjective    Ady Farley is a 79 y.o. male who is being evaluated via a billable telephone visit. 78 year old with a known history of coronary artery disease and mitral regurgitation status post SVG to his RCA and mitral valve repair in 2007, atrial fibrillation/atypical flutter status post PVI and flutter ablation 5 years prior, status post biventricular pacemaker and chronic heart failure with preserved ejection fraction.  He reports stable weights.  Denies worsening shortness of breath or chest pain.  He exercises regularly on a treadmill or elliptical in the basement.       I have reviewed and updated the patient's Past Medical History, Social History, Family History and Medication List.     Physical Examination not performed given phone encounter Review of Systems                                                Medical History  Surgical History Family History Social History   Past Medical History:   Diagnosis Date   ? Atrial fibrillation (H)    ? Atrial flutter (H)    ? Cardiomyopathy (H)    ? CHF (congestive heart failure) (H)    ? 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 Coronary artery disease    ? Disease of thyroid gland    ? Disorder of phrenic nerve    ? Hyperlipidemia    ? Hypertension    ? Malfunction of biventricular cardiac pacemaker battery 12/1/2015    [employer] device recall   ? Non-rheumatic mitral regurgitation     MVP s/p Mitral valve repair May 15, 2007 MAZE  FELIBERTO  amputation (incomplete) May 2007  Echo 2011 LVEF 45% and mild MR Echo 2012 LVEF 40% mild MS, Mod MR  NELY Jan 2013 mild MS and mild/mod MR Echo Jan 2015 Mild MS/ mild MR NELY 2016 small/mod cuff remains (recommend OAC) Replacement Utility updated for latest IMO load    ? PVC (premature ventricular contraction)    ? PVD (peripheral vascular disease) (H)    ? Status post ablation of atrial fibrillation 11/18/2015    MAZE May 2007 PVI May 31, 2011 (RF-PVI + TIAN line) PVI Oct 30, 2012 (Cryo-PVI + redo TIAN line + roof line + RA-scar flutter line)    Past Surgical History:   Procedure Laterality Date   ? CARDIOVERSION  07/24/2018   ? INSERT / REPLACE / REMOVE PACEMAKER     ? MITRAL VALVE REPAIR     ? MT ABLATE HEART DYSRHYTHM FOCUS      Description: Catheter Ablation Atrial Flutter;  Recorded: 10/30/2012;  Comments: Typical RA CTI flutter Sept 2007; ; Multiple atypical flutter Oct 2012 (Roof line + reinforce TIAN line + RA scar line)   ? MT ABLATE HEART DYSRHYTHM FOCUS      Description: Catheter Ablation Atrial Fibrillation;  Recorded: 10/24/2013;  Comments: May 2011 (PVI > 90% recurrence post MAZE + TIAN line); ; Re do PVI Oct 2012 (Cryo PVI + roof line + TIAN touch up + RA scar flutter); ; No recurrence by device check at 12 months   ? MT ABLATE HEART DYSRHYTHM FOCUS      Description: Catheter Ablation Atrial Fibrillation;  Recorded: 10/30/2014;  Comments: May 2011 (PVI > 90% recurrence post MAZE + TIAN line); ; Re do PVI Oct 2012 (Cryo PVI + roof line + TIAN touch up + RA scar flutter); ; No recurrence by device check at 12 months   ? MT ABLATE HEART DYSRHYTHM FOCUS      Description: Catheter Ablation Atrial Flutter;  Recorded: 10/30/2014;  Comments: Typical RA CTI flutter Sept 2007; ; Multiple atypical flutter Oct 2012 (Roof line + reinforce TIAN line + RA scar line)   ? MT ABLATE/ RECONSTUCT ATRIA, LIMITED      Description: Maze Procedure;  Proc Date: 05/01/2007;  Comments: May 2007 at time of valve repair   ? MT CABG, VEIN,  SINGLE      Description: CABG (CABG);  Recorded: 01/18/2012;  Comments: ONE VESSEL RCA   ? SC ICAR CATHETER ABLATION ATRIOVENTR NODE FUNCTION      Description: Catheter Ablation Atrioventricular Node;  Recorded: 08/13/2012;  Comments: Mar 2008 performed due to inability to biventricular with conduction abnormality   ? SC ICAR CATHETER ABLATION ATRIOVENTR NODE FUNCTION      Description: Catheter Ablation Atrioventricular Node;  Recorded: 10/30/2014;  Comments: Mar 2008 performed due to inability to biventricular with conduction abnormality    Family History   Problem Relation Age of Onset   ? No Medical Problems Mother    ? No Medical Problems Father    ? No Medical Problems Sister    ? No Medical Problems Brother    ? No Medical Problems Daughter    ? No Medical Problems Son    ? Acute Myocardial Infarction Neg Hx    ? Alcohol abuse Neg Hx    ? Alzheimer's disease Neg Hx    ? Amputation of Leg Below Knee Neg Hx    ? Aortic Valve Replacement Neg Hx    ? Arrhythmogenic Right Ventricular Cardiomyopathy Neg Hx    ? Atrial fibrillation Neg Hx    ? Atrial Flutter Neg Hx    ? Breast cancer Neg Hx    ? CABG Neg Hx    ? Cancer Neg Hx    ? Cardiomyopathy Neg Hx    ? Carotid Endarterectomy Neg Hx    ? Cerebral aneurysm Neg Hx    ? Chronic Kidney Disease Neg Hx    ? COPD Neg Hx    ? Colon cancer Neg Hx    ? Congenital heart disease Neg Hx    ? Coronary artery disease Neg Hx    ? Coronary Stenting Neg Hx    ? Dementia Neg Hx    ? Diabetes type I Neg Hx    ? Diabetes type II Neg Hx    ? Dialysis Neg Hx    ? Dyslipidemia Neg Hx    ? Heart failure Neg Hx    ? Hypertension Neg Hx    ? Hypertrophic cardiomyopathy Neg Hx    ? ICD Neg Hx    ? Long QT syndrome Neg Hx    ? Mitral Regurgitation Neg Hx    ? Mitral Valve Replacement Neg Hx    ? Morbid Obesity Neg Hx    ? Heart attack Neg Hx    ? Obstructive Sleep Apnea Neg Hx    ? Ovarian cancer Neg Hx    ? Pacemaker Neg Hx    ? Pancreatic cancer Neg Hx    ? Peripheral vascular disease Neg  Hx    ? Pulmonary embolism Neg Hx    ? Pulmonary Hypertension Neg Hx    ? Rheumatic Heart Disease Neg Hx    ? Stroke Neg Hx    ? Sudden death Neg Hx    ? Transient ischemic attack Neg Hx    ? Valvular heart disease Neg Hx     Social History     Socioeconomic History   ? Marital status:      Spouse name: Not on file   ? Number of children: Not on file   ? Years of education: Not on file   ? Highest education level: Not on file   Occupational History   ? Occupation: retired   Social Needs   ? Financial resource strain: Not on file   ? Food insecurity     Worry: Not on file     Inability: Not on file   ? Transportation needs     Medical: Not on file     Non-medical: Not on file   Tobacco Use   ? Smoking status: Former Smoker     Packs/day: 1.00     Last attempt to quit: 1990     Years since quittin.3   ? Smokeless tobacco: Never Used   Substance and Sexual Activity   ? Alcohol use: Yes     Alcohol/week: 2.0 standard drinks     Types: 2 Cans of beer per week   ? Drug use: No   ? Sexual activity: Not on file   Lifestyle   ? Physical activity     Days per week: Not on file     Minutes per session: Not on file   ? Stress: Not on file   Relationships   ? Social connections     Talks on phone: Not on file     Gets together: Not on file     Attends Christianity service: Not on file     Active member of club or organization: Not on file     Attends meetings of clubs or organizations: Not on file     Relationship status: Not on file   ? Intimate partner violence     Fear of current or ex partner: Not on file     Emotionally abused: Not on file     Physically abused: Not on file     Forced sexual activity: Not on file   Other Topics Concern   ? Not on file   Social History Narrative   ? Not on file          Medications  Allergies   Current Outpatient Medications   Medication Sig Dispense Refill   ? amLODIPine (NORVASC) 10 MG tablet Take 10 mg by mouth daily.     ? aspirin 81 MG EC tablet Take 81 mg by mouth daily.      ? atorvastatin (LIPITOR) 20 MG tablet Take 20 mg by mouth bedtime.  1   ? docoshexanoic acid-eicosapent 500 mg (FISH OIL) 500-100 mg cap capsule Take 1,000 mg by mouth daily.     ? furosemide (LASIX) 20 MG tablet TAKE 1 TO 2 TABLETS EVERY MORNING. FOLLOW INSTRUCTIONS GIVEN IN CLINIC 180 tablet 0   ? KLOR-CON M20 20 mEq tablet Take 1 tablet by mouth daily.     ? levothyroxine (SYNTHROID, LEVOTHROID) 150 MCG tablet Take 175 mcg by mouth daily.      ? losartan (COZAAR) 50 MG tablet TAKE 1 TABLET DAILY 90 tablet 2   ? metoprolol succinate (TOPROL-XL) 50 MG 24 hr tablet Take 1 tablet (50 mg total) by mouth daily. 90 tablet 3   ? MULTIVITAMIN ORAL Take 1 tablet by mouth daily.     ? warfarin (COUMADIN/JANTOVEN) 1 MG tablet Take 1 mg by mouth See Admin Instructions. Take 4 tablets (4 mg) by mouth daily on Tuesday, Thursday, and Saturday.   Take 3 tablets (3mg) by mouth daily on Monday, Wednesday, Friday, and Sunday.  Adjust dose based on INR results as directed.     ? warfarin (COUMADIN/JANTOVEN) 1 MG tablet TAKE 1 TABLET WITH 3MG TABLET (TOTAL 4 MG) ON TUESDAY, THURSDAY, AND SATURDAY AND TAKE 3 MG ALL OTHER DAYS 90 tablet 1   ? warfarin ANTICOAGULANT (COUMADIN/JANTOVEN) 3 MG tablet TAKE 1 TABLET (3 MG) WITH 1 MG TABLET (4 MG DOSE) ON TUESDAY, THURSDAY AND SATURDAY. TAKE 1 TABLET (3 MG) ON ALL OTHER DAYS 90 tablet 1   ? zolpidem (AMBIEN) 5 MG tablet Do not take this at home.  For use if needed during the sleep study. May take one tablet by mouth as directed once you are in the Sleep Lab. 1 tablet 0     No current facility-administered medications for this visit.     Allergies   Allergen Reactions   ? Carvedilol Shortness Of Breath   ? Lisinopril Cough         Lab Results    Chemistry/lipid CBC Cardiac Enzymes/BNP/TSH/INR   Lab Results   Component Value Date    CHOL 139 12/16/2019    HDL 36 (L) 12/16/2019    LDLCALC 86 12/16/2019    TRIG 87 12/16/2019    CREATININE 1.28 12/16/2019    BUN 12 12/16/2019    K 4.2 12/16/2019      12/16/2019     12/16/2019    CO2 24 12/16/2019    Lab Results   Component Value Date    WBC 9.1 02/10/2019    HGB 13.7 (L) 02/10/2019    HCT 41.3 02/10/2019    MCV 95 02/10/2019     02/10/2019    Lab Results   Component Value Date    CKTOTAL 111 09/10/2015    TROPONINI 0.03 02/10/2019     (H) 02/10/2019    TSH 6.75 (H) 12/16/2019    INR 2.50 (!) 03/10/2020        Janis Townsend

## 2021-06-30 NOTE — PROGRESS NOTES
Progress Notes by Janis Townsend MD at 4/14/2021  8:30 AM     Author: Janis Townsend MD Service: -- Author Type: Physician    Filed: 4/14/2021  9:27 AM Encounter Date: 4/14/2021 Status: Signed    : Jnais Townsend MD (Physician)           Thank you, Dr. Ross, for asking us to see Ady Farley at the [facility].      Assessment/Recommendations   Assessment:    1.    Coronary artery disease: History of coronary artery disease status post SVG to RCA when he underwent mitral valve repair in 2007.  No anginal complaints.  2.  Atrial dysrhythmia: Atrial fibrillation/atypical flutter status post PVI and flutter ablation 5 years prior.  Continue on Coumadin for anticoagulation.  3.  Chronic diastolic congestive heart failure: Weights have remained stable  4.    Valvular heart disease: Status post mitral valve repair for mitral regurgitation in 2007 with residual mild to moderate mitral stenosis mild to moderate mitral regurgitation.  5.  Pulmonary hypertension and RV dysfunction     Plan:  1. continue aspirin and coumadin  2. Continue lasix 20 mg, low-sodium diet and daily weights   3.  Echocardiogram to reevaluate valvular heart disease and pulmonary hypertension   follow up in 1 year       History of Present Illness    Mr. Ady Farley is a 80 y.o. male with a known history of coronary artery disease and mitral regurgitation status post SVG to his RCA and mitral valve repair in 2007, atrial fibrillation/atypical flutter status post PVI and flutter ablation with right CTI flutter ablation 2007, PVI 2011 and 2012 with recurrence status post AV node ablation with CRT-P placement in 2012.  He also has history of chronic heart failure with preserved ejection fraction.  He has chronic lower extremity edema right worse than left after his bypass surgery.  Device check done last week showed that he is almost 100% by ventricularly paced.  No events.  Battery life  ending in about a year.  He exercises on a treadmill regularly and does light weights.  Denies any chest pain or breathing difficulty.       Physical Examination Review of Systems   Vitals:    04/14/21 0821   BP: 140/64   Pulse: 80   Resp: 12     Body mass index is 32.01 kg/m .  Wt Readings from Last 3 Encounters:   04/14/21 (!) 226 lb 6.4 oz (102.7 kg)   10/08/20 (!) 224 lb (101.6 kg)   05/08/20 (!) 222 lb 3.2 oz (100.8 kg)       General Appearance:   alert, no apparent distress   HEENT:  no scleral icterus; the mucous membranes are pink and moist                                  Neck: No jvd   Chest: the spine is straight and the chest is symmetric   Lungs:   respirations unlabored; the lungs are clear to auscultation   Cardiovascular:   regular rhythm with normal first and second heart sounds and no murmurs or gallops   Abdomen:  no organomegaly, masses, bruits, or tenderness; bowel sounds are present   Extremities: Mild edema  R> L   Skin: no xanthelasma    General: WNL  Eyes: WNL  Ears/Nose/Throat: WNL  Lungs: WNL  Heart: WNL  Stomach: WNL  Bladder: WNL  Muscle/Joints: WNL  Skin: WNL  Nervous System: WNL  Mental Health: WNL     Blood: WNL     Medical History  Surgical History Family History Social History   Past Medical History:   Diagnosis Date   ? Atrial fibrillation (H)    ? Atrial flutter (H)    ? Cardiomyopathy (H)    ? CHF (congestive heart failure) (H)    ? 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 Coronary artery disease    ? Disease of thyroid gland    ? Disorder of phrenic nerve    ? Hyperlipidemia    ? Hypertension    ? Malfunction of biventricular cardiac pacemaker battery 12/1/2015    [employer] device recall   ? Non-rheumatic mitral regurgitation     MVP s/p Mitral valve repair May 15, 2007 MAZE  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  Echo 2011 LVEF 45% and mild MR Echo 2012 LVEF 40% mild MS, Mod MR  NELY Jan 2013 mild MS and mild/mod MR Echo Jan 2015 Mild MS/ mild MR NELY 2016  small/mod cuff remains (recommend OAC) Replacement Utility updated for latest IMO load    ? PVC (premature ventricular contraction)    ? PVD (peripheral vascular disease) (H)    ? Status post ablation of atrial fibrillation 11/18/2015    MAZE May 2007 PVI May 31, 2011 (RF-PVI + TIAN line) PVI Oct 30, 2012 (Cryo-PVI + redo TIAN line + roof line + RA-scar flutter line)    Past Surgical History:   Procedure Laterality Date   ? CARDIOVERSION  07/24/2018   ? INSERT / REPLACE / REMOVE PACEMAKER     ? MITRAL VALVE REPAIR     ? [place] ABLATE HEART DYSRHYTHM FOCUS      Description: Catheter Ablation Atrial Flutter;  Recorded: 10/30/2012;  Comments: Typical RA CTI flutter Sept 2007; ; Multiple atypical flutter Oct 2012 (Roof line + reinforce TIAN line + RA scar line)   ? ID ABLATE HEART DYSRHYTHM FOCUS      Description: Catheter Ablation Atrial Fibrillation;  Recorded: 10/24/2013;  Comments: May 2011 (PVI > 90% recurrence post MAZE + TIAN line); ; Re do PVI Oct 2012 (Cryo PVI + roof line + TIAN touch up + RA scar flutter); ; No recurrence by device check at 12 months   ? ID ABLATE HEART DYSRHYTHM FOCUS      Description: Catheter Ablation Atrial Fibrillation;  Recorded: 10/30/2014;  Comments: May 2011 (PVI > 90% recurrence post MAZE + TIAN line); ; Re do PVI Oct 2012 (Cryo PVI + roof line + TIAN touch up + RA scar flutter); ; No recurrence by device check at 12 months   ? ID ABLATE HEART DYSRHYTHM FOCUS      Description: Catheter Ablation Atrial Flutter;  Recorded: 10/30/2014;  Comments: Typical RA CTI flutter Sept 2007; ; Multiple atypical flutter Oct 2012 (Roof line + reinforce TIAN line + RA scar line)   ? ID ABLATE/ RECONSTUCT ATRIA, LIMITED      Description: Maze Procedure;  Proc Date: 05/01/2007;  Comments: May 2007 at time of valve repair   ? ID CABG, VEIN, SINGLE      Description: CABG (CABG);  Recorded: 01/18/2012;  Comments: ONE VESSEL RCA   ? ID ICAR CATHETER ABLATION ATRIOVENTR NODE FUNCTION      Description: Catheter Ablation  Atrioventricular Node;  Recorded: 08/13/2012;  Comments: Mar 2008 performed due to inability to biventricular with conduction abnormality   ? [place] ICAR CATHETER ABLATION ATRIOVENTR NODE FUNCTION      Description: Catheter Ablation Atrioventricular Node;  Recorded: 10/30/2014;  Comments: Mar 2008 performed due to inability to biventricular with conduction abnormality    Family History   Problem Relation Age of Onset   ? No Medical Problems Mother    ? No Medical Problems Father    ? No Medical Problems Sister    ? No Medical Problems Brother    ? No Medical Problems Daughter    ? No Medical Problems Son    ? Acute Myocardial Infarction Neg Hx    ? Alcohol abuse Neg Hx    ? Alzheimer's disease Neg Hx    ? Amputation of Leg Below Knee Neg Hx    ? Aortic Valve Replacement Neg Hx    ? Arrhythmogenic Right Ventricular Cardiomyopathy Neg Hx    ? Atrial fibrillation Neg Hx    ? Atrial Flutter Neg Hx    ? Breast cancer Neg Hx    ? CABG Neg Hx    ? Cancer Neg Hx    ? Cardiomyopathy Neg Hx    ? Carotid Endarterectomy Neg Hx    ? Cerebral aneurysm Neg Hx    ? Chronic Kidney Disease Neg Hx    ? COPD Neg Hx    ? Colon cancer Neg Hx    ? Congenital heart disease Neg Hx    ? Coronary artery disease Neg Hx    ? Coronary Stenting Neg Hx    ? Dementia Neg Hx    ? Diabetes type I Neg Hx    ? Diabetes type II Neg Hx    ? Dialysis Neg Hx    ? Dyslipidemia Neg Hx    ? Heart failure Neg Hx    ? Hypertension Neg Hx    ? Hypertrophic cardiomyopathy Neg Hx    ? ICD Neg Hx    ? Long QT syndrome Neg Hx    ? Mitral Regurgitation Neg Hx    ? Mitral Valve Replacement Neg Hx    ? Morbid Obesity Neg Hx    ? Heart attack Neg Hx    ? Obstructive Sleep Apnea Neg Hx    ? Ovarian cancer Neg Hx    ? Pacemaker Neg Hx    ? Pancreatic cancer Neg Hx    ? Peripheral vascular disease Neg Hx    ? Pulmonary embolism Neg Hx    ? Pulmonary Hypertension Neg Hx    ? Rheumatic Heart Disease Neg Hx    ? Stroke Neg Hx    ? Sudden death Neg Hx    ? Transient ischemic  attack Neg Hx    ? Valvular heart disease Neg Hx     Social History     Socioeconomic History   ? Marital status:      Spouse name: Not on file   ? Number of children: Not on file   ? Years of education: Not on file   ? Highest education level: Not on file   Occupational History   ? Occupation: retired   Social Needs   ? Financial resource strain: Not on file   ? Food insecurity     Worry: Not on file     Inability: Not on file   ? Transportation needs     Medical: Not on file     Non-medical: Not on file   Tobacco Use   ? Smoking status: Former Smoker     Packs/day: 1.00     Quit date: 1990     Years since quittin.3   ? Smokeless tobacco: Never Used   Substance and Sexual Activity   ? Alcohol use: Yes     Alcohol/week: 2.0 standard drinks     Types: 2 Cans of beer per week   ? Drug use: No   ? Sexual activity: Not on file   Lifestyle   ? Physical activity     Days per week: Not on file     Minutes per session: Not on file   ? Stress: Not on file   Relationships   ? Social connections     Talks on phone: Not on file     Gets together: Not on file     Attends Yazdanism service: Not on file     Active member of club or organization: Not on file     Attends meetings of clubs or organizations: Not on file     Relationship status: Not on file   ? Intimate partner violence     Fear of current or ex partner: Not on file     Emotionally abused: Not on file     Physically abused: Not on file     Forced sexual activity: Not on file   Other Topics Concern   ? Not on file   Social History Narrative   ? Not on file          Medications  Allergies   Current Outpatient Medications   Medication Sig Dispense Refill   ? amLODIPine (NORVASC) 10 MG tablet Take 10 mg by mouth daily.     ? aspirin 81 MG EC tablet Take 81 mg by mouth daily.     ? atorvastatin (LIPITOR) 20 MG tablet Take 20 mg by mouth bedtime.  1   ? docoshexanoic acid-eicosapent 500 mg (FISH OIL) 500-100 mg cap capsule Take 1,000 mg by mouth daily.     ?  furosemide (LASIX) 20 MG tablet TAKE 1 TO 2 TABLETS EVERY MORNING. FOLLOW INSTRUCTIONS GIVEN IN CLINIC 180 tablet 1   ? KLOR-CON M20 20 mEq tablet Take 1 tablet by mouth daily.     ? levothyroxine (SYNTHROID, LEVOTHROID) 175 MCG tablet Take 175 mcg by mouth daily before breakfast.     ? losartan (COZAAR) 50 MG tablet TAKE 1 TABLET DAILY (PATIENT TO SCHEDULE FOLLOW UP FOR FURTHER REFILLS) 90 tablet 1   ? metoprolol succinate (TOPROL-XL) 50 MG 24 hr tablet TAKE 1 TABLET DAILY 90 tablet 1   ? MULTIVITAMIN ORAL Take 1 tablet by mouth daily.     ? warfarin ANTICOAGULANT (COUMADIN/JANTOVEN) 1 MG tablet TAKE 1 TABLET WITH 3 MG TABLET (TOTAL 4 MG) ON TUESDAY, THURSDAY AND SATURDAY AND TAKE 3 MG ALL OTHER DAYS 90 tablet 1   ? warfarin ANTICOAGULANT (COUMADIN/JANTOVEN) 3 MG tablet TAKE 1 TABLET (3 MG) WITH 1 MG TABLET (4 MG DOSE) ON TUESDAY, THURSDAY AND SATURDAY. TAKE 1 TABLET (3 MG) ON ALL OTHER DAYS 90 tablet 1     No current facility-administered medications for this visit.       Allergies   Allergen Reactions   ? Carvedilol Shortness Of Breath   ? Lisinopril Cough         Lab Results    Chemistry/lipid CBC Cardiac Enzymes/BNP/TSH/INR   Lab Results   Component Value Date    CHOL 127 12/18/2020    HDL 35 (L) 12/18/2020    LDLCALC 76 12/18/2020    TRIG 78 12/18/2020    CREATININE 1.22 12/18/2020    BUN 17 12/18/2020    K 4.0 12/18/2020     12/18/2020     12/18/2020    CO2 21 (L) 12/18/2020    Lab Results   Component Value Date    WBC 9.1 02/10/2019    HGB 13.7 (L) 02/10/2019    HCT 41.3 02/10/2019    MCV 95 02/10/2019     02/10/2019    Lab Results   Component Value Date    CKTOTAL 111 09/10/2015    TROPONINI 0.03 02/10/2019     (H) 02/10/2019    TSH 1.24 12/18/2020    INR 2.50 (!) 04/14/2021

## 2021-07-04 NOTE — LETTER
Letter by Solange Field at      Author: Solange Field Service: -- Author Type: --    Filed:  Encounter Date: 6/14/2021 Status: (Other)         Ady Farley  [address] [facility] [address]      June 14, 2021      Dear Mr. Farley,    RE: Remote Results    We are writing to you regarding your recent Remote Pacemaker check from home. Your transmission was received successfully. Battery status is satisfactory at this time.     Your results are being reviewed.  You will be contacted if further information is necessary.     Your next device appointment will be a remote check on August 16, 2021.  You can choose the time of day you wish to transmit.    To schedule or reschedule, please call [phone] and press 1.    NOTE: If you would like to do an extra transmission, please call [phone] and press 3 to speak to a nurse BEFORE transmitting. This ensures that the Device Clinic staff is aware of the reason you are sending a transmission, and can follow-up with you after it has been reviewed.    We will be checking your implanted device from home (remotely) every three months unless otherwise instructed. We will need to see you in the clinic at least once a year. You may need to be seen in the clinic sooner depending on the results of your check.    Please be aware:    The follow-up schedule is like a Physician prescription.    Your remote monitor is paired to your specific implanted device.      Sincerely,    [facility]

## 2021-07-14 PROBLEM — I50.9 ACUTE CONGESTIVE HEART FAILURE (H): Status: RESOLVED | Noted: 2019-02-10 | Resolved: 2019-02-28

## 2021-08-04 ENCOUNTER — ANTICOAGULATION THERAPY VISIT (OUTPATIENT)
Dept: ANTICOAGULATION | Facility: CLINIC | Age: 81
End: 2021-08-04

## 2021-08-04 ENCOUNTER — LAB (OUTPATIENT)
Dept: CARDIOLOGY | Facility: CLINIC | Age: 81
End: 2021-08-04
Payer: COMMERCIAL

## 2021-08-04 DIAGNOSIS — I48.92 ATRIAL FLUTTER, UNSPECIFIED TYPE (H): ICD-10-CM

## 2021-08-04 DIAGNOSIS — I48.91 ATRIAL FIBRILLATION (H): Primary | ICD-10-CM

## 2021-08-04 DIAGNOSIS — I48.91 ATRIAL FIBRILLATION (H): ICD-10-CM

## 2021-08-04 LAB — INR BLD: 2.4 (ref 2–3)

## 2021-08-04 PROCEDURE — 36416 COLLJ CAPILLARY BLOOD SPEC: CPT

## 2021-08-04 PROCEDURE — 85610 PROTHROMBIN TIME: CPT

## 2021-08-04 NOTE — PROGRESS NOTES
ANTICOAGULATION MANAGEMENT     Ady DÍAZ Ilirbrianneyuly 81 year old male is on warfarin with therapeutic INR result. (Goal INR 2.0-3.0)    Recent labs: (last 7 days)     08/04/21  0842   INR 2.4       ASSESSMENT     Source(s): Chart Review and Template       Warfarin doses taken: Warfarin taken as instructed    Diet: No new diet changes identified    New illness, injury, or hospitalization: No    Medication/supplement changes: None noted    Signs or symptoms of bleeding or clotting: No    Previous INR: Therapeutic last 2(+) visits    Additional findings: None     PLAN     Recommended plan for no diet, medication or health factor changes affecting INR     Dosing Instructions: Continue your current warfarin dose with next INR in 6 weeks       Summary  As of 8/4/2021    Full warfarin instructions:  4 mg every Thu, Sat; 3 mg all other days   Next INR check:  9/15/2021             Telephone call with Ady who verbalizes understanding and agrees to plan    Lab visit scheduled    Education provided: Importance of therapeutic range, Importance of following up for INR monitoring at instructed interval, Importance of notifying clinic of upcoming surgeries and procedures 2 weeks in advance and Contact [phone] with any changes, questions or concerns.     Plan made per [facility] anticoagulation protocol    Salma Marques RN  Anticoagulation Clinic  8/4/2021    _______________________________________________________________________     Anticoagulation Episode Summary     Current INR goal:  2.0-3.0   TTR:  --   Target end date:     Send INR reminders to:  [facility]    Indications    Paroxysmal Atrial Fibrillation [I48.91]  Atrial flutter  unspecified type (H) [I48.92]           Comments:           Anticoagulation Care Providers     Provider Role Specialty Phone number    Janis Townsend MD Referring Cardiovascular Disease [phone]

## 2021-08-16 ENCOUNTER — ANCILLARY PROCEDURE (OUTPATIENT)
Dept: CARDIOLOGY | Facility: CLINIC | Age: 81
End: 2021-08-16
Attending: INTERNAL MEDICINE
Payer: COMMERCIAL

## 2021-08-16 DIAGNOSIS — Z95.0 PRESENCE OF BIVENTRICULAR CARDIAC PACEMAKER: ICD-10-CM

## 2021-08-16 PROCEDURE — 93296 REM INTERROG EVL PM/IDS: CPT | Performed by: INTERNAL MEDICINE

## 2021-08-16 PROCEDURE — 93294 REM INTERROG EVL PM/LDLS PM: CPT | Performed by: INTERNAL MEDICINE

## 2021-08-18 ENCOUNTER — TELEPHONE (OUTPATIENT)
Dept: CARDIOLOGY | Facility: CLINIC | Age: 81
End: 2021-08-18

## 2021-08-18 DIAGNOSIS — I47.29 NSVT (NONSUSTAINED VENTRICULAR TACHYCARDIA) (H): Primary | ICD-10-CM

## 2021-08-18 NOTE — PROGRESS NOTES
Msg rec'd 8-18-21 @ 1528:    Blanca Santiago RN    You can continue to route these.  Looks like we haven't checked magnesium level - could you have him come in for this and potassium check?    Janis Townsend MD         Previous Messages     ----- Message -----   From: Blanca Santiago RN   Sent: 8/18/2021   3:02 PM CDT   To: Janis Townsend MD     ----- Message from Blanca Santiago RN sent at 8/18/2021  3:02 PM CDT -----   Hx NSVTs, if remains asymptomatic should we continue to route these? Or what would you like to be notified of for NSVTs? Thanks!!

## 2021-08-18 NOTE — TELEPHONE ENCOUNTER
Patient called back, denies any awareness of arrhythmias or near-syncope/syncope/palpitations. Advised to call with any troublesome episodes so we can run another device check. Verbalized understanding, confirms Toprol XL 50 mg.     Blanca Santiago RN

## 2021-08-18 NOTE — TELEPHONE ENCOUNTER
Type: routine remote CRT-P transmission.   Presenting rhythm: AP-biVP 72 bpm.  Battery/lead status: stable. Device nearing TANESHA.  Arrhythmias: since 6/14/21; one ventricular high rate episode, appears to be NSVT 18 bts 200bpm. EF 60% per Echo 5/5/21 26 mode switch episodes longest lasting 1hr 19min, EGMs show atrial tachy arrhythmia.   Comments: normal pacemaker function. Routed to device RN for review. DAVON Field, Device Specialist    Transmission reviewed, NSVT noted on 7/19/21 at ~noon, 18 bts, rates 190-200s. Takes Toprol XL 50 mg daily. EF WNL on most recent Echo. Call placed to patient to review/Assess. LVM for call back.     Blanca Santiago RN

## 2021-08-18 NOTE — PROGRESS NOTES
Phone call to patient - informed him of Dr. Townsend's recommendations - patient verbalized understanding, offered no questions/concerns at this time and agreed to sched lab appt - orders placed per protocol - call transf to sched.  mg

## 2021-08-19 LAB
MDC_IDC_EPISODE_DTM: NORMAL
MDC_IDC_EPISODE_DURATION: 10 S
MDC_IDC_EPISODE_DURATION: 1186 S
MDC_IDC_EPISODE_DURATION: 12 S
MDC_IDC_EPISODE_DURATION: 1388 S
MDC_IDC_EPISODE_DURATION: 180 S
MDC_IDC_EPISODE_DURATION: 20 S
MDC_IDC_EPISODE_DURATION: 200 S
MDC_IDC_EPISODE_DURATION: 207 S
MDC_IDC_EPISODE_DURATION: 2241 S
MDC_IDC_EPISODE_DURATION: 228 S
MDC_IDC_EPISODE_DURATION: 242 S
MDC_IDC_EPISODE_DURATION: 2592 S
MDC_IDC_EPISODE_DURATION: 3 S
MDC_IDC_EPISODE_DURATION: 3138 S
MDC_IDC_EPISODE_DURATION: 315 S
MDC_IDC_EPISODE_DURATION: 3522 S
MDC_IDC_EPISODE_DURATION: 4 S
MDC_IDC_EPISODE_DURATION: 4751 S
MDC_IDC_EPISODE_DURATION: 6 S
MDC_IDC_EPISODE_DURATION: 70 S
MDC_IDC_EPISODE_ID: 1180
MDC_IDC_EPISODE_ID: 1181
MDC_IDC_EPISODE_ID: 1182
MDC_IDC_EPISODE_ID: 1183
MDC_IDC_EPISODE_ID: 1184
MDC_IDC_EPISODE_ID: 1185
MDC_IDC_EPISODE_ID: 1186
MDC_IDC_EPISODE_ID: 477
MDC_IDC_EPISODE_ID: 478
MDC_IDC_EPISODE_ID: 479
MDC_IDC_EPISODE_ID: 480
MDC_IDC_EPISODE_ID: 481
MDC_IDC_EPISODE_ID: 482
MDC_IDC_EPISODE_ID: 483
MDC_IDC_EPISODE_ID: 484
MDC_IDC_EPISODE_ID: 485
MDC_IDC_EPISODE_ID: 486
MDC_IDC_EPISODE_ID: 487
MDC_IDC_EPISODE_ID: 488
MDC_IDC_EPISODE_ID: 489
MDC_IDC_EPISODE_ID: 490
MDC_IDC_EPISODE_ID: 491
MDC_IDC_EPISODE_ID: 492
MDC_IDC_EPISODE_ID: 493
MDC_IDC_EPISODE_TYPE: NORMAL
MDC_IDC_LEAD_IMPLANT_DT: NORMAL
MDC_IDC_LEAD_LOCATION: NORMAL
MDC_IDC_LEAD_LOCATION_DETAIL_1: NORMAL
MDC_IDC_LEAD_MFG: NORMAL
MDC_IDC_LEAD_MODEL: NORMAL
MDC_IDC_LEAD_POLARITY_TYPE: NORMAL
MDC_IDC_LEAD_SERIAL: NORMAL
MDC_IDC_MSMT_BATTERY_DTM: NORMAL
MDC_IDC_MSMT_BATTERY_REMAINING_LONGEVITY: 12 MO
MDC_IDC_MSMT_BATTERY_RRT_TRIGGER: 2.77
MDC_IDC_MSMT_BATTERY_STATUS: NORMAL
MDC_IDC_MSMT_BATTERY_VOLTAGE: 2.88 V
MDC_IDC_MSMT_LEADCHNL_LV_IMPEDANCE_VALUE: 323 OHM
MDC_IDC_MSMT_LEADCHNL_LV_IMPEDANCE_VALUE: 494 OHM
MDC_IDC_MSMT_LEADCHNL_LV_IMPEDANCE_VALUE: 532 OHM
MDC_IDC_MSMT_LEADCHNL_LV_IMPEDANCE_VALUE: 684 OHM
MDC_IDC_MSMT_LEADCHNL_LV_IMPEDANCE_VALUE: 703 OHM
MDC_IDC_MSMT_LEADCHNL_LV_PACING_THRESHOLD_AMPLITUDE: 1.38 V
MDC_IDC_MSMT_LEADCHNL_LV_PACING_THRESHOLD_PULSEWIDTH: 1 MS
MDC_IDC_MSMT_LEADCHNL_RA_IMPEDANCE_VALUE: 285 OHM
MDC_IDC_MSMT_LEADCHNL_RA_IMPEDANCE_VALUE: 361 OHM
MDC_IDC_MSMT_LEADCHNL_RA_PACING_THRESHOLD_AMPLITUDE: 0.5 V
MDC_IDC_MSMT_LEADCHNL_RA_PACING_THRESHOLD_PULSEWIDTH: 0.4 MS
MDC_IDC_MSMT_LEADCHNL_RA_SENSING_INTR_AMPL: 0.62 MV
MDC_IDC_MSMT_LEADCHNL_RA_SENSING_INTR_AMPL: 0.62 MV
MDC_IDC_MSMT_LEADCHNL_RV_IMPEDANCE_VALUE: 361 OHM
MDC_IDC_MSMT_LEADCHNL_RV_IMPEDANCE_VALUE: 513 OHM
MDC_IDC_MSMT_LEADCHNL_RV_PACING_THRESHOLD_AMPLITUDE: 0.62 V
MDC_IDC_MSMT_LEADCHNL_RV_PACING_THRESHOLD_PULSEWIDTH: 0.4 MS
MDC_IDC_MSMT_LEADCHNL_RV_SENSING_INTR_AMPL: 6.25 MV
MDC_IDC_MSMT_LEADCHNL_RV_SENSING_INTR_AMPL: 6.25 MV
MDC_IDC_PG_IMPLANT_DTM: NORMAL
MDC_IDC_PG_MFG: NORMAL
MDC_IDC_PG_MODEL: NORMAL
MDC_IDC_PG_SERIAL: NORMAL
MDC_IDC_PG_TYPE: NORMAL
MDC_IDC_SESS_CLINIC_NAME: NORMAL
MDC_IDC_SESS_DTM: NORMAL
MDC_IDC_SESS_TYPE: NORMAL
MDC_IDC_SET_BRADY_AT_MODE_SWITCH_RATE: 171 {BEATS}/MIN
MDC_IDC_SET_BRADY_LOWRATE: 70 {BEATS}/MIN
MDC_IDC_SET_BRADY_MAX_SENSOR_RATE: 140 {BEATS}/MIN
MDC_IDC_SET_BRADY_MAX_TRACKING_RATE: 140 {BEATS}/MIN
MDC_IDC_SET_BRADY_MODE: NORMAL
MDC_IDC_SET_BRADY_PAV_DELAY_LOW: 170 MS
MDC_IDC_SET_BRADY_SAV_DELAY_LOW: 110 MS
MDC_IDC_SET_CRT_LVRV_DELAY: 0 MS
MDC_IDC_SET_CRT_PACED_CHAMBERS: NORMAL
MDC_IDC_SET_LEADCHNL_LV_PACING_AMPLITUDE: 2.5 V
MDC_IDC_SET_LEADCHNL_LV_PACING_ANODE_ELECTRODE_1: NORMAL
MDC_IDC_SET_LEADCHNL_LV_PACING_ANODE_LOCATION_1: NORMAL
MDC_IDC_SET_LEADCHNL_LV_PACING_CAPTURE_MODE: NORMAL
MDC_IDC_SET_LEADCHNL_LV_PACING_CATHODE_ELECTRODE_1: NORMAL
MDC_IDC_SET_LEADCHNL_LV_PACING_CATHODE_LOCATION_1: NORMAL
MDC_IDC_SET_LEADCHNL_LV_PACING_POLARITY: NORMAL
MDC_IDC_SET_LEADCHNL_LV_PACING_PULSEWIDTH: 1.5 MS
MDC_IDC_SET_LEADCHNL_RA_PACING_AMPLITUDE: 2 V
MDC_IDC_SET_LEADCHNL_RA_PACING_ANODE_ELECTRODE_1: NORMAL
MDC_IDC_SET_LEADCHNL_RA_PACING_ANODE_LOCATION_1: NORMAL
MDC_IDC_SET_LEADCHNL_RA_PACING_CAPTURE_MODE: NORMAL
MDC_IDC_SET_LEADCHNL_RA_PACING_CATHODE_ELECTRODE_1: NORMAL
MDC_IDC_SET_LEADCHNL_RA_PACING_CATHODE_LOCATION_1: NORMAL
MDC_IDC_SET_LEADCHNL_RA_PACING_POLARITY: NORMAL
MDC_IDC_SET_LEADCHNL_RA_PACING_PULSEWIDTH: 0.4 MS
MDC_IDC_SET_LEADCHNL_RA_SENSING_ANODE_ELECTRODE_1: NORMAL
MDC_IDC_SET_LEADCHNL_RA_SENSING_ANODE_LOCATION_1: NORMAL
MDC_IDC_SET_LEADCHNL_RA_SENSING_CATHODE_ELECTRODE_1: NORMAL
MDC_IDC_SET_LEADCHNL_RA_SENSING_CATHODE_LOCATION_1: NORMAL
MDC_IDC_SET_LEADCHNL_RA_SENSING_POLARITY: NORMAL
MDC_IDC_SET_LEADCHNL_RA_SENSING_SENSITIVITY: 0.15 MV
MDC_IDC_SET_LEADCHNL_RV_PACING_AMPLITUDE: 1.5 V
MDC_IDC_SET_LEADCHNL_RV_PACING_ANODE_ELECTRODE_1: NORMAL
MDC_IDC_SET_LEADCHNL_RV_PACING_ANODE_LOCATION_1: NORMAL
MDC_IDC_SET_LEADCHNL_RV_PACING_CAPTURE_MODE: NORMAL
MDC_IDC_SET_LEADCHNL_RV_PACING_CATHODE_ELECTRODE_1: NORMAL
MDC_IDC_SET_LEADCHNL_RV_PACING_CATHODE_LOCATION_1: NORMAL
MDC_IDC_SET_LEADCHNL_RV_PACING_POLARITY: NORMAL
MDC_IDC_SET_LEADCHNL_RV_PACING_PULSEWIDTH: 0.4 MS
MDC_IDC_SET_LEADCHNL_RV_SENSING_ANODE_ELECTRODE_1: NORMAL
MDC_IDC_SET_LEADCHNL_RV_SENSING_ANODE_LOCATION_1: NORMAL
MDC_IDC_SET_LEADCHNL_RV_SENSING_CATHODE_ELECTRODE_1: NORMAL
MDC_IDC_SET_LEADCHNL_RV_SENSING_CATHODE_LOCATION_1: NORMAL
MDC_IDC_SET_LEADCHNL_RV_SENSING_POLARITY: NORMAL
MDC_IDC_SET_LEADCHNL_RV_SENSING_SENSITIVITY: 0.9 MV
MDC_IDC_SET_ZONE_DETECTION_INTERVAL: 200 MS
MDC_IDC_SET_ZONE_DETECTION_INTERVAL: 350 MS
MDC_IDC_SET_ZONE_DETECTION_INTERVAL: 400 MS
MDC_IDC_SET_ZONE_TYPE: NORMAL
MDC_IDC_STAT_AT_BURDEN_PERCENT: 0.4 %
MDC_IDC_STAT_AT_DTM_END: NORMAL
MDC_IDC_STAT_AT_DTM_START: NORMAL
MDC_IDC_STAT_BRADY_AP_VP_PERCENT: 97.94 %
MDC_IDC_STAT_BRADY_AP_VS_PERCENT: 0.38 %
MDC_IDC_STAT_BRADY_AS_VP_PERCENT: 1.64 %
MDC_IDC_STAT_BRADY_AS_VS_PERCENT: 0.05 %
MDC_IDC_STAT_BRADY_DTM_END: NORMAL
MDC_IDC_STAT_BRADY_DTM_START: NORMAL
MDC_IDC_STAT_BRADY_RA_PERCENT_PACED: 98.05 %
MDC_IDC_STAT_BRADY_RV_PERCENT_PACED: 98.66 %
MDC_IDC_STAT_CRT_DTM_END: NORMAL
MDC_IDC_STAT_CRT_DTM_START: NORMAL
MDC_IDC_STAT_CRT_LV_PERCENT_PACED: 97.41 %
MDC_IDC_STAT_CRT_PERCENT_PACED: 97.41 %
MDC_IDC_STAT_EPISODE_RECENT_COUNT: 0
MDC_IDC_STAT_EPISODE_RECENT_COUNT: 0
MDC_IDC_STAT_EPISODE_RECENT_COUNT: 1
MDC_IDC_STAT_EPISODE_RECENT_COUNT: 16
MDC_IDC_STAT_EPISODE_RECENT_COUNT_DTM_END: NORMAL
MDC_IDC_STAT_EPISODE_RECENT_COUNT_DTM_START: NORMAL
MDC_IDC_STAT_EPISODE_TOTAL_COUNT: 0
MDC_IDC_STAT_EPISODE_TOTAL_COUNT: 103
MDC_IDC_STAT_EPISODE_TOTAL_COUNT: 4
MDC_IDC_STAT_EPISODE_TOTAL_COUNT: 699
MDC_IDC_STAT_EPISODE_TOTAL_COUNT_DTM_END: NORMAL
MDC_IDC_STAT_EPISODE_TOTAL_COUNT_DTM_START: NORMAL
MDC_IDC_STAT_EPISODE_TYPE: NORMAL

## 2021-08-20 ENCOUNTER — LAB (OUTPATIENT)
Dept: CARDIOLOGY | Facility: CLINIC | Age: 81
End: 2021-08-20
Payer: COMMERCIAL

## 2021-08-20 DIAGNOSIS — I47.29 NSVT (NONSUSTAINED VENTRICULAR TACHYCARDIA) (H): ICD-10-CM

## 2021-08-20 LAB
MAGNESIUM SERPL-MCNC: 2.4 MG/DL (ref 1.8–2.6)
POTASSIUM BLD-SCNC: 4 MMOL/L (ref 3.5–5)

## 2021-08-20 PROCEDURE — 36415 COLL VENOUS BLD VENIPUNCTURE: CPT

## 2021-08-20 PROCEDURE — 83735 ASSAY OF MAGNESIUM: CPT

## 2021-08-20 PROCEDURE — 84132 ASSAY OF SERUM POTASSIUM: CPT

## 2021-09-04 DIAGNOSIS — I48.91 ATRIAL FIBRILLATION (H): ICD-10-CM

## 2021-09-04 DIAGNOSIS — I50.41 ACUTE COMBINED SYSTOLIC AND DIASTOLIC CONGESTIVE HEART FAILURE (H): ICD-10-CM

## 2021-09-07 RX ORDER — FUROSEMIDE 20 MG
TABLET ORAL
Qty: 180 TABLET | Refills: 1 | Status: SHIPPED | OUTPATIENT
Start: 2021-09-07 | End: 2021-12-20

## 2021-09-07 RX ORDER — WARFARIN SODIUM 3 MG/1
TABLET ORAL
Qty: 90 TABLET | Refills: 1 | Status: SHIPPED | OUTPATIENT
Start: 2021-09-07 | End: 2022-04-11

## 2021-09-07 NOTE — TELEPHONE ENCOUNTER
Lab Results   Component Value Date    INR 2.4 08/04/2021    INR 2.30 (H) 05/21/2019    INR 2.5 04/23/2019       Patient's current Warfarin doses:    4 mg every Thu, Sat; 3 mg all other days           Next INR check is on 9/15/21      Patient's last OV with HCC was on 4/14/2021    Warfarin prescription 3 month supply and one refill sent to patient's pharmacy today.    Salma Marques RN [facility]    [phone]

## 2021-09-15 ENCOUNTER — LAB (OUTPATIENT)
Dept: CARDIOLOGY | Facility: CLINIC | Age: 81
End: 2021-09-15
Payer: COMMERCIAL

## 2021-09-15 ENCOUNTER — ANTICOAGULATION THERAPY VISIT (OUTPATIENT)
Dept: ANTICOAGULATION | Facility: CLINIC | Age: 81
End: 2021-09-15

## 2021-09-15 DIAGNOSIS — I48.92 ATRIAL FLUTTER, UNSPECIFIED TYPE (H): ICD-10-CM

## 2021-09-15 DIAGNOSIS — I48.91 ATRIAL FIBRILLATION (H): ICD-10-CM

## 2021-09-15 DIAGNOSIS — I48.91 ATRIAL FIBRILLATION (H): Primary | ICD-10-CM

## 2021-09-15 LAB — INR BLD: 2.5 (ref 2–3)

## 2021-09-15 PROCEDURE — 36416 COLLJ CAPILLARY BLOOD SPEC: CPT

## 2021-09-15 PROCEDURE — 85610 PROTHROMBIN TIME: CPT

## 2021-09-15 NOTE — PROGRESS NOTES
ANTICOAGULATION MANAGEMENT     Ady Farley 81 year old male is on warfarin with therapeutic INR result. (Goal INR 2.0-3.0)    Recent labs: (last 7 days)     09/15/21  0837   INR 2.5       ASSESSMENT     Source(s): Chart Review       Warfarin doses taken: Warfarin taken as instructed    Diet: No new diet changes identified    New illness, injury, or hospitalization: No    Medication/supplement changes: None noted    Signs or symptoms of bleeding or clotting: No    Previous INR: Therapeutic last 2(+) visits    Additional findings: None     PLAN     Recommended plan for no diet, medication or health factor changes affecting INR     Dosing Instructions: Continue your current warfarin dose with next INR in 6 weeks       Summary  As of 9/15/2021    Full warfarin instructions:  4 mg every Thu, Sat; 3 mg all other days   Next INR check:  10/27/2021             Telephone call with Ady who verbalizes understanding and agrees to plan    Lab visit scheduled    Education provided: Importance of therapeutic range, Importance of following up at instructed interval, Importance of taking warfarin as instructed, Importance of notifying clinic for changes in medications; a sooner lab recheck maybe needed., Importance of notifying clinic of upcoming surgeries and procedures 2 weeks in advance and Contact [phone] with any changes, questions or concerns.     Plan made per [facility] anticoagulation protocol    Salma Marques RN  Anticoagulation Clinic  9/15/2021    _______________________________________________________________________     Anticoagulation Episode Summary     Current INR goal:  2.0-3.0   TTR:  100.0 % (1.4 mo)   Target end date:     Send INR reminders to:  [facility]    Indications    Paroxysmal Atrial Fibrillation [I48.91]  Atrial flutter  unspecified type (H) [I48.92]           Comments:           Anticoagulation Care Providers     Provider Role Specialty Phone number    Janis Townsend MD  Referring Cardiovascular Disease [phone]

## 2021-10-22 ENCOUNTER — ANCILLARY PROCEDURE (OUTPATIENT)
Dept: CARDIOLOGY | Facility: CLINIC | Age: 81
End: 2021-10-22
Attending: INTERNAL MEDICINE
Payer: COMMERCIAL

## 2021-10-22 ENCOUNTER — TELEPHONE (OUTPATIENT)
Dept: CARDIOLOGY | Facility: CLINIC | Age: 81
End: 2021-10-22

## 2021-10-22 DIAGNOSIS — I47.29 NSVT (NONSUSTAINED VENTRICULAR TACHYCARDIA) (H): ICD-10-CM

## 2021-10-22 DIAGNOSIS — I49.5 SSS (SICK SINUS SYNDROME) (H): ICD-10-CM

## 2021-10-22 DIAGNOSIS — Z95.0 BIVENTRICULAR CARDIAC PACEMAKER IN SITU: ICD-10-CM

## 2021-10-22 DIAGNOSIS — I25.83 CORONARY ATHEROSCLEROSIS DUE TO LIPID RICH PLAQUE: ICD-10-CM

## 2021-10-22 DIAGNOSIS — I50.30 (HFPEF) HEART FAILURE WITH PRESERVED EJECTION FRACTION (H): ICD-10-CM

## 2021-10-22 PROCEDURE — 99207 CARDIAC DEVICE CHECK - REMOTE: CPT | Performed by: INTERNAL MEDICINE

## 2021-10-22 NOTE — TELEPHONE ENCOUNTER
"Type: routine remote CRT-P transmission.   presenting rhythm: AP-biVP 83 bpm.  Battery/lead status: stable.  Arrhythmias: since 8/16/21; 117 mode switch episodes, longest lasting 14hrs, EGMs confirm AT/AF, ventricular rates well controlled. AF burden 5/6%. Three ventricular high rate episodes, atrial marker channel drops out, possible NSVT up to 20bts 170 bpm, duration up to 7 seconds. EF 60% per Echo 5/5/2021  Anticoagulant: warfarin.  Comments: normal pacemaker function. Device biVP 97.6%. Routed to device RN for review. DAVON Field Device Specialist    Addendum: Transmission and chart reviewed.  On 10/19/2021 at 0636 hours patient had 7 seconds of probable NSVT, initial rate ~130 bpm accelerating to ~180 bpm. Patient had other salvos of probable NSVT on 10/21/2021 and 10/12/2021. EF 60% per echo on 05/05/2021.  Remaining battery longevity is estimated at 9 months on today's transmission.  Phone call to patient.  He reports, \"Huh, I didn't feel anything and I would have been up by that time on the 19th, Tuesday morning.  What's with all of these lately?  I sure don't remember anything out of the ordinary.\"  Routed to Dr. Townsend for review.  I thanked Tomer Martine for his time.  Tia Hicks, RN, MA  Device Nurse  [facility]    "

## 2021-10-25 RX ORDER — METOPROLOL SUCCINATE 100 MG/1
100 TABLET, EXTENDED RELEASE ORAL DAILY
Qty: 90 TABLET | Refills: 1 | Status: SHIPPED | OUTPATIENT
Start: 2021-10-25 | End: 2021-12-20

## 2021-10-25 NOTE — TELEPHONE ENCOUNTER
Phone call to patient - informed him of Dr. Townsend's recommendations after review of recent remote device ck - patient verbalized understanding and confirmed preferred pharmacy for new Rx - patient will plan to follow-up 4/2022 and call during interim if he develops any probs tolerating increased dose - orders placed per protocol.  mg

## 2021-10-25 NOTE — TELEPHONE ENCOUNTER
Msg rec'd 10-25-21 @ 1520:  Janis Townsend MD Wiatros, Suzanne M, RN; Jayla Collins, ERROL  Commend increasing Toprol-XL to 100 mg daily      Previous Messages       ----- Message -----   From: Tia Hicks RN   Sent: 10/22/2021   1:01 PM CDT   To: Janis Townsend MD     ----- Message from Tia Hicks RN sent at 10/22/2021  1:01 PM CDT -----   More frequent ventricular high rates noted on routine remote pacemaker transmission.

## 2021-10-27 ENCOUNTER — ANTICOAGULATION THERAPY VISIT (OUTPATIENT)
Dept: ANTICOAGULATION | Facility: CLINIC | Age: 81
End: 2021-10-27

## 2021-10-27 ENCOUNTER — LAB (OUTPATIENT)
Dept: CARDIOLOGY | Facility: CLINIC | Age: 81
End: 2021-10-27
Payer: COMMERCIAL

## 2021-10-27 DIAGNOSIS — I48.91 ATRIAL FIBRILLATION (H): Primary | ICD-10-CM

## 2021-10-27 DIAGNOSIS — I48.92 ATRIAL FLUTTER, UNSPECIFIED TYPE (H): ICD-10-CM

## 2021-10-27 DIAGNOSIS — I48.91 ATRIAL FIBRILLATION (H): ICD-10-CM

## 2021-10-27 LAB — INR BLD: 2.2 (ref 2–3)

## 2021-10-27 PROCEDURE — 85610 PROTHROMBIN TIME: CPT | Performed by: INTERNAL MEDICINE

## 2021-10-27 PROCEDURE — 36416 COLLJ CAPILLARY BLOOD SPEC: CPT | Performed by: INTERNAL MEDICINE

## 2021-10-27 NOTE — PROGRESS NOTES
ANTICOAGULATION MANAGEMENT     Ady DÍAZ Ilirjhonathan 81 year old male is on warfarin with therapeutic INR result. (Goal INR 2.0-3.0)    Recent labs: (last 7 days)     10/27/21  0850   INR 2.2       ASSESSMENT     Source(s): Chart Review and Template       Warfarin doses taken: Warfarin taken as instructed    Diet: No new diet changes identified    New illness, injury, or hospitalization: No    Medication/supplement changes: None noted    Signs or symptoms of bleeding or clotting: No    Previous INR: Therapeutic last 2(+) visits    Additional findings: None     PLAN     Recommended plan for no diet, medication or health factor changes affecting INR     Dosing Instructions: Continue your current warfarin dose with next INR in 6 weeks       Summary  As of 10/27/2021    Full warfarin instructions:  4 mg every Thu, Sat; 3 mg all other days   Next INR check:  12/8/2021             Telephone call with Ady who verbalizes understanding and agrees to plan    Lab visit scheduled    Education provided: Importance of therapeutic range, Importance of following up at instructed interval, Importance of notifying clinic for changes in medications; a sooner lab recheck maybe needed. and Contact [phone] with any changes, questions or concerns.     Plan made per [facility] anticoagulation protocol    Salma Marques RN  Anticoagulation Clinic  10/27/2021    _______________________________________________________________________     Anticoagulation Episode Summary     Current INR goal:  2.0-3.0   TTR:  100.0 % (2.8 mo)   Target end date:     Send INR reminders to:  [facility]    Indications    Paroxysmal Atrial Fibrillation [I48.91]  Atrial flutter  unspecified type (H) [I48.92]           Comments:           Anticoagulation Care Providers     Provider Role Specialty Phone number    Janis Townsend MD Referring Cardiovascular Disease [phone]

## 2021-11-01 LAB
MDC_IDC_EPISODE_DTM: NORMAL
MDC_IDC_EPISODE_DURATION: 0 S
MDC_IDC_EPISODE_DURATION: 11 S
MDC_IDC_EPISODE_DURATION: 1119 S
MDC_IDC_EPISODE_DURATION: 12 S
MDC_IDC_EPISODE_DURATION: 1220 S
MDC_IDC_EPISODE_DURATION: 1226 S
MDC_IDC_EPISODE_DURATION: 1281 S
MDC_IDC_EPISODE_DURATION: 1733 S
MDC_IDC_EPISODE_DURATION: 1744 S
MDC_IDC_EPISODE_DURATION: 180 S
MDC_IDC_EPISODE_DURATION: 181 S
MDC_IDC_EPISODE_DURATION: 182 S
MDC_IDC_EPISODE_DURATION: 183 S
MDC_IDC_EPISODE_DURATION: 189 S
MDC_IDC_EPISODE_DURATION: 193 S
MDC_IDC_EPISODE_DURATION: 1974 S
MDC_IDC_EPISODE_DURATION: 206 S
MDC_IDC_EPISODE_DURATION: 2065 S
MDC_IDC_EPISODE_DURATION: 2270 S
MDC_IDC_EPISODE_DURATION: 2347 S
MDC_IDC_EPISODE_DURATION: 239 S
MDC_IDC_EPISODE_DURATION: 246 S
MDC_IDC_EPISODE_DURATION: 261 S
MDC_IDC_EPISODE_DURATION: 2716 S
MDC_IDC_EPISODE_DURATION: 2888 S
MDC_IDC_EPISODE_DURATION: 2986 S
MDC_IDC_EPISODE_DURATION: 3 S
MDC_IDC_EPISODE_DURATION: 309 S
MDC_IDC_EPISODE_DURATION: 309 S
MDC_IDC_EPISODE_DURATION: 31 S
MDC_IDC_EPISODE_DURATION: 310 S
MDC_IDC_EPISODE_DURATION: 317 S
MDC_IDC_EPISODE_DURATION: 3246 S
MDC_IDC_EPISODE_DURATION: 335 S
MDC_IDC_EPISODE_DURATION: 343 S
MDC_IDC_EPISODE_DURATION: 3624 S
MDC_IDC_EPISODE_DURATION: 3849 S
MDC_IDC_EPISODE_DURATION: 3927 S
MDC_IDC_EPISODE_DURATION: 3939 S
MDC_IDC_EPISODE_DURATION: 3968 S
MDC_IDC_EPISODE_DURATION: 4 S
MDC_IDC_EPISODE_DURATION: 4 S
MDC_IDC_EPISODE_DURATION: 421 S
MDC_IDC_EPISODE_DURATION: 4228 S
MDC_IDC_EPISODE_DURATION: 4290 S
MDC_IDC_EPISODE_DURATION: 4577 S
MDC_IDC_EPISODE_DURATION: 5 S
MDC_IDC_EPISODE_DURATION: 5139 S
MDC_IDC_EPISODE_DURATION: 521 S
MDC_IDC_EPISODE_DURATION: 551 S
MDC_IDC_EPISODE_DURATION: 5707 S
MDC_IDC_EPISODE_DURATION: 583 S
MDC_IDC_EPISODE_DURATION: 5876 S
MDC_IDC_EPISODE_DURATION: 6041 S
MDC_IDC_EPISODE_DURATION: 67 S
MDC_IDC_EPISODE_DURATION: 6720 S
MDC_IDC_EPISODE_DURATION: 674 S
MDC_IDC_EPISODE_DURATION: 68 S
MDC_IDC_EPISODE_DURATION: 70 S
MDC_IDC_EPISODE_DURATION: 7100 S
MDC_IDC_EPISODE_DURATION: 741 S
MDC_IDC_EPISODE_DURATION: 79 S
MDC_IDC_EPISODE_DURATION: 794 S
MDC_IDC_EPISODE_DURATION: 7982 S
MDC_IDC_EPISODE_DURATION: 8 S
MDC_IDC_EPISODE_DURATION: 8 S
MDC_IDC_EPISODE_DURATION: 843 S
MDC_IDC_EPISODE_DURATION: 860 S
MDC_IDC_EPISODE_DURATION: 89 S
MDC_IDC_EPISODE_DURATION: 8922 S
MDC_IDC_EPISODE_DURATION: 897 S
MDC_IDC_EPISODE_DURATION: 90 S
MDC_IDC_EPISODE_DURATION: 907 S
MDC_IDC_EPISODE_DURATION: 909 S
MDC_IDC_EPISODE_DURATION: 916 S
MDC_IDC_EPISODE_DURATION: 98 S
MDC_IDC_EPISODE_DURATION: 989 S
MDC_IDC_EPISODE_DURATION: NORMAL S
MDC_IDC_EPISODE_ID: 1197
MDC_IDC_EPISODE_ID: 1198
MDC_IDC_EPISODE_ID: 1199
MDC_IDC_EPISODE_ID: 1200
MDC_IDC_EPISODE_ID: 1201
MDC_IDC_EPISODE_ID: 1202
MDC_IDC_EPISODE_ID: 1203
MDC_IDC_EPISODE_ID: 494
MDC_IDC_EPISODE_ID: 495
MDC_IDC_EPISODE_ID: 496
MDC_IDC_EPISODE_ID: 497
MDC_IDC_EPISODE_ID: 498
MDC_IDC_EPISODE_ID: 499
MDC_IDC_EPISODE_ID: 500
MDC_IDC_EPISODE_ID: 501
MDC_IDC_EPISODE_ID: 502
MDC_IDC_EPISODE_ID: 503
MDC_IDC_EPISODE_ID: 504
MDC_IDC_EPISODE_ID: 505
MDC_IDC_EPISODE_ID: 506
MDC_IDC_EPISODE_ID: 507
MDC_IDC_EPISODE_ID: 508
MDC_IDC_EPISODE_ID: 509
MDC_IDC_EPISODE_ID: 510
MDC_IDC_EPISODE_ID: 511
MDC_IDC_EPISODE_ID: 512
MDC_IDC_EPISODE_ID: 513
MDC_IDC_EPISODE_ID: 514
MDC_IDC_EPISODE_ID: 515
MDC_IDC_EPISODE_ID: 516
MDC_IDC_EPISODE_ID: 517
MDC_IDC_EPISODE_ID: 518
MDC_IDC_EPISODE_ID: 519
MDC_IDC_EPISODE_ID: 520
MDC_IDC_EPISODE_ID: 521
MDC_IDC_EPISODE_ID: 522
MDC_IDC_EPISODE_ID: 523
MDC_IDC_EPISODE_ID: 524
MDC_IDC_EPISODE_ID: 525
MDC_IDC_EPISODE_ID: 526
MDC_IDC_EPISODE_ID: 527
MDC_IDC_EPISODE_ID: 528
MDC_IDC_EPISODE_ID: 529
MDC_IDC_EPISODE_ID: 530
MDC_IDC_EPISODE_ID: 531
MDC_IDC_EPISODE_ID: 532
MDC_IDC_EPISODE_ID: 533
MDC_IDC_EPISODE_ID: 534
MDC_IDC_EPISODE_ID: 535
MDC_IDC_EPISODE_ID: 536
MDC_IDC_EPISODE_ID: 537
MDC_IDC_EPISODE_ID: 538
MDC_IDC_EPISODE_ID: 539
MDC_IDC_EPISODE_ID: 540
MDC_IDC_EPISODE_ID: 541
MDC_IDC_EPISODE_ID: 542
MDC_IDC_EPISODE_ID: 543
MDC_IDC_EPISODE_ID: 544
MDC_IDC_EPISODE_ID: 545
MDC_IDC_EPISODE_ID: 546
MDC_IDC_EPISODE_ID: 547
MDC_IDC_EPISODE_ID: 548
MDC_IDC_EPISODE_ID: 549
MDC_IDC_EPISODE_ID: 550
MDC_IDC_EPISODE_ID: 551
MDC_IDC_EPISODE_ID: 552
MDC_IDC_EPISODE_ID: 553
MDC_IDC_EPISODE_ID: 554
MDC_IDC_EPISODE_ID: 555
MDC_IDC_EPISODE_ID: 556
MDC_IDC_EPISODE_ID: 557
MDC_IDC_EPISODE_ID: 558
MDC_IDC_EPISODE_ID: 559
MDC_IDC_EPISODE_ID: 560
MDC_IDC_EPISODE_ID: 561
MDC_IDC_EPISODE_ID: 562
MDC_IDC_EPISODE_ID: 563
MDC_IDC_EPISODE_ID: 564
MDC_IDC_EPISODE_ID: 565
MDC_IDC_EPISODE_ID: 566
MDC_IDC_EPISODE_ID: 567
MDC_IDC_EPISODE_ID: 568
MDC_IDC_EPISODE_ID: 569
MDC_IDC_EPISODE_ID: 570
MDC_IDC_EPISODE_ID: 571
MDC_IDC_EPISODE_ID: 572
MDC_IDC_EPISODE_ID: 573
MDC_IDC_EPISODE_ID: 574
MDC_IDC_EPISODE_ID: 575
MDC_IDC_EPISODE_ID: 576
MDC_IDC_EPISODE_ID: 577
MDC_IDC_EPISODE_ID: 578
MDC_IDC_EPISODE_ID: 579
MDC_IDC_EPISODE_ID: 580
MDC_IDC_EPISODE_ID: 581
MDC_IDC_EPISODE_TYPE: NORMAL
MDC_IDC_LEAD_IMPLANT_DT: NORMAL
MDC_IDC_LEAD_LOCATION: NORMAL
MDC_IDC_LEAD_LOCATION_DETAIL_1: NORMAL
MDC_IDC_LEAD_MFG: NORMAL
MDC_IDC_LEAD_MODEL: NORMAL
MDC_IDC_LEAD_POLARITY_TYPE: NORMAL
MDC_IDC_LEAD_SERIAL: NORMAL
MDC_IDC_MSMT_BATTERY_DTM: NORMAL
MDC_IDC_MSMT_BATTERY_REMAINING_LONGEVITY: 9 MO
MDC_IDC_MSMT_BATTERY_RRT_TRIGGER: 2.77
MDC_IDC_MSMT_BATTERY_STATUS: NORMAL
MDC_IDC_MSMT_BATTERY_VOLTAGE: 2.87 V
MDC_IDC_MSMT_LEADCHNL_LV_IMPEDANCE_VALUE: 361 OHM
MDC_IDC_MSMT_LEADCHNL_LV_IMPEDANCE_VALUE: 551 OHM
MDC_IDC_MSMT_LEADCHNL_LV_IMPEDANCE_VALUE: 608 OHM
MDC_IDC_MSMT_LEADCHNL_LV_IMPEDANCE_VALUE: 798 OHM
MDC_IDC_MSMT_LEADCHNL_LV_IMPEDANCE_VALUE: 798 OHM
MDC_IDC_MSMT_LEADCHNL_LV_PACING_THRESHOLD_AMPLITUDE: 1.38 V
MDC_IDC_MSMT_LEADCHNL_LV_PACING_THRESHOLD_PULSEWIDTH: 1 MS
MDC_IDC_MSMT_LEADCHNL_RA_IMPEDANCE_VALUE: 304 OHM
MDC_IDC_MSMT_LEADCHNL_RA_IMPEDANCE_VALUE: 380 OHM
MDC_IDC_MSMT_LEADCHNL_RA_PACING_THRESHOLD_AMPLITUDE: 0.5 V
MDC_IDC_MSMT_LEADCHNL_RA_PACING_THRESHOLD_PULSEWIDTH: 0.4 MS
MDC_IDC_MSMT_LEADCHNL_RA_SENSING_INTR_AMPL: 0.62 MV
MDC_IDC_MSMT_LEADCHNL_RA_SENSING_INTR_AMPL: 0.62 MV
MDC_IDC_MSMT_LEADCHNL_RV_IMPEDANCE_VALUE: 361 OHM
MDC_IDC_MSMT_LEADCHNL_RV_IMPEDANCE_VALUE: 532 OHM
MDC_IDC_MSMT_LEADCHNL_RV_PACING_THRESHOLD_AMPLITUDE: 0.62 V
MDC_IDC_MSMT_LEADCHNL_RV_PACING_THRESHOLD_PULSEWIDTH: 0.4 MS
MDC_IDC_MSMT_LEADCHNL_RV_SENSING_INTR_AMPL: 7.62 MV
MDC_IDC_MSMT_LEADCHNL_RV_SENSING_INTR_AMPL: 7.62 MV
MDC_IDC_PG_IMPLANT_DTM: NORMAL
MDC_IDC_PG_MFG: NORMAL
MDC_IDC_PG_MODEL: NORMAL
MDC_IDC_PG_SERIAL: NORMAL
MDC_IDC_PG_TYPE: NORMAL
MDC_IDC_SESS_CLINIC_NAME: NORMAL
MDC_IDC_SESS_DTM: NORMAL
MDC_IDC_SESS_TYPE: NORMAL
MDC_IDC_SET_BRADY_AT_MODE_SWITCH_RATE: 171 {BEATS}/MIN
MDC_IDC_SET_BRADY_LOWRATE: 70 {BEATS}/MIN
MDC_IDC_SET_BRADY_MAX_SENSOR_RATE: 140 {BEATS}/MIN
MDC_IDC_SET_BRADY_MAX_TRACKING_RATE: 140 {BEATS}/MIN
MDC_IDC_SET_BRADY_MODE: NORMAL
MDC_IDC_SET_BRADY_PAV_DELAY_LOW: 170 MS
MDC_IDC_SET_BRADY_SAV_DELAY_LOW: 110 MS
MDC_IDC_SET_CRT_LVRV_DELAY: 0 MS
MDC_IDC_SET_CRT_PACED_CHAMBERS: NORMAL
MDC_IDC_SET_LEADCHNL_LV_PACING_AMPLITUDE: 2.5 V
MDC_IDC_SET_LEADCHNL_LV_PACING_ANODE_ELECTRODE_1: NORMAL
MDC_IDC_SET_LEADCHNL_LV_PACING_ANODE_LOCATION_1: NORMAL
MDC_IDC_SET_LEADCHNL_LV_PACING_CAPTURE_MODE: NORMAL
MDC_IDC_SET_LEADCHNL_LV_PACING_CATHODE_ELECTRODE_1: NORMAL
MDC_IDC_SET_LEADCHNL_LV_PACING_CATHODE_LOCATION_1: NORMAL
MDC_IDC_SET_LEADCHNL_LV_PACING_POLARITY: NORMAL
MDC_IDC_SET_LEADCHNL_LV_PACING_PULSEWIDTH: 1.5 MS
MDC_IDC_SET_LEADCHNL_RA_PACING_AMPLITUDE: 2 V
MDC_IDC_SET_LEADCHNL_RA_PACING_ANODE_ELECTRODE_1: NORMAL
MDC_IDC_SET_LEADCHNL_RA_PACING_ANODE_LOCATION_1: NORMAL
MDC_IDC_SET_LEADCHNL_RA_PACING_CAPTURE_MODE: NORMAL
MDC_IDC_SET_LEADCHNL_RA_PACING_CATHODE_ELECTRODE_1: NORMAL
MDC_IDC_SET_LEADCHNL_RA_PACING_CATHODE_LOCATION_1: NORMAL
MDC_IDC_SET_LEADCHNL_RA_PACING_POLARITY: NORMAL
MDC_IDC_SET_LEADCHNL_RA_PACING_PULSEWIDTH: 0.4 MS
MDC_IDC_SET_LEADCHNL_RA_SENSING_ANODE_ELECTRODE_1: NORMAL
MDC_IDC_SET_LEADCHNL_RA_SENSING_ANODE_LOCATION_1: NORMAL
MDC_IDC_SET_LEADCHNL_RA_SENSING_CATHODE_ELECTRODE_1: NORMAL
MDC_IDC_SET_LEADCHNL_RA_SENSING_CATHODE_LOCATION_1: NORMAL
MDC_IDC_SET_LEADCHNL_RA_SENSING_POLARITY: NORMAL
MDC_IDC_SET_LEADCHNL_RA_SENSING_SENSITIVITY: 0.15 MV
MDC_IDC_SET_LEADCHNL_RV_PACING_AMPLITUDE: 1.5 V
MDC_IDC_SET_LEADCHNL_RV_PACING_ANODE_ELECTRODE_1: NORMAL
MDC_IDC_SET_LEADCHNL_RV_PACING_ANODE_LOCATION_1: NORMAL
MDC_IDC_SET_LEADCHNL_RV_PACING_CAPTURE_MODE: NORMAL
MDC_IDC_SET_LEADCHNL_RV_PACING_CATHODE_ELECTRODE_1: NORMAL
MDC_IDC_SET_LEADCHNL_RV_PACING_CATHODE_LOCATION_1: NORMAL
MDC_IDC_SET_LEADCHNL_RV_PACING_POLARITY: NORMAL
MDC_IDC_SET_LEADCHNL_RV_PACING_PULSEWIDTH: 0.4 MS
MDC_IDC_SET_LEADCHNL_RV_SENSING_ANODE_ELECTRODE_1: NORMAL
MDC_IDC_SET_LEADCHNL_RV_SENSING_ANODE_LOCATION_1: NORMAL
MDC_IDC_SET_LEADCHNL_RV_SENSING_CATHODE_ELECTRODE_1: NORMAL
MDC_IDC_SET_LEADCHNL_RV_SENSING_CATHODE_LOCATION_1: NORMAL
MDC_IDC_SET_LEADCHNL_RV_SENSING_POLARITY: NORMAL
MDC_IDC_SET_LEADCHNL_RV_SENSING_SENSITIVITY: 0.9 MV
MDC_IDC_SET_ZONE_DETECTION_INTERVAL: 200 MS
MDC_IDC_SET_ZONE_DETECTION_INTERVAL: 350 MS
MDC_IDC_SET_ZONE_DETECTION_INTERVAL: 400 MS
MDC_IDC_SET_ZONE_TYPE: NORMAL
MDC_IDC_STAT_AT_BURDEN_PERCENT: 5.6 %
MDC_IDC_STAT_AT_DTM_END: NORMAL
MDC_IDC_STAT_AT_DTM_START: NORMAL
MDC_IDC_STAT_BRADY_AP_VP_PERCENT: 90.21 %
MDC_IDC_STAT_BRADY_AP_VS_PERCENT: 0.51 %
MDC_IDC_STAT_BRADY_AS_VP_PERCENT: 8.76 %
MDC_IDC_STAT_BRADY_AS_VS_PERCENT: 0.52 %
MDC_IDC_STAT_BRADY_DTM_END: NORMAL
MDC_IDC_STAT_BRADY_DTM_START: NORMAL
MDC_IDC_STAT_BRADY_RA_PERCENT_PACED: 89.64 %
MDC_IDC_STAT_BRADY_RV_PERCENT_PACED: 97.58 %
MDC_IDC_STAT_CRT_DTM_END: NORMAL
MDC_IDC_STAT_CRT_DTM_START: NORMAL
MDC_IDC_STAT_CRT_LV_PERCENT_PACED: 96.04 %
MDC_IDC_STAT_CRT_PERCENT_PACED: 96.04 %
MDC_IDC_STAT_EPISODE_RECENT_COUNT: 0
MDC_IDC_STAT_EPISODE_RECENT_COUNT: 0
MDC_IDC_STAT_EPISODE_RECENT_COUNT: 3
MDC_IDC_STAT_EPISODE_RECENT_COUNT: 85
MDC_IDC_STAT_EPISODE_RECENT_COUNT_DTM_END: NORMAL
MDC_IDC_STAT_EPISODE_RECENT_COUNT_DTM_START: NORMAL
MDC_IDC_STAT_EPISODE_TOTAL_COUNT: 0
MDC_IDC_STAT_EPISODE_TOTAL_COUNT: 106
MDC_IDC_STAT_EPISODE_TOTAL_COUNT: 4
MDC_IDC_STAT_EPISODE_TOTAL_COUNT: 849
MDC_IDC_STAT_EPISODE_TOTAL_COUNT_DTM_END: NORMAL
MDC_IDC_STAT_EPISODE_TOTAL_COUNT_DTM_START: NORMAL
MDC_IDC_STAT_EPISODE_TYPE: NORMAL

## 2021-11-09 ENCOUNTER — TRANSCRIBE ORDERS (OUTPATIENT)
Dept: CARDIOLOGY | Facility: CLINIC | Age: 81
End: 2021-11-09
Payer: COMMERCIAL

## 2021-11-09 DIAGNOSIS — Z95.0 BIVENTRICULAR CARDIAC PACEMAKER IN SITU: Primary | ICD-10-CM

## 2021-12-08 ENCOUNTER — ANTICOAGULATION THERAPY VISIT (OUTPATIENT)
Dept: ANTICOAGULATION | Facility: CLINIC | Age: 81
End: 2021-12-08

## 2021-12-08 ENCOUNTER — LAB (OUTPATIENT)
Dept: CARDIOLOGY | Facility: CLINIC | Age: 81
End: 2021-12-08
Payer: COMMERCIAL

## 2021-12-08 DIAGNOSIS — I48.91 ATRIAL FIBRILLATION (H): Primary | ICD-10-CM

## 2021-12-08 DIAGNOSIS — I48.92 ATRIAL FLUTTER, UNSPECIFIED TYPE (H): ICD-10-CM

## 2021-12-08 DIAGNOSIS — Z79.01 LONG TERM CURRENT USE OF ANTICOAGULANT THERAPY: Primary | ICD-10-CM

## 2021-12-08 LAB — INR BLD: 2.4 (ref 2–3)

## 2021-12-08 PROCEDURE — 36416 COLLJ CAPILLARY BLOOD SPEC: CPT

## 2021-12-08 PROCEDURE — 85610 PROTHROMBIN TIME: CPT

## 2021-12-08 NOTE — PROGRESS NOTES
ANTICOAGULATION MANAGEMENT     Ady Farley 81 year old male is on warfarin with therapeutic INR result. (Goal INR 2.0-3.0)    Recent labs: (last 7 days)     12/08/21  0852   INR 2.4       ASSESSMENT     Source(s): Chart Review, Patient/Caregiver Call and Template       Warfarin doses taken: Warfarin taken as instructed    Diet: No new diet changes identified    New illness, injury, or hospitalization: No    Medication/supplement changes: None noted    Signs or symptoms of bleeding or clotting: No    Previous INR: Therapeutic last 2(+) visits    Additional findings: None     PLAN     Recommended plan for no diet, medication or health factor changes affecting INR     Dosing Instructions: Continue your current warfarin dose with next INR waiting for approval from Dr Townsend to extend interval to 8 weeks.     Summary  As of 12/8/2021    Full warfarin instructions:  4 mg every Thu, Sat; 3 mg all other days   Next INR check:               Telephone call with Ady who verbalizes understanding and agrees to plan    Will schedule appointment once [facility] approves extended interval request    Education provided: Importance of following up at instructed interval, Importance of notifying clinic for changes in medications; a sooner lab recheck maybe needed., Importance of notifying clinic of upcoming surgeries and procedures 2 weeks in advance and Contact [phone] with any changes, questions or concerns.     Plan made per [facility] anticoagulation protocol    Salma Marques RN  Anticoagulation Clinic  12/8/2021    _______________________________________________________________________     Anticoagulation Episode Summary     Current INR goal:  2.0-3.0   TTR:  100.0 % (4.2 mo)   Target end date:     Send INR reminders to:  [facility]    Indications    Paroxysmal Atrial Fibrillation [I48.91]  Atrial flutter  unspecified type (H) [I48.92]           Comments:           Anticoagulation Care Providers     Provider  Role Specialty Phone number    Janis Townsend MD Referring Cardiovascular Disease [phone]

## 2021-12-08 NOTE — PROGRESS NOTES
Janis Townsend MD routed conversation to You Just now (1:50 PM)     Janis Townsend MD Just now (1:50 PM)     CL       He is approved      Unsigned   Documentation

## 2021-12-08 NOTE — PROGRESS NOTES
VINAYAK called and updated patient that Dr Townsend gave the approval for him to extend INR check to 8 weeks.    Appointment made for 2/2/2022.    Salma Marques RN [facility]    [phone]

## 2021-12-08 NOTE — PROGRESS NOTES
Anticoagulation-Extended Recheck Request    Under [facility] Anticoagulation protocol, Anticoagulation team may extend INR recheck interval + 1 week for each stable in range INR to max of 6 weeks. Extended interval rechecks between 8-12 weeks for patients on stable maintenance therapy require an approval (one time) from referring provider.    Anticoagulation clinic suggests consideration of extended intervals in medically stable patients, with standard INR goals, and no change in warfarin dose for last 4-6 months    Ady Farley, 81 year old, male has been on:    Current recheck interval: 6 week  Compliant: Yes  Stable warfarin dose since: 10/30/2018  INR Goal: 2.0-3.0  Time in Therapeutic range: 100 %    Lab Results   Component Value Date    INR 2.4 12/08/2021    INR 2.2 10/27/2021    INR 2.5 09/15/2021    INR 2.4 08/04/2021    INR 2.30 05/21/2019    INR 2.5 04/23/2019    INR 3.0 03/26/2019    INR 2.6 02/26/2019    INR 2.18 02/13/2019    INR 2.00 02/12/2019    INR 2.16 02/11/2019    INR 2.10 02/10/2019    INR 2.1 01/29/2019    INR 2.4 12/27/2018    INR 2.3 11/27/2018    INR 2.2 10/30/2018    INR 1.8 10/16/2018         Please advise if Ady is approved to have extended interval rechecks every 8 weeks while on stable maintenance therapy    Thank you,    Salma Marques RN

## 2021-12-20 ENCOUNTER — OFFICE VISIT (OUTPATIENT)
Dept: CARDIOLOGY | Facility: CLINIC | Age: 81
End: 2021-12-20
Attending: INTERNAL MEDICINE
Payer: COMMERCIAL

## 2021-12-20 VITALS
SYSTOLIC BLOOD PRESSURE: 150 MMHG | BODY MASS INDEX: 31.71 KG/M2 | DIASTOLIC BLOOD PRESSURE: 58 MMHG | HEART RATE: 71 BPM | RESPIRATION RATE: 14 BRPM | WEIGHT: 221 LBS

## 2021-12-20 DIAGNOSIS — I48.92 ATRIAL FLUTTER, UNSPECIFIED TYPE (H): ICD-10-CM

## 2021-12-20 DIAGNOSIS — I48.0 PAROXYSMAL ATRIAL FIBRILLATION (H): Primary | ICD-10-CM

## 2021-12-20 DIAGNOSIS — Z95.0 PRESENCE OF BIVENTRICULAR CARDIAC PACEMAKER: ICD-10-CM

## 2021-12-20 DIAGNOSIS — I10 BENIGN ESSENTIAL HYPERTENSION: ICD-10-CM

## 2021-12-20 DIAGNOSIS — Z95.0 BIVENTRICULAR CARDIAC PACEMAKER IN SITU: ICD-10-CM

## 2021-12-20 DIAGNOSIS — I34.0 NON-RHEUMATIC MITRAL REGURGITATION: ICD-10-CM

## 2021-12-20 DIAGNOSIS — I50.41 ACUTE COMBINED SYSTOLIC AND DIASTOLIC CONGESTIVE HEART FAILURE (H): ICD-10-CM

## 2021-12-20 DIAGNOSIS — I50.32 CHRONIC HEART FAILURE WITH PRESERVED EJECTION FRACTION (H): ICD-10-CM

## 2021-12-20 DIAGNOSIS — I47.29 NSVT (NONSUSTAINED VENTRICULAR TACHYCARDIA) (H): ICD-10-CM

## 2021-12-20 DIAGNOSIS — I25.10 ATHEROSCLEROSIS OF NATIVE CORONARY ARTERY OF NATIVE HEART WITHOUT ANGINA PECTORIS: ICD-10-CM

## 2021-12-20 DIAGNOSIS — I25.10 CORONARY ARTERY DISEASE INVOLVING NATIVE CORONARY ARTERY OF NATIVE HEART WITHOUT ANGINA PECTORIS: ICD-10-CM

## 2021-12-20 DIAGNOSIS — E78.5 HYPERLIPIDEMIA LDL GOAL <100: ICD-10-CM

## 2021-12-20 PROCEDURE — 93281 PM DEVICE PROGR EVAL MULTI: CPT | Performed by: INTERNAL MEDICINE

## 2021-12-20 PROCEDURE — 99215 OFFICE O/P EST HI 40 MIN: CPT | Performed by: NURSE PRACTITIONER

## 2021-12-20 RX ORDER — POTASSIUM CHLORIDE 1500 MG/1
20 TABLET, EXTENDED RELEASE ORAL DAILY
Qty: 90 TABLET | Refills: 1 | Status: SHIPPED | OUTPATIENT
Start: 2021-12-20 | End: 2022-04-13

## 2021-12-20 RX ORDER — AMLODIPINE BESYLATE 10 MG/1
10 TABLET ORAL DAILY
Qty: 90 TABLET | Refills: 3 | Status: SHIPPED | OUTPATIENT
Start: 2021-12-20 | End: 2022-04-13

## 2021-12-20 RX ORDER — LOSARTAN POTASSIUM 50 MG/1
50 TABLET ORAL DAILY
Qty: 90 TABLET | Refills: 3 | Status: SHIPPED | OUTPATIENT
Start: 2021-12-20 | End: 2022-01-01

## 2021-12-20 RX ORDER — FUROSEMIDE 20 MG
TABLET ORAL
Qty: 180 TABLET | Refills: 1 | Status: SHIPPED | OUTPATIENT
Start: 2021-12-20 | End: 2022-01-01

## 2021-12-20 RX ORDER — ATORVASTATIN CALCIUM 20 MG/1
20 TABLET, FILM COATED ORAL AT BEDTIME
Qty: 90 TABLET | Refills: 3 | Status: SHIPPED | OUTPATIENT
Start: 2021-12-20 | End: 2022-01-01

## 2021-12-20 NOTE — PATIENT INSTRUCTIONS
Ady Farley,    It was a pleasure to see you today at the [facility].     My recommendations after this visit include:    Please call device clinic and do remote if sensing more frequent episodes of atrial fibrillation.    To followup with device check and see me in clinic in 1 year.  To follow-up with Dr. Townsend in April 2022 with echo prior to appointment.      My contact information:  Cisco Perez CNP  After Hours or Scheduling  [phone]  My Nurse---Ana Hatfield [phone]    Your next Pacemaker check will be on 4/5/2022, this will occur automatically with your home monitor.   multiple organ failure

## 2021-12-20 NOTE — LETTER
12/20/2021    Martín Ross MD  [facility] [address] [place] [address]    RE: Ady BRE Martine       Dear Colleague,     I had the pleasure of seeing Ady Farley in the [facility].    Thank you, Dr. Ross, for asking the [facility] team to see Mr. Ady Farley to evaluate PAF.    Assessment/Recommendations     Assessment/Plan:    Diagnoses and all orders for this visit:  Paroxysmal atrial fibrillation (H) and Atrial flutter, unspecified type (H) with recurrence since ablation but now has AV node ablation and asymptomatic.  He did have an episode that was 15 hours but rates are controlled and asymptomatic.  2.5% burden.  Patient is discouraged by this but I discussed with Ady that this burden will likely go up over time but insignificant because he had AV node ablation and has good BiV pacing.  Presence of biventricular cardiac pacemaker and estimated battery life 7 months.  I discussed that he may develop symptoms of shortness of breath with activity when his device goes to TANESHA and will need to come to clinic to have it reset.  I answered multiple questions regarding generator change.    Atherosclerosis of native coronary artery of native heart without angina pectoris and no anginal symptoms on questioning.  Chronic heart failure with preserved ejection fraction (H) and on furosemide 20 mg orally every day and 2 or 3 times a week takes 40 mg instead.  He is on potassium replacement as well.  This dose has been fairly stable for him.  No signs or symptoms of decompensated heart failure.  NSVT (nonsustained ventricular tachycardia) (H) history and has previously had ischemic work-up that was negative.  Essential hypertension and BP systolic elevated but usually is in clinic visits per patient.  He is normotensive at home.  No change in medications advised.    [id number] score of 4 and on chronic warfarin and very stable on this typically  has INRs every 8 weeks now.  Follow up in clinic with echo and then see Dr. Townsend in April 2022.  I will let Dr. Boo decide if she wants to follow you only.  Otherwise to have device check and see me in 1 year.  At least 10 minutes were spent in this visit refilling all of his cardiac medications.     History of Present Illness/Subjective     Ady Farley is a very pleasant 81 year old male who comes in today for EP followup for PAF and had device check before visit.  Ady Farley has a known history of coronary artery disease with coronary artery bypass surgery, mitral valve disease and atrial arrhythmias.  In 2007, Ady had coronary artery bypass with maze and mitral valve repair.  He  started having nonsustained VT last fall and ischemic workup was negative.  He follows with Dr. Townsend for his valvular and coronary artery disease.  Ady has had multiple ablations in the past with a right CTI flutter ablation in 2007 and then PVI in 2011 and in 2012 he had repeat ablation.  He has had reoccurrence since.  In 2012 he had an AV node ablation with CRT P placement.     Ady remains asymptomatic with atrial fib and nonsustained VT.   Ady did not have any symptoms of more shortness of breath and fluid retention in November through December when his OptiVol was elevated.  Is now back to baseline.  He may have had some sodium indiscretion at the time of Thanksgiving but otherwise tells me that he is very strict with sodium intake.  He denies any cardiac symptoms other than peripheral edema which is stable.  He continues to alter his furosemide dose to cover peripheral edema.  See above.  He complained of shortness of breath on metoprolol and when he stopped his shortness of breath went away.  He had the same reaction to carvedilol.  He tells me it is very significant and this is not a medication that he can tolerate.  I have added this to his drug intolerance list.  He does blood pressure monitoring at home  and BP systolic 90s to 130s.    Cardiographics (reviewed):  Results for orders placed during the hospital encounter of 06/11/19   Echo Complete [ECH10] 06/11/2019     Narrative   Left ventricle ejection fraction is mildly decreased. The calculated   left ventricular ejection fraction is 53%.    When compared to the previous study dated 2/10/2019, no significant   change.    Normal right ventricular size and systolic function.    Mitral valve repair with mild residual mitral stenosis and mild mitral   regurgitation    Pacemaker lead in right heart chambers                 Results for orders placed during the hospital encounter of 02/10/19   NM Pharmacologic Stress Test     Narrative   The pharmacologic nuclear stress test is negative for inducible   myocardial ischemia or infarction.    The left ventricular ejection fraction is 56% with abnormal septal   motion consistent with paced rhythm.    When compared to the images of 6/20/2017, area of nontransmural   infarction in the lateral wall is not identified on the current study.    The findings of this examination were communicated to the nursing staff   at [facility].          Cardiac testing personally reviewed:  Device check shows leads stable and battery ok.  Device functioning appropriately.   Atrial pacing 96% and BiVentricular pacing 99%.  AT/AFib burden 2.5% and rates well controlled.  Longest episode was 15 hours.  12 episodes of nonsustained VT and longest was 25 beats at a rate of 169.        Problem List:  Patient Active Problem List   Diagnosis     Hypothyroidism     Non-rheumatic mitral regurgitation     Paroxysmal Atrial Fibrillation     Premature Ventricular Contractions     Dyslipidemia     Primary hypertension     Coronary Artery Disease     Atrial flutter, unspecified type (H)     Peripheral Vascular Disease     Status post ablation of atrial fibrillation     Presence of biventricular cardiac pacemaker     Mitral regurgitation and mitral  stenosis     Postablative hypothyroidism     Status post aorto-coronary artery bypass graft     Heart failure with preserved ejection fraction (H)     SUBHASH (obstructive sleep apnea)     NSVT (nonsustained ventricular tachycardia) (H)     Revi  e  Physical Examination Review of Systems   w [facility]  There were no vitals taken for this visit.  There is no height or weight on file to calculate BMI.  Wt Readings from Last 3 Encounters:   04/14/21 102.7 kg (226 lb 6.4 oz)   10/08/20 101.6 kg (224 lb)   05/08/20 100.8 kg (222 lb 3.2 oz)     General Appearance:   Alert, well-appearing and in no acute distress.   HEENT: Atraumatic, normocephalic.  No scleral icterus, normal conjunctivae; mucous membranes pink and moist.     Chest: Chest symmetric, spine straight.   Lungs:   Respirations unlabored: Lungs are clear to auscultation.   Cardiovascular:   Normal first and second heart sounds with no murmurs, rubs, or gallops.  Regular, regular.   Normal JVD, no edema.       Extremities: No cyanosis or clubbing   Musculoskeletal: Moves all extremities   Skin: Warm, dry, intact.    Neurologic: Mood and affect are appropriate, alert and oriented to person, place, time, and situation     ROS: 10 point ROS neg other than the symptoms noted above in the HPI.     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     Coronary artery disease      Disease of thyroid gland      Disorder of phrenic nerve      Hyperlipidemia      Hypertension      Malfunction of biventricular cardiac pacemaker battery 12/1/2015    [employer] device recall     Non-rheumatic mitral regurgitation     MVP s/p Mitral valve repair May 15, 2007 MAZE  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  Echo 2011 LVEF 45% and mild MR Echo 2012 LVEF 40%  mild MS, Mod MR  NELY Jan 2013 mild MS and mild/mod MR Echo Jan 2015 Mild MS/ mild MR NELY 2016 small/mod cuff remains (recommend OAC) Replacement Utility updated for latest IMO load      PVC (premature ventricular contraction)      PVD (peripheral vascular disease) (H)      Status post ablation of atrial fibrillation 11/18/2015    MAZE May 2007 PVI May 31, 2011 (RF-PVI + TIAN line) PVI Oct 30, 2012 (Cryo-PVI + redo TIAN line + roof line + RA-scar flutter line)    Past Surgical History:   Procedure Laterality Date     C CABG, VEIN, SINGLE      Description: CABG (CABG);  Recorded: 01/18/2012;  Comments: ONE VESSEL RCA     CARDIOVERSION  07/24/2018     INSERT / REPLACE / REMOVE PACEMAKER       MITRAL VALVE REPAIR       DE ABLATE HEART DYSRHYTHM FOCUS      Description: Catheter Ablation Atrial Flutter;  Recorded: 10/30/2012;  Comments: Typical RA CTI flutter Sept 2007; ; Multiple atypical flutter Oct 2012 (Roof line + reinforce TIAN line + RA scar line)     DE ABLATE HEART DYSRHYTHM FOCUS      Description: Catheter Ablation Atrial Fibrillation;  Recorded: 10/24/2013;  Comments: May 2011 (PVI > 90% recurrence post MAZE + TIAN line); ; Re do PVI Oct 2012 (Cryo PVI + roof line + TIAN touch up + RA scar flutter); ; No recurrence by device check at 12 months     DE ABLATE HEART DYSRHYTHM FOCUS      Description: Catheter Ablation Atrial Fibrillation;  Recorded: 10/30/2014;  Comments: May 2011 (PVI > 90% recurrence post MAZE + TIAN line); ; Re do PVI Oct 2012 (Cryo PVI + roof line + TIAN touch up + RA scar flutter); ; No recurrence by device check at 12 months     DE ABLATE HEART DYSRHYTHM FOCUS      Description: Catheter Ablation Atrial Flutter;  Recorded: 10/30/2014;  Comments: Typical RA CTI flutter Sept 2007; ; Multiple atypical flutter Oct 2012 (Roof line + reinforce TIAN line + RA scar line)     DE ABLATE/ RECONSTUCT ATRIA, LIMITED      Description: Maze Procedure;  Proc Date: 05/01/2007;  Comments: May 2007 at time of valve  repair     MT ICAR CATHETER ABLATION ATRIOVENTR NODE FUNCTION      Description: Catheter Ablation Atrioventricular Node;  Recorded: 08/13/2012;  Comments: Mar 2008 performed due to inability to biventricular with conduction abnormality     MT ICAR CATHETER ABLATION ATRIOVENTR NODE FUNCTION      Description: Catheter Ablation Atrioventricular Node;  Recorded: 10/30/2014;  Comments: Mar 2008 performed due to inability to biventricular with conduction abnormality    Family History   Problem Relation Age of Onset     No Known Problems Mother      No Known Problems Father      No Known Problems Sister      No Known Problems Brother      No Known Problems Daughter      No Known Problems Son      Acute Myocardial Infarction No family hx of      Alcoholism No family hx of      Alzheimer Disease No family hx of      Amputation of Leg Below Knee No family hx of      Aortic Valve Replacement No family hx of      Arrhythmogenic Right Ventricular Cardiomyopathy No family hx of      Atrial fibrillation No family hx of      Atrial Flutter No family hx of      Breast Cancer No family hx of      CABG No family hx of      Cancer No family hx of      Cardiomyopathy No family hx of      Carotid Endarterectomy No family hx of      Cerebral aneurysm No family hx of      Chronic Kidney Disease No family hx of      Chronic Obstructive Pulmonary Disease No family hx of      Colon Cancer No family hx of      Congenital heart disease No family hx of      Coronary Artery Disease No family hx of      Coronary Stenting No family hx of      Dementia No family hx of      Diabetes Type 1 No family hx of      Diabetes Type 2  No family hx of      Dialysis No family hx of      Dyslipidemia No family hx of      Heart Failure No family hx of      Hypertension No family hx of      Hypertrophic cardiomyopathy No family hx of      ICD - Implantable Cardioverter Defibrillator No family hx of      Long QT syndrome No family hx of      Mitral Regurgitation  No family hx of      Mitral Valve Replacement No family hx of      Morbid Obesity No family hx of      Obstructive Sleep Apnea No family hx of      Ovarian Cancer No family hx of      Pacemaker No family hx of      Pancreatic Cancer No family hx of      Peripheral Vascular Disease No family hx of      Pulmonary Embolism No family hx of      Pulmonary Hypertension No family hx of      Rheumatic Heart Disease No family hx of      Cerebrovascular Disease No family hx of      Sudden Death No family hx of      Transient ischemic attack No family hx of      Valvular heart disease No family hx of     History   Smoking Status     Former Smoker     Packs/day: 1.00     Quit date: 1/1/1990   Smokeless Tobacco     Never Used     Social History    Substance and Sexual Activity      Alcohol use: Yes        Alcohol/week: 2.0 standard drinks       Medications  Allergies     Current Outpatient Medications   Medication Sig Dispense Refill     amLODIPine (NORVASC) 10 MG tablet [AMLODIPINE (NORVASC) 10 MG TABLET] Take 10 mg by mouth daily.       aspirin 81 MG EC tablet [ASPIRIN 81 MG EC TABLET] Take 81 mg by mouth daily.       atorvastatin (LIPITOR) 20 MG tablet [ATORVASTATIN (LIPITOR) 20 MG TABLET] Take 20 mg by mouth bedtime.  1     furosemide (LASIX) 20 MG tablet TAKE 1 TO 2 TABLETS EVERY MORNING. FOLLOW INSTRUCTIONS GIVEN IN CLINIC 180 tablet 1     KLOR-CON M20 20 mEq tablet [KLOR-CON M20 20 MEQ TABLET] Take 1 tablet by mouth daily.       levothyroxine (SYNTHROID, LEVOTHROID) 175 MCG tablet [LEVOTHYROXINE (SYNTHROID, LEVOTHROID) 175 MCG TABLET] Take 175 mcg by mouth daily before breakfast.       losartan (COZAAR) 50 MG tablet [LOSARTAN (COZAAR) 50 MG TABLET] Take 1 tablet (50 mg total) by mouth daily. 90 tablet 2     losartan (COZAAR) 50 MG tablet [LOSARTAN (COZAAR) 50 MG TABLET] TAKE 1 TABLET DAILY (PATIENT TO SCHEDULE FOLLOW UP FOR FURTHER REFILLS) 90 tablet 1     MEDICATION CANNOT BE REORDERED - PLEASE MANUALLY REORDER AND  DISCONTINUE THE OLD ORDER [DOCOSHEXANOIC ACID-EICOSAPENT 500 MG (FISH OIL) 500-100 MG CAP CAPSULE] Take 1,000 mg by mouth daily.       metoprolol succinate ER (TOPROL-XL) 100 MG 24 hr tablet Take 1 tablet (100 mg) by mouth daily 90 tablet 1     MULTIVITAMIN ORAL [MULTIVITAMIN ORAL] Take 1 tablet by mouth daily.       warfarin ANTICOAGULANT (COUMADIN) 3 MG tablet Take 1 tablet ( 3 mg) with 1 mg tablet ( 4 mg dose) on Thur, Sat then 3 mg on all other days 90 tablet 1     warfarin ANTICOAGULANT (COUMADIN/JANTOVEN) 1 MG tablet [WARFARIN ANTICOAGULANT (COUMADIN/JANTOVEN) 1 MG TABLET] Take 1 tablet with 3 mg tablet ( total of 4 mg) on Tues, Sat then 3 mg all other days 90 tablet 1     warfarin ANTICOAGULANT (COUMADIN/JANTOVEN) 1 MG tablet [WARFARIN ANTICOAGULANT (COUMADIN/JANTOVEN) 1 MG TABLET] TAKE 1 TABLET WITH 3 MG TABLET (TOTAL 4 MG) ON TUESDAY, THURSDAY AND SATURDAY AND TAKE 3 MG ALL OTHER DAYS 90 tablet 1      Allergies   Allergen Reactions     Carvedilol Shortness Of Breath     Lisinopril Cough      Medical, surgical, family, social history, and medications were all reviewed and updated as necessary.   Lab Results    Chemistry/lipid CBC Cardiac Enzymes/BNP/TSH/INR   Recent Labs   Lab Test 12/18/20  0903   CHOL 127   HDL 35*   LDL 76   TRIG 78     Recent Labs   Lab Test 12/18/20  0903 12/16/19  0835 12/12/18  0916   LDL 76 86 82     Recent Labs   Lab Test 08/20/21  0844 12/18/20  0903   NA  --  139   POTASSIUM 4.0 4.0   CHLORIDE  --  106   CO2  --  21*   GLC  --  96   BUN  --  17   CR  --  1.22   GFRESTIMATED  --  57*   OBIE  --  8.8     Recent Labs   Lab Test 12/18/20  0903 12/16/19  0835 10/03/19  1035   CR 1.22 1.28 1.23     No results for input(s): A1C in the last 74511 hours.       Recent Labs   Lab Test 02/10/19  0754   WBC 9.1   HGB 13.7*   HCT 41.3   MCV 95        Recent Labs   Lab Test 02/10/19  0754   HGB 13.7*    Recent Labs   Lab Test 02/10/19  2348 02/10/19  1808 02/10/19  0754   TROPONINI 0.03  0.03 0.03     Recent Labs   Lab Test 02/10/19  0810   *     Recent Labs   Lab Test 12/18/20  0903   TSH 1.24     Recent Labs   Lab Test 12/08/21  0852 10/27/21  0850 09/15/21  0837   INR 2.4 2.2 2.5          Total Time- 40 minutes spent on date of encounter doing chart review, history and exam, documentation and further activities as noted above.  This note has been dictated using voice recognition software. Any grammatical, typographical, or context distortions are unintentional and inherent to the software.      Thank you for allowing me to participate in the care of your patient.      Sincerely,     STACY Jesus [facility]  cc:   Celeste Mirza MD  [address] [place] [address]

## 2021-12-20 NOTE — PROGRESS NOTES
Thank you, Dr. Rsos, for asking the [facility] team to see . Ady Farley to evaluate PAF.    Assessment/Recommendations     Assessment/Plan:    Diagnoses and all orders for this visit:  Paroxysmal atrial fibrillation (H) and Atrial flutter, unspecified type (H) with recurrence since ablation but now has AV node ablation and asymptomatic.  He did have an episode that was 15 hours but rates are controlled and asymptomatic.  2.5% burden.  Patient is discouraged by this but I discussed with Ady that this burden will likely go up over time but insignificant because he had AV node ablation and has good BiV pacing.  Presence of biventricular cardiac pacemaker and estimated battery life 7 months.  I discussed that he may develop symptoms of shortness of breath with activity when his device goes to TANESHA and will need to come to clinic to have it reset.  I answered multiple questions regarding generator change.    Atherosclerosis of native coronary artery of native heart without angina pectoris and no anginal symptoms on questioning.  Chronic heart failure with preserved ejection fraction (H) and on furosemide 20 mg orally every day and 2 or 3 times a week takes 40 mg instead.  He is on potassium replacement as well.  This dose has been fairly stable for him.  No signs or symptoms of decompensated heart failure.  NSVT (nonsustained ventricular tachycardia) (H) history and has previously had ischemic work-up that was negative.  Essential hypertension and BP systolic elevated but usually is in clinic visits per patient.  He is normotensive at home.  No change in medications advised.    [id number] score of 4 and on chronic warfarin and very stable on this typically has INRs every 8 weeks now.  Follow up in clinic with echo and then see Dr. Townsend in April 2022.  I will let Dr. Boo decide if she wants to follow you only.  Otherwise to have device check and see me in 1 year.  At least 10 minutes  were spent in this visit refilling all of his cardiac medications.     History of Present Illness/Subjective     Ady Farley is a very pleasant 81 year old male who comes in today for EP followup for PAF and had device check before visit.  Ady Farley has a known history of coronary artery disease with coronary artery bypass surgery, mitral valve disease and atrial arrhythmias.  In 2007, Ady had coronary artery bypass with maze and mitral valve repair.  He  started having nonsustained VT last fall and ischemic workup was negative.  He follows with Dr. Townsend for his valvular and coronary artery disease.  Ady has had multiple ablations in the past with a right CTI flutter ablation in 2007 and then PVI in 2011 and in 2012 he had repeat ablation.  He has had reoccurrence since.  In 2012 he had an AV node ablation with CRT P placement.     Ady remains asymptomatic with atrial fib and nonsustained VT.   Ady did not have any symptoms of more shortness of breath and fluid retention in November through December when his OptiVol was elevated.  Is now back to baseline.  He may have had some sodium indiscretion at the time of Thanksgiving but otherwise tells me that he is very strict with sodium intake.  He denies any cardiac symptoms other than peripheral edema which is stable.  He continues to alter his furosemide dose to cover peripheral edema.  See above.  He complained of shortness of breath on metoprolol and when he stopped his shortness of breath went away.  He had the same reaction to carvedilol.  He tells me it is very significant and this is not a medication that he can tolerate.  I have added this to his drug intolerance list.  He does blood pressure monitoring at home and BP systolic 90s to 130s.    Cardiographics (reviewed):  Results for orders placed during the hospital encounter of 06/11/19   Echo Complete [ECH10] 06/11/2019     Narrative   Left ventricle ejection fraction is mildly decreased.  The calculated   left ventricular ejection fraction is 53%.    When compared to the previous study dated 2/10/2019, no significant   change.    Normal right ventricular size and systolic function.    Mitral valve repair with mild residual mitral stenosis and mild mitral   regurgitation    Pacemaker lead in right heart chambers                 Results for orders placed during the hospital encounter of 02/10/19   NM Pharmacologic Stress Test     Narrative   The pharmacologic nuclear stress test is negative for inducible   myocardial ischemia or infarction.    The left ventricular ejection fraction is 56% with abnormal septal   motion consistent with paced rhythm.    When compared to the images of 6/20/2017, area of nontransmural   infarction in the lateral wall is not identified on the current study.    The findings of this examination were communicated to the nursing staff   at [facility].          Cardiac testing personally reviewed:  Device check shows leads stable and battery ok.  Device functioning appropriately.   Atrial pacing 96% and BiVentricular pacing 99%.  AT/AFib burden 2.5% and rates well controlled.  Longest episode was 15 hours.  12 episodes of nonsustained VT and longest was 25 beats at a rate of 169.        Problem List:  Patient Active Problem List   Diagnosis     Hypothyroidism     Non-rheumatic mitral regurgitation     Paroxysmal Atrial Fibrillation     Premature Ventricular Contractions     Dyslipidemia     Primary hypertension     Coronary Artery Disease     Atrial flutter, unspecified type (H)     Peripheral Vascular Disease     Status post ablation of atrial fibrillation     Presence of biventricular cardiac pacemaker     Mitral regurgitation and mitral stenosis     Postablative hypothyroidism     Status post aorto-coronary artery bypass graft     Heart failure with preserved ejection fraction (H)     SUBHASH (obstructive sleep apnea)     NSVT (nonsustained ventricular tachycardia) (H)      Revi  e  Physical Examination Review of Systems   [facility]  There were no vitals taken for this visit.  There is no height or weight on file to calculate BMI.  Wt Readings from Last 3 Encounters:   04/14/21 102.7 kg (226 lb 6.4 oz)   10/08/20 101.6 kg (224 lb)   05/08/20 100.8 kg (222 lb 3.2 oz)     General Appearance:   Alert, well-appearing and in no acute distress.   HEENT: Atraumatic, normocephalic.  No scleral icterus, normal conjunctivae; mucous membranes pink and moist.     Chest: Chest symmetric, spine straight.   Lungs:   Respirations unlabored: Lungs are clear to auscultation.   Cardiovascular:   Normal first and second heart sounds with no murmurs, rubs, or gallops.  Regular, regular.   Normal JVD, no edema.       Extremities: No cyanosis or clubbing   Musculoskeletal: Moves all extremities   Skin: Warm, dry, intact.    Neurologic: Mood and affect are appropriate, alert and oriented to person, place, time, and situation     ROS: 10 point ROS neg other than the symptoms noted above in the HPI.     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     Coronary artery disease      Disease of thyroid gland      Disorder of phrenic nerve      Hyperlipidemia      Hypertension      Malfunction of biventricular cardiac pacemaker battery 12/1/2015    [employer] device recall     Non-rheumatic mitral regurgitation     MVP s/p Mitral valve repair May 15, 2007 MAZE  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  Echo 2011 LVEF 45% and mild MR Echo 2012 LVEF 40% mild MS, Mod MR  NELY Jan 2013 mild MS and mild/mod MR Echo Jan 2015 Mild MS/ mild MR NELY 2016 small/mod cuff remains (recommend OAC) Replacement Utility updated for latest IMO load      PVC (premature ventricular contraction)      PVD  (peripheral vascular disease) (H)      Status post ablation of atrial fibrillation 11/18/2015    MAZE May 2007 PVI May 31, 2011 (RF-PVI + TIAN line) PVI Oct 30, 2012 (Cryo-PVI + redo TIAN line + roof line + RA-scar flutter line)    Past Surgical History:   Procedure Laterality Date     C CABG, VEIN, SINGLE      Description: CABG (CABG);  Recorded: 01/18/2012;  Comments: ONE VESSEL RCA     CARDIOVERSION  07/24/2018     INSERT / REPLACE / REMOVE PACEMAKER       MITRAL VALVE REPAIR       CT ABLATE HEART DYSRHYTHM FOCUS      Description: Catheter Ablation Atrial Flutter;  Recorded: 10/30/2012;  Comments: Typical RA CTI flutter Sept 2007; ; Multiple atypical flutter Oct 2012 (Roof line + reinforce TIAN line + RA scar line)     CT ABLATE HEART DYSRHYTHM FOCUS      Description: Catheter Ablation Atrial Fibrillation;  Recorded: 10/24/2013;  Comments: May 2011 (PVI > 90% recurrence post MAZE + TIAN line); ; Re do PVI Oct 2012 (Cryo PVI + roof line + TIAN touch up + RA scar flutter); ; No recurrence by device check at 12 months     CT ABLATE HEART DYSRHYTHM FOCUS      Description: Catheter Ablation Atrial Fibrillation;  Recorded: 10/30/2014;  Comments: May 2011 (PVI > 90% recurrence post MAZE + TIAN line); ; Re do PVI Oct 2012 (Cryo PVI + roof line + TIAN touch up + RA scar flutter); ; No recurrence by device check at 12 months     CT ABLATE HEART DYSRHYTHM FOCUS      Description: Catheter Ablation Atrial Flutter;  Recorded: 10/30/2014;  Comments: Typical RA CTI flutter Sept 2007; ; Multiple atypical flutter Oct 2012 (Roof line + reinforce TIAN line + RA scar line)     CT ABLATE/ RECONSTUCT ATRIA, LIMITED      Description: Maze Procedure;  Proc Date: 05/01/2007;  Comments: May 2007 at time of valve repair     CT ICAR CATHETER ABLATION ATRIOVENTR NODE FUNCTION      Description: Catheter Ablation Atrioventricular Node;  Recorded: 08/13/2012;  Comments: Mar 2008 performed due to inability to biventricular with conduction abnormality      NH ICAR CATHETER ABLATION ATRIOVENTR NODE FUNCTION      Description: Catheter Ablation Atrioventricular Node;  Recorded: 10/30/2014;  Comments: Mar 2008 performed due to inability to biventricular with conduction abnormality    Family History   Problem Relation Age of Onset     No Known Problems Mother      No Known Problems Father      No Known Problems Sister      No Known Problems Brother      No Known Problems Daughter      No Known Problems Son      Acute Myocardial Infarction No family hx of      Alcoholism No family hx of      Alzheimer Disease No family hx of      Amputation of Leg Below Knee No family hx of      Aortic Valve Replacement No family hx of      Arrhythmogenic Right Ventricular Cardiomyopathy No family hx of      Atrial fibrillation No family hx of      Atrial Flutter No family hx of      Breast Cancer No family hx of      CABG No family hx of      Cancer No family hx of      Cardiomyopathy No family hx of      Carotid Endarterectomy No family hx of      Cerebral aneurysm No family hx of      Chronic Kidney Disease No family hx of      Chronic Obstructive Pulmonary Disease No family hx of      Colon Cancer No family hx of      Congenital heart disease No family hx of      Coronary Artery Disease No family hx of      Coronary Stenting No family hx of      Dementia No family hx of      Diabetes Type 1 No family hx of      Diabetes Type 2  No family hx of      Dialysis No family hx of      Dyslipidemia No family hx of      Heart Failure No family hx of      Hypertension No family hx of      Hypertrophic cardiomyopathy No family hx of      ICD - Implantable Cardioverter Defibrillator No family hx of      Long QT syndrome No family hx of      Mitral Regurgitation No family hx of      Mitral Valve Replacement No family hx of      Morbid Obesity No family hx of      Obstructive Sleep Apnea No family hx of      Ovarian Cancer No family hx of      Pacemaker No family hx of      Pancreatic Cancer No  family hx of      Peripheral Vascular Disease No family hx of      Pulmonary Embolism No family hx of      Pulmonary Hypertension No family hx of      Rheumatic Heart Disease No family hx of      Cerebrovascular Disease No family hx of      Sudden Death No family hx of      Transient ischemic attack No family hx of      Valvular heart disease No family hx of     History   Smoking Status     Former Smoker     Packs/day: 1.00     Quit date: 1/1/1990   Smokeless Tobacco     Never Used     Social History    Substance and Sexual Activity      Alcohol use: Yes        Alcohol/week: 2.0 standard drinks       Medications  Allergies     Current Outpatient Medications   Medication Sig Dispense Refill     amLODIPine (NORVASC) 10 MG tablet [AMLODIPINE (NORVASC) 10 MG TABLET] Take 10 mg by mouth daily.       aspirin 81 MG EC tablet [ASPIRIN 81 MG EC TABLET] Take 81 mg by mouth daily.       atorvastatin (LIPITOR) 20 MG tablet [ATORVASTATIN (LIPITOR) 20 MG TABLET] Take 20 mg by mouth bedtime.  1     furosemide (LASIX) 20 MG tablet TAKE 1 TO 2 TABLETS EVERY MORNING. FOLLOW INSTRUCTIONS GIVEN IN CLINIC 180 tablet 1     KLOR-CON M20 20 mEq tablet [KLOR-CON M20 20 MEQ TABLET] Take 1 tablet by mouth daily.       levothyroxine (SYNTHROID, LEVOTHROID) 175 MCG tablet [LEVOTHYROXINE (SYNTHROID, LEVOTHROID) 175 MCG TABLET] Take 175 mcg by mouth daily before breakfast.       losartan (COZAAR) 50 MG tablet [LOSARTAN (COZAAR) 50 MG TABLET] Take 1 tablet (50 mg total) by mouth daily. 90 tablet 2     losartan (COZAAR) 50 MG tablet [LOSARTAN (COZAAR) 50 MG TABLET] TAKE 1 TABLET DAILY (PATIENT TO SCHEDULE FOLLOW UP FOR FURTHER REFILLS) 90 tablet 1     MEDICATION CANNOT BE REORDERED - PLEASE MANUALLY REORDER AND DISCONTINUE THE OLD ORDER [DOCOSHEXANOIC ACID-EICOSAPENT 500 MG (FISH OIL) 500-100 MG CAP CAPSULE] Take 1,000 mg by mouth daily.       metoprolol succinate ER (TOPROL-XL) 100 MG 24 hr tablet Take 1 tablet (100 mg) by mouth daily 90 tablet 1      MULTIVITAMIN ORAL [MULTIVITAMIN ORAL] Take 1 tablet by mouth daily.       warfarin ANTICOAGULANT (COUMADIN) 3 MG tablet Take 1 tablet ( 3 mg) with 1 mg tablet ( 4 mg dose) on Thur, Sat then 3 mg on all other days 90 tablet 1     warfarin ANTICOAGULANT (COUMADIN/JANTOVEN) 1 MG tablet [WARFARIN ANTICOAGULANT (COUMADIN/JANTOVEN) 1 MG TABLET] Take 1 tablet with 3 mg tablet ( total of 4 mg) on Tues, Sat then 3 mg all other days 90 tablet 1     warfarin ANTICOAGULANT (COUMADIN/JANTOVEN) 1 MG tablet [WARFARIN ANTICOAGULANT (COUMADIN/JANTOVEN) 1 MG TABLET] TAKE 1 TABLET WITH 3 MG TABLET (TOTAL 4 MG) ON TUESDAY, THURSDAY AND SATURDAY AND TAKE 3 MG ALL OTHER DAYS 90 tablet 1      Allergies   Allergen Reactions     Carvedilol Shortness Of Breath     Lisinopril Cough      Medical, surgical, family, social history, and medications were all reviewed and updated as necessary.   Lab Results    Chemistry/lipid CBC Cardiac Enzymes/BNP/TSH/INR   Recent Labs   Lab Test 12/18/20  0903   CHOL 127   HDL 35*   LDL 76   TRIG 78     Recent Labs   Lab Test 12/18/20  0903 12/16/19  0835 12/12/18  0916   LDL 76 86 82     Recent Labs   Lab Test 08/20/21  0844 12/18/20  0903   NA  --  139   POTASSIUM 4.0 4.0   CHLORIDE  --  106   CO2  --  21*   GLC  --  96   BUN  --  17   CR  --  1.22   GFRESTIMATED  --  57*   OBIE  --  8.8     Recent Labs   Lab Test 12/18/20  0903 12/16/19  0835 10/03/19  1035   CR 1.22 1.28 1.23     No results for input(s): A1C in the last 88955 hours.       Recent Labs   Lab Test 02/10/19  0754   WBC 9.1   HGB 13.7*   HCT 41.3   MCV 95        Recent Labs   Lab Test 02/10/19  0754   HGB 13.7*    Recent Labs   Lab Test 02/10/19  2348 02/10/19  1808 02/10/19  0754   TROPONINI 0.03 0.03 0.03     Recent Labs   Lab Test 02/10/19  0810   *     Recent Labs   Lab Test 12/18/20  0903   TSH 1.24     Recent Labs   Lab Test 12/08/21  0852 10/27/21  0850 09/15/21  0837   INR 2.4 2.2 2.5          Total Time- 40 minutes spent  on date of encounter doing chart review, history and exam, documentation and further activities as noted above.  This note has been dictated using voice recognition software. Any grammatical, typographical, or context distortions are unintentional and inherent to the software.

## 2021-12-22 ENCOUNTER — LAB REQUISITION (OUTPATIENT)
Dept: LAB | Facility: CLINIC | Age: 81
End: 2021-12-22

## 2021-12-22 DIAGNOSIS — I10 ESSENTIAL (PRIMARY) HYPERTENSION: ICD-10-CM

## 2021-12-22 DIAGNOSIS — N40.1 BENIGN PROSTATIC HYPERPLASIA WITH LOWER URINARY TRACT SYMPTOMS: ICD-10-CM

## 2021-12-22 DIAGNOSIS — E78.5 HYPERLIPIDEMIA, UNSPECIFIED: ICD-10-CM

## 2021-12-22 LAB
ALBUMIN SERPL-MCNC: 4.1 G/DL (ref 3.5–5)
ALP SERPL-CCNC: 102 U/L (ref 45–120)
ALT SERPL W P-5'-P-CCNC: 17 U/L (ref 0–45)
ANION GAP SERPL CALCULATED.3IONS-SCNC: 11 MMOL/L (ref 5–18)
AST SERPL W P-5'-P-CCNC: 17 U/L (ref 0–40)
BILIRUB SERPL-MCNC: 1.5 MG/DL (ref 0–1)
BUN SERPL-MCNC: 13 MG/DL (ref 8–28)
CALCIUM SERPL-MCNC: 9.5 MG/DL (ref 8.5–10.5)
CHLORIDE BLD-SCNC: 108 MMOL/L (ref 98–107)
CHOLEST SERPL-MCNC: 131 MG/DL
CO2 SERPL-SCNC: 22 MMOL/L (ref 22–31)
CREAT SERPL-MCNC: 1.19 MG/DL (ref 0.7–1.3)
GFR SERPL CREATININE-BSD FRML MDRD: 62 ML/MIN/1.73M2
GLUCOSE BLD-MCNC: 96 MG/DL (ref 70–125)
HDLC SERPL-MCNC: 41 MG/DL
LDLC SERPL CALC-MCNC: 77 MG/DL
POTASSIUM BLD-SCNC: 4.2 MMOL/L (ref 3.5–5)
PROT SERPL-MCNC: 7.3 G/DL (ref 6–8)
PSA SERPL-MCNC: 2.03 UG/L (ref 0–6.5)
SODIUM SERPL-SCNC: 141 MMOL/L (ref 136–145)
TRIGL SERPL-MCNC: 66 MG/DL

## 2021-12-22 PROCEDURE — 80053 COMPREHEN METABOLIC PANEL: CPT | Performed by: FAMILY MEDICINE

## 2021-12-22 PROCEDURE — G0103 PSA SCREENING: HCPCS | Performed by: FAMILY MEDICINE

## 2021-12-22 PROCEDURE — 80061 LIPID PANEL: CPT | Performed by: FAMILY MEDICINE

## 2022-01-01 ENCOUNTER — LAB REQUISITION (OUTPATIENT)
Dept: LAB | Facility: CLINIC | Age: 82
End: 2022-01-01

## 2022-01-01 ENCOUNTER — OFFICE VISIT (OUTPATIENT)
Dept: CARDIOLOGY | Facility: CLINIC | Age: 82
End: 2022-01-01
Payer: COMMERCIAL

## 2022-01-01 ENCOUNTER — LAB (OUTPATIENT)
Dept: CARDIOLOGY | Facility: CLINIC | Age: 82
End: 2022-01-01
Payer: COMMERCIAL

## 2022-01-01 ENCOUNTER — DOCUMENTATION ONLY (OUTPATIENT)
Dept: ANTICOAGULATION | Facility: CLINIC | Age: 82
End: 2022-01-01

## 2022-01-01 ENCOUNTER — TELEPHONE (OUTPATIENT)
Dept: ANTICOAGULATION | Facility: CLINIC | Age: 82
End: 2022-01-01

## 2022-01-01 ENCOUNTER — ANTICOAGULATION THERAPY VISIT (OUTPATIENT)
Dept: ANTICOAGULATION | Facility: CLINIC | Age: 82
End: 2022-01-01

## 2022-01-01 ENCOUNTER — ANESTHESIA EVENT (OUTPATIENT)
Dept: CARDIOLOGY | Facility: HOSPITAL | Age: 82
End: 2022-01-01
Payer: COMMERCIAL

## 2022-01-01 ENCOUNTER — ANESTHESIA (OUTPATIENT)
Dept: CARDIOLOGY | Facility: HOSPITAL | Age: 82
End: 2022-01-01
Payer: COMMERCIAL

## 2022-01-01 ENCOUNTER — OFFICE VISIT (OUTPATIENT)
Dept: CARDIOLOGY | Facility: CLINIC | Age: 82
End: 2022-01-01
Attending: NURSE PRACTITIONER
Payer: COMMERCIAL

## 2022-01-01 ENCOUNTER — TELEPHONE (OUTPATIENT)
Dept: CARDIOLOGY | Facility: CLINIC | Age: 82
End: 2022-01-01
Payer: COMMERCIAL

## 2022-01-01 ENCOUNTER — TELEPHONE (OUTPATIENT)
Dept: CARDIOLOGY | Facility: CLINIC | Age: 82
End: 2022-01-01

## 2022-01-01 ENCOUNTER — DOCUMENTATION ONLY (OUTPATIENT)
Dept: CARDIOLOGY | Facility: CLINIC | Age: 82
End: 2022-01-01

## 2022-01-01 ENCOUNTER — ANCILLARY PROCEDURE (OUTPATIENT)
Dept: CARDIOLOGY | Facility: CLINIC | Age: 82
End: 2022-01-01
Attending: INTERNAL MEDICINE
Payer: COMMERCIAL

## 2022-01-01 ENCOUNTER — TRANSFERRED RECORDS (OUTPATIENT)
Dept: HEALTH INFORMATION MANAGEMENT | Facility: CLINIC | Age: 82
End: 2022-01-01

## 2022-01-01 ENCOUNTER — PREP FOR PROCEDURE (OUTPATIENT)
Dept: CARDIOLOGY | Facility: CLINIC | Age: 82
End: 2022-01-01

## 2022-01-01 ENCOUNTER — HOSPITAL ENCOUNTER (OUTPATIENT)
Facility: HOSPITAL | Age: 82
Discharge: HOME | End: 2022-10-12
Attending: INTERNAL MEDICINE | Admitting: INTERNAL MEDICINE
Payer: COMMERCIAL

## 2022-01-01 VITALS
RESPIRATION RATE: 16 BRPM | WEIGHT: 211 LBS | DIASTOLIC BLOOD PRESSURE: 58 MMHG | HEIGHT: 71 IN | HEART RATE: 73 BPM | SYSTOLIC BLOOD PRESSURE: 122 MMHG | BODY MASS INDEX: 29.54 KG/M2

## 2022-01-01 VITALS
SYSTOLIC BLOOD PRESSURE: 154 MMHG | BODY MASS INDEX: 29.15 KG/M2 | HEIGHT: 71 IN | DIASTOLIC BLOOD PRESSURE: 64 MMHG | WEIGHT: 208.2 LBS | RESPIRATION RATE: 16 BRPM | HEART RATE: 92 BPM

## 2022-01-01 VITALS
HEART RATE: 82 BPM | WEIGHT: 214 LBS | BODY MASS INDEX: 29.96 KG/M2 | HEIGHT: 71 IN | DIASTOLIC BLOOD PRESSURE: 58 MMHG | RESPIRATION RATE: 16 BRPM | SYSTOLIC BLOOD PRESSURE: 110 MMHG

## 2022-01-01 VITALS
DIASTOLIC BLOOD PRESSURE: 58 MMHG | TEMPERATURE: 97.8 F | HEART RATE: 69 BPM | OXYGEN SATURATION: 99 % | SYSTOLIC BLOOD PRESSURE: 127 MMHG | RESPIRATION RATE: 15 BRPM

## 2022-01-01 DIAGNOSIS — Z95.0 BIVENTRICULAR CARDIAC PACEMAKER IN SITU: Primary | ICD-10-CM

## 2022-01-01 DIAGNOSIS — I48.20 CHRONIC ATRIAL FIBRILLATION (H): Primary | ICD-10-CM

## 2022-01-01 DIAGNOSIS — I50.41 ACUTE COMBINED SYSTOLIC AND DIASTOLIC CONGESTIVE HEART FAILURE (H): ICD-10-CM

## 2022-01-01 DIAGNOSIS — I48.91 ATRIAL FIBRILLATION (H): Primary | ICD-10-CM

## 2022-01-01 DIAGNOSIS — I48.20 CHRONIC ATRIAL FIBRILLATION (H): ICD-10-CM

## 2022-01-01 DIAGNOSIS — I49.5 SSS (SICK SINUS SYNDROME) (H): ICD-10-CM

## 2022-01-01 DIAGNOSIS — I50.30 (HFPEF) HEART FAILURE WITH PRESERVED EJECTION FRACTION (H): ICD-10-CM

## 2022-01-01 DIAGNOSIS — Z95.0 BIVENTRICULAR CARDIAC PACEMAKER IN SITU: ICD-10-CM

## 2022-01-01 DIAGNOSIS — I48.92 ATRIAL FLUTTER, UNSPECIFIED TYPE (H): ICD-10-CM

## 2022-01-01 DIAGNOSIS — Z79.01 LONG TERM (CURRENT) USE OF ANTICOAGULANTS: ICD-10-CM

## 2022-01-01 DIAGNOSIS — E78.5 HYPERLIPIDEMIA LDL GOAL <100: ICD-10-CM

## 2022-01-01 DIAGNOSIS — I50.20 HEART FAILURE WITH REDUCED EJECTION FRACTION, NYHA CLASS II (H): ICD-10-CM

## 2022-01-01 DIAGNOSIS — I48.91 ATRIAL FIBRILLATION (H): ICD-10-CM

## 2022-01-01 DIAGNOSIS — Z13.220 ENCOUNTER FOR SCREENING FOR LIPOID DISORDERS: ICD-10-CM

## 2022-01-01 DIAGNOSIS — Z12.5 ENCOUNTER FOR SCREENING FOR MALIGNANT NEOPLASM OF PROSTATE: ICD-10-CM

## 2022-01-01 DIAGNOSIS — I50.32 CHRONIC HEART FAILURE WITH PRESERVED EJECTION FRACTION (H): ICD-10-CM

## 2022-01-01 DIAGNOSIS — I50.32 CHRONIC HEART FAILURE WITH PRESERVED EJECTION FRACTION (H): Primary | ICD-10-CM

## 2022-01-01 DIAGNOSIS — I10 ESSENTIAL (PRIMARY) HYPERTENSION: ICD-10-CM

## 2022-01-01 DIAGNOSIS — Z45.010 PACEMAKER BATTERY DEPLETION: ICD-10-CM

## 2022-01-01 DIAGNOSIS — I48.0 PAROXYSMAL ATRIAL FIBRILLATION (H): Primary | ICD-10-CM

## 2022-01-01 DIAGNOSIS — Z01.818 ENCOUNTER FOR OTHER PREPROCEDURAL EXAMINATION: ICD-10-CM

## 2022-01-01 LAB
ALBUMIN SERPL BCG-MCNC: 4.5 G/DL (ref 3.5–5.2)
ALP SERPL-CCNC: 85 U/L (ref 40–129)
ALT SERPL W P-5'-P-CCNC: 22 U/L (ref 10–50)
ANION GAP SERPL CALCULATED.3IONS-SCNC: 11 MMOL/L (ref 5–18)
ANION GAP SERPL CALCULATED.3IONS-SCNC: 11 MMOL/L (ref 7–15)
ANION GAP SERPL CALCULATED.3IONS-SCNC: 11 MMOL/L (ref 7–15)
ANION GAP SERPL CALCULATED.3IONS-SCNC: 14 MMOL/L (ref 7–15)
ANION GAP SERPL CALCULATED.3IONS-SCNC: 8 MMOL/L (ref 5–18)
AST SERPL W P-5'-P-CCNC: 29 U/L (ref 10–50)
BILIRUB SERPL-MCNC: 0.9 MG/DL
BUN SERPL-MCNC: 15 MG/DL (ref 8–28)
BUN SERPL-MCNC: 19 MG/DL (ref 8–28)
BUN SERPL-MCNC: 24.4 MG/DL (ref 8–23)
BUN SERPL-MCNC: 24.5 MG/DL (ref 8–23)
BUN SERPL-MCNC: 27.5 MG/DL (ref 8–23)
CALCIUM SERPL-MCNC: 9.1 MG/DL (ref 8.5–10.5)
CALCIUM SERPL-MCNC: 9.3 MG/DL (ref 8.5–10.5)
CALCIUM SERPL-MCNC: 9.3 MG/DL (ref 8.8–10.2)
CALCIUM SERPL-MCNC: 9.4 MG/DL (ref 8.8–10.2)
CALCIUM SERPL-MCNC: 9.4 MG/DL (ref 8.8–10.2)
CHLORIDE BLD-SCNC: 105 MMOL/L (ref 98–107)
CHLORIDE BLD-SCNC: 107 MMOL/L (ref 98–107)
CHLORIDE SERPL-SCNC: 102 MMOL/L (ref 98–107)
CHLORIDE SERPL-SCNC: 103 MMOL/L (ref 98–107)
CHLORIDE SERPL-SCNC: 103 MMOL/L (ref 98–107)
CHOLEST SERPL-MCNC: 122 MG/DL
CO2 SERPL-SCNC: 24 MMOL/L (ref 22–31)
CO2 SERPL-SCNC: 24 MMOL/L (ref 22–31)
CREAT SERPL-MCNC: 1.32 MG/DL (ref 0.7–1.3)
CREAT SERPL-MCNC: 1.33 MG/DL (ref 0.7–1.3)
CREAT SERPL-MCNC: 1.35 MG/DL (ref 0.67–1.17)
CREAT SERPL-MCNC: 1.36 MG/DL (ref 0.67–1.17)
CREAT SERPL-MCNC: 1.4 MG/DL (ref 0.67–1.17)
DEPRECATED HCO3 PLAS-SCNC: 21 MMOL/L (ref 22–29)
DEPRECATED HCO3 PLAS-SCNC: 24 MMOL/L (ref 22–29)
DEPRECATED HCO3 PLAS-SCNC: 25 MMOL/L (ref 22–29)
ERYTHROCYTE [DISTWIDTH] IN BLOOD BY AUTOMATED COUNT: 12.3 % (ref 10–15)
GFR SERPL CREATININE-BSD FRML MDRD: 50 ML/MIN/1.73M2
GFR SERPL CREATININE-BSD FRML MDRD: 52 ML/MIN/1.73M2
GFR SERPL CREATININE-BSD FRML MDRD: 53 ML/MIN/1.73M2
GFR SERPL CREATININE-BSD FRML MDRD: 54 ML/MIN/1.73M2
GFR SERPL CREATININE-BSD FRML MDRD: 55 ML/MIN/1.73M2
GLUCOSE BLD-MCNC: 81 MG/DL (ref 70–125)
GLUCOSE BLD-MCNC: 94 MG/DL (ref 70–125)
GLUCOSE SERPL-MCNC: 104 MG/DL (ref 70–99)
GLUCOSE SERPL-MCNC: 98 MG/DL (ref 70–99)
GLUCOSE SERPL-MCNC: 99 MG/DL (ref 70–99)
HCT VFR BLD AUTO: 39.7 % (ref 40–53)
HDLC SERPL-MCNC: 42 MG/DL
HGB BLD-MCNC: 13.2 G/DL (ref 13.3–17.7)
HGB BLD-MCNC: 13.3 G/DL (ref 13.3–17.7)
HOLD SPECIMEN: NORMAL
INR POINT OF CARE: 1.8 (ref 0.9–1.1)
INR POINT OF CARE: 2.1 (ref 0.9–1.1)
INR POINT OF CARE: 2.2 (ref 0.9–1.1)
INR POINT OF CARE: 2.2 (ref 0.9–1.1)
INR POINT OF CARE: 2.3 (ref 0.9–1.1)
INR POINT OF CARE: 2.5 (ref 0.9–1.1)
INR PPP: 2.12 (ref 0.85–1.15)
LDLC SERPL CALC-MCNC: 68 MG/DL
MCH RBC QN AUTO: 31.9 PG (ref 26.5–33)
MCHC RBC AUTO-ENTMCNC: 33.2 G/DL (ref 31.5–36.5)
MCV RBC AUTO: 96 FL (ref 78–100)
MDC_IDC_EPISODE_DTM: NORMAL
MDC_IDC_EPISODE_DURATION: 0 S
MDC_IDC_EPISODE_DURATION: 10 S
MDC_IDC_EPISODE_DURATION: 1049 S
MDC_IDC_EPISODE_DURATION: 105 S
MDC_IDC_EPISODE_DURATION: 1062 S
MDC_IDC_EPISODE_DURATION: 11 S
MDC_IDC_EPISODE_DURATION: 1133 S
MDC_IDC_EPISODE_DURATION: 1201 S
MDC_IDC_EPISODE_DURATION: 125 S
MDC_IDC_EPISODE_DURATION: 1317 S
MDC_IDC_EPISODE_DURATION: 1353 S
MDC_IDC_EPISODE_DURATION: 1396 S
MDC_IDC_EPISODE_DURATION: 14 S
MDC_IDC_EPISODE_DURATION: 14 S
MDC_IDC_EPISODE_DURATION: 1448 S
MDC_IDC_EPISODE_DURATION: 1448 S
MDC_IDC_EPISODE_DURATION: 15 S
MDC_IDC_EPISODE_DURATION: 152 S
MDC_IDC_EPISODE_DURATION: 155 S
MDC_IDC_EPISODE_DURATION: 1586 S
MDC_IDC_EPISODE_DURATION: 1621 S
MDC_IDC_EPISODE_DURATION: 1657 S
MDC_IDC_EPISODE_DURATION: 17 S
MDC_IDC_EPISODE_DURATION: 17 S
MDC_IDC_EPISODE_DURATION: 1731 S
MDC_IDC_EPISODE_DURATION: 180 S
MDC_IDC_EPISODE_DURATION: 181 S
MDC_IDC_EPISODE_DURATION: 182 S
MDC_IDC_EPISODE_DURATION: 183 S
MDC_IDC_EPISODE_DURATION: 184 S
MDC_IDC_EPISODE_DURATION: 185 S
MDC_IDC_EPISODE_DURATION: 185 S
MDC_IDC_EPISODE_DURATION: 1857 S
MDC_IDC_EPISODE_DURATION: 186 S
MDC_IDC_EPISODE_DURATION: 186 S
MDC_IDC_EPISODE_DURATION: 188 S
MDC_IDC_EPISODE_DURATION: 190 S
MDC_IDC_EPISODE_DURATION: 191 S
MDC_IDC_EPISODE_DURATION: 192 S
MDC_IDC_EPISODE_DURATION: 193 S
MDC_IDC_EPISODE_DURATION: 195 S
MDC_IDC_EPISODE_DURATION: 196 S
MDC_IDC_EPISODE_DURATION: 198 S
MDC_IDC_EPISODE_DURATION: 2 S
MDC_IDC_EPISODE_DURATION: 2 S
MDC_IDC_EPISODE_DURATION: 202 S
MDC_IDC_EPISODE_DURATION: 202 S
MDC_IDC_EPISODE_DURATION: 205 S
MDC_IDC_EPISODE_DURATION: 206 S
MDC_IDC_EPISODE_DURATION: 212 S
MDC_IDC_EPISODE_DURATION: 216 S
MDC_IDC_EPISODE_DURATION: 218 S
MDC_IDC_EPISODE_DURATION: 220 S
MDC_IDC_EPISODE_DURATION: 2217 S
MDC_IDC_EPISODE_DURATION: 222 S
MDC_IDC_EPISODE_DURATION: 223 S
MDC_IDC_EPISODE_DURATION: 223 S
MDC_IDC_EPISODE_DURATION: 224 S
MDC_IDC_EPISODE_DURATION: 224 S
MDC_IDC_EPISODE_DURATION: 228 S
MDC_IDC_EPISODE_DURATION: 229 S
MDC_IDC_EPISODE_DURATION: 233 S
MDC_IDC_EPISODE_DURATION: 235 S
MDC_IDC_EPISODE_DURATION: 238 S
MDC_IDC_EPISODE_DURATION: 241 S
MDC_IDC_EPISODE_DURATION: 2415 S
MDC_IDC_EPISODE_DURATION: 243 S
MDC_IDC_EPISODE_DURATION: 245 S
MDC_IDC_EPISODE_DURATION: 246 S
MDC_IDC_EPISODE_DURATION: 247 S
MDC_IDC_EPISODE_DURATION: 249 S
MDC_IDC_EPISODE_DURATION: 25 S
MDC_IDC_EPISODE_DURATION: 26 S
MDC_IDC_EPISODE_DURATION: 268 S
MDC_IDC_EPISODE_DURATION: 2698 S
MDC_IDC_EPISODE_DURATION: 272 S
MDC_IDC_EPISODE_DURATION: 272 S
MDC_IDC_EPISODE_DURATION: 276 S
MDC_IDC_EPISODE_DURATION: 278 S
MDC_IDC_EPISODE_DURATION: 279 S
MDC_IDC_EPISODE_DURATION: 2796 S
MDC_IDC_EPISODE_DURATION: 284 S
MDC_IDC_EPISODE_DURATION: 2883 S
MDC_IDC_EPISODE_DURATION: 291 S
MDC_IDC_EPISODE_DURATION: 291 S
MDC_IDC_EPISODE_DURATION: 30 S
MDC_IDC_EPISODE_DURATION: 303 S
MDC_IDC_EPISODE_DURATION: 304 S
MDC_IDC_EPISODE_DURATION: 305 S
MDC_IDC_EPISODE_DURATION: 310 S
MDC_IDC_EPISODE_DURATION: 3102 S
MDC_IDC_EPISODE_DURATION: 312 S
MDC_IDC_EPISODE_DURATION: 318 S
MDC_IDC_EPISODE_DURATION: 32 S
MDC_IDC_EPISODE_DURATION: 325 S
MDC_IDC_EPISODE_DURATION: 326 S
MDC_IDC_EPISODE_DURATION: 328 S
MDC_IDC_EPISODE_DURATION: 3292 S
MDC_IDC_EPISODE_DURATION: 338 S
MDC_IDC_EPISODE_DURATION: 342 S
MDC_IDC_EPISODE_DURATION: 354 S
MDC_IDC_EPISODE_DURATION: 355 S
MDC_IDC_EPISODE_DURATION: 356 S
MDC_IDC_EPISODE_DURATION: 367 S
MDC_IDC_EPISODE_DURATION: 3710 S
MDC_IDC_EPISODE_DURATION: 376 S
MDC_IDC_EPISODE_DURATION: 377 S
MDC_IDC_EPISODE_DURATION: 3831 S
MDC_IDC_EPISODE_DURATION: 390 S
MDC_IDC_EPISODE_DURATION: 4 S
MDC_IDC_EPISODE_DURATION: 407 S
MDC_IDC_EPISODE_DURATION: 4117 S
MDC_IDC_EPISODE_DURATION: 418 S
MDC_IDC_EPISODE_DURATION: 423 S
MDC_IDC_EPISODE_DURATION: 428 S
MDC_IDC_EPISODE_DURATION: 432 S
MDC_IDC_EPISODE_DURATION: 4451 S
MDC_IDC_EPISODE_DURATION: 4486 S
MDC_IDC_EPISODE_DURATION: 453 S
MDC_IDC_EPISODE_DURATION: 4548 S
MDC_IDC_EPISODE_DURATION: 47 S
MDC_IDC_EPISODE_DURATION: 484 S
MDC_IDC_EPISODE_DURATION: 5 S
MDC_IDC_EPISODE_DURATION: 502 S
MDC_IDC_EPISODE_DURATION: 503 S
MDC_IDC_EPISODE_DURATION: 53 S
MDC_IDC_EPISODE_DURATION: 544 S
MDC_IDC_EPISODE_DURATION: 551 S
MDC_IDC_EPISODE_DURATION: 5700 S
MDC_IDC_EPISODE_DURATION: 573 S
MDC_IDC_EPISODE_DURATION: 6 S
MDC_IDC_EPISODE_DURATION: 630 S
MDC_IDC_EPISODE_DURATION: 657 S
MDC_IDC_EPISODE_DURATION: 6686 S
MDC_IDC_EPISODE_DURATION: 673 S
MDC_IDC_EPISODE_DURATION: 6804 S
MDC_IDC_EPISODE_DURATION: 682 S
MDC_IDC_EPISODE_DURATION: 7011 S
MDC_IDC_EPISODE_DURATION: 73 S
MDC_IDC_EPISODE_DURATION: 73 S
MDC_IDC_EPISODE_DURATION: 74 S
MDC_IDC_EPISODE_DURATION: 76 S
MDC_IDC_EPISODE_DURATION: 7652 S
MDC_IDC_EPISODE_DURATION: 78 S
MDC_IDC_EPISODE_DURATION: 795 S
MDC_IDC_EPISODE_DURATION: 809 S
MDC_IDC_EPISODE_DURATION: 865 S
MDC_IDC_EPISODE_DURATION: 9 S
MDC_IDC_EPISODE_DURATION: 9 S
MDC_IDC_EPISODE_DURATION: 933 S
MDC_IDC_EPISODE_DURATION: 96 S
MDC_IDC_EPISODE_DURATION: 974 S
MDC_IDC_EPISODE_DURATION: 9857 S
MDC_IDC_EPISODE_DURATION: NORMAL S
MDC_IDC_EPISODE_ID: 1097
MDC_IDC_EPISODE_ID: 1099
MDC_IDC_EPISODE_ID: 1103
MDC_IDC_EPISODE_ID: 1104
MDC_IDC_EPISODE_ID: 1105
MDC_IDC_EPISODE_ID: 1107
MDC_IDC_EPISODE_ID: 1108
MDC_IDC_EPISODE_ID: 1110
MDC_IDC_EPISODE_ID: 1120
MDC_IDC_EPISODE_ID: 1121
MDC_IDC_EPISODE_ID: 1122
MDC_IDC_EPISODE_ID: 1126
MDC_IDC_EPISODE_ID: 1127
MDC_IDC_EPISODE_ID: 1129
MDC_IDC_EPISODE_ID: 1131
MDC_IDC_EPISODE_ID: 1132
MDC_IDC_EPISODE_ID: 1133
MDC_IDC_EPISODE_ID: 1134
MDC_IDC_EPISODE_ID: 1135
MDC_IDC_EPISODE_ID: 1136
MDC_IDC_EPISODE_ID: 1137
MDC_IDC_EPISODE_ID: 1138
MDC_IDC_EPISODE_ID: 1139
MDC_IDC_EPISODE_ID: 1140
MDC_IDC_EPISODE_ID: 1141
MDC_IDC_EPISODE_ID: 1142
MDC_IDC_EPISODE_ID: 1143
MDC_IDC_EPISODE_ID: 1144
MDC_IDC_EPISODE_ID: 1145
MDC_IDC_EPISODE_ID: 1146
MDC_IDC_EPISODE_ID: 1147
MDC_IDC_EPISODE_ID: 1148
MDC_IDC_EPISODE_ID: 1149
MDC_IDC_EPISODE_ID: 1150
MDC_IDC_EPISODE_ID: 1151
MDC_IDC_EPISODE_ID: 1152
MDC_IDC_EPISODE_ID: 1153
MDC_IDC_EPISODE_ID: 1154
MDC_IDC_EPISODE_ID: 1155
MDC_IDC_EPISODE_ID: 1156
MDC_IDC_EPISODE_ID: 1157
MDC_IDC_EPISODE_ID: 1158
MDC_IDC_EPISODE_ID: 1159
MDC_IDC_EPISODE_ID: 1160
MDC_IDC_EPISODE_ID: 1161
MDC_IDC_EPISODE_ID: 1162
MDC_IDC_EPISODE_ID: 1163
MDC_IDC_EPISODE_ID: 1164
MDC_IDC_EPISODE_ID: 1165
MDC_IDC_EPISODE_ID: 1166
MDC_IDC_EPISODE_ID: 1167
MDC_IDC_EPISODE_ID: 1168
MDC_IDC_EPISODE_ID: 1169
MDC_IDC_EPISODE_ID: 1170
MDC_IDC_EPISODE_ID: 1171
MDC_IDC_EPISODE_ID: 1172
MDC_IDC_EPISODE_ID: 1173
MDC_IDC_EPISODE_ID: 1174
MDC_IDC_EPISODE_ID: 1175
MDC_IDC_EPISODE_ID: 1176
MDC_IDC_EPISODE_ID: 1177
MDC_IDC_EPISODE_ID: 1178
MDC_IDC_EPISODE_ID: 1179
MDC_IDC_EPISODE_ID: 1180
MDC_IDC_EPISODE_ID: 1181
MDC_IDC_EPISODE_ID: 1182
MDC_IDC_EPISODE_ID: 1183
MDC_IDC_EPISODE_ID: 1184
MDC_IDC_EPISODE_ID: 1185
MDC_IDC_EPISODE_ID: 1186
MDC_IDC_EPISODE_ID: 1187
MDC_IDC_EPISODE_ID: 1188
MDC_IDC_EPISODE_ID: 1189
MDC_IDC_EPISODE_ID: 1190
MDC_IDC_EPISODE_ID: 1191
MDC_IDC_EPISODE_ID: 1192
MDC_IDC_EPISODE_ID: 1193
MDC_IDC_EPISODE_ID: 1194
MDC_IDC_EPISODE_ID: 1195
MDC_IDC_EPISODE_ID: 1196
MDC_IDC_EPISODE_ID: 1197
MDC_IDC_EPISODE_ID: 1198
MDC_IDC_EPISODE_ID: 1199
MDC_IDC_EPISODE_ID: 1200
MDC_IDC_EPISODE_ID: 1201
MDC_IDC_EPISODE_ID: 1202
MDC_IDC_EPISODE_ID: 1203
MDC_IDC_EPISODE_ID: 1204
MDC_IDC_EPISODE_ID: 1205
MDC_IDC_EPISODE_ID: 1206
MDC_IDC_EPISODE_ID: 1207
MDC_IDC_EPISODE_ID: 1208
MDC_IDC_EPISODE_ID: 1209
MDC_IDC_EPISODE_ID: 1210
MDC_IDC_EPISODE_ID: 1211
MDC_IDC_EPISODE_ID: 1212
MDC_IDC_EPISODE_ID: 1213
MDC_IDC_EPISODE_ID: 1214
MDC_IDC_EPISODE_ID: 1215
MDC_IDC_EPISODE_ID: 1216
MDC_IDC_EPISODE_ID: 1217
MDC_IDC_EPISODE_ID: 1218
MDC_IDC_EPISODE_ID: 1219
MDC_IDC_EPISODE_ID: 1220
MDC_IDC_EPISODE_ID: 1221
MDC_IDC_EPISODE_ID: 1222
MDC_IDC_EPISODE_ID: 1223
MDC_IDC_EPISODE_ID: 1224
MDC_IDC_EPISODE_ID: 1225
MDC_IDC_EPISODE_ID: 1226
MDC_IDC_EPISODE_ID: 1227
MDC_IDC_EPISODE_ID: 1228
MDC_IDC_EPISODE_ID: 1229
MDC_IDC_EPISODE_ID: 1230
MDC_IDC_EPISODE_ID: 1231
MDC_IDC_EPISODE_ID: 1232
MDC_IDC_EPISODE_ID: 1233
MDC_IDC_EPISODE_ID: 1234
MDC_IDC_EPISODE_ID: 1235
MDC_IDC_EPISODE_ID: 1236
MDC_IDC_EPISODE_ID: 1237
MDC_IDC_EPISODE_ID: 1238
MDC_IDC_EPISODE_ID: 1239
MDC_IDC_EPISODE_ID: 1240
MDC_IDC_EPISODE_ID: 1241
MDC_IDC_EPISODE_ID: 1242
MDC_IDC_EPISODE_ID: 1243
MDC_IDC_EPISODE_ID: 1244
MDC_IDC_EPISODE_ID: 1245
MDC_IDC_EPISODE_ID: 1246
MDC_IDC_EPISODE_ID: 1247
MDC_IDC_EPISODE_ID: 1248
MDC_IDC_EPISODE_ID: 1249
MDC_IDC_EPISODE_ID: 1250
MDC_IDC_EPISODE_ID: 1251
MDC_IDC_EPISODE_ID: 1252
MDC_IDC_EPISODE_ID: 1253
MDC_IDC_EPISODE_ID: 1254
MDC_IDC_EPISODE_ID: 1255
MDC_IDC_EPISODE_ID: 1256
MDC_IDC_EPISODE_ID: 1257
MDC_IDC_EPISODE_ID: 1258
MDC_IDC_EPISODE_ID: 1259
MDC_IDC_EPISODE_ID: 1260
MDC_IDC_EPISODE_ID: 1261
MDC_IDC_EPISODE_ID: 1262
MDC_IDC_EPISODE_ID: 1263
MDC_IDC_EPISODE_ID: 1264
MDC_IDC_EPISODE_ID: 1265
MDC_IDC_EPISODE_ID: 1266
MDC_IDC_EPISODE_ID: 1267
MDC_IDC_EPISODE_ID: 1268
MDC_IDC_EPISODE_ID: 1269
MDC_IDC_EPISODE_ID: 1270
MDC_IDC_EPISODE_ID: 1271
MDC_IDC_EPISODE_ID: 1272
MDC_IDC_EPISODE_ID: 1273
MDC_IDC_EPISODE_ID: 1274
MDC_IDC_EPISODE_ID: 1275
MDC_IDC_EPISODE_ID: 1276
MDC_IDC_EPISODE_ID: 1277
MDC_IDC_EPISODE_ID: 1278
MDC_IDC_EPISODE_ID: 1279
MDC_IDC_EPISODE_ID: 1280
MDC_IDC_EPISODE_ID: 1281
MDC_IDC_EPISODE_ID: 1282
MDC_IDC_EPISODE_ID: 1283
MDC_IDC_EPISODE_ID: 1284
MDC_IDC_EPISODE_ID: 1285
MDC_IDC_EPISODE_ID: 1286
MDC_IDC_EPISODE_ID: 1287
MDC_IDC_EPISODE_ID: 1288
MDC_IDC_EPISODE_ID: 1289
MDC_IDC_EPISODE_ID: 1290
MDC_IDC_EPISODE_ID: 1291
MDC_IDC_EPISODE_ID: 1292
MDC_IDC_EPISODE_ID: 1293
MDC_IDC_EPISODE_ID: 1294
MDC_IDC_EPISODE_ID: 1295
MDC_IDC_EPISODE_ID: 1296
MDC_IDC_EPISODE_ID: 1297
MDC_IDC_EPISODE_ID: 1298
MDC_IDC_EPISODE_ID: 1299
MDC_IDC_EPISODE_ID: 1300
MDC_IDC_EPISODE_ID: 1301
MDC_IDC_EPISODE_ID: 1302
MDC_IDC_EPISODE_ID: 1303
MDC_IDC_EPISODE_ID: 1304
MDC_IDC_EPISODE_ID: 1305
MDC_IDC_EPISODE_ID: 1306
MDC_IDC_EPISODE_ID: 1307
MDC_IDC_EPISODE_ID: 1308
MDC_IDC_EPISODE_ID: 1309
MDC_IDC_EPISODE_ID: 1310
MDC_IDC_EPISODE_ID: 1311
MDC_IDC_EPISODE_ID: 1312
MDC_IDC_EPISODE_ID: 1313
MDC_IDC_EPISODE_ID: 1314
MDC_IDC_EPISODE_ID: 1315
MDC_IDC_EPISODE_ID: 1316
MDC_IDC_EPISODE_ID: 1317
MDC_IDC_EPISODE_ID: 1318
MDC_IDC_EPISODE_ID: 1319
MDC_IDC_EPISODE_ID: 1320
MDC_IDC_EPISODE_ID: 1321
MDC_IDC_EPISODE_ID: 1322
MDC_IDC_EPISODE_ID: 1323
MDC_IDC_EPISODE_ID: 1324
MDC_IDC_EPISODE_ID: 1325
MDC_IDC_EPISODE_ID: 1326
MDC_IDC_EPISODE_ID: 1327
MDC_IDC_EPISODE_ID: 1328
MDC_IDC_EPISODE_ID: 1329
MDC_IDC_EPISODE_ID: 1330
MDC_IDC_EPISODE_ID: 1331
MDC_IDC_EPISODE_ID: 1332
MDC_IDC_EPISODE_ID: 1688
MDC_IDC_EPISODE_ID: 1689
MDC_IDC_EPISODE_ID: 1690
MDC_IDC_EPISODE_ID: 1691
MDC_IDC_EPISODE_ID: 1692
MDC_IDC_EPISODE_ID: 1693
MDC_IDC_EPISODE_ID: 1694
MDC_IDC_EPISODE_ID: 1695
MDC_IDC_EPISODE_ID: 1696
MDC_IDC_EPISODE_ID: 1697
MDC_IDC_EPISODE_ID: 1698
MDC_IDC_EPISODE_ID: 1699
MDC_IDC_EPISODE_ID: 1700
MDC_IDC_EPISODE_ID: 1701
MDC_IDC_EPISODE_ID: 1702
MDC_IDC_EPISODE_ID: 1703
MDC_IDC_EPISODE_ID: 1704
MDC_IDC_EPISODE_ID: 1705
MDC_IDC_EPISODE_ID: 3
MDC_IDC_EPISODE_ID: 4
MDC_IDC_EPISODE_ID: 5
MDC_IDC_EPISODE_ID: 5
MDC_IDC_EPISODE_ID: 6
MDC_IDC_EPISODE_ID: 7
MDC_IDC_EPISODE_TYPE: NORMAL
MDC_IDC_LEAD_IMPLANT_DT: NORMAL
MDC_IDC_LEAD_LOCATION: NORMAL
MDC_IDC_LEAD_LOCATION_DETAIL_1: NORMAL
MDC_IDC_LEAD_MFG: NORMAL
MDC_IDC_LEAD_MODEL: NORMAL
MDC_IDC_LEAD_POLARITY_TYPE: NORMAL
MDC_IDC_LEAD_SERIAL: NORMAL
MDC_IDC_MSMT_BATTERY_DTM: NORMAL
MDC_IDC_MSMT_BATTERY_REMAINING_LONGEVITY: 1 MO
MDC_IDC_MSMT_BATTERY_REMAINING_LONGEVITY: 1 MO
MDC_IDC_MSMT_BATTERY_REMAINING_LONGEVITY: 100 MO
MDC_IDC_MSMT_BATTERY_REMAINING_LONGEVITY: 2 MO
MDC_IDC_MSMT_BATTERY_RRT_TRIGGER: 2.6
MDC_IDC_MSMT_BATTERY_RRT_TRIGGER: 2.77
MDC_IDC_MSMT_BATTERY_STATUS: NORMAL
MDC_IDC_MSMT_BATTERY_VOLTAGE: 2.75 V
MDC_IDC_MSMT_BATTERY_VOLTAGE: 2.78 V
MDC_IDC_MSMT_BATTERY_VOLTAGE: 2.8 V
MDC_IDC_MSMT_BATTERY_VOLTAGE: 3.05 V
MDC_IDC_MSMT_LEADCHNL_LV_IMPEDANCE_VALUE: 285 OHM
MDC_IDC_MSMT_LEADCHNL_LV_IMPEDANCE_VALUE: 304 OHM
MDC_IDC_MSMT_LEADCHNL_LV_IMPEDANCE_VALUE: 475 OHM
MDC_IDC_MSMT_LEADCHNL_LV_IMPEDANCE_VALUE: 475 OHM
MDC_IDC_MSMT_LEADCHNL_LV_IMPEDANCE_VALUE: 494 OHM
MDC_IDC_MSMT_LEADCHNL_LV_IMPEDANCE_VALUE: 494 OHM
MDC_IDC_MSMT_LEADCHNL_LV_IMPEDANCE_VALUE: 513 OHM
MDC_IDC_MSMT_LEADCHNL_LV_IMPEDANCE_VALUE: 532 OHM
MDC_IDC_MSMT_LEADCHNL_LV_IMPEDANCE_VALUE: 570 OHM
MDC_IDC_MSMT_LEADCHNL_LV_IMPEDANCE_VALUE: 570 OHM
MDC_IDC_MSMT_LEADCHNL_LV_IMPEDANCE_VALUE: 608 OHM
MDC_IDC_MSMT_LEADCHNL_LV_IMPEDANCE_VALUE: 684 OHM
MDC_IDC_MSMT_LEADCHNL_LV_IMPEDANCE_VALUE: 684 OHM
MDC_IDC_MSMT_LEADCHNL_LV_IMPEDANCE_VALUE: 722 OHM
MDC_IDC_MSMT_LEADCHNL_LV_IMPEDANCE_VALUE: 722 OHM
MDC_IDC_MSMT_LEADCHNL_LV_IMPEDANCE_VALUE: 741 OHM
MDC_IDC_MSMT_LEADCHNL_LV_IMPEDANCE_VALUE: 779 OHM
MDC_IDC_MSMT_LEADCHNL_LV_IMPEDANCE_VALUE: 798 OHM
MDC_IDC_MSMT_LEADCHNL_LV_IMPEDANCE_VALUE: 798 OHM
MDC_IDC_MSMT_LEADCHNL_LV_PACING_THRESHOLD_AMPLITUDE: 1.38 V
MDC_IDC_MSMT_LEADCHNL_LV_PACING_THRESHOLD_AMPLITUDE: 2.25 V
MDC_IDC_MSMT_LEADCHNL_LV_PACING_THRESHOLD_AMPLITUDE: 2.38 V
MDC_IDC_MSMT_LEADCHNL_LV_PACING_THRESHOLD_PULSEWIDTH: 1 MS
MDC_IDC_MSMT_LEADCHNL_RA_IMPEDANCE_VALUE: 266 OHM
MDC_IDC_MSMT_LEADCHNL_RA_IMPEDANCE_VALUE: 285 OHM
MDC_IDC_MSMT_LEADCHNL_RA_IMPEDANCE_VALUE: 361 OHM
MDC_IDC_MSMT_LEADCHNL_RA_IMPEDANCE_VALUE: 361 OHM
MDC_IDC_MSMT_LEADCHNL_RA_IMPEDANCE_VALUE: 380 OHM
MDC_IDC_MSMT_LEADCHNL_RA_IMPEDANCE_VALUE: 418 OHM
MDC_IDC_MSMT_LEADCHNL_RA_IMPEDANCE_VALUE: 418 OHM
MDC_IDC_MSMT_LEADCHNL_RA_PACING_THRESHOLD_AMPLITUDE: 0.62 V
MDC_IDC_MSMT_LEADCHNL_RA_PACING_THRESHOLD_AMPLITUDE: 0.75 V
MDC_IDC_MSMT_LEADCHNL_RA_PACING_THRESHOLD_PULSEWIDTH: 0.4 MS
MDC_IDC_MSMT_LEADCHNL_RA_SENSING_INTR_AMPL: 0.1 MV
MDC_IDC_MSMT_LEADCHNL_RA_SENSING_INTR_AMPL: 0.12 MV
MDC_IDC_MSMT_LEADCHNL_RA_SENSING_INTR_AMPL: 0.25 MV
MDC_IDC_MSMT_LEADCHNL_RA_SENSING_INTR_AMPL: 0.25 MV
MDC_IDC_MSMT_LEADCHNL_RA_SENSING_INTR_AMPL: 0.38 MV
MDC_IDC_MSMT_LEADCHNL_RA_SENSING_INTR_AMPL: 0.5 MV
MDC_IDC_MSMT_LEADCHNL_RA_SENSING_INTR_AMPL: 0.5 MV
MDC_IDC_MSMT_LEADCHNL_RV_IMPEDANCE_VALUE: 323 OHM
MDC_IDC_MSMT_LEADCHNL_RV_IMPEDANCE_VALUE: 342 OHM
MDC_IDC_MSMT_LEADCHNL_RV_IMPEDANCE_VALUE: 342 OHM
MDC_IDC_MSMT_LEADCHNL_RV_IMPEDANCE_VALUE: 361 OHM
MDC_IDC_MSMT_LEADCHNL_RV_IMPEDANCE_VALUE: 513 OHM
MDC_IDC_MSMT_LEADCHNL_RV_IMPEDANCE_VALUE: 532 OHM
MDC_IDC_MSMT_LEADCHNL_RV_PACING_THRESHOLD_AMPLITUDE: 0.62 V
MDC_IDC_MSMT_LEADCHNL_RV_PACING_THRESHOLD_AMPLITUDE: 1 V
MDC_IDC_MSMT_LEADCHNL_RV_PACING_THRESHOLD_PULSEWIDTH: 0.4 MS
MDC_IDC_MSMT_LEADCHNL_RV_SENSING_INTR_AMPL: 11.25 MV
MDC_IDC_MSMT_LEADCHNL_RV_SENSING_INTR_AMPL: 11.3 MV
MDC_IDC_MSMT_LEADCHNL_RV_SENSING_INTR_AMPL: 5.62 MV
MDC_IDC_MSMT_LEADCHNL_RV_SENSING_INTR_AMPL: 5.62 MV
MDC_IDC_MSMT_LEADCHNL_RV_SENSING_INTR_AMPL: 7.38 MV
MDC_IDC_MSMT_LEADCHNL_RV_SENSING_INTR_AMPL: 7.38 MV
MDC_IDC_MSMT_LEADCHNL_RV_SENSING_INTR_AMPL: 8.62 MV
MDC_IDC_MSMT_LEADCHNL_RV_SENSING_INTR_AMPL: 8.62 MV
MDC_IDC_MSMT_LEADCHNL_RV_SENSING_INTR_AMPL: 9.38 MV
MDC_IDC_PG_IMPLANT_DTM: NORMAL
MDC_IDC_PG_MFG: NORMAL
MDC_IDC_PG_MODEL: NORMAL
MDC_IDC_PG_SERIAL: NORMAL
MDC_IDC_PG_TYPE: NORMAL
MDC_IDC_SESS_CLINIC_NAME: NORMAL
MDC_IDC_SESS_DTM: NORMAL
MDC_IDC_SESS_TYPE: NORMAL
MDC_IDC_SET_BRADY_AT_MODE_SWITCH_RATE: 150 {BEATS}/MIN
MDC_IDC_SET_BRADY_AT_MODE_SWITCH_RATE: 171 {BEATS}/MIN
MDC_IDC_SET_BRADY_LOWRATE: 70 {BEATS}/MIN
MDC_IDC_SET_BRADY_MAX_SENSOR_RATE: 140 {BEATS}/MIN
MDC_IDC_SET_BRADY_MAX_TRACKING_RATE: 140 {BEATS}/MIN
MDC_IDC_SET_BRADY_MODE: NORMAL
MDC_IDC_SET_BRADY_PAV_DELAY_LOW: 170 MS
MDC_IDC_SET_BRADY_SAV_DELAY_LOW: 110 MS
MDC_IDC_SET_CRT_LVRV_DELAY: 0 MS
MDC_IDC_SET_CRT_PACED_CHAMBERS: NORMAL
MDC_IDC_SET_LEADCHNL_LV_PACING_AMPLITUDE: 2.5 V
MDC_IDC_SET_LEADCHNL_LV_PACING_ANODE_ELECTRODE_1: NORMAL
MDC_IDC_SET_LEADCHNL_LV_PACING_ANODE_LOCATION_1: NORMAL
MDC_IDC_SET_LEADCHNL_LV_PACING_CAPTURE_MODE: NORMAL
MDC_IDC_SET_LEADCHNL_LV_PACING_CATHODE_ELECTRODE_1: NORMAL
MDC_IDC_SET_LEADCHNL_LV_PACING_CATHODE_LOCATION_1: NORMAL
MDC_IDC_SET_LEADCHNL_LV_PACING_POLARITY: NORMAL
MDC_IDC_SET_LEADCHNL_LV_PACING_PULSEWIDTH: 1 MS
MDC_IDC_SET_LEADCHNL_LV_PACING_PULSEWIDTH: 1.5 MS
MDC_IDC_SET_LEADCHNL_RA_PACING_AMPLITUDE: 2 V
MDC_IDC_SET_LEADCHNL_RA_PACING_AMPLITUDE: NORMAL
MDC_IDC_SET_LEADCHNL_RA_PACING_ANODE_ELECTRODE_1: NORMAL
MDC_IDC_SET_LEADCHNL_RA_PACING_ANODE_LOCATION_1: NORMAL
MDC_IDC_SET_LEADCHNL_RA_PACING_CAPTURE_MODE: NORMAL
MDC_IDC_SET_LEADCHNL_RA_PACING_CATHODE_ELECTRODE_1: NORMAL
MDC_IDC_SET_LEADCHNL_RA_PACING_CATHODE_LOCATION_1: NORMAL
MDC_IDC_SET_LEADCHNL_RA_PACING_POLARITY: NORMAL
MDC_IDC_SET_LEADCHNL_RA_PACING_PULSEWIDTH: 0.4 MS
MDC_IDC_SET_LEADCHNL_RA_SENSING_ANODE_ELECTRODE_1: NORMAL
MDC_IDC_SET_LEADCHNL_RA_SENSING_ANODE_LOCATION_1: NORMAL
MDC_IDC_SET_LEADCHNL_RA_SENSING_CATHODE_ELECTRODE_1: NORMAL
MDC_IDC_SET_LEADCHNL_RA_SENSING_CATHODE_LOCATION_1: NORMAL
MDC_IDC_SET_LEADCHNL_RA_SENSING_POLARITY: NORMAL
MDC_IDC_SET_LEADCHNL_RA_SENSING_SENSITIVITY: 0.15 MV
MDC_IDC_SET_LEADCHNL_RV_PACING_AMPLITUDE: 1.5 V
MDC_IDC_SET_LEADCHNL_RV_PACING_AMPLITUDE: NORMAL
MDC_IDC_SET_LEADCHNL_RV_PACING_ANODE_ELECTRODE_1: NORMAL
MDC_IDC_SET_LEADCHNL_RV_PACING_ANODE_LOCATION_1: NORMAL
MDC_IDC_SET_LEADCHNL_RV_PACING_CAPTURE_MODE: NORMAL
MDC_IDC_SET_LEADCHNL_RV_PACING_CATHODE_ELECTRODE_1: NORMAL
MDC_IDC_SET_LEADCHNL_RV_PACING_CATHODE_LOCATION_1: NORMAL
MDC_IDC_SET_LEADCHNL_RV_PACING_POLARITY: NORMAL
MDC_IDC_SET_LEADCHNL_RV_PACING_PULSEWIDTH: 0.4 MS
MDC_IDC_SET_LEADCHNL_RV_SENSING_ANODE_ELECTRODE_1: NORMAL
MDC_IDC_SET_LEADCHNL_RV_SENSING_ANODE_LOCATION_1: NORMAL
MDC_IDC_SET_LEADCHNL_RV_SENSING_CATHODE_ELECTRODE_1: NORMAL
MDC_IDC_SET_LEADCHNL_RV_SENSING_CATHODE_LOCATION_1: NORMAL
MDC_IDC_SET_LEADCHNL_RV_SENSING_POLARITY: NORMAL
MDC_IDC_SET_LEADCHNL_RV_SENSING_SENSITIVITY: 0.9 MV
MDC_IDC_SET_ZONE_DETECTION_INTERVAL: 200 MS
MDC_IDC_SET_ZONE_DETECTION_INTERVAL: 350 MS
MDC_IDC_SET_ZONE_DETECTION_INTERVAL: 400 MS
MDC_IDC_SET_ZONE_TYPE: NORMAL
MDC_IDC_STAT_AT_BURDEN_PERCENT: 10.6 %
MDC_IDC_STAT_AT_BURDEN_PERCENT: 13.6 %
MDC_IDC_STAT_AT_BURDEN_PERCENT: 5 %
MDC_IDC_STAT_AT_BURDEN_PERCENT: 8.3 %
MDC_IDC_STAT_AT_DTM_END: NORMAL
MDC_IDC_STAT_AT_DTM_START: NORMAL
MDC_IDC_STAT_BRADY_AP_VP_PERCENT: 68.89 %
MDC_IDC_STAT_BRADY_AP_VP_PERCENT: 70.75 %
MDC_IDC_STAT_BRADY_AP_VP_PERCENT: 72.29 %
MDC_IDC_STAT_BRADY_AP_VP_PERCENT: 94.7 %
MDC_IDC_STAT_BRADY_AP_VS_PERCENT: 0.36 %
MDC_IDC_STAT_BRADY_AP_VS_PERCENT: 0.39 %
MDC_IDC_STAT_BRADY_AP_VS_PERCENT: 0.47 %
MDC_IDC_STAT_BRADY_AP_VS_PERCENT: 0.71 %
MDC_IDC_STAT_BRADY_AS_VP_PERCENT: 1 %
MDC_IDC_STAT_BRADY_AS_VP_PERCENT: 26.1 %
MDC_IDC_STAT_BRADY_AS_VP_PERCENT: 26.33 %
MDC_IDC_STAT_BRADY_AS_VP_PERCENT: 29.25 %
MDC_IDC_STAT_BRADY_AS_VS_PERCENT: 0.99 %
MDC_IDC_STAT_BRADY_AS_VS_PERCENT: 1.39 %
MDC_IDC_STAT_BRADY_AS_VS_PERCENT: 2.45 %
MDC_IDC_STAT_BRADY_AS_VS_PERCENT: 4.07 %
MDC_IDC_STAT_BRADY_DTM_END: NORMAL
MDC_IDC_STAT_BRADY_DTM_START: NORMAL
MDC_IDC_STAT_BRADY_RA_PERCENT_PACED: 65.92 %
MDC_IDC_STAT_BRADY_RA_PERCENT_PACED: 67.87 %
MDC_IDC_STAT_BRADY_RA_PERCENT_PACED: 69.28 %
MDC_IDC_STAT_BRADY_RA_PERCENT_PACED: 93.29 %
MDC_IDC_STAT_BRADY_RV_PERCENT_PACED: 93.44 %
MDC_IDC_STAT_BRADY_RV_PERCENT_PACED: 95.41 %
MDC_IDC_STAT_BRADY_RV_PERCENT_PACED: 95.89 %
MDC_IDC_STAT_BRADY_RV_PERCENT_PACED: 97.24 %
MDC_IDC_STAT_CRT_DTM_END: NORMAL
MDC_IDC_STAT_CRT_DTM_START: NORMAL
MDC_IDC_STAT_CRT_LV_PERCENT_PACED: 91.41 %
MDC_IDC_STAT_CRT_LV_PERCENT_PACED: 94.5 %
MDC_IDC_STAT_CRT_LV_PERCENT_PACED: 95.38 %
MDC_IDC_STAT_CRT_LV_PERCENT_PACED: 96.05 %
MDC_IDC_STAT_CRT_PERCENT_PACED: 91.41 %
MDC_IDC_STAT_CRT_PERCENT_PACED: 94.5 %
MDC_IDC_STAT_CRT_PERCENT_PACED: 95.38 %
MDC_IDC_STAT_CRT_PERCENT_PACED: 96.05 %
MDC_IDC_STAT_EPISODE_RECENT_COUNT: 0
MDC_IDC_STAT_EPISODE_RECENT_COUNT: 1
MDC_IDC_STAT_EPISODE_RECENT_COUNT: 123
MDC_IDC_STAT_EPISODE_RECENT_COUNT: 2
MDC_IDC_STAT_EPISODE_RECENT_COUNT: 2
MDC_IDC_STAT_EPISODE_RECENT_COUNT: 3
MDC_IDC_STAT_EPISODE_RECENT_COUNT: 45
MDC_IDC_STAT_EPISODE_RECENT_COUNT: 65
MDC_IDC_STAT_EPISODE_RECENT_COUNT_DTM_END: NORMAL
MDC_IDC_STAT_EPISODE_RECENT_COUNT_DTM_START: NORMAL
MDC_IDC_STAT_EPISODE_TOTAL_COUNT: 0
MDC_IDC_STAT_EPISODE_TOTAL_COUNT: 117
MDC_IDC_STAT_EPISODE_TOTAL_COUNT: 118
MDC_IDC_STAT_EPISODE_TOTAL_COUNT: 118
MDC_IDC_STAT_EPISODE_TOTAL_COUNT: 1892
MDC_IDC_STAT_EPISODE_TOTAL_COUNT: 1979
MDC_IDC_STAT_EPISODE_TOTAL_COUNT: 2
MDC_IDC_STAT_EPISODE_TOTAL_COUNT: 2041
MDC_IDC_STAT_EPISODE_TOTAL_COUNT: 3
MDC_IDC_STAT_EPISODE_TOTAL_COUNT: 4
MDC_IDC_STAT_EPISODE_TOTAL_COUNT_DTM_END: NORMAL
MDC_IDC_STAT_EPISODE_TOTAL_COUNT_DTM_START: NORMAL
MDC_IDC_STAT_EPISODE_TYPE: NORMAL
NONHDLC SERPL-MCNC: 80 MG/DL
PLATELET # BLD AUTO: 178 10E3/UL (ref 150–450)
POTASSIUM BLD-SCNC: 4.3 MMOL/L (ref 3.5–5)
POTASSIUM BLD-SCNC: 4.7 MMOL/L (ref 3.5–5)
POTASSIUM SERPL-SCNC: 4.4 MMOL/L (ref 3.4–5.3)
POTASSIUM SERPL-SCNC: 4.4 MMOL/L (ref 3.4–5.3)
POTASSIUM SERPL-SCNC: 4.6 MMOL/L (ref 3.4–5.3)
PROT SERPL-MCNC: 7.3 G/DL (ref 6.4–8.3)
PSA SERPL-MCNC: 1.91 NG/ML
RBC # BLD AUTO: 4.14 10E6/UL (ref 4.4–5.9)
SODIUM SERPL-SCNC: 138 MMOL/L (ref 136–145)
SODIUM SERPL-SCNC: 139 MMOL/L (ref 136–145)
SODIUM SERPL-SCNC: 140 MMOL/L (ref 136–145)
TRIGL SERPL-MCNC: 61 MG/DL
WBC # BLD AUTO: 6.8 10E3/UL (ref 4–11)

## 2022-01-01 PROCEDURE — C2621 PMKR, OTHER THAN SING/DUAL: HCPCS | Performed by: INTERNAL MEDICINE

## 2022-01-01 PROCEDURE — 36415 COLL VENOUS BLD VENIPUNCTURE: CPT | Performed by: NURSE PRACTITIONER

## 2022-01-01 PROCEDURE — 80048 BASIC METABOLIC PNL TOTAL CA: CPT | Performed by: NURSE PRACTITIONER

## 2022-01-01 PROCEDURE — 80048 BASIC METABOLIC PNL TOTAL CA: CPT | Performed by: INTERNAL MEDICINE

## 2022-01-01 PROCEDURE — 36416 COLLJ CAPILLARY BLOOD SPEC: CPT

## 2022-01-01 PROCEDURE — 85610 PROTHROMBIN TIME: CPT

## 2022-01-01 PROCEDURE — 99214 OFFICE O/P EST MOD 30 MIN: CPT | Performed by: INTERNAL MEDICINE

## 2022-01-01 PROCEDURE — 258N000003 HC RX IP 258 OP 636: Performed by: INTERNAL MEDICINE

## 2022-01-01 PROCEDURE — 370N000017 HC ANESTHESIA TECHNICAL FEE, PER MIN: Performed by: INTERNAL MEDICINE

## 2022-01-01 PROCEDURE — G0103 PSA SCREENING: HCPCS | Performed by: STUDENT IN AN ORGANIZED HEALTH CARE EDUCATION/TRAINING PROGRAM

## 2022-01-01 PROCEDURE — 80061 LIPID PANEL: CPT | Performed by: STUDENT IN AN ORGANIZED HEALTH CARE EDUCATION/TRAINING PROGRAM

## 2022-01-01 PROCEDURE — 33229 REMV&REPLC PM GEN MULT LEADS: CPT | Performed by: INTERNAL MEDICINE

## 2022-01-01 PROCEDURE — 85018 HEMOGLOBIN: CPT | Performed by: STUDENT IN AN ORGANIZED HEALTH CARE EDUCATION/TRAINING PROGRAM

## 2022-01-01 PROCEDURE — 85610 PROTHROMBIN TIME: CPT | Performed by: INTERNAL MEDICINE

## 2022-01-01 PROCEDURE — 272N000001 HC OR GENERAL SUPPLY STERILE: Performed by: INTERNAL MEDICINE

## 2022-01-01 PROCEDURE — 258N000003 HC RX IP 258 OP 636: Performed by: NURSE ANESTHETIST, CERTIFIED REGISTERED

## 2022-01-01 PROCEDURE — 85014 HEMATOCRIT: CPT | Performed by: INTERNAL MEDICINE

## 2022-01-01 PROCEDURE — 93294 REM INTERROG EVL PM/LDLS PM: CPT | Performed by: INTERNAL MEDICINE

## 2022-01-01 PROCEDURE — 99207 CARDIAC DEVICE CHECK - REMOTE: CPT | Performed by: INTERNAL MEDICINE

## 2022-01-01 PROCEDURE — 99214 OFFICE O/P EST MOD 30 MIN: CPT | Performed by: NURSE PRACTITIONER

## 2022-01-01 PROCEDURE — 80053 COMPREHEN METABOLIC PANEL: CPT | Performed by: STUDENT IN AN ORGANIZED HEALTH CARE EDUCATION/TRAINING PROGRAM

## 2022-01-01 PROCEDURE — 250N000009 HC RX 250: Performed by: NURSE ANESTHETIST, CERTIFIED REGISTERED

## 2022-01-01 PROCEDURE — 93296 REM INTERROG EVL PM/IDS: CPT | Performed by: INTERNAL MEDICINE

## 2022-01-01 PROCEDURE — 250N000011 HC RX IP 250 OP 636: Performed by: NURSE ANESTHETIST, CERTIFIED REGISTERED

## 2022-01-01 PROCEDURE — 80048 BASIC METABOLIC PNL TOTAL CA: CPT | Performed by: STUDENT IN AN ORGANIZED HEALTH CARE EDUCATION/TRAINING PROGRAM

## 2022-01-01 PROCEDURE — 250N000011 HC RX IP 250 OP 636: Performed by: INTERNAL MEDICINE

## 2022-01-01 PROCEDURE — 93281 PM DEVICE PROGR EVAL MULTI: CPT | Performed by: INTERNAL MEDICINE

## 2022-01-01 PROCEDURE — 36415 COLL VENOUS BLD VENIPUNCTURE: CPT | Performed by: INTERNAL MEDICINE

## 2022-01-01 DEVICE — PCMKR PERCEPTA BIPOLAR CRT-P M: Type: IMPLANTABLE DEVICE | Site: CHEST  WALL | Status: FUNCTIONAL

## 2022-01-01 RX ORDER — PROPOFOL 10 MG/ML
INJECTION, EMULSION INTRAVENOUS CONTINUOUS PRN
Status: DISCONTINUED | OUTPATIENT
Start: 2022-01-01 | End: 2022-01-01

## 2022-01-01 RX ORDER — SODIUM CHLORIDE 9 MG/ML
100 INJECTION, SOLUTION INTRAVENOUS CONTINUOUS
Status: CANCELLED | OUTPATIENT
Start: 2022-01-01

## 2022-01-01 RX ORDER — ACETAMINOPHEN 325 MG/1
650 TABLET ORAL EVERY 4 HOURS PRN
Status: DISCONTINUED | OUTPATIENT
Start: 2022-01-01 | End: 2022-01-01 | Stop reason: HOSPADM

## 2022-01-01 RX ORDER — WARFARIN SODIUM 3 MG/1
TABLET ORAL
Qty: 90 TABLET | Refills: 1 | Status: SHIPPED | OUTPATIENT
Start: 2022-01-01 | End: 2023-01-01

## 2022-01-01 RX ORDER — SODIUM CHLORIDE 9 MG/ML
100 INJECTION, SOLUTION INTRAVENOUS CONTINUOUS
Status: DISCONTINUED | OUTPATIENT
Start: 2022-01-01 | End: 2022-01-01 | Stop reason: HOSPADM

## 2022-01-01 RX ORDER — LIDOCAINE 40 MG/G
CREAM TOPICAL
Status: CANCELLED | OUTPATIENT
Start: 2022-01-01

## 2022-01-01 RX ORDER — CHLORAL HYDRATE 500 MG
1 CAPSULE ORAL DAILY
COMMUNITY
Start: 2022-01-01

## 2022-01-01 RX ORDER — SODIUM CHLORIDE, SODIUM LACTATE, POTASSIUM CHLORIDE, CALCIUM CHLORIDE 600; 310; 30; 20 MG/100ML; MG/100ML; MG/100ML; MG/100ML
INJECTION, SOLUTION INTRAVENOUS CONTINUOUS
Status: DISCONTINUED | OUTPATIENT
Start: 2022-01-01 | End: 2022-01-01 | Stop reason: HOSPADM

## 2022-01-01 RX ORDER — ATORVASTATIN CALCIUM 20 MG/1
TABLET, FILM COATED ORAL
Qty: 90 TABLET | Refills: 3 | Status: SHIPPED | OUTPATIENT
Start: 2022-01-01

## 2022-01-01 RX ORDER — FUROSEMIDE 20 MG
20 TABLET ORAL DAILY
Qty: 90 TABLET | Refills: 3 | Status: SHIPPED | OUTPATIENT
Start: 2022-01-01 | End: 2022-01-01

## 2022-01-01 RX ORDER — KETAMINE HYDROCHLORIDE 10 MG/ML
INJECTION INTRAMUSCULAR; INTRAVENOUS PRN
Status: DISCONTINUED | OUTPATIENT
Start: 2022-01-01 | End: 2022-01-01

## 2022-01-01 RX ORDER — ONDANSETRON 2 MG/ML
INJECTION INTRAMUSCULAR; INTRAVENOUS PRN
Status: DISCONTINUED | OUTPATIENT
Start: 2022-01-01 | End: 2022-01-01

## 2022-01-01 RX ORDER — PROPOFOL 10 MG/ML
INJECTION, EMULSION INTRAVENOUS PRN
Status: DISCONTINUED | OUTPATIENT
Start: 2022-01-01 | End: 2022-01-01

## 2022-01-01 RX ORDER — FUROSEMIDE 20 MG
40 TABLET ORAL DAILY
Qty: 90 TABLET | Refills: 1 | Status: SHIPPED | OUTPATIENT
Start: 2022-01-01 | End: 2022-01-01

## 2022-01-01 RX ORDER — LIDOCAINE 40 MG/G
CREAM TOPICAL
Status: DISCONTINUED | OUTPATIENT
Start: 2022-01-01 | End: 2022-01-01 | Stop reason: HOSPADM

## 2022-01-01 RX ORDER — SODIUM CHLORIDE 9 MG/ML
INJECTION, SOLUTION INTRAVENOUS CONTINUOUS PRN
Status: DISCONTINUED | OUTPATIENT
Start: 2022-01-01 | End: 2022-01-01

## 2022-01-01 RX ORDER — FENTANYL CITRATE 50 UG/ML
25 INJECTION, SOLUTION INTRAMUSCULAR; INTRAVENOUS
Status: CANCELLED | OUTPATIENT
Start: 2022-01-01

## 2022-01-01 RX ORDER — FENTANYL CITRATE 50 UG/ML
25 INJECTION, SOLUTION INTRAMUSCULAR; INTRAVENOUS
Status: DISCONTINUED | OUTPATIENT
Start: 2022-01-01 | End: 2022-01-01 | Stop reason: HOSPADM

## 2022-01-01 RX ORDER — LOSARTAN POTASSIUM 50 MG/1
TABLET ORAL
Qty: 90 TABLET | Refills: 3 | Status: SHIPPED | OUTPATIENT
Start: 2022-01-01 | End: 2023-01-01

## 2022-01-01 RX ORDER — FUROSEMIDE 20 MG
20 TABLET ORAL DAILY
Qty: 120 TABLET | Refills: 3 | Status: SHIPPED | OUTPATIENT
Start: 2022-01-01 | End: 2023-01-01

## 2022-01-01 RX ORDER — WARFARIN SODIUM 1 MG/1
TABLET ORAL
Qty: 90 TABLET | Refills: 3 | Status: SHIPPED | OUTPATIENT
Start: 2022-01-01 | End: 2023-01-01

## 2022-01-01 RX ORDER — DEXMEDETOMIDINE HYDROCHLORIDE 4 UG/ML
.1-1.5 INJECTION, SOLUTION INTRAVENOUS CONTINUOUS
Status: DISCONTINUED | OUTPATIENT
Start: 2022-01-01 | End: 2022-01-01 | Stop reason: HOSPADM

## 2022-01-01 RX ORDER — DEXMEDETOMIDINE HYDROCHLORIDE 4 UG/ML
.1-1.5 INJECTION, SOLUTION INTRAVENOUS CONTINUOUS
Status: CANCELLED | OUTPATIENT
Start: 2022-01-01

## 2022-01-01 RX ADMIN — KETAMINE HYDROCHLORIDE 5 MG: 10 INJECTION, SOLUTION INTRAMUSCULAR; INTRAVENOUS at 15:33

## 2022-01-01 RX ADMIN — SODIUM CHLORIDE: 9 INJECTION, SOLUTION INTRAVENOUS at 15:19

## 2022-01-01 RX ADMIN — KETAMINE HYDROCHLORIDE 5 MG: 10 INJECTION, SOLUTION INTRAMUSCULAR; INTRAVENOUS at 15:38

## 2022-01-01 RX ADMIN — KETAMINE HYDROCHLORIDE 5 MG: 10 INJECTION, SOLUTION INTRAMUSCULAR; INTRAVENOUS at 15:22

## 2022-01-01 RX ADMIN — PROPOFOL 10 MG: 10 INJECTION, EMULSION INTRAVENOUS at 15:38

## 2022-01-01 RX ADMIN — PROPOFOL 50 MCG/KG/MIN: 10 INJECTION, EMULSION INTRAVENOUS at 15:21

## 2022-01-01 RX ADMIN — PROPOFOL 30 MG: 10 INJECTION, EMULSION INTRAVENOUS at 15:21

## 2022-01-01 RX ADMIN — ONDANSETRON 4 MG: 2 INJECTION INTRAMUSCULAR; INTRAVENOUS at 15:24

## 2022-01-01 RX ADMIN — VANCOMYCIN HYDROCHLORIDE 1500 MG: 5 INJECTION, POWDER, LYOPHILIZED, FOR SOLUTION INTRAVENOUS at 12:09

## 2022-01-01 RX ADMIN — KETAMINE HYDROCHLORIDE 5 MG: 10 INJECTION, SOLUTION INTRAMUSCULAR; INTRAVENOUS at 15:24

## 2022-01-01 ASSESSMENT — ACTIVITIES OF DAILY LIVING (ADL)
ADLS_ACUITY_SCORE: 35

## 2022-01-01 ASSESSMENT — ENCOUNTER SYMPTOMS: DYSRHYTHMIAS: 1

## 2022-01-04 LAB
MDC_IDC_LEAD_IMPLANT_DT: NORMAL
MDC_IDC_LEAD_LOCATION: NORMAL
MDC_IDC_LEAD_LOCATION_DETAIL_1: NORMAL
MDC_IDC_LEAD_MFG: NORMAL
MDC_IDC_LEAD_MODEL: NORMAL
MDC_IDC_LEAD_POLARITY_TYPE: NORMAL
MDC_IDC_LEAD_SERIAL: NORMAL
MDC_IDC_MSMT_BATTERY_DTM: NORMAL
MDC_IDC_MSMT_BATTERY_REMAINING_LONGEVITY: 7 MO
MDC_IDC_MSMT_BATTERY_STATUS: NORMAL
MDC_IDC_MSMT_CAP_CHARGE_TYPE: NORMAL
MDC_IDC_MSMT_LEADCHNL_LV_IMPEDANCE_VALUE: 798 OHM
MDC_IDC_MSMT_LEADCHNL_LV_PACING_THRESHOLD_AMPLITUDE: 1.5 V
MDC_IDC_MSMT_LEADCHNL_LV_PACING_THRESHOLD_PULSEWIDTH: 1.5 MS
MDC_IDC_MSMT_LEADCHNL_RA_IMPEDANCE_VALUE: 380 OHM
MDC_IDC_MSMT_LEADCHNL_RA_PACING_THRESHOLD_AMPLITUDE: 0.5 V
MDC_IDC_MSMT_LEADCHNL_RA_PACING_THRESHOLD_AMPLITUDE: 0.5 V
MDC_IDC_MSMT_LEADCHNL_RA_PACING_THRESHOLD_PULSEWIDTH: 0.3 MS
MDC_IDC_MSMT_LEADCHNL_RA_PACING_THRESHOLD_PULSEWIDTH: 0.4 MS
MDC_IDC_MSMT_LEADCHNL_RA_SENSING_INTR_AMPL: 0.3 MV
MDC_IDC_MSMT_LEADCHNL_RV_IMPEDANCE_VALUE: 532 OHM
MDC_IDC_MSMT_LEADCHNL_RV_PACING_THRESHOLD_AMPLITUDE: 0.75 V
MDC_IDC_MSMT_LEADCHNL_RV_PACING_THRESHOLD_PULSEWIDTH: 0.4 MS
MDC_IDC_MSMT_LEADCHNL_RV_SENSING_INTR_AMPL: 10.9 MV
MDC_IDC_PG_IMPLANT_DTM: NORMAL
MDC_IDC_PG_MFG: NORMAL
MDC_IDC_PG_MODEL: NORMAL
MDC_IDC_PG_SERIAL: NORMAL
MDC_IDC_PG_TYPE: NORMAL
MDC_IDC_SESS_CLINIC_NAME: NORMAL
MDC_IDC_SESS_DTM: NORMAL
MDC_IDC_SESS_TYPE: NORMAL
MDC_IDC_SET_BRADY_AT_MODE_SWITCH_RATE: 171 {BEATS}/MIN
MDC_IDC_SET_BRADY_LOWRATE: 70 {BEATS}/MIN
MDC_IDC_SET_BRADY_MAX_SENSOR_RATE: 140 {BEATS}/MIN
MDC_IDC_SET_BRADY_MAX_TRACKING_RATE: 140 {BEATS}/MIN
MDC_IDC_SET_BRADY_MODE: NORMAL
MDC_IDC_SET_BRADY_PAV_DELAY_LOW: 170 MS
MDC_IDC_SET_BRADY_SAV_DELAY_LOW: 110 MS
MDC_IDC_SET_CRT_LVRV_DELAY: 0 MS
MDC_IDC_SET_CRT_PACED_CHAMBERS: NORMAL
MDC_IDC_SET_LEADCHNL_LV_PACING_AMPLITUDE: 2.5 V
MDC_IDC_SET_LEADCHNL_LV_PACING_ANODE_ELECTRODE_1: NORMAL
MDC_IDC_SET_LEADCHNL_LV_PACING_ANODE_LOCATION_1: NORMAL
MDC_IDC_SET_LEADCHNL_LV_PACING_CAPTURE_MODE: NORMAL
MDC_IDC_SET_LEADCHNL_LV_PACING_CATHODE_ELECTRODE_1: NORMAL
MDC_IDC_SET_LEADCHNL_LV_PACING_CATHODE_LOCATION_1: NORMAL
MDC_IDC_SET_LEADCHNL_LV_PACING_POLARITY: NORMAL
MDC_IDC_SET_LEADCHNL_LV_PACING_PULSEWIDTH: 1.5 MS
MDC_IDC_SET_LEADCHNL_RA_PACING_AMPLITUDE: 2 V
MDC_IDC_SET_LEADCHNL_RA_PACING_ANODE_ELECTRODE_1: NORMAL
MDC_IDC_SET_LEADCHNL_RA_PACING_ANODE_LOCATION_1: NORMAL
MDC_IDC_SET_LEADCHNL_RA_PACING_CAPTURE_MODE: NORMAL
MDC_IDC_SET_LEADCHNL_RA_PACING_CATHODE_ELECTRODE_1: NORMAL
MDC_IDC_SET_LEADCHNL_RA_PACING_CATHODE_LOCATION_1: NORMAL
MDC_IDC_SET_LEADCHNL_RA_PACING_POLARITY: NORMAL
MDC_IDC_SET_LEADCHNL_RA_PACING_PULSEWIDTH: 0.4 MS
MDC_IDC_SET_LEADCHNL_RA_SENSING_ANODE_ELECTRODE_1: NORMAL
MDC_IDC_SET_LEADCHNL_RA_SENSING_ANODE_LOCATION_1: NORMAL
MDC_IDC_SET_LEADCHNL_RA_SENSING_CATHODE_ELECTRODE_1: NORMAL
MDC_IDC_SET_LEADCHNL_RA_SENSING_CATHODE_LOCATION_1: NORMAL
MDC_IDC_SET_LEADCHNL_RA_SENSING_POLARITY: NORMAL
MDC_IDC_SET_LEADCHNL_RA_SENSING_SENSITIVITY: 0.15 MV
MDC_IDC_SET_LEADCHNL_RV_PACING_AMPLITUDE: NORMAL
MDC_IDC_SET_LEADCHNL_RV_PACING_ANODE_ELECTRODE_1: NORMAL
MDC_IDC_SET_LEADCHNL_RV_PACING_ANODE_LOCATION_1: NORMAL
MDC_IDC_SET_LEADCHNL_RV_PACING_CAPTURE_MODE: NORMAL
MDC_IDC_SET_LEADCHNL_RV_PACING_CATHODE_ELECTRODE_1: NORMAL
MDC_IDC_SET_LEADCHNL_RV_PACING_CATHODE_LOCATION_1: NORMAL
MDC_IDC_SET_LEADCHNL_RV_PACING_POLARITY: NORMAL
MDC_IDC_SET_LEADCHNL_RV_PACING_PULSEWIDTH: 0.4 MS
MDC_IDC_SET_LEADCHNL_RV_SENSING_ANODE_ELECTRODE_1: NORMAL
MDC_IDC_SET_LEADCHNL_RV_SENSING_ANODE_LOCATION_1: NORMAL
MDC_IDC_SET_LEADCHNL_RV_SENSING_CATHODE_ELECTRODE_1: NORMAL
MDC_IDC_SET_LEADCHNL_RV_SENSING_CATHODE_LOCATION_1: NORMAL
MDC_IDC_SET_LEADCHNL_RV_SENSING_POLARITY: NORMAL
MDC_IDC_SET_LEADCHNL_RV_SENSING_SENSITIVITY: 0.9 MV
MDC_IDC_SET_ZONE_DETECTION_INTERVAL: 200 MS
MDC_IDC_SET_ZONE_DETECTION_INTERVAL: 350 MS
MDC_IDC_SET_ZONE_DETECTION_INTERVAL: 400 MS
MDC_IDC_SET_ZONE_TYPE: NORMAL
MDC_IDC_STAT_AT_BURDEN_PERCENT: 2.5 %
MDC_IDC_STAT_AT_MODE_SW_COUNT: 231
MDC_IDC_STAT_BRADY_DTM_END: NORMAL
MDC_IDC_STAT_BRADY_DTM_START: NORMAL
MDC_IDC_STAT_BRADY_RA_PERCENT_PACED: 95.8 %
MDC_IDC_STAT_BRADY_RV_PERCENT_PACED: 99.2 %
MDC_IDC_STAT_CRT_LV_PERCENT_PACED: 99.2 %
MDC_IDC_STAT_EPISODE_RECENT_COUNT: 12
MDC_IDC_STAT_EPISODE_RECENT_COUNT: 231
MDC_IDC_STAT_EPISODE_RECENT_COUNT_DTM_END: NORMAL
MDC_IDC_STAT_EPISODE_RECENT_COUNT_DTM_END: NORMAL
MDC_IDC_STAT_EPISODE_RECENT_COUNT_DTM_START: NORMAL
MDC_IDC_STAT_EPISODE_RECENT_COUNT_DTM_START: NORMAL
MDC_IDC_STAT_EPISODE_TYPE: NORMAL
MDC_IDC_STAT_EPISODE_TYPE: NORMAL

## 2022-02-02 ENCOUNTER — LAB (OUTPATIENT)
Dept: CARDIOLOGY | Facility: CLINIC | Age: 82
End: 2022-02-02
Payer: COMMERCIAL

## 2022-02-02 ENCOUNTER — ANTICOAGULATION THERAPY VISIT (OUTPATIENT)
Dept: ANTICOAGULATION | Facility: CLINIC | Age: 82
End: 2022-02-02

## 2022-02-02 DIAGNOSIS — I48.92 ATRIAL FLUTTER, UNSPECIFIED TYPE (H): ICD-10-CM

## 2022-02-02 DIAGNOSIS — I48.91 ATRIAL FIBRILLATION (H): Primary | ICD-10-CM

## 2022-02-02 DIAGNOSIS — I48.91 ATRIAL FIBRILLATION (H): ICD-10-CM

## 2022-02-02 DIAGNOSIS — I48.0 PAROXYSMAL ATRIAL FIBRILLATION (H): Primary | ICD-10-CM

## 2022-02-02 LAB — INR BLD: 2.3 (ref 2–3)

## 2022-02-02 PROCEDURE — 36416 COLLJ CAPILLARY BLOOD SPEC: CPT

## 2022-02-02 PROCEDURE — 85610 PROTHROMBIN TIME: CPT

## 2022-02-02 NOTE — PROGRESS NOTES
ANTICOAGULATION MANAGEMENT     Ady DÍAZ Ilirjhonathan 81 year old male is on warfarin with therapeutic INR result. (Goal INR 2.0-3.0)    Recent labs: (last 7 days)     02/02/22  0837   INR 2.3       ASSESSMENT     Source(s): Chart Review and Template       Warfarin doses taken: Warfarin taken as instructed    Diet: No new diet changes identified    New illness, injury, or hospitalization: No    Medication/supplement changes: None noted    Signs or symptoms of bleeding or clotting: No    Previous INR: Therapeutic last 2(+) visits    Additional findings: None     PLAN     Recommended plan for no diet, medication or health factor changes affecting INR     Dosing Instructions: Continue your current warfarin dose with next INR in 8 weeks       Summary  As of 2/2/2022    Full warfarin instructions:  4 mg every Thu, Sat; 3 mg all other days   Next INR check:  3/30/2022             Telephone call with Ady who verbalizes understanding and agrees to plan    Lab visit scheduled    Education provided: Importance of notifying clinic for changes in medications; a sooner lab recheck maybe needed., Importance of notifying clinic of upcoming surgeries and procedures 2 weeks in advance and Contact [phone] with any changes, questions or concerns.     Plan made per [facility] anticoagulation protocol    Salma Marques RN  Anticoagulation Clinic  2/2/2022    _______________________________________________________________________     Anticoagulation Episode Summary     Current INR goal:  2.0-3.0   TTR:  100.0 % (6 mo)   Target end date:     Send INR reminders to:  [facility]    Indications    Paroxysmal Atrial Fibrillation [I48.91]  Atrial flutter  unspecified type (H) [I48.92]           Comments:           Anticoagulation Care Providers     Provider Role Specialty Phone number    Janis Townsend MD Referring Cardiovascular Disease [phone]

## 2022-03-30 ENCOUNTER — LAB (OUTPATIENT)
Dept: CARDIOLOGY | Facility: CLINIC | Age: 82
End: 2022-03-30
Payer: COMMERCIAL

## 2022-03-30 ENCOUNTER — HOSPITAL ENCOUNTER (OUTPATIENT)
Dept: CARDIOLOGY | Facility: HOSPITAL | Age: 82
Discharge: HOME | End: 2022-03-30
Attending: NURSE PRACTITIONER | Admitting: NURSE PRACTITIONER
Payer: COMMERCIAL

## 2022-03-30 ENCOUNTER — ANTICOAGULATION THERAPY VISIT (OUTPATIENT)
Dept: ANTICOAGULATION | Facility: CLINIC | Age: 82
End: 2022-03-30

## 2022-03-30 DIAGNOSIS — I48.91 ATRIAL FIBRILLATION (H): Primary | ICD-10-CM

## 2022-03-30 DIAGNOSIS — I48.92 ATRIAL FLUTTER, UNSPECIFIED TYPE (H): ICD-10-CM

## 2022-03-30 DIAGNOSIS — I48.91 ATRIAL FIBRILLATION (H): ICD-10-CM

## 2022-03-30 DIAGNOSIS — I48.0 PAROXYSMAL ATRIAL FIBRILLATION (H): ICD-10-CM

## 2022-03-30 LAB
INR POINT OF CARE: 2.1 (ref 0.9–1.1)
LVEF ECHO: NORMAL

## 2022-03-30 PROCEDURE — 36416 COLLJ CAPILLARY BLOOD SPEC: CPT

## 2022-03-30 PROCEDURE — 93306 TTE W/DOPPLER COMPLETE: CPT | Mod: 26 | Performed by: INTERNAL MEDICINE

## 2022-03-30 PROCEDURE — 93306 TTE W/DOPPLER COMPLETE: CPT

## 2022-03-30 PROCEDURE — 85610 PROTHROMBIN TIME: CPT

## 2022-03-30 NOTE — PROGRESS NOTES
ANTICOAGULATION MANAGEMENT     dAy DÍAZ Ilirjhonathan 81 year old male is on warfarin with therapeutic INR result. (Goal INR 2.0-3.0)    Recent labs: (last 7 days)     03/30/22  0850   INR 2.1*       ASSESSMENT       Source(s): Chart Review, Patient/Caregiver Call and Template       Warfarin doses taken: Warfarin taken as instructed    Diet: Change in alcohol intake may be affecting INR. 2 beers 3/24     New illness, injury, or hospitalization: No    Medication/supplement changes: None noted    Signs or symptoms of bleeding or clotting: No    Previous INR: Therapeutic last 2(+) visits    Additional findings: None       PLAN     Recommended plan for temporary change(s) affecting INR     Dosing Instructions: Continue your current warfarin dose with next INR in 8 weeks       Summary  As of 3/30/2022    Full warfarin instructions:  4 mg every Thu, Sat; 3 mg all other days   Next INR check:  5/25/2022             Telephone call with Ady who verbalizes understanding and agrees to plan    Lab visit scheduled    Education provided: Goal range and significance of current result and Importance of therapeutic range    Plan made per [facility] anticoagulation protocol    Cindy Hardin RN  Anticoagulation Clinic  3/30/2022    _______________________________________________________________________     Anticoagulation Episode Summary     Current INR goal:  2.0-3.0   TTR:  100.0 % (7.9 mo)   Target end date:     Send INR reminders to:  [facility]    Indications    Paroxysmal Atrial Fibrillation [I48.91]  Atrial flutter  unspecified type (H) [I48.92]           Comments:           Anticoagulation Care Providers     Provider Role Specialty Phone number    Janis Townsend MD Referring Cardiovascular Disease [phone]

## 2022-04-05 ENCOUNTER — ANCILLARY PROCEDURE (OUTPATIENT)
Dept: CARDIOLOGY | Facility: CLINIC | Age: 82
End: 2022-04-05
Attending: INTERNAL MEDICINE
Payer: COMMERCIAL

## 2022-04-05 DIAGNOSIS — I49.5 SSS (SICK SINUS SYNDROME) (H): ICD-10-CM

## 2022-04-05 DIAGNOSIS — Z95.0 BIVENTRICULAR CARDIAC PACEMAKER IN SITU: ICD-10-CM

## 2022-04-05 DIAGNOSIS — I50.30 (HFPEF) HEART FAILURE WITH PRESERVED EJECTION FRACTION (H): ICD-10-CM

## 2022-04-05 PROCEDURE — 93296 REM INTERROG EVL PM/IDS: CPT | Performed by: INTERNAL MEDICINE

## 2022-04-05 PROCEDURE — 93294 REM INTERROG EVL PM/LDLS PM: CPT | Performed by: INTERNAL MEDICINE

## 2022-04-06 LAB
MDC_IDC_EPISODE_DTM: NORMAL
MDC_IDC_EPISODE_DURATION: 0 S
MDC_IDC_EPISODE_DURATION: 1 S
MDC_IDC_EPISODE_DURATION: 1 S
MDC_IDC_EPISODE_DURATION: 10 S
MDC_IDC_EPISODE_DURATION: 1018 S
MDC_IDC_EPISODE_DURATION: 1018 S
MDC_IDC_EPISODE_DURATION: 106 S
MDC_IDC_EPISODE_DURATION: 1088 S
MDC_IDC_EPISODE_DURATION: 109 S
MDC_IDC_EPISODE_DURATION: 1091 S
MDC_IDC_EPISODE_DURATION: 11 S
MDC_IDC_EPISODE_DURATION: 115 S
MDC_IDC_EPISODE_DURATION: 130 S
MDC_IDC_EPISODE_DURATION: 1318 S
MDC_IDC_EPISODE_DURATION: 1339 S
MDC_IDC_EPISODE_DURATION: 147 S
MDC_IDC_EPISODE_DURATION: 147 S
MDC_IDC_EPISODE_DURATION: 1504 S
MDC_IDC_EPISODE_DURATION: 17 S
MDC_IDC_EPISODE_DURATION: 178 S
MDC_IDC_EPISODE_DURATION: 180 S
MDC_IDC_EPISODE_DURATION: 180 S
MDC_IDC_EPISODE_DURATION: 181 S
MDC_IDC_EPISODE_DURATION: 182 S
MDC_IDC_EPISODE_DURATION: 183 S
MDC_IDC_EPISODE_DURATION: 184 S
MDC_IDC_EPISODE_DURATION: 185 S
MDC_IDC_EPISODE_DURATION: 187 S
MDC_IDC_EPISODE_DURATION: 189 S
MDC_IDC_EPISODE_DURATION: 191 S
MDC_IDC_EPISODE_DURATION: 192 S
MDC_IDC_EPISODE_DURATION: 196 S
MDC_IDC_EPISODE_DURATION: 196 S
MDC_IDC_EPISODE_DURATION: 1962 S
MDC_IDC_EPISODE_DURATION: 198 S
MDC_IDC_EPISODE_DURATION: 1992 S
MDC_IDC_EPISODE_DURATION: 2 S
MDC_IDC_EPISODE_DURATION: 2 S
MDC_IDC_EPISODE_DURATION: 203 S
MDC_IDC_EPISODE_DURATION: 207 S
MDC_IDC_EPISODE_DURATION: 2098 S
MDC_IDC_EPISODE_DURATION: 213 S
MDC_IDC_EPISODE_DURATION: 217 S
MDC_IDC_EPISODE_DURATION: 2202 S
MDC_IDC_EPISODE_DURATION: 222 S
MDC_IDC_EPISODE_DURATION: 23 S
MDC_IDC_EPISODE_DURATION: 2325 S
MDC_IDC_EPISODE_DURATION: 238 S
MDC_IDC_EPISODE_DURATION: 239 S
MDC_IDC_EPISODE_DURATION: 243 S
MDC_IDC_EPISODE_DURATION: 243 S
MDC_IDC_EPISODE_DURATION: 244 S
MDC_IDC_EPISODE_DURATION: 246 S
MDC_IDC_EPISODE_DURATION: 247 S
MDC_IDC_EPISODE_DURATION: 248 S
MDC_IDC_EPISODE_DURATION: 248 S
MDC_IDC_EPISODE_DURATION: 249 S
MDC_IDC_EPISODE_DURATION: 2492 S
MDC_IDC_EPISODE_DURATION: 253 S
MDC_IDC_EPISODE_DURATION: 2550 S
MDC_IDC_EPISODE_DURATION: 256 S
MDC_IDC_EPISODE_DURATION: 257 S
MDC_IDC_EPISODE_DURATION: 260 S
MDC_IDC_EPISODE_DURATION: 263 S
MDC_IDC_EPISODE_DURATION: 263 S
MDC_IDC_EPISODE_DURATION: 2688 S
MDC_IDC_EPISODE_DURATION: 269 S
MDC_IDC_EPISODE_DURATION: 269 S
MDC_IDC_EPISODE_DURATION: 2693 S
MDC_IDC_EPISODE_DURATION: 270 S
MDC_IDC_EPISODE_DURATION: 272 S
MDC_IDC_EPISODE_DURATION: 273 S
MDC_IDC_EPISODE_DURATION: 2746 S
MDC_IDC_EPISODE_DURATION: 278 S
MDC_IDC_EPISODE_DURATION: 2783 S
MDC_IDC_EPISODE_DURATION: 279 S
MDC_IDC_EPISODE_DURATION: 281 S
MDC_IDC_EPISODE_DURATION: 2832 S
MDC_IDC_EPISODE_DURATION: 3 S
MDC_IDC_EPISODE_DURATION: 300 S
MDC_IDC_EPISODE_DURATION: 302 S
MDC_IDC_EPISODE_DURATION: 302 S
MDC_IDC_EPISODE_DURATION: 305 S
MDC_IDC_EPISODE_DURATION: 316 S
MDC_IDC_EPISODE_DURATION: 319 S
MDC_IDC_EPISODE_DURATION: 3204 S
MDC_IDC_EPISODE_DURATION: 3251 S
MDC_IDC_EPISODE_DURATION: 326 S
MDC_IDC_EPISODE_DURATION: 326 S
MDC_IDC_EPISODE_DURATION: 327 S
MDC_IDC_EPISODE_DURATION: 332 S
MDC_IDC_EPISODE_DURATION: 3328 S
MDC_IDC_EPISODE_DURATION: 335 S
MDC_IDC_EPISODE_DURATION: 346 S
MDC_IDC_EPISODE_DURATION: 3468 S
MDC_IDC_EPISODE_DURATION: 372 S
MDC_IDC_EPISODE_DURATION: 3730 S
MDC_IDC_EPISODE_DURATION: 374 S
MDC_IDC_EPISODE_DURATION: 3823 S
MDC_IDC_EPISODE_DURATION: 393 S
MDC_IDC_EPISODE_DURATION: 3982 S
MDC_IDC_EPISODE_DURATION: 4 S
MDC_IDC_EPISODE_DURATION: 422 S
MDC_IDC_EPISODE_DURATION: 426 S
MDC_IDC_EPISODE_DURATION: 4419 S
MDC_IDC_EPISODE_DURATION: 4528 S
MDC_IDC_EPISODE_DURATION: 454 S
MDC_IDC_EPISODE_DURATION: 479 S
MDC_IDC_EPISODE_DURATION: 489 S
MDC_IDC_EPISODE_DURATION: 498 S
MDC_IDC_EPISODE_DURATION: 499 S
MDC_IDC_EPISODE_DURATION: 4997 S
MDC_IDC_EPISODE_DURATION: 5 S
MDC_IDC_EPISODE_DURATION: 500 S
MDC_IDC_EPISODE_DURATION: 5062 S
MDC_IDC_EPISODE_DURATION: 542 S
MDC_IDC_EPISODE_DURATION: 580 S
MDC_IDC_EPISODE_DURATION: 5886 S
MDC_IDC_EPISODE_DURATION: 595 S
MDC_IDC_EPISODE_DURATION: 6029 S
MDC_IDC_EPISODE_DURATION: 609 S
MDC_IDC_EPISODE_DURATION: 6231 S
MDC_IDC_EPISODE_DURATION: 6483 S
MDC_IDC_EPISODE_DURATION: 71 S
MDC_IDC_EPISODE_DURATION: 713 S
MDC_IDC_EPISODE_DURATION: 764 S
MDC_IDC_EPISODE_DURATION: 8 S
MDC_IDC_EPISODE_DURATION: 803 S
MDC_IDC_EPISODE_DURATION: 817 S
MDC_IDC_EPISODE_DURATION: 845 S
MDC_IDC_EPISODE_DURATION: 85 S
MDC_IDC_EPISODE_DURATION: 861 S
MDC_IDC_EPISODE_DURATION: 894 S
MDC_IDC_EPISODE_DURATION: 8991 S
MDC_IDC_EPISODE_DURATION: 901 S
MDC_IDC_EPISODE_DURATION: 91 S
MDC_IDC_EPISODE_DURATION: 93 S
MDC_IDC_EPISODE_DURATION: 957 S
MDC_IDC_EPISODE_DURATION: 97 S
MDC_IDC_EPISODE_DURATION: NORMAL S
MDC_IDC_EPISODE_ID: 1000
MDC_IDC_EPISODE_ID: 1001
MDC_IDC_EPISODE_ID: 1004
MDC_IDC_EPISODE_ID: 1005
MDC_IDC_EPISODE_ID: 1008
MDC_IDC_EPISODE_ID: 1009
MDC_IDC_EPISODE_ID: 1010
MDC_IDC_EPISODE_ID: 1011
MDC_IDC_EPISODE_ID: 1012
MDC_IDC_EPISODE_ID: 1013
MDC_IDC_EPISODE_ID: 1014
MDC_IDC_EPISODE_ID: 1015
MDC_IDC_EPISODE_ID: 1016
MDC_IDC_EPISODE_ID: 1017
MDC_IDC_EPISODE_ID: 1018
MDC_IDC_EPISODE_ID: 1019
MDC_IDC_EPISODE_ID: 1020
MDC_IDC_EPISODE_ID: 1021
MDC_IDC_EPISODE_ID: 1022
MDC_IDC_EPISODE_ID: 1023
MDC_IDC_EPISODE_ID: 1024
MDC_IDC_EPISODE_ID: 1025
MDC_IDC_EPISODE_ID: 1026
MDC_IDC_EPISODE_ID: 1027
MDC_IDC_EPISODE_ID: 1028
MDC_IDC_EPISODE_ID: 1029
MDC_IDC_EPISODE_ID: 1030
MDC_IDC_EPISODE_ID: 1031
MDC_IDC_EPISODE_ID: 1032
MDC_IDC_EPISODE_ID: 1033
MDC_IDC_EPISODE_ID: 1034
MDC_IDC_EPISODE_ID: 1035
MDC_IDC_EPISODE_ID: 1036
MDC_IDC_EPISODE_ID: 1037
MDC_IDC_EPISODE_ID: 1038
MDC_IDC_EPISODE_ID: 1039
MDC_IDC_EPISODE_ID: 1040
MDC_IDC_EPISODE_ID: 1041
MDC_IDC_EPISODE_ID: 1042
MDC_IDC_EPISODE_ID: 1043
MDC_IDC_EPISODE_ID: 1044
MDC_IDC_EPISODE_ID: 1045
MDC_IDC_EPISODE_ID: 1046
MDC_IDC_EPISODE_ID: 1047
MDC_IDC_EPISODE_ID: 1048
MDC_IDC_EPISODE_ID: 1049
MDC_IDC_EPISODE_ID: 1050
MDC_IDC_EPISODE_ID: 1051
MDC_IDC_EPISODE_ID: 1052
MDC_IDC_EPISODE_ID: 1053
MDC_IDC_EPISODE_ID: 1054
MDC_IDC_EPISODE_ID: 1055
MDC_IDC_EPISODE_ID: 1056
MDC_IDC_EPISODE_ID: 1057
MDC_IDC_EPISODE_ID: 1058
MDC_IDC_EPISODE_ID: 1059
MDC_IDC_EPISODE_ID: 1060
MDC_IDC_EPISODE_ID: 1061
MDC_IDC_EPISODE_ID: 1062
MDC_IDC_EPISODE_ID: 1063
MDC_IDC_EPISODE_ID: 1064
MDC_IDC_EPISODE_ID: 1065
MDC_IDC_EPISODE_ID: 1066
MDC_IDC_EPISODE_ID: 1067
MDC_IDC_EPISODE_ID: 1068
MDC_IDC_EPISODE_ID: 1069
MDC_IDC_EPISODE_ID: 1070
MDC_IDC_EPISODE_ID: 1071
MDC_IDC_EPISODE_ID: 1072
MDC_IDC_EPISODE_ID: 1073
MDC_IDC_EPISODE_ID: 1074
MDC_IDC_EPISODE_ID: 1075
MDC_IDC_EPISODE_ID: 1076
MDC_IDC_EPISODE_ID: 1077
MDC_IDC_EPISODE_ID: 1078
MDC_IDC_EPISODE_ID: 1079
MDC_IDC_EPISODE_ID: 1080
MDC_IDC_EPISODE_ID: 1081
MDC_IDC_EPISODE_ID: 1082
MDC_IDC_EPISODE_ID: 1083
MDC_IDC_EPISODE_ID: 1084
MDC_IDC_EPISODE_ID: 1085
MDC_IDC_EPISODE_ID: 1086
MDC_IDC_EPISODE_ID: 1087
MDC_IDC_EPISODE_ID: 1088
MDC_IDC_EPISODE_ID: 1089
MDC_IDC_EPISODE_ID: 1090
MDC_IDC_EPISODE_ID: 1091
MDC_IDC_EPISODE_ID: 1092
MDC_IDC_EPISODE_ID: 1093
MDC_IDC_EPISODE_ID: 1094
MDC_IDC_EPISODE_ID: 1095
MDC_IDC_EPISODE_ID: 1096
MDC_IDC_EPISODE_ID: 1666
MDC_IDC_EPISODE_ID: 1667
MDC_IDC_EPISODE_ID: 1668
MDC_IDC_EPISODE_ID: 1669
MDC_IDC_EPISODE_ID: 1670
MDC_IDC_EPISODE_ID: 1671
MDC_IDC_EPISODE_ID: 1672
MDC_IDC_EPISODE_ID: 691
MDC_IDC_EPISODE_ID: 697
MDC_IDC_EPISODE_ID: 735
MDC_IDC_EPISODE_ID: 781
MDC_IDC_EPISODE_ID: 823
MDC_IDC_EPISODE_ID: 895
MDC_IDC_EPISODE_ID: 909
MDC_IDC_EPISODE_ID: 910
MDC_IDC_EPISODE_ID: 911
MDC_IDC_EPISODE_ID: 912
MDC_IDC_EPISODE_ID: 913
MDC_IDC_EPISODE_ID: 914
MDC_IDC_EPISODE_ID: 915
MDC_IDC_EPISODE_ID: 916
MDC_IDC_EPISODE_ID: 917
MDC_IDC_EPISODE_ID: 918
MDC_IDC_EPISODE_ID: 919
MDC_IDC_EPISODE_ID: 920
MDC_IDC_EPISODE_ID: 921
MDC_IDC_EPISODE_ID: 922
MDC_IDC_EPISODE_ID: 926
MDC_IDC_EPISODE_ID: 928
MDC_IDC_EPISODE_ID: 929
MDC_IDC_EPISODE_ID: 932
MDC_IDC_EPISODE_ID: 933
MDC_IDC_EPISODE_ID: 934
MDC_IDC_EPISODE_ID: 935
MDC_IDC_EPISODE_ID: 936
MDC_IDC_EPISODE_ID: 943
MDC_IDC_EPISODE_ID: 945
MDC_IDC_EPISODE_ID: 948
MDC_IDC_EPISODE_ID: 949
MDC_IDC_EPISODE_ID: 950
MDC_IDC_EPISODE_ID: 955
MDC_IDC_EPISODE_ID: 959
MDC_IDC_EPISODE_ID: 960
MDC_IDC_EPISODE_ID: 962
MDC_IDC_EPISODE_ID: 964
MDC_IDC_EPISODE_ID: 965
MDC_IDC_EPISODE_ID: 966
MDC_IDC_EPISODE_ID: 967
MDC_IDC_EPISODE_ID: 968
MDC_IDC_EPISODE_ID: 970
MDC_IDC_EPISODE_ID: 971
MDC_IDC_EPISODE_ID: 972
MDC_IDC_EPISODE_ID: 973
MDC_IDC_EPISODE_ID: 974
MDC_IDC_EPISODE_ID: 975
MDC_IDC_EPISODE_ID: 976
MDC_IDC_EPISODE_ID: 977
MDC_IDC_EPISODE_ID: 978
MDC_IDC_EPISODE_ID: 979
MDC_IDC_EPISODE_ID: 980
MDC_IDC_EPISODE_ID: 981
MDC_IDC_EPISODE_ID: 982
MDC_IDC_EPISODE_ID: 985
MDC_IDC_EPISODE_ID: 986
MDC_IDC_EPISODE_ID: 987
MDC_IDC_EPISODE_ID: 992
MDC_IDC_EPISODE_ID: 993
MDC_IDC_EPISODE_ID: 995
MDC_IDC_EPISODE_ID: 996
MDC_IDC_EPISODE_ID: 997
MDC_IDC_EPISODE_ID: 999
MDC_IDC_EPISODE_TYPE: NORMAL
MDC_IDC_LEAD_IMPLANT_DT: NORMAL
MDC_IDC_LEAD_LOCATION: NORMAL
MDC_IDC_LEAD_LOCATION_DETAIL_1: NORMAL
MDC_IDC_LEAD_MFG: NORMAL
MDC_IDC_LEAD_MODEL: NORMAL
MDC_IDC_LEAD_POLARITY_TYPE: NORMAL
MDC_IDC_LEAD_SERIAL: NORMAL
MDC_IDC_MSMT_BATTERY_DTM: NORMAL
MDC_IDC_MSMT_BATTERY_REMAINING_LONGEVITY: 3 MO
MDC_IDC_MSMT_BATTERY_RRT_TRIGGER: 2.77
MDC_IDC_MSMT_BATTERY_STATUS: NORMAL
MDC_IDC_MSMT_BATTERY_VOLTAGE: 2.82 V
MDC_IDC_MSMT_LEADCHNL_LV_IMPEDANCE_VALUE: 285 OHM
MDC_IDC_MSMT_LEADCHNL_LV_IMPEDANCE_VALUE: 475 OHM
MDC_IDC_MSMT_LEADCHNL_LV_IMPEDANCE_VALUE: 513 OHM
MDC_IDC_MSMT_LEADCHNL_LV_IMPEDANCE_VALUE: 646 OHM
MDC_IDC_MSMT_LEADCHNL_LV_IMPEDANCE_VALUE: 684 OHM
MDC_IDC_MSMT_LEADCHNL_LV_PACING_THRESHOLD_AMPLITUDE: 1.38 V
MDC_IDC_MSMT_LEADCHNL_LV_PACING_THRESHOLD_PULSEWIDTH: 1 MS
MDC_IDC_MSMT_LEADCHNL_RA_IMPEDANCE_VALUE: 266 OHM
MDC_IDC_MSMT_LEADCHNL_RA_IMPEDANCE_VALUE: 361 OHM
MDC_IDC_MSMT_LEADCHNL_RA_PACING_THRESHOLD_AMPLITUDE: 0.62 V
MDC_IDC_MSMT_LEADCHNL_RA_PACING_THRESHOLD_PULSEWIDTH: 0.4 MS
MDC_IDC_MSMT_LEADCHNL_RA_SENSING_INTR_AMPL: 0.25 MV
MDC_IDC_MSMT_LEADCHNL_RA_SENSING_INTR_AMPL: 0.25 MV
MDC_IDC_MSMT_LEADCHNL_RV_IMPEDANCE_VALUE: 342 OHM
MDC_IDC_MSMT_LEADCHNL_RV_IMPEDANCE_VALUE: 532 OHM
MDC_IDC_MSMT_LEADCHNL_RV_PACING_THRESHOLD_AMPLITUDE: 0.5 V
MDC_IDC_MSMT_LEADCHNL_RV_PACING_THRESHOLD_PULSEWIDTH: 0.4 MS
MDC_IDC_MSMT_LEADCHNL_RV_SENSING_INTR_AMPL: 7.5 MV
MDC_IDC_MSMT_LEADCHNL_RV_SENSING_INTR_AMPL: 7.5 MV
MDC_IDC_PG_IMPLANT_DTM: NORMAL
MDC_IDC_PG_MFG: NORMAL
MDC_IDC_PG_MODEL: NORMAL
MDC_IDC_PG_SERIAL: NORMAL
MDC_IDC_PG_TYPE: NORMAL
MDC_IDC_SESS_CLINIC_NAME: NORMAL
MDC_IDC_SESS_DTM: NORMAL
MDC_IDC_SESS_TYPE: NORMAL
MDC_IDC_SET_BRADY_AT_MODE_SWITCH_RATE: 171 {BEATS}/MIN
MDC_IDC_SET_BRADY_LOWRATE: 70 {BEATS}/MIN
MDC_IDC_SET_BRADY_MAX_SENSOR_RATE: 140 {BEATS}/MIN
MDC_IDC_SET_BRADY_MAX_TRACKING_RATE: 140 {BEATS}/MIN
MDC_IDC_SET_BRADY_MODE: NORMAL
MDC_IDC_SET_BRADY_PAV_DELAY_LOW: 170 MS
MDC_IDC_SET_BRADY_SAV_DELAY_LOW: 110 MS
MDC_IDC_SET_CRT_LVRV_DELAY: 0 MS
MDC_IDC_SET_CRT_PACED_CHAMBERS: NORMAL
MDC_IDC_SET_LEADCHNL_LV_PACING_AMPLITUDE: 2.5 V
MDC_IDC_SET_LEADCHNL_LV_PACING_ANODE_ELECTRODE_1: NORMAL
MDC_IDC_SET_LEADCHNL_LV_PACING_ANODE_LOCATION_1: NORMAL
MDC_IDC_SET_LEADCHNL_LV_PACING_CAPTURE_MODE: NORMAL
MDC_IDC_SET_LEADCHNL_LV_PACING_CATHODE_ELECTRODE_1: NORMAL
MDC_IDC_SET_LEADCHNL_LV_PACING_CATHODE_LOCATION_1: NORMAL
MDC_IDC_SET_LEADCHNL_LV_PACING_POLARITY: NORMAL
MDC_IDC_SET_LEADCHNL_LV_PACING_PULSEWIDTH: 1.5 MS
MDC_IDC_SET_LEADCHNL_RA_PACING_AMPLITUDE: 2 V
MDC_IDC_SET_LEADCHNL_RA_PACING_ANODE_ELECTRODE_1: NORMAL
MDC_IDC_SET_LEADCHNL_RA_PACING_ANODE_LOCATION_1: NORMAL
MDC_IDC_SET_LEADCHNL_RA_PACING_CAPTURE_MODE: NORMAL
MDC_IDC_SET_LEADCHNL_RA_PACING_CATHODE_ELECTRODE_1: NORMAL
MDC_IDC_SET_LEADCHNL_RA_PACING_CATHODE_LOCATION_1: NORMAL
MDC_IDC_SET_LEADCHNL_RA_PACING_POLARITY: NORMAL
MDC_IDC_SET_LEADCHNL_RA_PACING_PULSEWIDTH: 0.4 MS
MDC_IDC_SET_LEADCHNL_RA_SENSING_ANODE_ELECTRODE_1: NORMAL
MDC_IDC_SET_LEADCHNL_RA_SENSING_ANODE_LOCATION_1: NORMAL
MDC_IDC_SET_LEADCHNL_RA_SENSING_CATHODE_ELECTRODE_1: NORMAL
MDC_IDC_SET_LEADCHNL_RA_SENSING_CATHODE_LOCATION_1: NORMAL
MDC_IDC_SET_LEADCHNL_RA_SENSING_POLARITY: NORMAL
MDC_IDC_SET_LEADCHNL_RA_SENSING_SENSITIVITY: 0.15 MV
MDC_IDC_SET_LEADCHNL_RV_PACING_AMPLITUDE: 1.5 V
MDC_IDC_SET_LEADCHNL_RV_PACING_ANODE_ELECTRODE_1: NORMAL
MDC_IDC_SET_LEADCHNL_RV_PACING_ANODE_LOCATION_1: NORMAL
MDC_IDC_SET_LEADCHNL_RV_PACING_CAPTURE_MODE: NORMAL
MDC_IDC_SET_LEADCHNL_RV_PACING_CATHODE_ELECTRODE_1: NORMAL
MDC_IDC_SET_LEADCHNL_RV_PACING_CATHODE_LOCATION_1: NORMAL
MDC_IDC_SET_LEADCHNL_RV_PACING_POLARITY: NORMAL
MDC_IDC_SET_LEADCHNL_RV_PACING_PULSEWIDTH: 0.4 MS
MDC_IDC_SET_LEADCHNL_RV_SENSING_ANODE_ELECTRODE_1: NORMAL
MDC_IDC_SET_LEADCHNL_RV_SENSING_ANODE_LOCATION_1: NORMAL
MDC_IDC_SET_LEADCHNL_RV_SENSING_CATHODE_ELECTRODE_1: NORMAL
MDC_IDC_SET_LEADCHNL_RV_SENSING_CATHODE_LOCATION_1: NORMAL
MDC_IDC_SET_LEADCHNL_RV_SENSING_POLARITY: NORMAL
MDC_IDC_SET_LEADCHNL_RV_SENSING_SENSITIVITY: 0.9 MV
MDC_IDC_SET_ZONE_DETECTION_INTERVAL: 200 MS
MDC_IDC_SET_ZONE_DETECTION_INTERVAL: 350 MS
MDC_IDC_SET_ZONE_DETECTION_INTERVAL: 400 MS
MDC_IDC_SET_ZONE_TYPE: NORMAL
MDC_IDC_STAT_AT_BURDEN_PERCENT: 14.9 %
MDC_IDC_STAT_AT_DTM_END: NORMAL
MDC_IDC_STAT_AT_DTM_START: NORMAL
MDC_IDC_STAT_BRADY_AP_VP_PERCENT: 72.67 %
MDC_IDC_STAT_BRADY_AP_VS_PERCENT: 0.9 %
MDC_IDC_STAT_BRADY_AS_VP_PERCENT: 24.18 %
MDC_IDC_STAT_BRADY_AS_VS_PERCENT: 2.24 %
MDC_IDC_STAT_BRADY_DTM_END: NORMAL
MDC_IDC_STAT_BRADY_DTM_START: NORMAL
MDC_IDC_STAT_BRADY_RA_PERCENT_PACED: 70.54 %
MDC_IDC_STAT_BRADY_RV_PERCENT_PACED: 94.57 %
MDC_IDC_STAT_CRT_DTM_END: NORMAL
MDC_IDC_STAT_CRT_DTM_START: NORMAL
MDC_IDC_STAT_CRT_LV_PERCENT_PACED: 92.59 %
MDC_IDC_STAT_CRT_PERCENT_PACED: 92.59 %
MDC_IDC_STAT_EPISODE_RECENT_COUNT: 0
MDC_IDC_STAT_EPISODE_RECENT_COUNT: 0
MDC_IDC_STAT_EPISODE_RECENT_COUNT: 419
MDC_IDC_STAT_EPISODE_RECENT_COUNT: 6
MDC_IDC_STAT_EPISODE_RECENT_COUNT_DTM_END: NORMAL
MDC_IDC_STAT_EPISODE_RECENT_COUNT_DTM_START: NORMAL
MDC_IDC_STAT_EPISODE_TOTAL_COUNT: 0
MDC_IDC_STAT_EPISODE_TOTAL_COUNT: 115
MDC_IDC_STAT_EPISODE_TOTAL_COUNT: 1715
MDC_IDC_STAT_EPISODE_TOTAL_COUNT: 4
MDC_IDC_STAT_EPISODE_TOTAL_COUNT_DTM_END: NORMAL
MDC_IDC_STAT_EPISODE_TOTAL_COUNT_DTM_START: NORMAL
MDC_IDC_STAT_EPISODE_TYPE: NORMAL

## 2022-04-11 ENCOUNTER — DOCUMENTATION ONLY (OUTPATIENT)
Dept: ANTICOAGULATION | Facility: CLINIC | Age: 82
End: 2022-04-11
Payer: COMMERCIAL

## 2022-04-11 DIAGNOSIS — I48.91 ATRIAL FIBRILLATION (H): ICD-10-CM

## 2022-04-11 RX ORDER — WARFARIN SODIUM 3 MG/1
TABLET ORAL
Qty: 90 TABLET | Refills: 1 | Status: SHIPPED | OUTPATIENT
Start: 2022-04-11 | End: 2022-01-01

## 2022-04-11 NOTE — PROGRESS NOTES
Lab Results   Component Value Date    INR 2.1 (A) 03/30/2022    INR 2.3 02/02/2022    INR 2.4 12/08/2021       Patient's current Warfarin doses:    4 mg every Thu, Sat; 3 mg all other days           Next INR check is on 5/25/2022      Patient's last OV with  HCC was on 12/20/2021    Warfarin prescription 3 month supply and one refill sent to patient's pharmacy today.      Salma Marques [facility]    [phone]

## 2022-04-13 ENCOUNTER — OFFICE VISIT (OUTPATIENT)
Dept: CARDIOLOGY | Facility: CLINIC | Age: 82
End: 2022-04-13
Payer: COMMERCIAL

## 2022-04-13 VITALS
SYSTOLIC BLOOD PRESSURE: 112 MMHG | WEIGHT: 226.6 LBS | HEIGHT: 71 IN | BODY MASS INDEX: 31.72 KG/M2 | HEART RATE: 96 BPM | DIASTOLIC BLOOD PRESSURE: 50 MMHG | RESPIRATION RATE: 16 BRPM

## 2022-04-13 DIAGNOSIS — I50.20 HEART FAILURE WITH REDUCED EJECTION FRACTION, NYHA CLASS II (H): Primary | ICD-10-CM

## 2022-04-13 DIAGNOSIS — I10 BENIGN ESSENTIAL HYPERTENSION: ICD-10-CM

## 2022-04-13 PROCEDURE — 99214 OFFICE O/P EST MOD 30 MIN: CPT | Performed by: INTERNAL MEDICINE

## 2022-04-13 RX ORDER — AMLODIPINE BESYLATE 5 MG/1
5 TABLET ORAL DAILY
Qty: 90 TABLET | Refills: 3 | Status: SHIPPED | OUTPATIENT
Start: 2022-04-13 | End: 2023-01-01

## 2022-04-13 RX ORDER — SPIRONOLACTONE 25 MG/1
25 TABLET ORAL DAILY
Qty: 90 TABLET | Refills: 3 | Status: SHIPPED | OUTPATIENT
Start: 2022-04-13 | End: 2023-01-01

## 2022-04-13 NOTE — PROGRESS NOTES
HEART CARE ENCOUNTER CONSULTATON MARIA C DÍAZ [facility]  [phone]      Assessment/Recommendations   Assessment:    1.    Coronary artery disease: History of coronary artery disease status post SVG to RCA when he underwent mitral valve repair in 2007.  No anginal complaints.  2.  Atrial fibrillation/atypical flutter status post PVI and flutter ablation in 2007, 2011 and 2012.  Status post AV node ablation status post CRT-P device placement in 2012 after another recurrence.    3.  Anticoagulation: Continue on Coumadin for anticoagulation.  4.  Chronic diastolic congestive heart failure: Weights have remained stable  5.    Valvular heart disease: Status post mitral valve repair for mitral regurgitation in 2007 with residual mild to moderate mitral stenosis mild to moderate mitral regurgitation.  6.  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  Start spironolactone 25 mg daily.  Decrease Norvasc to 5 mg daily which may also help with his lower extremity edema  2. Continue lasix 40 mg daily, low-sodium diet and daily weights   3.  Recommend following up in our heart failure clinic for further medication titration.  Follow-up with me in 3 months          History of Present Illness/Subjective    HPI: Ady Farley is a 81 year old male with a known history of coronary artery disease and mitral regurgitation status post SVG to his RCA and mitral valve repair in 2007, atrial fibrillation/atypical flutter status post PVI and flutter ablation with right CTI flutter ablation 2007, PVI 2011 and 2012 with recurrence status post AV node ablation with CRT-P placement in 2012.  He also has history of chronic heart failure with preserved ejection fraction.    Recently he has noted increased abdominal girth and swelling.  He is unsure if he has gained weight, he has not been weighing himself daily.  He takes Lasix 40 mg  "daily.  He denies any chest pain or palpitations.    Echocardiogram 3/30/2022  Interpretation Summary     1.Left ventricular function is decreased. The ejection fraction is 45-50%  (mildly reduced).  2.Moderately decreased right ventricular systolic function  3.Moderate biatrial chamber enlargement.  4.An annuloplasty ring is noted in the mitral position. There is mild mitral  stenosis. Mean gradient is 7 mmHg at rate of 70 bpm. There is mild to moderate  (1-2+) mitral regurgitation.  5.There is moderate (2+) tricuspid regurgitation.  6.Right ventricular systolic pressure is elevated, consistent with moderate to  severe pulmonary hypertension.  7.IVC diameter >2.1 cm collapsing <50% with sniff suggests a high RA pressure  estimated at 15 mmHg or greater.  Compared to the prior study dated 5/5/2021, ther LVEF is slightly lower,  mitral and tricuspid and insufficiency are greater, pulmonic pressures are  higher.     Physical Examination  Review of Systems   Vitals: /50 (BP Location: Right arm, Patient Position: Sitting, Cuff Size: Adult Large)   Pulse 96   Resp 16   Ht 1.791 m (5' 10.5\")   Wt 102.8 kg (226 lb 9.6 oz)   BMI 32.05 kg/m    BMI= Body mass index is 32.05 kg/m .  Wt Readings from Last 3 Encounters:   04/13/22 102.8 kg (226 lb 9.6 oz)   12/20/21 100.2 kg (221 lb)   04/14/21 102.7 kg (226 lb 6.4 oz)       General Appearance:   no distress, normal body habitus   ENT/Mouth: membranes moist, no oral lesions or bleeding gums.      EYES:  no scleral icterus, normal conjunctivae   Neck: no carotid bruits or thyromegaly   Chest/Lungs:   lungs are clear to auscultation, no rales or wheezing   Cardiovascular:   Regular. Normal first and second heart sounds with systolic murmurs  + edema bilaterally    Abdomen:  no organomegaly, masses, bruits, or tenderness; bowel sounds are present   Extremities: no cyanosis or clubbing   Skin: no xanthelasma, warm.    Neurologic: normal  bilateral, no tremors   "   Psychiatric: alert and oriented x3, calm        Please refer above for cardiac ROS details.        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     Coronary artery disease      Disease of thyroid gland      Disorder of phrenic nerve      Hyperlipidemia      Hypertension      Malfunction of biventricular cardiac pacemaker battery 12/1/2015    [employer] device recall     Non-rheumatic mitral regurgitation     MVP s/p Mitral valve repair May 15, 2007 MAZE  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  Echo 2011 LVEF 45% and mild MR Echo 2012 LVEF 40% mild MS, Mod MR  NELY Jan 2013 mild MS and mild/mod MR Echo Jan 2015 Mild MS/ mild MR NELY 2016 small/mod cuff remains (recommend OAC) Replacement Utility updated for latest IMO load      PVC (premature ventricular contraction)      PVD (peripheral vascular disease) (H)      Status post ablation of atrial fibrillation 11/18/2015    MAZE May 2007 PVI May 31, 2011 (RF-PVI + TIAN line) PVI Oct 30, 2012 (Cryo-PVI + redo TIAN line + roof line + RA-scar flutter line)     Past Surgical History:   Procedure Laterality Date     CARDIOVERSION  07/24/2018     INSERT / REPLACE / REMOVE PACEMAKER       MITRAL VALVE REPAIR       CT ABLATE HEART DYSRHYTHM FOCUS      Description: Catheter Ablation Atrial Flutter;  Recorded: 10/30/2012;  Comments: Typical RA CTI flutter Sept 2007; ; Multiple atypical flutter Oct 2012 (Roof line + reinforce TIAN line + RA scar line)     CT ABLATE HEART DYSRHYTHM FOCUS      Description: Catheter Ablation Atrial Fibrillation;  Recorded: 10/24/2013;  Comments: May 2011 (PVI > 90% recurrence post MAZE + TIAN line); ; Re do PVI Oct 2012 (Cryo PVI + roof line + TIAN touch up + RA scar flutter); ; No recurrence by device check at 12 months      HI ABLATE HEART DYSRHYTHM FOCUS      Description: Catheter Ablation Atrial Fibrillation;  Recorded: 10/30/2014;  Comments: May 2011 (PVI > 90% recurrence post MAZE + TIAN line); ; Re do PVI Oct 2012 (Cryo PVI + roof line + TIAN touch up + RA scar flutter); ; No recurrence by device check at 12 months     HI ABLATE HEART DYSRHYTHM FOCUS      Description: Catheter Ablation Atrial Flutter;  Recorded: 10/30/2014;  Comments: Typical RA CTI flutter Sept 2007; ; Multiple atypical flutter Oct 2012 (Roof line + reinforce TIAN line + RA scar line)     HI ABLATE/ RECONSTUCT ATRIA, LIMITED      Description: Maze Procedure;  Proc Date: 05/01/2007;  Comments: May 2007 at time of valve repair     HI ICAR CATHETER ABLATION ATRIOVENTR NODE FUNCTION      Description: Catheter Ablation Atrioventricular Node;  Recorded: 08/13/2012;  Comments: Mar 2008 performed due to inability to biventricular with conduction abnormality     HI ICAR CATHETER ABLATION ATRIOVENTR NODE FUNCTION      Description: Catheter Ablation Atrioventricular Node;  Recorded: 10/30/2014;  Comments: Mar 2008 performed due to inability to biventricular with conduction abnormality     ZZC CABG, VEIN, SINGLE      Description: CABG (CABG);  Recorded: 01/18/2012;  Comments: ONE VESSEL RCA     Family History   Problem Relation Age of Onset     No Known Problems Mother      No Known Problems Father      No Known Problems Sister      No Known Problems Brother      No Known Problems Daughter      No Known Problems Son      Acute Myocardial Infarction No family hx of      Alcoholism No family hx of      Alzheimer Disease No family hx of      Amputation of Leg Below Knee No family hx of      Aortic Valve Replacement No family hx of      Arrhythmogenic Right Ventricular Cardiomyopathy No family hx of      Atrial fibrillation No family hx of      Atrial Flutter No family hx of      Breast Cancer No family hx of      CABG No family hx of      Cancer No family hx of      Cardiomyopathy  No family hx of      Carotid Endarterectomy No family hx of      Cerebral aneurysm No family hx of      Chronic Kidney Disease No family hx of      Chronic Obstructive Pulmonary Disease No family hx of      Colon Cancer No family hx of      Congenital heart disease No family hx of      Coronary Artery Disease No family hx of      Coronary Stenting No family hx of      Dementia No family hx of      Diabetes Type 1 No family hx of      Diabetes Type 2  No family hx of      Dialysis No family hx of      Dyslipidemia No family hx of      Heart Failure No family hx of      Hypertension No family hx of      Hypertrophic cardiomyopathy No family hx of      ICD - Implantable Cardioverter Defibrillator No family hx of      Long QT syndrome No family hx of      Mitral Regurgitation No family hx of      Mitral Valve Replacement No family hx of      Morbid Obesity No family hx of      Obstructive Sleep Apnea No family hx of      Ovarian Cancer No family hx of      Pacemaker No family hx of      Pancreatic Cancer No family hx of      Peripheral Vascular Disease No family hx of      Pulmonary Embolism No family hx of      Pulmonary Hypertension No family hx of      Rheumatic Heart Disease No family hx of      Cerebrovascular Disease No family hx of      Sudden Death No family hx of      Transient ischemic attack No family hx of      Valvular heart disease No family hx of         Social History     Socioeconomic History     Marital status:      Spouse name: Not on file     Number of children: Not on file     Years of education: Not on file     Highest education level: Not on file   Occupational History     Not on file   Tobacco Use     Smoking status: Former Smoker     Packs/day: 1.00     Quit date: 1990     Years since quittin.3     Smokeless tobacco: Never Used   Substance and Sexual Activity     Alcohol use: Yes     Alcohol/week: 2.0 standard drinks     Drug use: No     Sexual activity: Not on file   Other  Topics Concern     Not on file   Social History Narrative     Not on file     Social Determinants of Health     Financial Resource Strain: Not on file   Food Insecurity: Not on file   Transportation Needs: Not on file   Physical Activity: Not on file   Stress: Not on file   Social Connections: Not on file   Intimate Partner Violence: Not on file   Housing Stability: Not on file           Medications  Allergies   Current Outpatient Medications   Medication Sig Dispense Refill     amLODIPine (NORVASC) 5 MG tablet Take 1 tablet (5 mg) by mouth daily 90 tablet 3     aspirin 81 MG EC tablet [ASPIRIN 81 MG EC TABLET] Take 81 mg by mouth daily.       atorvastatin (LIPITOR) 20 MG tablet Take 1 tablet (20 mg) by mouth At Bedtime 90 tablet 3     furosemide (LASIX) 20 MG tablet TAKE 1 TO 2 TABLETS EVERY MORNING. FOLLOW INSTRUCTIONS GIVEN IN CLINIC 180 tablet 1     levothyroxine (SYNTHROID, LEVOTHROID) 175 MCG tablet [LEVOTHYROXINE (SYNTHROID, LEVOTHROID) 175 MCG TABLET] Take 175 mcg by mouth daily before breakfast.       losartan (COZAAR) 50 MG tablet Take 1 tablet (50 mg) by mouth daily 90 tablet 3     MULTIVITAMIN ORAL [MULTIVITAMIN ORAL] Take 1 tablet by mouth daily.       spironolactone (ALDACTONE) 25 MG tablet Take 1 tablet (25 mg) by mouth daily 90 tablet 3     warfarin ANTICOAGULANT (COUMADIN) 3 MG tablet Take 1 tablet ( 3 mg) with 1 mg tablet ( 4 mg dose) on Thur, Sat then 3 mg on all other days 90 tablet 1     warfarin ANTICOAGULANT (COUMADIN/JANTOVEN) 1 MG tablet [WARFARIN ANTICOAGULANT (COUMADIN/JANTOVEN) 1 MG TABLET] TAKE 1 TABLET WITH 3 MG TABLET (TOTAL 4 MG) ON TUESDAY, THURSDAY AND SATURDAY AND TAKE 3 MG ALL OTHER DAYS 90 tablet 1       Allergies   Allergen Reactions     Carvedilol Shortness Of Breath     Lisinopril Cough     Metoprolol      Shortness of breath          Lab Results    Chemistry/lipid CBC Cardiac Enzymes/BNP/TSH/INR   Recent Labs   Lab Test 12/22/21  0956   CHOL 131   HDL 41   LDL 77   TRIG 66      Recent Labs   Lab Test 12/22/21  0956 12/18/20  0903 12/16/19  0835   LDL 77 76 86     Recent Labs   Lab Test 12/22/21  0956      POTASSIUM 4.2   CHLORIDE 108*   CO2 22   GLC 96   BUN 13   CR 1.19   GFRESTIMATED 62   OBIE 9.5     Recent Labs   Lab Test 12/22/21  0956 12/18/20  0903 12/16/19  0835   CR 1.19 1.22 1.28     No results for input(s): A1C in the last 86675 hours.       Recent Labs   Lab Test 02/10/19  0754   WBC 9.1   HGB 13.7*   HCT 41.3   MCV 95        Recent Labs   Lab Test 02/10/19  0754   HGB 13.7*    Recent Labs   Lab Test 02/10/19  2348 02/10/19  1808 02/10/19  0754   TROPONINI 0.03 0.03 0.03     Recent Labs   Lab Test 02/10/19  0810   *     Recent Labs   Lab Test 12/18/20  0903   TSH 1.24     Recent Labs   Lab Test 03/30/22  0850 02/02/22  0837 12/08/21  0852   INR 2.1* 2.3 2.4        Janis Townsend MD

## 2022-04-13 NOTE — LETTER
4/13/2022    Martín Ross MD  [facility] [address] [place] [address]    RE: Ady Farley       Dear Colleague,     I had the pleasure of seeing Ady Farley in the [facility].    HEART CARE ENCOUNTER CONSULTATON NOTE      [facility]  [phone]      Assessment/Recommendations   Assessment:    1.    Coronary artery disease: History of coronary artery disease status post SVG to RCA when he underwent mitral valve repair in 2007.  No anginal complaints.  2.  Atrial fibrillation/atypical flutter status post PVI and flutter ablation in 2007, 2011 and 2012.  Status post AV node ablation status post CRT-P device placement in 2012 after another recurrence.    3.  Anticoagulation: Continue on Coumadin for anticoagulation.  4.  Chronic diastolic congestive heart failure: Weights have remained stable  5.    Valvular heart disease: Status post mitral valve repair for mitral regurgitation in 2007 with residual mild to moderate mitral stenosis mild to moderate mitral regurgitation.  6.  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  Start spironolactone 25 mg daily.  Decrease Norvasc to 5 mg daily which may also help with his lower extremity edema  2. Continue lasix 40 mg daily, low-sodium diet and daily weights   3.  Recommend following up in our heart failure clinic for further medication titration.  Follow-up with me in 3 months          History of Present Illness/Subjective    HPI: Ady Farley is a 81 year old male with a known history of coronary artery disease and mitral regurgitation status post SVG to his RCA and mitral valve repair in 2007, atrial fibrillation/atypical flutter status post PVI and flutter ablation with right CTI flutter ablation 2007, PVI 2011 and 2012 with recurrence status post AV node ablation with CRT-P placement in 2012.  He also has  "history of chronic heart failure with preserved ejection fraction.    Recently he has noted increased abdominal girth and swelling.  He is unsure if he has gained weight, he has not been weighing himself daily.  He takes Lasix 40 mg daily.  He denies any chest pain or palpitations.    Echocardiogram 3/30/2022  Interpretation Summary     1.Left ventricular function is decreased. The ejection fraction is 45-50%  (mildly reduced).  2.Moderately decreased right ventricular systolic function  3.Moderate biatrial chamber enlargement.  4.An annuloplasty ring is noted in the mitral position. There is mild mitral  stenosis. Mean gradient is 7 mmHg at rate of 70 bpm. There is mild to moderate  (1-2+) mitral regurgitation.  5.There is moderate (2+) tricuspid regurgitation.  6.Right ventricular systolic pressure is elevated, consistent with moderate to  severe pulmonary hypertension.  7.IVC diameter >2.1 cm collapsing <50% with sniff suggests a high RA pressure  estimated at 15 mmHg or greater.  Compared to the prior study dated 5/5/2021, ther LVEF is slightly lower,  mitral and tricuspid and insufficiency are greater, pulmonic pressures are  higher.     Physical Examination  Review of Systems   Vitals: /50 (BP Location: Right arm, Patient Position: Sitting, Cuff Size: Adult Large)   Pulse 96   Resp 16   Ht 1.791 m (5' 10.5\")   Wt 102.8 kg (226 lb 9.6 oz)   BMI 32.05 kg/m    BMI= Body mass index is 32.05 kg/m .  Wt Readings from Last 3 Encounters:   04/13/22 102.8 kg (226 lb 9.6 oz)   12/20/21 100.2 kg (221 lb)   04/14/21 102.7 kg (226 lb 6.4 oz)       General Appearance:   no distress, normal body habitus   ENT/Mouth: membranes moist, no oral lesions or bleeding gums.      EYES:  no scleral icterus, normal conjunctivae   Neck: no carotid bruits or thyromegaly   Chest/Lungs:   lungs are clear to auscultation, no rales or wheezing   Cardiovascular:   Regular. Normal first and second heart sounds with systolic murmurs  " + edema bilaterally    Abdomen:  no organomegaly, masses, bruits, or tenderness; bowel sounds are present   Extremities: no cyanosis or clubbing   Skin: no xanthelasma, warm.    Neurologic: normal  bilateral, no tremors     Psychiatric: alert and oriented x3, calm        Please refer above for cardiac ROS details.        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     Coronary artery disease      Disease of thyroid gland      Disorder of phrenic nerve      Hyperlipidemia      Hypertension      Malfunction of biventricular cardiac pacemaker battery 12/1/2015    [employer] device recall     Non-rheumatic mitral regurgitation     MVP s/p Mitral valve repair May 15, 2007 MAZE  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  Echo 2011 LVEF 45% and mild MR Echo 2012 LVEF 40% mild MS, Mod MR  NELY Jan 2013 mild MS and mild/mod MR Echo Jan 2015 Mild MS/ mild MR NELY 2016 small/mod cuff remains (recommend OAC) Replacement Utility updated for latest IMO load      PVC (premature ventricular contraction)      PVD (peripheral vascular disease) (H)      Status post ablation of atrial fibrillation 11/18/2015    MAZE May 2007 PVI May 31, 2011 (RF-PVI + TIAN line) PVI Oct 30, 2012 (Cryo-PVI + redo TIAN line + roof line + RA-scar flutter line)     Past Surgical History:   Procedure Laterality Date     CARDIOVERSION  07/24/2018     INSERT / REPLACE / REMOVE PACEMAKER       MITRAL VALVE REPAIR       SC ABLATE HEART DYSRHYTHM FOCUS      Description: Catheter Ablation Atrial Flutter;  Recorded: 10/30/2012;  Comments: Typical RA CTI flutter Sept 2007; ; Multiple atypical flutter Oct 2012 (Roof line + reinforce TIAN line + RA scar line)     SC ABLATE HEART DYSRHYTHM FOCUS      Description: Catheter Ablation Atrial  Fibrillation;  Recorded: 10/24/2013;  Comments: May 2011 (PVI > 90% recurrence post MAZE + TIAN line); ; Re do PVI Oct 2012 (Cryo PVI + roof line + TIAN touch up + RA scar flutter); ; No recurrence by device check at 12 months     LA ABLATE HEART DYSRHYTHM FOCUS      Description: Catheter Ablation Atrial Fibrillation;  Recorded: 10/30/2014;  Comments: May 2011 (PVI > 90% recurrence post MAZE + TIAN line); ; Re do PVI Oct 2012 (Cryo PVI + roof line + TIAN touch up + RA scar flutter); ; No recurrence by device check at 12 months     LA ABLATE HEART DYSRHYTHM FOCUS      Description: Catheter Ablation Atrial Flutter;  Recorded: 10/30/2014;  Comments: Typical RA CTI flutter Sept 2007; ; Multiple atypical flutter Oct 2012 (Roof line + reinforce TIAN line + RA scar line)     LA ABLATE/ RECONSTUCT ATRIA, LIMITED      Description: Maze Procedure;  Proc Date: 05/01/2007;  Comments: May 2007 at time of valve repair     LA ICAR CATHETER ABLATION ATRIOVENTR NODE FUNCTION      Description: Catheter Ablation Atrioventricular Node;  Recorded: 08/13/2012;  Comments: Mar 2008 performed due to inability to biventricular with conduction abnormality     LA ICAR CATHETER ABLATION ATRIOVENTR NODE FUNCTION      Description: Catheter Ablation Atrioventricular Node;  Recorded: 10/30/2014;  Comments: Mar 2008 performed due to inability to biventricular with conduction abnormality     ZZC CABG, VEIN, SINGLE      Description: CABG (CABG);  Recorded: 01/18/2012;  Comments: ONE VESSEL RCA     Family History   Problem Relation Age of Onset     No Known Problems Mother      No Known Problems Father      No Known Problems Sister      No Known Problems Brother      No Known Problems Daughter      No Known Problems Son      Acute Myocardial Infarction No family hx of      Alcoholism No family hx of      Alzheimer Disease No family hx of      Amputation of Leg Below Knee No family hx of      Aortic Valve Replacement No family hx of      Arrhythmogenic Right  Ventricular Cardiomyopathy No family hx of      Atrial fibrillation No family hx of      Atrial Flutter No family hx of      Breast Cancer No family hx of      CABG No family hx of      Cancer No family hx of      Cardiomyopathy No family hx of      Carotid Endarterectomy No family hx of      Cerebral aneurysm No family hx of      Chronic Kidney Disease No family hx of      Chronic Obstructive Pulmonary Disease No family hx of      Colon Cancer No family hx of      Congenital heart disease No family hx of      Coronary Artery Disease No family hx of      Coronary Stenting No family hx of      Dementia No family hx of      Diabetes Type 1 No family hx of      Diabetes Type 2  No family hx of      Dialysis No family hx of      Dyslipidemia No family hx of      Heart Failure No family hx of      Hypertension No family hx of      Hypertrophic cardiomyopathy No family hx of      ICD - Implantable Cardioverter Defibrillator No family hx of      Long QT syndrome No family hx of      Mitral Regurgitation No family hx of      Mitral Valve Replacement No family hx of      Morbid Obesity No family hx of      Obstructive Sleep Apnea No family hx of      Ovarian Cancer No family hx of      Pacemaker No family hx of      Pancreatic Cancer No family hx of      Peripheral Vascular Disease No family hx of      Pulmonary Embolism No family hx of      Pulmonary Hypertension No family hx of      Rheumatic Heart Disease No family hx of      Cerebrovascular Disease No family hx of      Sudden Death No family hx of      Transient ischemic attack No family hx of      Valvular heart disease No family hx of         Social History     Socioeconomic History     Marital status:      Spouse name: Not on file     Number of children: Not on file     Years of education: Not on file     Highest education level: Not on file   Occupational History     Not on file   Tobacco Use     Smoking status: Former Smoker     Packs/day: 1.00     Quit date:  1990     Years since quittin.3     Smokeless tobacco: Never Used   Substance and Sexual Activity     Alcohol use: Yes     Alcohol/week: 2.0 standard drinks     Drug use: No     Sexual activity: Not on file   Other Topics Concern     Not on file   Social History Narrative     Not on file     Social Determinants of Health     Financial Resource Strain: Not on file   Food Insecurity: Not on file   Transportation Needs: Not on file   Physical Activity: Not on file   Stress: Not on file   Social Connections: Not on file   Intimate Partner Violence: Not on file   Housing Stability: Not on file           Medications  Allergies   Current Outpatient Medications   Medication Sig Dispense Refill     amLODIPine (NORVASC) 5 MG tablet Take 1 tablet (5 mg) by mouth daily 90 tablet 3     aspirin 81 MG EC tablet [ASPIRIN 81 MG EC TABLET] Take 81 mg by mouth daily.       atorvastatin (LIPITOR) 20 MG tablet Take 1 tablet (20 mg) by mouth At Bedtime 90 tablet 3     furosemide (LASIX) 20 MG tablet TAKE 1 TO 2 TABLETS EVERY MORNING. FOLLOW INSTRUCTIONS GIVEN IN CLINIC 180 tablet 1     levothyroxine (SYNTHROID, LEVOTHROID) 175 MCG tablet [LEVOTHYROXINE (SYNTHROID, LEVOTHROID) 175 MCG TABLET] Take 175 mcg by mouth daily before breakfast.       losartan (COZAAR) 50 MG tablet Take 1 tablet (50 mg) by mouth daily 90 tablet 3     MULTIVITAMIN ORAL [MULTIVITAMIN ORAL] Take 1 tablet by mouth daily.       spironolactone (ALDACTONE) 25 MG tablet Take 1 tablet (25 mg) by mouth daily 90 tablet 3     warfarin ANTICOAGULANT (COUMADIN) 3 MG tablet Take 1 tablet ( 3 mg) with 1 mg tablet ( 4 mg dose) on Thur, Sat then 3 mg on all other days 90 tablet 1     warfarin ANTICOAGULANT (COUMADIN/JANTOVEN) 1 MG tablet [WARFARIN ANTICOAGULANT (COUMADIN/JANTOVEN) 1 MG TABLET] TAKE 1 TABLET WITH 3 MG TABLET (TOTAL 4 MG) ON TUESDAY, THURSDAY AND SATURDAY AND TAKE 3 MG ALL OTHER DAYS 90 tablet 1       Allergies   Allergen Reactions     Carvedilol Shortness  Of Breath     Lisinopril Cough     Metoprolol      Shortness of breath          Lab Results    Chemistry/lipid CBC Cardiac Enzymes/BNP/TSH/INR   Recent Labs   Lab Test 12/22/21  0956   CHOL 131   HDL 41   LDL 77   TRIG 66     Recent Labs   Lab Test 12/22/21  0956 12/18/20  0903 12/16/19  0835   LDL 77 76 86     Recent Labs   Lab Test 12/22/21  0956      POTASSIUM 4.2   CHLORIDE 108*   CO2 22   GLC 96   BUN 13   CR 1.19   GFRESTIMATED 62   OBIE 9.5     Recent Labs   Lab Test 12/22/21  0956 12/18/20  0903 12/16/19  0835   CR 1.19 1.22 1.28     No results for input(s): A1C in the last 16981 hours.       Recent Labs   Lab Test 02/10/19  0754   WBC 9.1   HGB 13.7*   HCT 41.3   MCV 95        Recent Labs   Lab Test 02/10/19  0754   HGB 13.7*    Recent Labs   Lab Test 02/10/19  2348 02/10/19  1808 02/10/19  0754   TROPONINI 0.03 0.03 0.03     Recent Labs   Lab Test 02/10/19  0810   *     Recent Labs   Lab Test 12/18/20  0903   TSH 1.24     Recent Labs   Lab Test 03/30/22  0850 02/02/22  0837 12/08/21  0852   INR 2.1* 2.3 2.4        Janis Townsend MD    Thank you for allowing me to participate in the care of your patient.      Sincerely,     Janis Townsend MD     [facility]  cc: No referring provider defined for this encounter.

## 2022-04-13 NOTE — PATIENT INSTRUCTIONS
. Ady BRE Farley,     It was a pleasure to see you in the office today. My recommendations for you include:    Decrease amlodipine to 5 mg daily  2.  Stop potassium supplement (klor con)  3. Add spironolactone 25 mg daily  4. Heart failure follow up in 1-2 weeks  5. Increase lasix / furosemide to 40 mg twice a day for three days then decrease back down to 40 mg once day     Please do not hesitate to call the [facility] clinic with any questions or concerns at [phone].    Sincerely,     Janis Townsend MD

## 2022-04-29 ENCOUNTER — OFFICE VISIT (OUTPATIENT)
Dept: CARDIOLOGY | Facility: CLINIC | Age: 82
End: 2022-04-29
Attending: INTERNAL MEDICINE
Payer: COMMERCIAL

## 2022-04-29 VITALS
SYSTOLIC BLOOD PRESSURE: 112 MMHG | HEIGHT: 71 IN | HEART RATE: 96 BPM | RESPIRATION RATE: 16 BRPM | WEIGHT: 225.8 LBS | BODY MASS INDEX: 31.61 KG/M2 | DIASTOLIC BLOOD PRESSURE: 64 MMHG

## 2022-04-29 DIAGNOSIS — I50.32 CHRONIC HEART FAILURE WITH PRESERVED EJECTION FRACTION (H): Primary | ICD-10-CM

## 2022-04-29 DIAGNOSIS — I50.20 HEART FAILURE WITH REDUCED EJECTION FRACTION, NYHA CLASS II (H): ICD-10-CM

## 2022-04-29 LAB
ANION GAP SERPL CALCULATED.3IONS-SCNC: 12 MMOL/L (ref 5–18)
BUN SERPL-MCNC: 17 MG/DL (ref 8–28)
CALCIUM SERPL-MCNC: 9.2 MG/DL (ref 8.5–10.5)
CHLORIDE BLD-SCNC: 104 MMOL/L (ref 98–107)
CO2 SERPL-SCNC: 23 MMOL/L (ref 22–31)
CREAT SERPL-MCNC: 1.32 MG/DL (ref 0.7–1.3)
GFR SERPL CREATININE-BSD FRML MDRD: 55 ML/MIN/1.73M2
GLUCOSE BLD-MCNC: 92 MG/DL (ref 70–125)
POTASSIUM BLD-SCNC: 4.4 MMOL/L (ref 3.5–5)
SODIUM SERPL-SCNC: 139 MMOL/L (ref 136–145)

## 2022-04-29 PROCEDURE — 80048 BASIC METABOLIC PNL TOTAL CA: CPT | Performed by: NURSE PRACTITIONER

## 2022-04-29 PROCEDURE — 36415 COLL VENOUS BLD VENIPUNCTURE: CPT | Performed by: NURSE PRACTITIONER

## 2022-04-29 PROCEDURE — 99214 OFFICE O/P EST MOD 30 MIN: CPT | Performed by: NURSE PRACTITIONER

## 2022-04-29 NOTE — PATIENT INSTRUCTIONS
Ady Farley,    It was a pleasure to see you today at the [facility].     My recommendations after this visit include:  - You will be called with the results of your labs on Monday.    - Continue to limit salt in your diet.   - I will check with device nurse about your battery.   - You are due to see Dr. Townsend in July, please schedule.   - If you have any questions or concerns, please call [phone] to talk with my nurse.    Elizabeth Smith, CNP

## 2022-04-29 NOTE — PROGRESS NOTES
HEART CARE PROGRESS NOTE      [facility]  [phone]      Assessment/Recommendations   Assessment:    1.  Heart failure with preserved ejection fraction: He has had some improvement since starting spironolactone 2 weeks ago.  He has also been taking Lasix 20 mg twice daily (previously once daily).  Home weight is down 3 pounds.  Abdominal bloating and edema have improved.  He is following a low-sodium diet.    2.  Coronary artery disease: History of coronary bypass surgery in 2007. He denies any chest pain.    3.  Atrial fibrillation/flutter: Status post ablation with recurrence.  He had AV allison ablation and CRT in 2012.  He continues to take warfarin.    4.  CRT: Device check on April 5 showed 3-month battery life.  Device clinic will continue to monitor.     Plan:  1.  BMP pending  2.  Continue low-sodium diet and daily weights    Ady Farely will follow up with Dr. Townsend in July.  Follow-up in the heart failure clinic will be determined after lab results are reviewed.       History of Present Illness/Subjective    Mr. Ady Farley is a 81 year old male seen at [facility] today for follow-up.  She has a history of coronary artery disease, coronary artery bypass surgery, aortic valve repair, heart failure with preserved ejection fraction, atrial fibrillation/flutter with recurrence after ablation, AV node ablation and CRT implant, and pulmonary hypertension.  Echocardiogram on March 30, 2022 showed ejection fraction of 45 to 50%, moderately decreased right ventricular systolic function, mild to moderate mitral regurgitation, moderate tricuspid regurgitation, and moderate to severe pulmonary hypertension.    He was seen by Dr. Townsend on April 13 with increased abdominal bloating and edema.  He was started on spironolactone and Norvasc was decreased.  He feels that abdominal bloating has decreased.  He still has lower extremity edema and he thinks there has  "been slight improvement.  He states that he has no edema when he wakes up in the morning but it develops during the day.  He has tried compression stockings in the past but does not like them.  His home weight is down 3 pounds since starting spironolactone.  Home weight is now 220 pounds.  He denies fatigue, lightheadedness, shortness of breath, dyspnea on exertion, orthopnea and chest pain.      He is following a low-sodium diet.  He walks daily.      ECHO 3/30/2022:   Echo result w/o MOPS: Interpretation Summary 1.Left ventricular function is decreased. The ejection fraction is 45-50%(mildly reduced).2.Moderately decreased right ventricular systolic function3.Moderate biatrial chamber enlargement.4.An annuloplasty ring is noted in the mitral position. There is mild mitralstenosis. Mean gradient is 7 mmHg at rate of 70 bpm. There is mild to moderate(1-2+) mitral regurgitation.5.There is moderate (2+) tricuspid regurgitation.6.Right ventricular systolic pressure is elevated, consistent with moderate tosevere pulmonary hypertension.7.IVC diameter >2.1 cm collapsing <50% with sniff suggests a high RA pressureestimated at 15 mmHg or greater.Compared to the prior study dated 5/5/2021, ther LVEF is slightly lower,mitral and tricuspid and insufficiency are greater, pulmonic pressures arehigher.          Physical Examination Review of Systems   Vitals: /64 (BP Location: Left arm, Patient Position: Sitting, Cuff Size: Adult Large)   Pulse 96   Resp 16   Ht 1.791 m (5' 10.5\")   Wt 102.4 kg (225 lb 12.8 oz)   BMI 31.94 kg/m    BMI= Body mass index is 31.94 kg/m .  Wt Readings from Last 3 Encounters:   04/29/22 102.4 kg (225 lb 12.8 oz)   04/13/22 102.8 kg (226 lb 9.6 oz)   12/20/21 100.2 kg (221 lb)       General Appearance:     Alert, cooperative and in no acute distress.   ENT/Mouth: membranes moist, no oral lesions or bleeding gums.      EYES:  no scleral icterus, normal conjunctivae   Chest/Lungs:   lungs are " clear to auscultation, no rales or wheezing, respirations unlabored   Cardiovascular:   Regular. Normal first and second heart sounds, 1-2+ edema bilateral lower extremities    Abdomen:  Soft, nontender, nondistended, bowel sounds present   Extremities: no cyanosis or clubbing   Skin: warm, dry.    Neurologic: mood and affect are appropriate, alert and oriented x3         Please refer above for cardiac ROS details.      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     Coronary artery disease      Disease of thyroid gland      Disorder of phrenic nerve      Hyperlipidemia      Hypertension      Malfunction of biventricular cardiac pacemaker battery 12/1/2015    [employer] device recall     Non-rheumatic mitral regurgitation     MVP s/p Mitral valve repair May 15, 2007 MAZE  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  Echo 2011 LVEF 45% and mild MR Echo 2012 LVEF 40% mild MS, Mod MR  NELY Jan 2013 mild MS and mild/mod MR Echo Jan 2015 Mild MS/ mild MR NELY 2016 small/mod cuff remains (recommend OAC) Replacement Utility updated for latest IMO load      PVC (premature ventricular contraction)      PVD (peripheral vascular disease) (H)      Status post ablation of atrial fibrillation 11/18/2015    MAZE May 2007 PVI May 31, 2011 (RF-PVI + TIAN line) PVI Oct 30, 2012 (Cryo-PVI + redo TIAN line + roof line + RA-scar flutter line)     Past Surgical History:   Procedure Laterality Date     CARDIOVERSION  07/24/2018     INSERT / REPLACE / REMOVE PACEMAKER       MITRAL VALVE REPAIR       MO ABLATE HEART DYSRHYTHM FOCUS      Description: Catheter Ablation Atrial Flutter;  Recorded: 10/30/2012;  Comments: Typical RA CTI flutter Sept 2007; ; Multiple atypical flutter Oct 2012 (Roof line + reinforce TIAN line  + RA scar line)     NV ABLATE HEART DYSRHYTHM FOCUS      Description: Catheter Ablation Atrial Fibrillation;  Recorded: 10/24/2013;  Comments: May 2011 (PVI > 90% recurrence post MAZE + TIAN line); ; Re do PVI Oct 2012 (Cryo PVI + roof line + TIAN touch up + RA scar flutter); ; No recurrence by device check at 12 months     NV ABLATE HEART DYSRHYTHM FOCUS      Description: Catheter Ablation Atrial Fibrillation;  Recorded: 10/30/2014;  Comments: May 2011 (PVI > 90% recurrence post MAZE + TIAN line); ; Re do PVI Oct 2012 (Cryo PVI + roof line + TIAN touch up + RA scar flutter); ; No recurrence by device check at 12 months     NV ABLATE HEART DYSRHYTHM FOCUS      Description: Catheter Ablation Atrial Flutter;  Recorded: 10/30/2014;  Comments: Typical RA CTI flutter Sept 2007; ; Multiple atypical flutter Oct 2012 (Roof line + reinforce TIAN line + RA scar line)     NV ABLATE/ RECONSTUCT ATRIA, LIMITED      Description: Maze Procedure;  Proc Date: 05/01/2007;  Comments: May 2007 at time of valve repair     NV ICAR CATHETER ABLATION ATRIOVENTR NODE FUNCTION      Description: Catheter Ablation Atrioventricular Node;  Recorded: 08/13/2012;  Comments: Mar 2008 performed due to inability to biventricular with conduction abnormality     NV ICAR CATHETER ABLATION ATRIOVENTR NODE FUNCTION      Description: Catheter Ablation Atrioventricular Node;  Recorded: 10/30/2014;  Comments: Mar 2008 performed due to inability to biventricular with conduction abnormality     ZZC CABG, VEIN, SINGLE      Description: CABG (CABG);  Recorded: 01/18/2012;  Comments: ONE VESSEL RCA     Family History   Problem Relation Age of Onset     No Known Problems Mother      No Known Problems Father      No Known Problems Sister      No Known Problems Brother      No Known Problems Daughter      No Known Problems Son      Acute Myocardial Infarction No family hx of      Alcoholism No family hx of      Alzheimer Disease No family hx of      Amputation of Leg  Below Knee No family hx of      Aortic Valve Replacement No family hx of      Arrhythmogenic Right Ventricular Cardiomyopathy No family hx of      Atrial fibrillation No family hx of      Atrial Flutter No family hx of      Breast Cancer No family hx of      CABG No family hx of      Cancer No family hx of      Cardiomyopathy No family hx of      Carotid Endarterectomy No family hx of      Cerebral aneurysm No family hx of      Chronic Kidney Disease No family hx of      Chronic Obstructive Pulmonary Disease No family hx of      Colon Cancer No family hx of      Congenital heart disease No family hx of      Coronary Artery Disease No family hx of      Coronary Stenting No family hx of      Dementia No family hx of      Diabetes Type 1 No family hx of      Diabetes Type 2  No family hx of      Dialysis No family hx of      Dyslipidemia No family hx of      Heart Failure No family hx of      Hypertension No family hx of      Hypertrophic cardiomyopathy No family hx of      ICD - Implantable Cardioverter Defibrillator No family hx of      Long QT syndrome No family hx of      Mitral Regurgitation No family hx of      Mitral Valve Replacement No family hx of      Morbid Obesity No family hx of      Obstructive Sleep Apnea No family hx of      Ovarian Cancer No family hx of      Pacemaker No family hx of      Pancreatic Cancer No family hx of      Peripheral Vascular Disease No family hx of      Pulmonary Embolism No family hx of      Pulmonary Hypertension No family hx of      Rheumatic Heart Disease No family hx of      Cerebrovascular Disease No family hx of      Sudden Death No family hx of      Transient ischemic attack No family hx of      Valvular heart disease No family hx of     Social History     Socioeconomic History     Marital status:      Spouse name: Not on file     Number of children: Not on file     Years of education: Not on file     Highest education level: Not on file   Occupational History      Not on file   Tobacco Use     Smoking status: Former Smoker     Packs/day: 1.00     Quit date: 1990     Years since quittin.3     Smokeless tobacco: Never Used   Substance and Sexual Activity     Alcohol use: Yes     Alcohol/week: 2.0 standard drinks     Drug use: No     Sexual activity: Not on file   Other Topics Concern     Not on file   Social History Narrative     Not on file     Social Determinants of Health     Financial Resource Strain: Not on file   Food Insecurity: Not on file   Transportation Needs: Not on file   Physical Activity: Not on file   Stress: Not on file   Social Connections: Not on file   Intimate Partner Violence: Not on file   Housing Stability: Not on file          Medications  Allergies   Current Outpatient Medications   Medication Sig Dispense Refill     amLODIPine (NORVASC) 5 MG tablet Take 1 tablet (5 mg) by mouth daily 90 tablet 3     aspirin 81 MG EC tablet [ASPIRIN 81 MG EC TABLET] Take 81 mg by mouth daily.       atorvastatin (LIPITOR) 20 MG tablet Take 1 tablet (20 mg) by mouth At Bedtime 90 tablet 3     furosemide (LASIX) 20 MG tablet TAKE 1 TO 2 TABLETS EVERY MORNING. FOLLOW INSTRUCTIONS GIVEN IN CLINIC 180 tablet 1     levothyroxine (SYNTHROID, LEVOTHROID) 175 MCG tablet [LEVOTHYROXINE (SYNTHROID, LEVOTHROID) 175 MCG TABLET] Take 175 mcg by mouth daily before breakfast.       losartan (COZAAR) 50 MG tablet Take 1 tablet (50 mg) by mouth daily 90 tablet 3     MULTIVITAMIN ORAL [MULTIVITAMIN ORAL] Take 1 tablet by mouth daily.       spironolactone (ALDACTONE) 25 MG tablet Take 1 tablet (25 mg) by mouth daily 90 tablet 3     warfarin ANTICOAGULANT (COUMADIN) 3 MG tablet Take 1 tablet ( 3 mg) with 1 mg tablet ( 4 mg dose) on Thur, Sat then 3 mg on all other days 90 tablet 1     warfarin ANTICOAGULANT (COUMADIN/JANTOVEN) 1 MG tablet [WARFARIN ANTICOAGULANT (COUMADIN/JANTOVEN) 1 MG TABLET] TAKE 1 TABLET WITH 3 MG TABLET (TOTAL 4 MG) ON TUESDAY, THURSDAY AND SATURDAY AND TAKE 3  MG ALL OTHER DAYS 90 tablet 1    Allergies   Allergen Reactions     Carvedilol Shortness Of Breath     Lisinopril Cough     Metoprolol      Shortness of breath         Lab Results    Chemistry/lipid CBC Cardiac Enzymes/BNP/TSH/INR   Recent Labs   Lab Test 12/22/21  0956   CHOL 131   HDL 41   LDL 77   TRIG 66     Recent Labs   Lab Test 12/22/21  0956 12/18/20  0903 12/16/19  0835   LDL 77 76 86     Recent Labs   Lab Test 12/22/21  0956      POTASSIUM 4.2   CHLORIDE 108*   CO2 22   GLC 96   BUN 13   CR 1.19   GFRESTIMATED 62   OBIE 9.5     Recent Labs   Lab Test 12/22/21  0956 12/18/20  0903 12/16/19  0835   CR 1.19 1.22 1.28     No results for input(s): A1C in the last 83398 hours. Recent Labs   Lab Test 02/10/19  0754   WBC 9.1   HGB 13.7*   HCT 41.3   MCV 95        Recent Labs   Lab Test 02/10/19  0754   HGB 13.7*    Recent Labs   Lab Test 02/10/19  2348 02/10/19  1808 02/10/19  0754   TROPONINI 0.03 0.03 0.03     Recent Labs   Lab Test 02/10/19  0810   *     Recent Labs   Lab Test 12/18/20  0903   TSH 1.24     Recent Labs   Lab Test 03/30/22  0850 02/02/22  0837 12/08/21  0852   INR 2.1* 2.3 2.4

## 2022-04-29 NOTE — LETTER
4/29/2022    Martín Ross MD  [facility] [address] [place] [address]    RE: Ady Farley       Dear Colleague,     I had the pleasure of seeing Ady Farley in the [facility].  HEART CARE PROGRESS NOTE      [facility]  [phone]      Assessment/Recommendations   Assessment:    1.  Heart failure with preserved ejection fraction: He has had some improvement since starting spironolactone 2 weeks ago.  He has also been taking Lasix 20 mg twice daily (previously once daily).  Home weight is down 3 pounds.  Abdominal bloating and edema have improved.  He is following a low-sodium diet.    2.  Coronary artery disease: History of coronary bypass surgery in 2007. He denies any chest pain.    3.  Atrial fibrillation/flutter: Status post ablation with recurrence.  He had AV allison ablation and CRT in 2012.  He continues to take warfarin.    4.  CRT: Device check on April 5 showed 3-month battery life.  Device clinic will continue to monitor.     Plan:  1.  BMP pending  2.  Continue low-sodium diet and daily weights    Ady Farley will follow up with Dr. Townsend in July.  Follow-up in the heart failure clinic will be determined after lab results are reviewed.       History of Present Illness/Subjective    Mr. Ady Farley is a 81 year old male seen at [facility] today for follow-up.  She has a history of coronary artery disease, coronary artery bypass surgery, aortic valve repair, heart failure with preserved ejection fraction, atrial fibrillation/flutter with recurrence after ablation, AV node ablation and CRT implant, and pulmonary hypertension.  Echocardiogram on March 30, 2022 showed ejection fraction of 45 to 50%, moderately decreased right ventricular systolic function, mild to moderate mitral regurgitation, moderate tricuspid regurgitation, and moderate to severe pulmonary hypertension.    He was seen by   "Kristian on April 13 with increased abdominal bloating and edema.  He was started on spironolactone and Norvasc was decreased.  He feels that abdominal bloating has decreased.  He still has lower extremity edema and he thinks there has been slight improvement.  He states that he has no edema when he wakes up in the morning but it develops during the day.  He has tried compression stockings in the past but does not like them.  His home weight is down 3 pounds since starting spironolactone.  Home weight is now 220 pounds.  He denies fatigue, lightheadedness, shortness of breath, dyspnea on exertion, orthopnea and chest pain.      He is following a low-sodium diet.  He walks daily.      ECHO 3/30/2022:   Echo result w/o MOPS: Interpretation Summary 1.Left ventricular function is decreased. The ejection fraction is 45-50%(mildly reduced).2.Moderately decreased right ventricular systolic function3.Moderate biatrial chamber enlargement.4.An annuloplasty ring is noted in the mitral position. There is mild mitralstenosis. Mean gradient is 7 mmHg at rate of 70 bpm. There is mild to moderate(1-2+) mitral regurgitation.5.There is moderate (2+) tricuspid regurgitation.6.Right ventricular systolic pressure is elevated, consistent with moderate tosevere pulmonary hypertension.7.IVC diameter >2.1 cm collapsing <50% with sniff suggests a high RA pressureestimated at 15 mmHg or greater.Compared to the prior study dated 5/5/2021, ther LVEF is slightly lower,mitral and tricuspid and insufficiency are greater, pulmonic pressures arehigher.          Physical Examination Review of Systems   Vitals: /64 (BP Location: Left arm, Patient Position: Sitting, Cuff Size: Adult Large)   Pulse 96   Resp 16   Ht 1.791 m (5' 10.5\")   Wt 102.4 kg (225 lb 12.8 oz)   BMI 31.94 kg/m    BMI= Body mass index is 31.94 kg/m .  Wt Readings from Last 3 Encounters:   04/29/22 102.4 kg (225 lb 12.8 oz)   04/13/22 102.8 kg (226 lb 9.6 oz)   12/20/21 " 100.2 kg (221 lb)       General Appearance:     Alert, cooperative and in no acute distress.   ENT/Mouth: membranes moist, no oral lesions or bleeding gums.      EYES:  no scleral icterus, normal conjunctivae   Chest/Lungs:   lungs are clear to auscultation, no rales or wheezing, respirations unlabored   Cardiovascular:   Regular. Normal first and second heart sounds, 1-2+ edema bilateral lower extremities    Abdomen:  Soft, nontender, nondistended, bowel sounds present   Extremities: no cyanosis or clubbing   Skin: warm, dry.    Neurologic: mood and affect are appropriate, alert and oriented x3         Please refer above for cardiac ROS details.      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     Coronary artery disease      Disease of thyroid gland      Disorder of phrenic nerve      Hyperlipidemia      Hypertension      Malfunction of biventricular cardiac pacemaker battery 12/1/2015    Sand [employer] device recall     Non-rheumatic mitral regurgitation     MVP s/p Mitral valve repair May 15, 2007 MAZE  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  Echo 2011 LVEF 45% and mild MR Echo 2012 LVEF 40% mild MS, Mod MR  NELY Jan 2013 mild MS and mild/mod MR Echo Jan 2015 Mild MS/ mild MR NELY 2016 small/mod cuff remains (recommend OAC) Replacement Utility updated for latest IMO load      PVC (premature ventricular contraction)      PVD (peripheral vascular disease) (H)      Status post ablation of atrial fibrillation 11/18/2015    MAZE May 2007 PVI May 31, 2011 (RF-PVI + TIAN line) PVI Oct 30, 2012 (Cryo-PVI + redo TIAN line + roof line + RA-scar flutter line)     Past Surgical History:   Procedure Laterality Date     CARDIOVERSION  07/24/2018     INSERT / REPLACE / REMOVE PACEMAKER       MITRAL  VALVE REPAIR       CA ABLATE HEART DYSRHYTHM FOCUS      Description: Catheter Ablation Atrial Flutter;  Recorded: 10/30/2012;  Comments: Typical RA CTI flutter Sept 2007; ; Multiple atypical flutter Oct 2012 (Roof line + reinforce TIAN line + RA scar line)     CA ABLATE HEART DYSRHYTHM FOCUS      Description: Catheter Ablation Atrial Fibrillation;  Recorded: 10/24/2013;  Comments: May 2011 (PVI > 90% recurrence post MAZE + TIAN line); ; Re do PVI Oct 2012 (Cryo PVI + roof line + TIAN touch up + RA scar flutter); ; No recurrence by device check at 12 months     CA ABLATE HEART DYSRHYTHM FOCUS      Description: Catheter Ablation Atrial Fibrillation;  Recorded: 10/30/2014;  Comments: May 2011 (PVI > 90% recurrence post MAZE + TIAN line); ; Re do PVI Oct 2012 (Cryo PVI + roof line + TIAN touch up + RA scar flutter); ; No recurrence by device check at 12 months     CA ABLATE HEART DYSRHYTHM FOCUS      Description: Catheter Ablation Atrial Flutter;  Recorded: 10/30/2014;  Comments: Typical RA CTI flutter Sept 2007; ; Multiple atypical flutter Oct 2012 (Roof line + reinforce TIAN line + RA scar line)     CA ABLATE/ RECONSTUCT ATRIA, LIMITED      Description: Maze Procedure;  Proc Date: 05/01/2007;  Comments: May 2007 at time of valve repair     CA ICAR CATHETER ABLATION ATRIOVENTR NODE FUNCTION      Description: Catheter Ablation Atrioventricular Node;  Recorded: 08/13/2012;  Comments: Mar 2008 performed due to inability to biventricular with conduction abnormality     CA ICAR CATHETER ABLATION ATRIOVENTR NODE FUNCTION      Description: Catheter Ablation Atrioventricular Node;  Recorded: 10/30/2014;  Comments: Mar 2008 performed due to inability to biventricular with conduction abnormality     ZZC CABG, VEIN, SINGLE      Description: CABG (CABG);  Recorded: 01/18/2012;  Comments: ONE VESSEL RCA     Family History   Problem Relation Age of Onset     No Known Problems Mother      No Known Problems Father      No Known Problems  Sister      No Known Problems Brother      No Known Problems Daughter      No Known Problems Son      Acute Myocardial Infarction No family hx of      Alcoholism No family hx of      Alzheimer Disease No family hx of      Amputation of Leg Below Knee No family hx of      Aortic Valve Replacement No family hx of      Arrhythmogenic Right Ventricular Cardiomyopathy No family hx of      Atrial fibrillation No family hx of      Atrial Flutter No family hx of      Breast Cancer No family hx of      CABG No family hx of      Cancer No family hx of      Cardiomyopathy No family hx of      Carotid Endarterectomy No family hx of      Cerebral aneurysm No family hx of      Chronic Kidney Disease No family hx of      Chronic Obstructive Pulmonary Disease No family hx of      Colon Cancer No family hx of      Congenital heart disease No family hx of      Coronary Artery Disease No family hx of      Coronary Stenting No family hx of      Dementia No family hx of      Diabetes Type 1 No family hx of      Diabetes Type 2  No family hx of      Dialysis No family hx of      Dyslipidemia No family hx of      Heart Failure No family hx of      Hypertension No family hx of      Hypertrophic cardiomyopathy No family hx of      ICD - Implantable Cardioverter Defibrillator No family hx of      Long QT syndrome No family hx of      Mitral Regurgitation No family hx of      Mitral Valve Replacement No family hx of      Morbid Obesity No family hx of      Obstructive Sleep Apnea No family hx of      Ovarian Cancer No family hx of      Pacemaker No family hx of      Pancreatic Cancer No family hx of      Peripheral Vascular Disease No family hx of      Pulmonary Embolism No family hx of      Pulmonary Hypertension No family hx of      Rheumatic Heart Disease No family hx of      Cerebrovascular Disease No family hx of      Sudden Death No family hx of      Transient ischemic attack No family hx of      Valvular heart disease No family hx of      Social History     Socioeconomic History     Marital status:      Spouse name: Not on file     Number of children: Not on file     Years of education: Not on file     Highest education level: Not on file   Occupational History     Not on file   Tobacco Use     Smoking status: Former Smoker     Packs/day: 1.00     Quit date: 1990     Years since quittin.3     Smokeless tobacco: Never Used   Substance and Sexual Activity     Alcohol use: Yes     Alcohol/week: 2.0 standard drinks     Drug use: No     Sexual activity: Not on file   Other Topics Concern     Not on file   Social History Narrative     Not on file     Social Determinants of Health     Financial Resource Strain: Not on file   Food Insecurity: Not on file   Transportation Needs: Not on file   Physical Activity: Not on file   Stress: Not on file   Social Connections: Not on file   Intimate Partner Violence: Not on file   Housing Stability: Not on file          Medications  Allergies   Current Outpatient Medications   Medication Sig Dispense Refill     amLODIPine (NORVASC) 5 MG tablet Take 1 tablet (5 mg) by mouth daily 90 tablet 3     aspirin 81 MG EC tablet [ASPIRIN 81 MG EC TABLET] Take 81 mg by mouth daily.       atorvastatin (LIPITOR) 20 MG tablet Take 1 tablet (20 mg) by mouth At Bedtime 90 tablet 3     furosemide (LASIX) 20 MG tablet TAKE 1 TO 2 TABLETS EVERY MORNING. FOLLOW INSTRUCTIONS GIVEN IN CLINIC 180 tablet 1     levothyroxine (SYNTHROID, LEVOTHROID) 175 MCG tablet [LEVOTHYROXINE (SYNTHROID, LEVOTHROID) 175 MCG TABLET] Take 175 mcg by mouth daily before breakfast.       losartan (COZAAR) 50 MG tablet Take 1 tablet (50 mg) by mouth daily 90 tablet 3     MULTIVITAMIN ORAL [MULTIVITAMIN ORAL] Take 1 tablet by mouth daily.       spironolactone (ALDACTONE) 25 MG tablet Take 1 tablet (25 mg) by mouth daily 90 tablet 3     warfarin ANTICOAGULANT (COUMADIN) 3 MG tablet Take 1 tablet ( 3 mg) with 1 mg tablet ( 4 mg dose) on Thur, Sat  then 3 mg on all other days 90 tablet 1     warfarin ANTICOAGULANT (COUMADIN/JANTOVEN) 1 MG tablet [WARFARIN ANTICOAGULANT (COUMADIN/JANTOVEN) 1 MG TABLET] TAKE 1 TABLET WITH 3 MG TABLET (TOTAL 4 MG) ON TUESDAY, THURSDAY AND SATURDAY AND TAKE 3 MG ALL OTHER DAYS 90 tablet 1    Allergies   Allergen Reactions     Carvedilol Shortness Of Breath     Lisinopril Cough     Metoprolol      Shortness of breath         Lab Results    Chemistry/lipid CBC Cardiac Enzymes/BNP/TSH/INR   Recent Labs   Lab Test 12/22/21  0956   CHOL 131   HDL 41   LDL 77   TRIG 66     Recent Labs   Lab Test 12/22/21  0956 12/18/20  0903 12/16/19  0835   LDL 77 76 86     Recent Labs   Lab Test 12/22/21  0956      POTASSIUM 4.2   CHLORIDE 108*   CO2 22   GLC 96   BUN 13   CR 1.19   GFRESTIMATED 62   OBIE 9.5     Recent Labs   Lab Test 12/22/21  0956 12/18/20  0903 12/16/19  0835   CR 1.19 1.22 1.28     No results for input(s): A1C in the last 62795 hours. Recent Labs   Lab Test 02/10/19  0754   WBC 9.1   HGB 13.7*   HCT 41.3   MCV 95        Recent Labs   Lab Test 02/10/19  0754   HGB 13.7*    Recent Labs   Lab Test 02/10/19  2348 02/10/19  1808 02/10/19  0754   TROPONINI 0.03 0.03 0.03     Recent Labs   Lab Test 02/10/19  0810   *     Recent Labs   Lab Test 12/18/20  0903   TSH 1.24     Recent Labs   Lab Test 03/30/22  0850 02/02/22  0837 12/08/21  0852   INR 2.1* 2.3 2.4                      Thank you for allowing me to participate in the care of your patient.      Sincerely,     Elizabeth Smith NP     [facility]  cc:   Janis Townsend MD  [address]  [place] [address]

## 2022-05-02 ENCOUNTER — TELEPHONE (OUTPATIENT)
Dept: CARDIOLOGY | Facility: CLINIC | Age: 82
End: 2022-05-02
Payer: COMMERCIAL

## 2022-05-02 DIAGNOSIS — I50.32 CHRONIC HEART FAILURE WITH PRESERVED EJECTION FRACTION (H): Primary | ICD-10-CM

## 2022-05-02 NOTE — TELEPHONE ENCOUNTER
Called Ady with lab results. Creatinine slightly elevated.     His weight is down 2 pounds over the weekend. Abdominal bloating is gone and edema is improving.     Continue current medications.      Follow up with me in 3 weeks.

## 2022-05-23 PROBLEM — I48.20 CHRONIC ATRIAL FIBRILLATION (H): Status: ACTIVE | Noted: 2022-01-01

## 2022-05-23 NOTE — PROGRESS NOTES
HEART CARE PROGRESS NOTE      [facility]  [phone]      Assessment/Recommendations   Assessment:    1.  Heart failure with preserved ejection fraction: His symptoms continue to improve since starting spironolactone and increasing Lasix in April.  His weight has now stabilized around 209 to 212 pounds.  Abdominal bloating has resolved.  Lower extremity edema has improved significantly.  He still has some swelling in his legs with right leg greater than left.  Lung sounds are clear.    2.  Coronary artery disease: History of coronary bypass surgery in 2007. He denies any chest pain.    3.  Atrial fibrillation/flutter: Status post ablation with recurrence.  He had AV allison ablation and CRT in 2012.  He continues to take warfarin.    4.  CRT: Device check on April 5 showed 3-month battery life.  Device clinic will continue to monitor.     Plan:  1.  BMP pending.  If renal function is abnormal, plan to decrease Lasix  2.  Continue low-sodium diet and daily weights  3.  Continue exercise    Ady Farley will follow up with Dr. Townsend on July 13.  Follow-up in the heart failure clinic will be determined after lab results are reviewed.       History of Present Illness/Subjective    Mr. Ady Farley is a 81 year old male seen at [facility] today for follow-up.  She has a history of coronary artery disease, coronary artery bypass surgery, aortic valve repair, heart failure with preserved ejection fraction, atrial fibrillation/flutter with recurrence after ablation, AV node ablation and CRT implant, and pulmonary hypertension.  Echocardiogram on March 30, 2022 showed ejection fraction of 45 to 50%, moderately decreased right ventricular systolic function, mild to moderate mitral regurgitation, moderate tricuspid regurgitation, and moderate to severe pulmonary hypertension.    He was started on spironolactone and increased Lasix dose in April.  He has had significant  "improvement in his symptoms.  His weight has decreased about 10 pounds.  Home weight is now 209 to 212 pounds.  He denies any shortness of breath.  Lower extremity edema has improved significantly but is still present.  Abdominal bloating has resolved.  He denies fatigue, lightheadedness and chest pain.      He is following a low-sodium diet.  He walks daily.  He golfed 18 holes last week and was asymptomatic.      ECHO 3/30/2022:   Echo result w/o MOPS: Interpretation Summary 1.Left ventricular function is decreased. The ejection fraction is 45-50%(mildly reduced).2.Moderately decreased right ventricular systolic function3.Moderate biatrial chamber enlargement.4.An annuloplasty ring is noted in the mitral position. There is mild mitralstenosis. Mean gradient is 7 mmHg at rate of 70 bpm. There is mild to moderate(1-2+) mitral regurgitation.5.There is moderate (2+) tricuspid regurgitation.6.Right ventricular systolic pressure is elevated, consistent with moderate tosevere pulmonary hypertension.7.IVC diameter >2.1 cm collapsing <50% with sniff suggests a high RA pressureestimated at 15 mmHg or greater.Compared to the prior study dated 5/5/2021, ther LVEF is slightly lower,mitral and tricuspid and insufficiency are greater, pulmonic pressures arehigher.          Physical Examination Review of Systems   Vitals: /58 (BP Location: Right arm, Patient Position: Sitting, Cuff Size: Adult Large)   Pulse 82   Resp 16   Ht 1.791 m (5' 10.5\")   Wt 97.1 kg (214 lb)   BMI 30.27 kg/m    BMI= Body mass index is 30.27 kg/m .  Wt Readings from Last 3 Encounters:   05/23/22 97.1 kg (214 lb)   04/29/22 102.4 kg (225 lb 12.8 oz)   04/13/22 102.8 kg (226 lb 9.6 oz)       General Appearance:     Alert, cooperative and in no acute distress.   ENT/Mouth: membranes moist, no oral lesions or bleeding gums.      EYES:  no scleral icterus, normal conjunctivae   Chest/Lungs:   lungs are clear to auscultation, no rales or wheezing, " respirations unlabored   Cardiovascular:   Regular. Normal first and second heart sounds, trace edema left lower leg, 1+ edema right lower leg   Abdomen:  Soft, nontender, nondistended, bowel sounds present   Extremities: no cyanosis or clubbing   Skin: warm, dry.    Neurologic: mood and affect are appropriate, alert and oriented x3         Please refer above for cardiac ROS details.      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     Coronary artery disease      Disease of thyroid gland      Disorder of phrenic nerve      Hyperlipidemia      Hypertension      Malfunction of biventricular cardiac pacemaker battery 12/1/2015    [employer] device recall     Non-rheumatic mitral regurgitation     MVP s/p Mitral valve repair May 15, 2007 MAZE  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  Echo 2011 LVEF 45% and mild MR Echo 2012 LVEF 40% mild MS, Mod MR  NELY Jan 2013 mild MS and mild/mod MR Echo Jan 2015 Mild MS/ mild MR NELY 2016 small/mod cuff remains (recommend OAC) Replacement Utility updated for latest IMO load      PVC (premature ventricular contraction)      PVD (peripheral vascular disease) (H)      Status post ablation of atrial fibrillation 11/18/2015    MAZE May 2007 PVI May 31, 2011 (RF-PVI + TIAN line) PVI Oct 30, 2012 (Cryo-PVI + redo TIAN line + roof line + RA-scar flutter line)     Past Surgical History:   Procedure Laterality Date     CARDIOVERSION  07/24/2018     INSERT / REPLACE / REMOVE PACEMAKER       MITRAL VALVE REPAIR       ID ABLATE HEART DYSRHYTHM FOCUS      Description: Catheter Ablation Atrial Flutter;  Recorded: 10/30/2012;  Comments: Typical RA CTI flutter Sept 2007; ; Multiple atypical flutter Oct 2012 (Roof line + reinforce TIAN line + RA scar line)     ID ABLATE  HEART DYSRHYTHM FOCUS      Description: Catheter Ablation Atrial Fibrillation;  Recorded: 10/24/2013;  Comments: May 2011 (PVI > 90% recurrence post MAZE + TIAN line); ; Re do PVI Oct 2012 (Cryo PVI + roof line + TIAN touch up + RA scar flutter); ; No recurrence by device check at 12 months     HI ABLATE HEART DYSRHYTHM FOCUS      Description: Catheter Ablation Atrial Fibrillation;  Recorded: 10/30/2014;  Comments: May 2011 (PVI > 90% recurrence post MAZE + TIAN line); ; Re do PVI Oct 2012 (Cryo PVI + roof line + TIAN touch up + RA scar flutter); ; No recurrence by device check at 12 months     HI ABLATE HEART DYSRHYTHM FOCUS      Description: Catheter Ablation Atrial Flutter;  Recorded: 10/30/2014;  Comments: Typical RA CTI flutter Sept 2007; ; Multiple atypical flutter Oct 2012 (Roof line + reinforce TIAN line + RA scar line)     HI ABLATE/ RECONSTUCT ATRIA, LIMITED      Description: Maze Procedure;  Proc Date: 05/01/2007;  Comments: May 2007 at time of valve repair     HI ICAR CATHETER ABLATION ATRIOVENTR NODE FUNCTION      Description: Catheter Ablation Atrioventricular Node;  Recorded: 08/13/2012;  Comments: Mar 2008 performed due to inability to biventricular with conduction abnormality     HI ICAR CATHETER ABLATION ATRIOVENTR NODE FUNCTION      Description: Catheter Ablation Atrioventricular Node;  Recorded: 10/30/2014;  Comments: Mar 2008 performed due to inability to biventricular with conduction abnormality     ZZC CABG, VEIN, SINGLE      Description: CABG (CABG);  Recorded: 01/18/2012;  Comments: ONE VESSEL RCA     Family History   Problem Relation Age of Onset     No Known Problems Mother      No Known Problems Father      No Known Problems Sister      No Known Problems Brother      No Known Problems Daughter      No Known Problems Son      Acute Myocardial Infarction No family hx of      Alcoholism No family hx of      Alzheimer Disease No family hx of      Amputation of Leg Below Knee No family hx of       Aortic Valve Replacement No family hx of      Arrhythmogenic Right Ventricular Cardiomyopathy No family hx of      Atrial fibrillation No family hx of      Atrial Flutter No family hx of      Breast Cancer No family hx of      CABG No family hx of      Cancer No family hx of      Cardiomyopathy No family hx of      Carotid Endarterectomy No family hx of      Cerebral aneurysm No family hx of      Chronic Kidney Disease No family hx of      Chronic Obstructive Pulmonary Disease No family hx of      Colon Cancer No family hx of      Congenital heart disease No family hx of      Coronary Artery Disease No family hx of      Coronary Stenting No family hx of      Dementia No family hx of      Diabetes Type 1 No family hx of      Diabetes Type 2  No family hx of      Dialysis No family hx of      Dyslipidemia No family hx of      Heart Failure No family hx of      Hypertension No family hx of      Hypertrophic cardiomyopathy No family hx of      ICD - Implantable Cardioverter Defibrillator No family hx of      Long QT syndrome No family hx of      Mitral Regurgitation No family hx of      Mitral Valve Replacement No family hx of      Morbid Obesity No family hx of      Obstructive Sleep Apnea No family hx of      Ovarian Cancer No family hx of      Pacemaker No family hx of      Pancreatic Cancer No family hx of      Peripheral Vascular Disease No family hx of      Pulmonary Embolism No family hx of      Pulmonary Hypertension No family hx of      Rheumatic Heart Disease No family hx of      Cerebrovascular Disease No family hx of      Sudden Death No family hx of      Transient ischemic attack No family hx of      Valvular heart disease No family hx of     Social History     Socioeconomic History     Marital status:      Spouse name: Not on file     Number of children: Not on file     Years of education: Not on file     Highest education level: Not on file   Occupational History     Not on file   Tobacco Use      Smoking status: Former Smoker     Packs/day: 1.00     Quit date: 1990     Years since quittin.4     Smokeless tobacco: Never Used   Substance and Sexual Activity     Alcohol use: Yes     Alcohol/week: 2.0 standard drinks     Drug use: No     Sexual activity: Not on file   Other Topics Concern     Not on file   Social History Narrative     Not on file     Social Determinants of Health     Financial Resource Strain: Not on file   Food Insecurity: Not on file   Transportation Needs: Not on file   Physical Activity: Not on file   Stress: Not on file   Social Connections: Not on file   Intimate Partner Violence: Not on file   Housing Stability: Not on file          Medications  Allergies   Current Outpatient Medications   Medication Sig Dispense Refill     amLODIPine (NORVASC) 5 MG tablet Take 1 tablet (5 mg) by mouth daily 90 tablet 3     aspirin 81 MG EC tablet [ASPIRIN 81 MG EC TABLET] Take 81 mg by mouth daily.       atorvastatin (LIPITOR) 20 MG tablet Take 1 tablet (20 mg) by mouth At Bedtime 90 tablet 3     furosemide (LASIX) 20 MG tablet TAKE 1 TO 2 TABLETS EVERY MORNING. FOLLOW INSTRUCTIONS GIVEN IN CLINIC (Patient taking differently: 40 mg in the morning and 20 mg in the afternoon) 180 tablet 1     levothyroxine (SYNTHROID, LEVOTHROID) 175 MCG tablet [LEVOTHYROXINE (SYNTHROID, LEVOTHROID) 175 MCG TABLET] Take 175 mcg by mouth daily before breakfast.       losartan (COZAAR) 50 MG tablet Take 1 tablet (50 mg) by mouth daily 90 tablet 3     MULTIVITAMIN ORAL [MULTIVITAMIN ORAL] Take 1 tablet by mouth daily.       spironolactone (ALDACTONE) 25 MG tablet Take 1 tablet (25 mg) by mouth daily 90 tablet 3     warfarin ANTICOAGULANT (COUMADIN) 3 MG tablet Take 1 tablet ( 3 mg) with 1 mg tablet ( 4 mg dose) on Thur, Sat then 3 mg on all other days 90 tablet 1     warfarin ANTICOAGULANT (COUMADIN/JANTOVEN) 1 MG tablet [WARFARIN ANTICOAGULANT (COUMADIN/JANTOVEN) 1 MG TABLET] TAKE 1 TABLET WITH 3 MG TABLET (TOTAL 4  MG) ON TUESDAY, THURSDAY AND SATURDAY AND TAKE 3 MG ALL OTHER DAYS 90 tablet 1    Allergies   Allergen Reactions     Carvedilol Shortness Of Breath     Lisinopril Cough     Metoprolol      Shortness of breath         Lab Results    Chemistry/lipid CBC Cardiac Enzymes/BNP/TSH/INR   Recent Labs   Lab Test 12/22/21  0956   CHOL 131   HDL 41   LDL 77   TRIG 66     Recent Labs   Lab Test 12/22/21  0956 12/18/20  0903 12/16/19  0835   LDL 77 76 86     Recent Labs   Lab Test 12/22/21  0956      POTASSIUM 4.2   CHLORIDE 108*   CO2 22   GLC 96   BUN 13   CR 1.19   GFRESTIMATED 62   OBIE 9.5     Recent Labs   Lab Test 12/22/21  0956 12/18/20  0903 12/16/19  0835   CR 1.19 1.22 1.28     No results for input(s): A1C in the last 34232 hours. Recent Labs   Lab Test 02/10/19  0754   WBC 9.1   HGB 13.7*   HCT 41.3   MCV 95        Recent Labs   Lab Test 02/10/19  0754   HGB 13.7*    Recent Labs   Lab Test 02/10/19  2348 02/10/19  1808 02/10/19  0754   TROPONINI 0.03 0.03 0.03     Recent Labs   Lab Test 02/10/19  0810   *     Recent Labs   Lab Test 12/18/20  0903   TSH 1.24     Recent Labs   Lab Test 03/30/22  0850 02/02/22  0837 12/08/21  0852   INR 2.1* 2.3 2.4

## 2022-05-23 NOTE — LETTER
5/23/2022    Martín Ross MD  [facility] [address] [place] [address]    RE: Ady Farley       Dear Colleague,     I had the pleasure of seeing Ady Farley in the [facility].  HEART CARE PROGRESS NOTE      [facility]  [phone]      Assessment/Recommendations   Assessment:    1.  Heart failure with preserved ejection fraction: His symptoms continue to improve since starting spironolactone and increasing Lasix in April.  His weight has now stabilized around 209 to 212 pounds.  Abdominal bloating has resolved.  Lower extremity edema has improved significantly.  He still has some swelling in his legs with right leg greater than left.  Lung sounds are clear.    2.  Coronary artery disease: History of coronary bypass surgery in 2007. He denies any chest pain.    3.  Atrial fibrillation/flutter: Status post ablation with recurrence.  He had AV allison ablation and CRT in 2012.  He continues to take warfarin.    4.  CRT: Device check on April 5 showed 3-month battery life.  Device clinic will continue to monitor.     Plan:  1.  BMP pending.  If renal function is abnormal, plan to decrease Lasix  2.  Continue low-sodium diet and daily weights  3.  Continue exercise    Ady Farley will follow up with Dr. Townsend on July 13.  Follow-up in the heart failure clinic will be determined after lab results are reviewed.       History of Present Illness/Subjective    Mr. Ady Farley is a 81 year old male seen at [facility] today for follow-up.  She has a history of coronary artery disease, coronary artery bypass surgery, aortic valve repair, heart failure with preserved ejection fraction, atrial fibrillation/flutter with recurrence after ablation, AV node ablation and CRT implant, and pulmonary hypertension.  Echocardiogram on March 30, 2022 showed ejection fraction of 45 to 50%, moderately decreased right ventricular  "systolic function, mild to moderate mitral regurgitation, moderate tricuspid regurgitation, and moderate to severe pulmonary hypertension.    He was started on spironolactone and increased Lasix dose in April.  He has had significant improvement in his symptoms.  His weight has decreased about 10 pounds.  Home weight is now 209 to 212 pounds.  He denies any shortness of breath.  Lower extremity edema has improved significantly but is still present.  Abdominal bloating has resolved.  He denies fatigue, lightheadedness and chest pain.      He is following a low-sodium diet.  He walks daily.  He golfed 18 holes last week and was asymptomatic.      ECHO 3/30/2022:   Echo result w/o MOPS: Interpretation Summary 1.Left ventricular function is decreased. The ejection fraction is 45-50%(mildly reduced).2.Moderately decreased right ventricular systolic function3.Moderate biatrial chamber enlargement.4.An annuloplasty ring is noted in the mitral position. There is mild mitralstenosis. Mean gradient is 7 mmHg at rate of 70 bpm. There is mild to moderate(1-2+) mitral regurgitation.5.There is moderate (2+) tricuspid regurgitation.6.Right ventricular systolic pressure is elevated, consistent with moderate tosevere pulmonary hypertension.7.IVC diameter >2.1 cm collapsing <50% with sniff suggests a high RA pressureestimated at 15 mmHg or greater.Compared to the prior study dated 5/5/2021, ther LVEF is slightly lower,mitral and tricuspid and insufficiency are greater, pulmonic pressures arehigher.          Physical Examination Review of Systems   Vitals: /58 (BP Location: Right arm, Patient Position: Sitting, Cuff Size: Adult Large)   Pulse 82   Resp 16   Ht 1.791 m (5' 10.5\")   Wt 97.1 kg (214 lb)   BMI 30.27 kg/m    BMI= Body mass index is 30.27 kg/m .  Wt Readings from Last 3 Encounters:   05/23/22 97.1 kg (214 lb)   04/29/22 102.4 kg (225 lb 12.8 oz)   04/13/22 102.8 kg (226 lb 9.6 oz)       General Appearance:   "   Alert, cooperative and in no acute distress.   ENT/Mouth: membranes moist, no oral lesions or bleeding gums.      EYES:  no scleral icterus, normal conjunctivae   Chest/Lungs:   lungs are clear to auscultation, no rales or wheezing, respirations unlabored   Cardiovascular:   Regular. Normal first and second heart sounds, trace edema left lower leg, 1+ edema right lower leg   Abdomen:  Soft, nontender, nondistended, bowel sounds present   Extremities: no cyanosis or clubbing   Skin: warm, dry.    Neurologic: mood and affect are appropriate, alert and oriented x3         Please refer above for cardiac ROS details.      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     Coronary artery disease      Disease of thyroid gland      Disorder of phrenic nerve      Hyperlipidemia      Hypertension      Malfunction of biventricular cardiac pacemaker battery 12/1/2015    [employer] device recall     Non-rheumatic mitral regurgitation     MVP s/p Mitral valve repair May 15, 2007 MAZE  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  Echo 2011 LVEF 45% and mild MR Echo 2012 LVEF 40% mild MS, Mod MR  NELY Jan 2013 mild MS and mild/mod MR Echo Jan 2015 Mild MS/ mild MR NELY 2016 small/mod cuff remains (recommend OAC) Replacement Utility updated for latest IMO load      PVC (premature ventricular contraction)      PVD (peripheral vascular disease) (H)      Status post ablation of atrial fibrillation 11/18/2015    MAZE May 2007 PVI May 31, 2011 (RF-PVI + TIAN line) PVI Oct 30, 2012 (Cryo-PVI + redo TIAN line + roof line + RA-scar flutter line)     Past Surgical History:   Procedure Laterality Date     CARDIOVERSION  07/24/2018     INSERT / REPLACE / REMOVE PACEMAKER       MITRAL VALVE REPAIR       [place] ABLATE HEART  DYSRHYTHM FOCUS      Description: Catheter Ablation Atrial Flutter;  Recorded: 10/30/2012;  Comments: Typical RA CTI flutter Sept 2007; ; Multiple atypical flutter Oct 2012 (Roof line + reinforce TIAN line + RA scar line)     NM ABLATE HEART DYSRHYTHM FOCUS      Description: Catheter Ablation Atrial Fibrillation;  Recorded: 10/24/2013;  Comments: May 2011 (PVI > 90% recurrence post MAZE + TIAN line); ; Re do PVI Oct 2012 (Cryo PVI + roof line + TIAN touch up + RA scar flutter); ; No recurrence by device check at 12 months     NM ABLATE HEART DYSRHYTHM FOCUS      Description: Catheter Ablation Atrial Fibrillation;  Recorded: 10/30/2014;  Comments: May 2011 (PVI > 90% recurrence post MAZE + TIAN line); ; Re do PVI Oct 2012 (Cryo PVI + roof line + TIAN touch up + RA scar flutter); ; No recurrence by device check at 12 months     NM ABLATE HEART DYSRHYTHM FOCUS      Description: Catheter Ablation Atrial Flutter;  Recorded: 10/30/2014;  Comments: Typical RA CTI flutter Sept 2007; ; Multiple atypical flutter Oct 2012 (Roof line + reinforce TIAN line + RA scar line)     NM ABLATE/ RECONSTUCT ATRIA, LIMITED      Description: Maze Procedure;  Proc Date: 05/01/2007;  Comments: May 2007 at time of valve repair     NM ICAR CATHETER ABLATION ATRIOVENTR NODE FUNCTION      Description: Catheter Ablation Atrioventricular Node;  Recorded: 08/13/2012;  Comments: Mar 2008 performed due to inability to biventricular with conduction abnormality     NM ICAR CATHETER ABLATION ATRIOVENTR NODE FUNCTION      Description: Catheter Ablation Atrioventricular Node;  Recorded: 10/30/2014;  Comments: Mar 2008 performed due to inability to biventricular with conduction abnormality     ZZC CABG, VEIN, SINGLE      Description: CABG (CABG);  Recorded: 01/18/2012;  Comments: ONE VESSEL RCA     Family History   Problem Relation Age of Onset     No Known Problems Mother      No Known Problems Father      No Known Problems Sister      No Known Problems Brother       No Known Problems Daughter      No Known Problems Son      Acute Myocardial Infarction No family hx of      Alcoholism No family hx of      Alzheimer Disease No family hx of      Amputation of Leg Below Knee No family hx of      Aortic Valve Replacement No family hx of      Arrhythmogenic Right Ventricular Cardiomyopathy No family hx of      Atrial fibrillation No family hx of      Atrial Flutter No family hx of      Breast Cancer No family hx of      CABG No family hx of      Cancer No family hx of      Cardiomyopathy No family hx of      Carotid Endarterectomy No family hx of      Cerebral aneurysm No family hx of      Chronic Kidney Disease No family hx of      Chronic Obstructive Pulmonary Disease No family hx of      Colon Cancer No family hx of      Congenital heart disease No family hx of      Coronary Artery Disease No family hx of      Coronary Stenting No family hx of      Dementia No family hx of      Diabetes Type 1 No family hx of      Diabetes Type 2  No family hx of      Dialysis No family hx of      Dyslipidemia No family hx of      Heart Failure No family hx of      Hypertension No family hx of      Hypertrophic cardiomyopathy No family hx of      ICD - Implantable Cardioverter Defibrillator No family hx of      Long QT syndrome No family hx of      Mitral Regurgitation No family hx of      Mitral Valve Replacement No family hx of      Morbid Obesity No family hx of      Obstructive Sleep Apnea No family hx of      Ovarian Cancer No family hx of      Pacemaker No family hx of      Pancreatic Cancer No family hx of      Peripheral Vascular Disease No family hx of      Pulmonary Embolism No family hx of      Pulmonary Hypertension No family hx of      Rheumatic Heart Disease No family hx of      Cerebrovascular Disease No family hx of      Sudden Death No family hx of      Transient ischemic attack No family hx of      Valvular heart disease No family hx of     Social History     Socioeconomic  History     Marital status:      Spouse name: Not on file     Number of children: Not on file     Years of education: Not on file     Highest education level: Not on file   Occupational History     Not on file   Tobacco Use     Smoking status: Former Smoker     Packs/day: 1.00     Quit date: 1990     Years since quittin.4     Smokeless tobacco: Never Used   Substance and Sexual Activity     Alcohol use: Yes     Alcohol/week: 2.0 standard drinks     Drug use: No     Sexual activity: Not on file   Other Topics Concern     Not on file   Social History Narrative     Not on file     Social Determinants of Health     Financial Resource Strain: Not on file   Food Insecurity: Not on file   Transportation Needs: Not on file   Physical Activity: Not on file   Stress: Not on file   Social Connections: Not on file   Intimate Partner Violence: Not on file   Housing Stability: Not on file          Medications  Allergies   Current Outpatient Medications   Medication Sig Dispense Refill     amLODIPine (NORVASC) 5 MG tablet Take 1 tablet (5 mg) by mouth daily 90 tablet 3     aspirin 81 MG EC tablet [ASPIRIN 81 MG EC TABLET] Take 81 mg by mouth daily.       atorvastatin (LIPITOR) 20 MG tablet Take 1 tablet (20 mg) by mouth At Bedtime 90 tablet 3     furosemide (LASIX) 20 MG tablet TAKE 1 TO 2 TABLETS EVERY MORNING. FOLLOW INSTRUCTIONS GIVEN IN CLINIC (Patient taking differently: 40 mg in the morning and 20 mg in the afternoon) 180 tablet 1     levothyroxine (SYNTHROID, LEVOTHROID) 175 MCG tablet [LEVOTHYROXINE (SYNTHROID, LEVOTHROID) 175 MCG TABLET] Take 175 mcg by mouth daily before breakfast.       losartan (COZAAR) 50 MG tablet Take 1 tablet (50 mg) by mouth daily 90 tablet 3     MULTIVITAMIN ORAL [MULTIVITAMIN ORAL] Take 1 tablet by mouth daily.       spironolactone (ALDACTONE) 25 MG tablet Take 1 tablet (25 mg) by mouth daily 90 tablet 3     warfarin ANTICOAGULANT (COUMADIN) 3 MG tablet Take 1 tablet ( 3 mg) with  1 mg tablet ( 4 mg dose) on Thur, Sat then 3 mg on all other days 90 tablet 1     warfarin ANTICOAGULANT (COUMADIN/JANTOVEN) 1 MG tablet [WARFARIN ANTICOAGULANT (COUMADIN/JANTOVEN) 1 MG TABLET] TAKE 1 TABLET WITH 3 MG TABLET (TOTAL 4 MG) ON TUESDAY, THURSDAY AND SATURDAY AND TAKE 3 MG ALL OTHER DAYS 90 tablet 1    Allergies   Allergen Reactions     Carvedilol Shortness Of Breath     Lisinopril Cough     Metoprolol      Shortness of breath         Lab Results    Chemistry/lipid CBC Cardiac Enzymes/BNP/TSH/INR   Recent Labs   Lab Test 12/22/21  0956   CHOL 131   HDL 41   LDL 77   TRIG 66     Recent Labs   Lab Test 12/22/21  0956 12/18/20  0903 12/16/19  0835   LDL 77 76 86     Recent Labs   Lab Test 12/22/21  0956      POTASSIUM 4.2   CHLORIDE 108*   CO2 22   GLC 96   BUN 13   CR 1.19   GFRESTIMATED 62   OBIE 9.5     Recent Labs   Lab Test 12/22/21  0956 12/18/20  0903 12/16/19  0835   CR 1.19 1.22 1.28     No results for input(s): A1C in the last 80565 hours. Recent Labs   Lab Test 02/10/19  0754   WBC 9.1   HGB 13.7*   HCT 41.3   MCV 95        Recent Labs   Lab Test 02/10/19  0754   HGB 13.7*    Recent Labs   Lab Test 02/10/19  2348 02/10/19  1808 02/10/19  0754   TROPONINI 0.03 0.03 0.03     Recent Labs   Lab Test 02/10/19  0810   *     Recent Labs   Lab Test 12/18/20  0903   TSH 1.24     Recent Labs   Lab Test 03/30/22  0850 02/02/22  0837 12/08/21  0852   INR 2.1* 2.3 2.4                      Thank you for allowing me to participate in the care of your patient.      Sincerely,     Elizabeth Smith NP     [facility]  cc:   Elizabeth Smith NP  [address] [place] [address]

## 2022-05-23 NOTE — PATIENT INSTRUCTIONS
Ady Farley,    It was a pleasure to see you today at the [facility].     My recommendations after this visit include:  - You will be called with the results of your labs today or tomorrow.    - Continue to limit salt in your diet.   - Continue to weigh yourself daily.   - If you have any questions or concerns, please call [phone] to talk with my nurse.    Elizabeth Smith, CNP

## 2022-05-23 NOTE — PROGRESS NOTES
ANTICOAGULATION MANAGEMENT     Ady Farley 81 year old male is on warfarin with therapeutic INR result. (Goal INR 2.0-3.0)    Recent labs: (last 7 days)     05/23/22  1423   INR 2.3*       ASSESSMENT       Source(s): Chart Review and Template       Warfarin doses taken: Warfarin taken as instructed    Diet: No new diet changes identified    New illness, injury, or hospitalization: No    Medication/supplement changes: Started spironolactone  25 mg daily started on 4/20/2022 - INR remains stable    Signs or symptoms of bleeding or clotting: No    Previous INR: Therapeutic last 2(+) visits    Additional findings: None       PLAN     Recommended plan for no diet, medication or health factor changes affecting INR     Dosing Instructions: continue your current warfarin dose with next INR in 8 weeks       Summary  As of 5/23/2022    Full warfarin instructions:  4 mg every Thu, Sat; 3 mg all other days   Next INR check:  7/18/2022             Telephone call with Ady who verbalizes understanding and agrees to plan    Patient elected to schedule next visit 7/20/2022    Education provided: Importance of therapeutic range, Importance of notifying clinic for changes in medications; a sooner lab recheck maybe needed. and Contact [phone] with any changes, questions or concerns.     Plan made per [facility] anticoagulation protocol    Salma Marques RN  Anticoagulation Clinic  5/23/2022    _______________________________________________________________________     Anticoagulation Episode Summary     Current INR goal:  2.0-3.0   TTR:  100.0 % (9.7 mo)   Target end date:     Send INR reminders to:  [facility]    Indications    Paroxysmal Atrial Fibrillation [I48.91]  Atrial flutter  unspecified type (H) [I48.92]           Comments:           Anticoagulation Care Providers     Provider Role Specialty Phone number    Janis Townsend MD Referring Cardiovascular Disease [phone]

## 2022-05-23 NOTE — PROGRESS NOTES
ANTICOAGULATION MANAGEMENT      Ady Farley due for annual renewal of referral to anticoagulation monitoring. Order pended for your review and signature.      ANTICOAGULATION SUMMARY      Warfarin indication(s)     Atrial fibrillation    Heart valve present?  NO       Current goal range   INR: 2.0-3.0     Goal appropriate for indication? Yes, INR 2-3 appropriate for hx of DVT, PE, hypercoagulable state, Afib, LVAD, or bileaflet AVR without risk factors     Current duration of therapy Indefinite/long term therapy   Time in Therapeutic Range (TTR)  (Goal > 60%) 100%       Office visit with referring provider's group within last year yes on 5/23/2022         Salma Marques RN

## 2022-06-07 NOTE — TELEPHONE ENCOUNTER
Type: routine remote CRT-P transmission. Done as a courtesy check, nearing TANESHA.  Presenting rhythm: AP-biVP rate 81 bpm. Frequent A's in refractory.  Battery/lead status: stable, remaining battery longevity estimated at 2 months.  Arrhythmias: since 4/5/22, 123 mode switches, longest 22 hrs, burden 8.3% (down from 15%), V-rates >/=120 bpm 5%. Two nonsustained ventricular high rate episodes; EGMs suggest VT, up to 16 beats (6 seconds), rate 170's.  Anticoagulant: warfarin  Comments: normal pacemaker function. BiV pacing 93%. Routed to device RN.  MAR Lainez, Device Specialist    Hx NSVTs in past and reviewed on prior remotes, Last EF 45-50% per Echo 3/30/2022. Most recent episode was 5/29/22 at ~ 6 pm lasting 6 seconds, ~170 bpm. Reviewed with patient, he again denies any troublesome/correlating symptoms. Advised to call with any changes. Also reviewed that device is reaching TANESHA and with dependent status and no automatic alerts we will bump him up to monthly remotes. Patient verbalized understanding.     Has upcoming appointments with Elizabeth BIGGS CNP and Dr. Townsend. Denies any questions/concerns at this time.     Blanca Santiago RN

## 2022-06-17 NOTE — PATIENT INSTRUCTIONS
Ady Farley,    It was a pleasure to see you today at the [facility].     My recommendations after this visit include:  - You will be called with the results of your labs.   - Continue taking furosemide 20 mg daily.  If you feel that you are retaining fluid, you can take an extra 20 mg.  Please let me know if this extra dose dose not work or you are using the extra dose more than 2 times/week.  Refill was sent to [facility].   - Follow up with Elizabeth in 4 months.   - If you have any questions or concerns, please call [phone] to talk with my nurse.    Elizabeth Smith, CNP

## 2022-06-17 NOTE — LETTER
6/17/2022    Martín Ross MD  [facility] [address] [place] [address]    RE: Ady Farley       Dear Colleague,     I had the pleasure of seeing Ady Farley in the [facility].  HEART CARE PROGRESS NOTE      [facility]  [phone]      Assessment/Recommendations   Assessment:    1.  Heart failure with preserved ejection fraction: He has no symptoms of acute heart failure.  Lung sounds are clear and lower extremity edema is minimal.  He no longer has abdominal bloating.    2.  Coronary artery disease: History of coronary bypass surgery in 2007. He denies any chest pain.    3.  Atrial fibrillation/flutter: Status post ablation with recurrence.  He had AV allison ablation and CRT in 2012.  He continues to take warfarin.    4.  CRT: Nearing TANESHA.  Device clinic will continue to monitor.     Plan:  1.  BMP pending  2.  Continue low-sodium diet and daily weights  3.  Continue exercise    Ady Farley will follow up with Dr. Townsend on July 13.  Follow-up in the heart failure clinic in 4 months.       History of Present Illness/Subjective    Mr. Ady Farley is a 81 year old male seen at [facility] today for follow-up.  He has a history of coronary artery disease, coronary artery bypass surgery, aortic valve repair, heart failure with preserved ejection fraction, atrial fibrillation/flutter with recurrence after ablation, AV node ablation and CRT implant, and pulmonary hypertension.  Echocardiogram on March 30, 2022 showed ejection fraction of 45 to 50%, moderately decreased right ventricular systolic function, mild to moderate mitral regurgitation, moderate tricuspid regurgitation, and moderate to severe pulmonary hypertension.    He was seen in clinic about 1 month ago.  Renal function slightly abnormal and no signs of retaining fluid.  Lasix was decreased to 40 mg daily.  On his own, he decrease Lasix to 20 mg  "daily and uses an extra 20 as needed when he feels that he is retaining fluid.  He states that the last time he took an extra dose was 2 weeks ago.  He has no symptoms of acute heart failure.  He is golfing twice weekly and is asymptomatic.  He denies fatigue, lightheadedness, shortness of breath, dyspnea on exertion, chest pain, abdominal fullness/bloating and lower extremity edema.      He is following a low-sodium diet.  He walks daily and golfs twice weekly.  His home weight is stable between 208 to 209 pounds.      ECHO 3/30/2022:   Echo result w/o MOPS: Interpretation Summary 1.Left ventricular function is decreased. The ejection fraction is 45-50%(mildly reduced).2.Moderately decreased right ventricular systolic function3.Moderate biatrial chamber enlargement.4.An annuloplasty ring is noted in the mitral position. There is mild mitralstenosis. Mean gradient is 7 mmHg at rate of 70 bpm. There is mild to moderate(1-2+) mitral regurgitation.5.There is moderate (2+) tricuspid regurgitation.6.Right ventricular systolic pressure is elevated, consistent with moderate tosevere pulmonary hypertension.7.IVC diameter >2.1 cm collapsing <50% with sniff suggests a high RA pressureestimated at 15 mmHg or greater.Compared to the prior study dated 5/5/2021, ther LVEF is slightly lower,mitral and tricuspid and insufficiency are greater, pulmonic pressures arehigher.          Physical Examination Review of Systems   Vitals: /58 (BP Location: Right arm, Patient Position: Sitting, Cuff Size: Adult Regular)   Pulse 73   Resp 16   Ht 1.791 m (5' 10.5\")   Wt 95.7 kg (211 lb)   BMI 29.85 kg/m    BMI= Body mass index is 29.85 kg/m .  Wt Readings from Last 3 Encounters:   06/17/22 95.7 kg (211 lb)   05/23/22 97.1 kg (214 lb)   04/29/22 102.4 kg (225 lb 12.8 oz)       General Appearance:     Alert, cooperative and in no acute distress.   ENT/Mouth: membranes moist, no oral lesions or bleeding gums.      EYES:  no scleral " icterus, normal conjunctivae   Chest/Lungs:   lungs are clear to auscultation, no rales or wheezing, respirations unlabored   Cardiovascular:   Regular. Normal first and second heart sounds, trace edema bilateral lower legs   Abdomen:  Soft, nontender, nondistended, bowel sounds present   Extremities: no cyanosis or clubbing   Skin: warm, dry.    Neurologic: mood and affect are appropriate, alert and oriented x3         Please refer above for cardiac ROS details.      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     Coronary artery disease      Disease of thyroid gland      Disorder of phrenic nerve      Hyperlipidemia      Hypertension      Malfunction of biventricular cardiac pacemaker battery 12/1/2015    [employer] device recall     Non-rheumatic mitral regurgitation     MVP s/p Mitral valve repair May 15, 2007 MAZE  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  Echo 2011 LVEF 45% and mild MR Echo 2012 LVEF 40% mild MS, Mod MR  NELY Jan 2013 mild MS and mild/mod MR Echo Jan 2015 Mild MS/ mild MR NELY 2016 small/mod cuff remains (recommend OAC) Replacement Utility updated for latest IMO load      PVC (premature ventricular contraction)      PVD (peripheral vascular disease) (H)      Status post ablation of atrial fibrillation 11/18/2015    MAZE May 2007 PVI May 31, 2011 (RF-PVI + TIAN line) PVI Oct 30, 2012 (Cryo-PVI + redo TIAN line + roof line + RA-scar flutter line)     Past Surgical History:   Procedure Laterality Date     CARDIOVERSION  07/24/2018     INSERT / REPLACE / REMOVE PACEMAKER       MITRAL VALVE REPAIR       CA ABLATE HEART DYSRHYTHM FOCUS      Description: Catheter Ablation Atrial Flutter;  Recorded: 10/30/2012;  Comments: Typical RA CTI flutter Sept 2007; ; Multiple atypical  flutter Oct 2012 (Roof line + reinforce TIAN line + RA scar line)     NM ABLATE HEART DYSRHYTHM FOCUS      Description: Catheter Ablation Atrial Fibrillation;  Recorded: 10/24/2013;  Comments: May 2011 (PVI > 90% recurrence post MAZE + TIAN line); ; Re do PVI Oct 2012 (Cryo PVI + roof line + TIAN touch up + RA scar flutter); ; No recurrence by device check at 12 months     NM ABLATE HEART DYSRHYTHM FOCUS      Description: Catheter Ablation Atrial Fibrillation;  Recorded: 10/30/2014;  Comments: May 2011 (PVI > 90% recurrence post MAZE + TIAN line); ; Re do PVI Oct 2012 (Cryo PVI + roof line + TIAN touch up + RA scar flutter); ; No recurrence by device check at 12 months     NM ABLATE HEART DYSRHYTHM FOCUS      Description: Catheter Ablation Atrial Flutter;  Recorded: 10/30/2014;  Comments: Typical RA CTI flutter Sept 2007; ; Multiple atypical flutter Oct 2012 (Roof line + reinforce TIAN line + RA scar line)     NM ABLATE/ RECONSTUCT ATRIA, LIMITED      Description: Maze Procedure;  Proc Date: 05/01/2007;  Comments: May 2007 at time of valve repair     NM ICAR CATHETER ABLATION ATRIOVENTR NODE FUNCTION      Description: Catheter Ablation Atrioventricular Node;  Recorded: 08/13/2012;  Comments: Mar 2008 performed due to inability to biventricular with conduction abnormality     NM ICAR CATHETER ABLATION ATRIOVENTR NODE FUNCTION      Description: Catheter Ablation Atrioventricular Node;  Recorded: 10/30/2014;  Comments: Mar 2008 performed due to inability to biventricular with conduction abnormality     ZZC CABG, VEIN, SINGLE      Description: CABG (CABG);  Recorded: 01/18/2012;  Comments: ONE VESSEL RCA     Family History   Problem Relation Age of Onset     No Known Problems Mother      No Known Problems Father      No Known Problems Sister      No Known Problems Brother      No Known Problems Daughter      No Known Problems Son      Acute Myocardial Infarction No family hx of      Alcoholism No family hx of      Alzheimer  Disease No family hx of      Amputation of Leg Below Knee No family hx of      Aortic Valve Replacement No family hx of      Arrhythmogenic Right Ventricular Cardiomyopathy No family hx of      Atrial fibrillation No family hx of      Atrial Flutter No family hx of      Breast Cancer No family hx of      CABG No family hx of      Cancer No family hx of      Cardiomyopathy No family hx of      Carotid Endarterectomy No family hx of      Cerebral aneurysm No family hx of      Chronic Kidney Disease No family hx of      Chronic Obstructive Pulmonary Disease No family hx of      Colon Cancer No family hx of      Congenital heart disease No family hx of      Coronary Artery Disease No family hx of      Coronary Stenting No family hx of      Dementia No family hx of      Diabetes Type 1 No family hx of      Diabetes Type 2  No family hx of      Dialysis No family hx of      Dyslipidemia No family hx of      Heart Failure No family hx of      Hypertension No family hx of      Hypertrophic cardiomyopathy No family hx of      ICD - Implantable Cardioverter Defibrillator No family hx of      Long QT syndrome No family hx of      Mitral Regurgitation No family hx of      Mitral Valve Replacement No family hx of      Morbid Obesity No family hx of      Obstructive Sleep Apnea No family hx of      Ovarian Cancer No family hx of      Pacemaker No family hx of      Pancreatic Cancer No family hx of      Peripheral Vascular Disease No family hx of      Pulmonary Embolism No family hx of      Pulmonary Hypertension No family hx of      Rheumatic Heart Disease No family hx of      Cerebrovascular Disease No family hx of      Sudden Death No family hx of      Transient ischemic attack No family hx of      Valvular heart disease No family hx of     Social History     Socioeconomic History     Marital status:      Spouse name: Not on file     Number of children: Not on file     Years of education: Not on file     Highest  education level: Not on file   Occupational History     Not on file   Tobacco Use     Smoking status: Former Smoker     Packs/day: 1.00     Quit date: 1990     Years since quittin.4     Smokeless tobacco: Never Used   Substance and Sexual Activity     Alcohol use: Yes     Alcohol/week: 2.0 standard drinks     Drug use: No     Sexual activity: Not on file   Other Topics Concern     Not on file   Social History Narrative     Not on file     Social Determinants of Health     Financial Resource Strain: Not on file   Food Insecurity: Not on file   Transportation Needs: Not on file   Physical Activity: Not on file   Stress: Not on file   Social Connections: Not on file   Intimate Partner Violence: Not on file   Housing Stability: Not on file          Medications  Allergies   Current Outpatient Medications   Medication Sig Dispense Refill     amLODIPine (NORVASC) 5 MG tablet Take 1 tablet (5 mg) by mouth daily 90 tablet 3     aspirin 81 MG EC tablet [ASPIRIN 81 MG EC TABLET] Take 81 mg by mouth daily.       atorvastatin (LIPITOR) 20 MG tablet Take 1 tablet (20 mg) by mouth At Bedtime 90 tablet 3     fish oil-omega-3 fatty acids 1000 MG capsule        furosemide (LASIX) 20 MG tablet Take 1 tablet (20 mg) by mouth daily 90 tablet 3     levothyroxine (SYNTHROID, LEVOTHROID) 175 MCG tablet [LEVOTHYROXINE (SYNTHROID, LEVOTHROID) 175 MCG TABLET] Take 175 mcg by mouth daily before breakfast.       losartan (COZAAR) 50 MG tablet Take 1 tablet (50 mg) by mouth daily 90 tablet 3     MULTIVITAMIN ORAL [MULTIVITAMIN ORAL] Take 1 tablet by mouth daily.       spironolactone (ALDACTONE) 25 MG tablet Take 1 tablet (25 mg) by mouth daily 90 tablet 3     warfarin ANTICOAGULANT (COUMADIN) 3 MG tablet Take 1 tablet ( 3 mg) with 1 mg tablet ( 4 mg dose) on Th, Sat then 3 mg on all other days 90 tablet 1     warfarin ANTICOAGULANT (COUMADIN/JANTOVEN) 1 MG tablet [WARFARIN ANTICOAGULANT (COUMADIN/JANTOVEN) 1 MG TABLET] TAKE 1 TABLET  WITH 3 MG TABLET (TOTAL 4 MG) ON TUESDAY, THURSDAY AND SATURDAY AND TAKE 3 MG ALL OTHER DAYS 90 tablet 1    Allergies   Allergen Reactions     Carvedilol Shortness Of Breath     Lisinopril Cough     Metoprolol      Shortness of breath         Lab Results    Chemistry/lipid CBC Cardiac Enzymes/BNP/TSH/INR   Recent Labs   Lab Test 12/22/21  0956   CHOL 131   HDL 41   LDL 77   TRIG 66     Recent Labs   Lab Test 12/22/21  0956 12/18/20  0903 12/16/19  0835   LDL 77 76 86     Recent Labs   Lab Test 12/22/21  0956      POTASSIUM 4.2   CHLORIDE 108*   CO2 22   GLC 96   BUN 13   CR 1.19   GFRESTIMATED 62   OBIE 9.5     Recent Labs   Lab Test 12/22/21  0956 12/18/20  0903 12/16/19  0835   CR 1.19 1.22 1.28     No results for input(s): A1C in the last 45696 hours. Recent Labs   Lab Test 02/10/19  0754   WBC 9.1   HGB 13.7*   HCT 41.3   MCV 95        Recent Labs   Lab Test 02/10/19  0754   HGB 13.7*    Recent Labs   Lab Test 02/10/19  2348 02/10/19  1808 02/10/19  0754   TROPONINI 0.03 0.03 0.03     Recent Labs   Lab Test 02/10/19  0810   *     Recent Labs   Lab Test 12/18/20  0903   TSH 1.24     Recent Labs   Lab Test 03/30/22  0850 02/02/22  0837 12/08/21  0852   INR 2.1* 2.3 2.4                      Thank you for allowing me to participate in the care of your patient.      Sincerely,     Elizabeth Smith NP     [facility]  cc:   Referred Self,

## 2022-06-17 NOTE — PROGRESS NOTES
HEART CARE PROGRESS NOTE      [facility]  [phone]      Assessment/Recommendations   Assessment:    1.  Heart failure with preserved ejection fraction: He has no symptoms of acute heart failure.  Lung sounds are clear and lower extremity edema is minimal.  He no longer has abdominal bloating.    2.  Coronary artery disease: History of coronary bypass surgery in 2007. He denies any chest pain.    3.  Atrial fibrillation/flutter: Status post ablation with recurrence.  He had AV allison ablation and CRT in 2012.  He continues to take warfarin.    4.  CRT: Nearing TANESHA.  Device clinic will continue to monitor.     Plan:  1.  BMP pending  2.  Continue low-sodium diet and daily weights  3.  Continue exercise    Ady Farley will follow up with Dr. Townsend on July 13.  Follow-up in the heart failure clinic in 4 months.       History of Present Illness/Subjective    Mr. Ady Farley is a 81 year old male seen at [facility] today for follow-up.  He has a history of coronary artery disease, coronary artery bypass surgery, aortic valve repair, heart failure with preserved ejection fraction, atrial fibrillation/flutter with recurrence after ablation, AV node ablation and CRT implant, and pulmonary hypertension.  Echocardiogram on March 30, 2022 showed ejection fraction of 45 to 50%, moderately decreased right ventricular systolic function, mild to moderate mitral regurgitation, moderate tricuspid regurgitation, and moderate to severe pulmonary hypertension.    He was seen in clinic about 1 month ago.  Renal function slightly abnormal and no signs of retaining fluid.  Lasix was decreased to 40 mg daily.  On his own, he decrease Lasix to 20 mg daily and uses an extra 20 as needed when he feels that he is retaining fluid.  He states that the last time he took an extra dose was 2 weeks ago.  He has no symptoms of acute heart failure.  He is golfing twice weekly and is  "asymptomatic.  He denies fatigue, lightheadedness, shortness of breath, dyspnea on exertion, chest pain, abdominal fullness/bloating and lower extremity edema.      He is following a low-sodium diet.  He walks daily and golfs twice weekly.  His home weight is stable between 208 to 209 pounds.      ECHO 3/30/2022:   Echo result w/o MOPS: Interpretation Summary 1.Left ventricular function is decreased. The ejection fraction is 45-50%(mildly reduced).2.Moderately decreased right ventricular systolic function3.Moderate biatrial chamber enlargement.4.An annuloplasty ring is noted in the mitral position. There is mild mitralstenosis. Mean gradient is 7 mmHg at rate of 70 bpm. There is mild to moderate(1-2+) mitral regurgitation.5.There is moderate (2+) tricuspid regurgitation.6.Right ventricular systolic pressure is elevated, consistent with moderate tosevere pulmonary hypertension.7.IVC diameter >2.1 cm collapsing <50% with sniff suggests a high RA pressureestimated at 15 mmHg or greater.Compared to the prior study dated 5/5/2021, ther LVEF is slightly lower,mitral and tricuspid and insufficiency are greater, pulmonic pressures arehigher.          Physical Examination Review of Systems   Vitals: /58 (BP Location: Right arm, Patient Position: Sitting, Cuff Size: Adult Regular)   Pulse 73   Resp 16   Ht 1.791 m (5' 10.5\")   Wt 95.7 kg (211 lb)   BMI 29.85 kg/m    BMI= Body mass index is 29.85 kg/m .  Wt Readings from Last 3 Encounters:   06/17/22 95.7 kg (211 lb)   05/23/22 97.1 kg (214 lb)   04/29/22 102.4 kg (225 lb 12.8 oz)       General Appearance:     Alert, cooperative and in no acute distress.   ENT/Mouth: membranes moist, no oral lesions or bleeding gums.      EYES:  no scleral icterus, normal conjunctivae   Chest/Lungs:   lungs are clear to auscultation, no rales or wheezing, respirations unlabored   Cardiovascular:   Regular. Normal first and second heart sounds, trace edema bilateral lower legs "   Abdomen:  Soft, nontender, nondistended, bowel sounds present   Extremities: no cyanosis or clubbing   Skin: warm, dry.    Neurologic: mood and affect are appropriate, alert and oriented x3         Please refer above for cardiac ROS details.      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     Coronary artery disease      Disease of thyroid gland      Disorder of phrenic nerve      Hyperlipidemia      Hypertension      Malfunction of biventricular cardiac pacemaker battery 12/1/2015    [employer] device recall     Non-rheumatic mitral regurgitation     MVP s/p Mitral valve repair May 15, 2007 MAZE  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  Echo 2011 LVEF 45% and mild MR Echo 2012 LVEF 40% mild MS, Mod MR  NELY Jan 2013 mild MS and mild/mod MR Echo Jan 2015 Mild MS/ mild MR NELY 2016 small/mod cuff remains (recommend OAC) Replacement Utility updated for latest IMO load      PVC (premature ventricular contraction)      PVD (peripheral vascular disease) (H)      Status post ablation of atrial fibrillation 11/18/2015    MAZE May 2007 PVI May 31, 2011 (RF-PVI + TIAN line) PVI Oct 30, 2012 (Cryo-PVI + redo TIAN line + roof line + RA-scar flutter line)     Past Surgical History:   Procedure Laterality Date     CARDIOVERSION  07/24/2018     INSERT / REPLACE / REMOVE PACEMAKER       MITRAL VALVE REPAIR       DC ABLATE HEART DYSRHYTHM FOCUS      Description: Catheter Ablation Atrial Flutter;  Recorded: 10/30/2012;  Comments: Typical RA CTI flutter Sept 2007; ; Multiple atypical flutter Oct 2012 (Roof line + reinforce TIAN line + RA scar line)     DC ABLATE HEART DYSRHYTHM FOCUS      Description: Catheter Ablation Atrial Fibrillation;  Recorded: 10/24/2013;  Comments: May 2011 (PVI > 90% recurrence  post MAZE + TIAN line); ; Re do PVI Oct 2012 (Cryo PVI + roof line + TIAN touch up + RA scar flutter); ; No recurrence by device check at 12 months     MT ABLATE HEART DYSRHYTHM FOCUS      Description: Catheter Ablation Atrial Fibrillation;  Recorded: 10/30/2014;  Comments: May 2011 (PVI > 90% recurrence post MAZE + TIAN line); ; Re do PVI Oct 2012 (Cryo PVI + roof line + TIAN touch up + RA scar flutter); ; No recurrence by device check at 12 months     MT ABLATE HEART DYSRHYTHM FOCUS      Description: Catheter Ablation Atrial Flutter;  Recorded: 10/30/2014;  Comments: Typical RA CTI flutter Sept 2007; ; Multiple atypical flutter Oct 2012 (Roof line + reinforce TIAN line + RA scar line)     MT ABLATE/ RECONSTUCT ATRIA, LIMITED      Description: Maze Procedure;  Proc Date: 05/01/2007;  Comments: May 2007 at time of valve repair     MT ICAR CATHETER ABLATION ATRIOVENTR NODE FUNCTION      Description: Catheter Ablation Atrioventricular Node;  Recorded: 08/13/2012;  Comments: Mar 2008 performed due to inability to biventricular with conduction abnormality     MT ICAR CATHETER ABLATION ATRIOVENTR NODE FUNCTION      Description: Catheter Ablation Atrioventricular Node;  Recorded: 10/30/2014;  Comments: Mar 2008 performed due to inability to biventricular with conduction abnormality     ZZC CABG, VEIN, SINGLE      Description: CABG (CABG);  Recorded: 01/18/2012;  Comments: ONE VESSEL RCA     Family History   Problem Relation Age of Onset     No Known Problems Mother      No Known Problems Father      No Known Problems Sister      No Known Problems Brother      No Known Problems Daughter      No Known Problems Son      Acute Myocardial Infarction No family hx of      Alcoholism No family hx of      Alzheimer Disease No family hx of      Amputation of Leg Below Knee No family hx of      Aortic Valve Replacement No family hx of      Arrhythmogenic Right Ventricular Cardiomyopathy No family hx of      Atrial fibrillation No family  hx of      Atrial Flutter No family hx of      Breast Cancer No family hx of      CABG No family hx of      Cancer No family hx of      Cardiomyopathy No family hx of      Carotid Endarterectomy No family hx of      Cerebral aneurysm No family hx of      Chronic Kidney Disease No family hx of      Chronic Obstructive Pulmonary Disease No family hx of      Colon Cancer No family hx of      Congenital heart disease No family hx of      Coronary Artery Disease No family hx of      Coronary Stenting No family hx of      Dementia No family hx of      Diabetes Type 1 No family hx of      Diabetes Type 2  No family hx of      Dialysis No family hx of      Dyslipidemia No family hx of      Heart Failure No family hx of      Hypertension No family hx of      Hypertrophic cardiomyopathy No family hx of      ICD - Implantable Cardioverter Defibrillator No family hx of      Long QT syndrome No family hx of      Mitral Regurgitation No family hx of      Mitral Valve Replacement No family hx of      Morbid Obesity No family hx of      Obstructive Sleep Apnea No family hx of      Ovarian Cancer No family hx of      Pacemaker No family hx of      Pancreatic Cancer No family hx of      Peripheral Vascular Disease No family hx of      Pulmonary Embolism No family hx of      Pulmonary Hypertension No family hx of      Rheumatic Heart Disease No family hx of      Cerebrovascular Disease No family hx of      Sudden Death No family hx of      Transient ischemic attack No family hx of      Valvular heart disease No family hx of     Social History     Socioeconomic History     Marital status:      Spouse name: Not on file     Number of children: Not on file     Years of education: Not on file     Highest education level: Not on file   Occupational History     Not on file   Tobacco Use     Smoking status: Former Smoker     Packs/day: 1.00     Quit date: 1990     Years since quittin.4     Smokeless tobacco: Never Used    Substance and Sexual Activity     Alcohol use: Yes     Alcohol/week: 2.0 standard drinks     Drug use: No     Sexual activity: Not on file   Other Topics Concern     Not on file   Social History Narrative     Not on file     Social Determinants of Health     Financial Resource Strain: Not on file   Food Insecurity: Not on file   Transportation Needs: Not on file   Physical Activity: Not on file   Stress: Not on file   Social Connections: Not on file   Intimate Partner Violence: Not on file   Housing Stability: Not on file          Medications  Allergies   Current Outpatient Medications   Medication Sig Dispense Refill     amLODIPine (NORVASC) 5 MG tablet Take 1 tablet (5 mg) by mouth daily 90 tablet 3     aspirin 81 MG EC tablet [ASPIRIN 81 MG EC TABLET] Take 81 mg by mouth daily.       atorvastatin (LIPITOR) 20 MG tablet Take 1 tablet (20 mg) by mouth At Bedtime 90 tablet 3     fish oil-omega-3 fatty acids 1000 MG capsule        furosemide (LASIX) 20 MG tablet Take 1 tablet (20 mg) by mouth daily 90 tablet 3     levothyroxine (SYNTHROID, LEVOTHROID) 175 MCG tablet [LEVOTHYROXINE (SYNTHROID, LEVOTHROID) 175 MCG TABLET] Take 175 mcg by mouth daily before breakfast.       losartan (COZAAR) 50 MG tablet Take 1 tablet (50 mg) by mouth daily 90 tablet 3     MULTIVITAMIN ORAL [MULTIVITAMIN ORAL] Take 1 tablet by mouth daily.       spironolactone (ALDACTONE) 25 MG tablet Take 1 tablet (25 mg) by mouth daily 90 tablet 3     warfarin ANTICOAGULANT (COUMADIN) 3 MG tablet Take 1 tablet ( 3 mg) with 1 mg tablet ( 4 mg dose) on Thur, Sat then 3 mg on all other days 90 tablet 1     warfarin ANTICOAGULANT (COUMADIN/JANTOVEN) 1 MG tablet [WARFARIN ANTICOAGULANT (COUMADIN/JANTOVEN) 1 MG TABLET] TAKE 1 TABLET WITH 3 MG TABLET (TOTAL 4 MG) ON TUESDAY, THURSDAY AND SATURDAY AND TAKE 3 MG ALL OTHER DAYS 90 tablet 1    Allergies   Allergen Reactions     Carvedilol Shortness Of Breath     Lisinopril Cough     Metoprolol      Shortness  of breath         Lab Results    Chemistry/lipid CBC Cardiac Enzymes/BNP/TSH/INR   Recent Labs   Lab Test 12/22/21  0956   CHOL 131   HDL 41   LDL 77   TRIG 66     Recent Labs   Lab Test 12/22/21  0956 12/18/20  0903 12/16/19  0835   LDL 77 76 86     Recent Labs   Lab Test 12/22/21  0956      POTASSIUM 4.2   CHLORIDE 108*   CO2 22   GLC 96   BUN 13   CR 1.19   GFRESTIMATED 62   OBIE 9.5     Recent Labs   Lab Test 12/22/21  0956 12/18/20  0903 12/16/19  0835   CR 1.19 1.22 1.28     No results for input(s): A1C in the last 26285 hours. Recent Labs   Lab Test 02/10/19  0754   WBC 9.1   HGB 13.7*   HCT 41.3   MCV 95        Recent Labs   Lab Test 02/10/19  0754   HGB 13.7*    Recent Labs   Lab Test 02/10/19  2348 02/10/19  1808 02/10/19  0754   TROPONINI 0.03 0.03 0.03     Recent Labs   Lab Test 02/10/19  0810   *     Recent Labs   Lab Test 12/18/20  0903   TSH 1.24     Recent Labs   Lab Test 03/30/22  0850 02/02/22  0837 12/08/21  0852   INR 2.1* 2.3 2.4

## 2022-06-23 NOTE — TELEPHONE ENCOUNTER
ANTICOAGULATION     Ady Farley is overdue for INR check.      Spoke with Ady who declined to schedule a lab appointment at this time. If calling back please schedule as soon as possible.    - he reported Hazel already scheduled INR on 7/20 @ [facility].  He goes every 8 wks.    Doreen Tom RN

## 2022-06-27 NOTE — TELEPHONE ENCOUNTER
ANTICOAGULATION MANAGEMENT:  Medication Refill    Anticoagulation Summary  As of 5/23/2022    Warfarin maintenance plan:  4 mg (1 mg x 1 and 3 mg x 1) every Thu, Sat; 3 mg (3 mg x 1) all other days   Next INR check:  7/18/2022   Target end date:      Indications    Paroxysmal Atrial Fibrillation [I48.91]  Atrial flutter  unspecified type (H) [I48.92]             Anticoagulation Care Providers     Provider Role Specialty Phone number    Janis Townsedn MD Referring Cardiovascular Disease [phone]          Visit with referring provider/group within last year: Yes    [facility] referral signed within last year: Yes    Ady meets all criteria for refill (current [facility] referral, office visit with referring provider/group in last year, lab monitoring up to date or not exceeding 2 weeks overdue). Rx instructions and quantity match patient's current dosing plan. Warfarin 90 day supply with 1 refill granted per [facility] protocol     Salma Marques RN  Anticoagulation Clinic

## 2022-07-13 NOTE — LETTER
7/13/2022    Martín Ross MD  [facility] [address] [place] [address]    RE: Ady DÍAZ Martine       Dear Colleague,     I had the pleasure of seeing Ady Farley in the [facility].    HEART CARE ENCOUNTER CONSULTATON NOTE      [facility]  [phone]      Assessment/Recommendations   Assessment:    1.    Coronary artery disease: History of coronary artery disease status post SVG to RCA when he underwent mitral valve repair in 2007.  No anginal complaints.  2.  Atrial fibrillation/atypical flutter status post PVI and flutter ablation in 2007, 2011 and 2012.  Status post AV node ablation status post CRT-P device placement in 2012 after another recurrence.    3.  Anticoagulation: Continue on Coumadin for anticoagulation.  4.  Chronic heart failure with reduced ejection fraction LVEF of 45-50%   5.    Valvular heart disease: Status post mitral valve repair for mitral regurgitation in 2007 with residual mild to moderate mitral stenosis mild to moderate mitral regurgitation.  6.  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 Continue spironolactone 25 mg daily with lasix 20 mg daily with an extra dose as needed  2. Continue Coumadin for anticoagulation  3.   Follow-up as scheduled in heart failure clinic in a few months and with myself in 6 months         History of Present Illness/Subjective    HPI: Ady Farley is a 82 year old male with a known history of coronary artery disease and mitral regurgitation status post SVG to his RCA and mitral valve repair in 2007, atrial fibrillation/atypical flutter status post PVI and flutter ablation with right CTI flutter ablation 2007, PVI 2011 and 2012 with recurrence status post AV node ablation with CRT-P placement in 2012 and chronic heart failure with reduced ejection fraction LVEF 45-50% who I am seeing today for a  follow-up.  Recent echocardiogram in March indicated mildly reduced left ventricular systolic function with moderately reduced RV systolic function mitral valve repair with mild stenosis and mild to moderate regurgitation with elevated RVSP suggestive of moderate to severe pulmonary hypertension.  He has been doing much better since I last saw him.  He had been noting worsening shortness of breath with increased abdominal girth and swelling.  His Lasix dose was doubled and he was also started on spironolactone 25 mg daily.  Creatinine increased slightly from 1.2-1.3.  He decreased his Lasix from 20 mg twice a day to 20 mg once a day with an extra dose only as needed since last month and has continued to feel well with stable weights.  Weights previously up to 20 pounds now down to 206-208 pounds.  He has noticed significant improvement in his swelling and decreased abdominal girth.  Overall feeling better with improved energy level and decreased shortness of breath.  He is now able to mow his lawn without a problem.  Echocardiogram 3/30/2022  Interpretation Summary     1.Left ventricular function is decreased. The ejection fraction is 45-50%  (mildly reduced).  2.Moderately decreased right ventricular systolic function  3.Moderate biatrial chamber enlargement.  4.An annuloplasty ring is noted in the mitral position. There is mild mitral  stenosis. Mean gradient is 7 mmHg at rate of 70 bpm. There is mild to moderate  (1-2+) mitral regurgitation.  5.There is moderate (2+) tricuspid regurgitation.  6.Right ventricular systolic pressure is elevated, consistent with moderate to  severe pulmonary hypertension.  7.IVC diameter >2.1 cm collapsing <50% with sniff suggests a high RA pressure  estimated at 15 mmHg or greater.  Compared to the prior study dated 5/5/2021, ther LVEF is slightly lower,  mitral and tricuspid and insufficiency are greater, pulmonic pressures are  higher.       Physical Examination  Review of Systems  "  Vitals: BP (!) 154/64 (BP Location: Left arm, Patient Position: Sitting, Cuff Size: Adult Regular)   Pulse 92   Resp 16   Ht 1.791 m (5' 10.5\")   Wt 94.4 kg (208 lb 3.2 oz)   BMI 29.45 kg/m    BMI= Body mass index is 29.45 kg/m .  Wt Readings from Last 3 Encounters:   07/13/22 94.4 kg (208 lb 3.2 oz)   06/17/22 95.7 kg (211 lb)   05/23/22 97.1 kg (214 lb)       General Appearance:   no distress, normal body habitus   ENT/Mouth: membranes moist, no oral lesions or bleeding gums.      EYES:  no scleral icterus, normal conjunctivae   Neck: no carotid bruits or thyromegaly   Chest/Lungs:   lungs are clear to auscultation   Cardiovascular:   Regular. Normal first and second heart sounds with no murmurs  + edema bilaterally    Abdomen:  no organomegaly, masses, bruits, or tenderness; bowel sounds are present   Extremities: no cyanosis or clubbing   Skin: no xanthelasma, warm.    Neurologic: normal  bilateral, no tremors     Psychiatric: alert and oriented x3, calm        Please refer above for cardiac ROS details.        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     Coronary artery disease      Disease of thyroid gland      Disorder of phrenic nerve      Hyperlipidemia      Hypertension      Malfunction of biventricular cardiac pacemaker battery 12/1/2015    [employer] device recall     Non-rheumatic mitral regurgitation     MVP s/p Mitral valve repair May 15, 2007 MAZE  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  Echo 2011 LVEF 45% and mild MR Echo 2012 LVEF 40% mild MS, Mod MR  NELY Jan 2013 mild MS and mild/mod MR Echo Jan 2015 Mild MS/ mild MR NELY 2016 small/mod cuff remains (recommend OAC) Replacement Utility updated for latest IMO load      PVC (premature ventricular " contraction)      PVD (peripheral vascular disease) (H)      Status post ablation of atrial fibrillation 11/18/2015    MAZE May 2007 PVI May 31, 2011 (RF-PVI + TIAN line) PVI Oct 30, 2012 (Cryo-PVI + redo TIAN line + roof line + RA-scar flutter line)     Past Surgical History:   Procedure Laterality Date     CARDIOVERSION  07/24/2018     INSERT / REPLACE / REMOVE PACEMAKER       MITRAL VALVE REPAIR       [place] ABLATE HEART DYSRHYTHM FOCUS      Description: Catheter Ablation Atrial Flutter;  Recorded: 10/30/2012;  Comments: Typical RA CTI flutter Sept 2007; ; Multiple atypical flutter Oct 2012 (Roof line + reinforce TIAN line + RA scar line)     [place] ABLATE HEART DYSRHYTHM FOCUS      Description: Catheter Ablation Atrial Fibrillation;  Recorded: 10/24/2013;  Comments: May 2011 (PVI > 90% recurrence post MAZE + TIAN line); ; Re do PVI Oct 2012 (Cryo PVI + roof line + TIAN touch up + RA scar flutter); ; No recurrence by device check at 12 months     [place] ABLATE HEART DYSRHYTHM FOCUS      Description: Catheter Ablation Atrial Fibrillation;  Recorded: 10/30/2014;  Comments: May 2011 (PVI > 90% recurrence post MAZE + TIAN line); ; Re do PVI Oct 2012 (Cryo PVI + roof line + TIAN touch up + RA scar flutter); ; No recurrence by device check at 12 months     [place] ABLATE HEART DYSRHYTHM FOCUS      Description: Catheter Ablation Atrial Flutter;  Recorded: 10/30/2014;  Comments: Typical RA CTI flutter Sept 2007; ; Multiple atypical flutter Oct 2012 (Roof line + reinforce TIAN line + RA scar line)     [place] ABLATE/ RECONSTUCT ATRIA, LIMITED      Description: Maze Procedure;  Proc Date: 05/01/2007;  Comments: May 2007 at time of valve repair     [place] ICAR CATHETER ABLATION ATRIOVENTR NODE FUNCTION      Description: Catheter Ablation Atrioventricular Node;  Recorded: 08/13/2012;  Comments: Mar 2008 performed due to inability to biventricular with conduction abnormality     [place] ICAR CATHETER ABLATION ATRIOVENTR NODE FUNCTION      Description: Catheter  Ablation Atrioventricular Node;  Recorded: 10/30/2014;  Comments: Mar 2008 performed due to inability to biventricular with conduction abnormality     ZZC CABG, VEIN, SINGLE      Description: CABG (CABG);  Recorded: 01/18/2012;  Comments: ONE VESSEL RCA     Family History   Problem Relation Age of Onset     No Known Problems Mother      No Known Problems Father      No Known Problems Sister      No Known Problems Brother      No Known Problems Daughter      No Known Problems Son      Acute Myocardial Infarction No family hx of      Alcoholism No family hx of      Alzheimer Disease No family hx of      Amputation of Leg Below Knee No family hx of      Aortic Valve Replacement No family hx of      Arrhythmogenic Right Ventricular Cardiomyopathy No family hx of      Atrial fibrillation No family hx of      Atrial Flutter No family hx of      Breast Cancer No family hx of      CABG No family hx of      Cancer No family hx of      Cardiomyopathy No family hx of      Carotid Endarterectomy No family hx of      Cerebral aneurysm No family hx of      Chronic Kidney Disease No family hx of      Chronic Obstructive Pulmonary Disease No family hx of      Colon Cancer No family hx of      Congenital heart disease No family hx of      Coronary Artery Disease No family hx of      Coronary Stenting No family hx of      Dementia No family hx of      Diabetes Type 1 No family hx of      Diabetes Type 2  No family hx of      Dialysis No family hx of      Dyslipidemia No family hx of      Heart Failure No family hx of      Hypertension No family hx of      Hypertrophic cardiomyopathy No family hx of      ICD - Implantable Cardioverter Defibrillator No family hx of      Long QT syndrome No family hx of      Mitral Regurgitation No family hx of      Mitral Valve Replacement No family hx of      Morbid Obesity No family hx of      Obstructive Sleep Apnea No family hx of      Ovarian Cancer No family hx of      Pacemaker No family hx of       Pancreatic Cancer No family hx of      Peripheral Vascular Disease No family hx of      Pulmonary Embolism No family hx of      Pulmonary Hypertension No family hx of      Rheumatic Heart Disease No family hx of      Cerebrovascular Disease No family hx of      Sudden Death No family hx of      Transient ischemic attack No family hx of      Valvular heart disease No family hx of         Social History     Socioeconomic History     Marital status:      Spouse name: Not on file     Number of children: Not on file     Years of education: Not on file     Highest education level: Not on file   Occupational History     Not on file   Tobacco Use     Smoking status: Former Smoker     Packs/day: 1.00     Quit date: 1990     Years since quittin.5     Smokeless tobacco: Never Used   Substance and Sexual Activity     Alcohol use: Yes     Alcohol/week: 2.0 standard drinks     Drug use: No     Sexual activity: Not on file   Other Topics Concern     Not on file   Social History Narrative     Not on file     Social Determinants of Health     Financial Resource Strain: Not on file   Food Insecurity: Not on file   Transportation Needs: Not on file   Physical Activity: Not on file   Stress: Not on file   Social Connections: Not on file   Intimate Partner Violence: Not on file   Housing Stability: Not on file           Medications  Allergies   Current Outpatient Medications   Medication Sig Dispense Refill     amLODIPine (NORVASC) 5 MG tablet Take 1 tablet (5 mg) by mouth daily 90 tablet 3     aspirin 81 MG EC tablet [ASPIRIN 81 MG EC TABLET] Take 81 mg by mouth daily.       atorvastatin (LIPITOR) 20 MG tablet Take 1 tablet (20 mg) by mouth At Bedtime 90 tablet 3     fish oil-omega-3 fatty acids 1000 MG capsule        furosemide (LASIX) 20 MG tablet Take 1 tablet (20 mg) by mouth daily 120 tablet 3     levothyroxine (SYNTHROID, LEVOTHROID) 175 MCG tablet [LEVOTHYROXINE (SYNTHROID, LEVOTHROID) 175 MCG TABLET] Take  175 mcg by mouth daily before breakfast.       losartan (COZAAR) 50 MG tablet Take 1 tablet (50 mg) by mouth daily 90 tablet 3     MULTIVITAMIN ORAL [MULTIVITAMIN ORAL] Take 1 tablet by mouth daily.       spironolactone (ALDACTONE) 25 MG tablet Take 1 tablet (25 mg) by mouth daily 90 tablet 3     warfarin ANTICOAGULANT (COUMADIN) 1 MG tablet TAKE 1 TABLET WITH 3 MG TABLET ( TOTAL OF 4 MG) ON TUESDAY AND SATURDAY THEN 3 MG ALL OTHER DAYS 90 tablet 3     warfarin ANTICOAGULANT (COUMADIN) 3 MG tablet Take 1 tablet ( 3 mg) with 1 mg tablet ( 4 mg dose) on Thur, Sat then 3 mg on all other days 90 tablet 1       Allergies   Allergen Reactions     Carvedilol Shortness Of Breath     Lisinopril Cough     Metoprolol      Shortness of breath          Lab Results    Chemistry/lipid CBC Cardiac Enzymes/BNP/TSH/INR   Recent Labs   Lab Test 12/22/21  0956   CHOL 131   HDL 41   LDL 77   TRIG 66     Recent Labs   Lab Test 12/22/21  0956 12/18/20  0903 12/16/19  0835   LDL 77 76 86     Recent Labs   Lab Test 06/17/22  0938      POTASSIUM 4.7   CHLORIDE 107   CO2 24   GLC 94   BUN 15   CR 1.33*   GFRESTIMATED 54*   OBEI 9.3     Recent Labs   Lab Test 06/17/22  0938 05/23/22  1411 04/29/22  1416   CR 1.33* 1.32* 1.32*     No results for input(s): A1C in the last 97654 hours.       Recent Labs   Lab Test 02/10/19  0754   WBC 9.1   HGB 13.7*   HCT 41.3   MCV 95        Recent Labs   Lab Test 02/10/19  0754   HGB 13.7*    Recent Labs   Lab Test 02/10/19  2348 02/10/19  1808 02/10/19  0754   TROPONINI 0.03 0.03 0.03     Recent Labs   Lab Test 02/10/19  0810   *     Recent Labs   Lab Test 12/18/20  0903   TSH 1.24     Recent Labs   Lab Test 07/13/22  1023 05/23/22  1423 03/30/22  0850   INR 1.8* 2.3* 2.1*        Janis Townsend MD    Thank you for allowing me to participate in the care of your patient.      Sincerely,     Janis Townsend MD     [facility]    cc:    Janis Townsend MD  [address]  [place] [address]

## 2022-07-13 NOTE — PROGRESS NOTES
HEART CARE ENCOUNTER CONSULTATON MARIA C DÍAZ [facility]  [phone]      Assessment/Recommendations   Assessment:    1.    Coronary artery disease: History of coronary artery disease status post SVG to RCA when he underwent mitral valve repair in 2007.  No anginal complaints.  2.  Atrial fibrillation/atypical flutter status post PVI and flutter ablation in 2007, 2011 and 2012.  Status post AV node ablation status post CRT-P device placement in 2012 after another recurrence.    3.  Anticoagulation: Continue on Coumadin for anticoagulation.  4.  Chronic heart failure with reduced ejection fraction LVEF of 45-50%   5.    Valvular heart disease: Status post mitral valve repair for mitral regurgitation in 2007 with residual mild to moderate mitral stenosis mild to moderate mitral regurgitation.  6.  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 Continue spironolactone 25 mg daily with lasix 20 mg daily with an extra dose as needed  2. Continue Coumadin for anticoagulation  3.   Follow-up as scheduled in heart failure clinic in a few months and with myself in 6 months         History of Present Illness/Subjective    HPI: Ady Farley is a 82 year old male with a known history of coronary artery disease and mitral regurgitation status post SVG to his RCA and mitral valve repair in 2007, atrial fibrillation/atypical flutter status post PVI and flutter ablation with right CTI flutter ablation 2007, PVI 2011 and 2012 with recurrence status post AV node ablation with CRT-P placement in 2012 and chronic heart failure with reduced ejection fraction LVEF 45-50% who I am seeing today for a follow-up.  Recent echocardiogram in March indicated mildly reduced left ventricular systolic function with moderately reduced RV systolic function mitral valve repair with mild stenosis and mild to moderate regurgitation with  "elevated RVSP suggestive of moderate to severe pulmonary hypertension.  He has been doing much better since I last saw him.  He had been noting worsening shortness of breath with increased abdominal girth and swelling.  His Lasix dose was doubled and he was also started on spironolactone 25 mg daily.  Creatinine increased slightly from 1.2-1.3.  He decreased his Lasix from 20 mg twice a day to 20 mg once a day with an extra dose only as needed since last month and has continued to feel well with stable weights.  Weights previously up to 20 pounds now down to 206-208 pounds.  He has noticed significant improvement in his swelling and decreased abdominal girth.  Overall feeling better with improved energy level and decreased shortness of breath.  He is now able to mow his lawn without a problem.  Echocardiogram 3/30/2022  Interpretation Summary     1.Left ventricular function is decreased. The ejection fraction is 45-50%  (mildly reduced).  2.Moderately decreased right ventricular systolic function  3.Moderate biatrial chamber enlargement.  4.An annuloplasty ring is noted in the mitral position. There is mild mitral  stenosis. Mean gradient is 7 mmHg at rate of 70 bpm. There is mild to moderate  (1-2+) mitral regurgitation.  5.There is moderate (2+) tricuspid regurgitation.  6.Right ventricular systolic pressure is elevated, consistent with moderate to  severe pulmonary hypertension.  7.IVC diameter >2.1 cm collapsing <50% with sniff suggests a high RA pressure  estimated at 15 mmHg or greater.  Compared to the prior study dated 5/5/2021, ther LVEF is slightly lower,  mitral and tricuspid and insufficiency are greater, pulmonic pressures are  higher.       Physical Examination  Review of Systems   Vitals: BP (!) 154/64 (BP Location: Left arm, Patient Position: Sitting, Cuff Size: Adult Regular)   Pulse 92   Resp 16   Ht 1.791 m (5' 10.5\")   Wt 94.4 kg (208 lb 3.2 oz)   BMI 29.45 kg/m    BMI= Body mass index is " 29.45 kg/m .  Wt Readings from Last 3 Encounters:   07/13/22 94.4 kg (208 lb 3.2 oz)   06/17/22 95.7 kg (211 lb)   05/23/22 97.1 kg (214 lb)       General Appearance:   no distress, normal body habitus   ENT/Mouth: membranes moist, no oral lesions or bleeding gums.      EYES:  no scleral icterus, normal conjunctivae   Neck: no carotid bruits or thyromegaly   Chest/Lungs:   lungs are clear to auscultation   Cardiovascular:   Regular. Normal first and second heart sounds with no murmurs  + edema bilaterally    Abdomen:  no organomegaly, masses, bruits, or tenderness; bowel sounds are present   Extremities: no cyanosis or clubbing   Skin: no xanthelasma, warm.    Neurologic: normal  bilateral, no tremors     Psychiatric: alert and oriented x3, calm        Please refer above for cardiac ROS details.        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     Coronary artery disease      Disease of thyroid gland      Disorder of phrenic nerve      Hyperlipidemia      Hypertension      Malfunction of biventricular cardiac pacemaker battery 12/1/2015    [employer] device recall     Non-rheumatic mitral regurgitation     MVP s/p Mitral valve repair May 15, 2007 MAZE  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  Echo 2011 LVEF 45% and mild MR Echo 2012 LVEF 40% mild MS, Mod MR  NELY Jan 2013 mild MS and mild/mod MR Echo Jan 2015 Mild MS/ mild MR NELY 2016 small/mod cuff remains (recommend OAC) Replacement Utility updated for latest IMO load      PVC (premature ventricular contraction)      PVD (peripheral vascular disease) (H)      Status post ablation of atrial fibrillation 11/18/2015    MAZE May 2007 PVI May 31, 2011 (RF-PVI + TIAN line) PVI Oct 30, 2012 (Cryo-PVI + redo TIAN line + roof line +  RA-scar flutter line)     Past Surgical History:   Procedure Laterality Date     CARDIOVERSION  07/24/2018     INSERT / REPLACE / REMOVE PACEMAKER       MITRAL VALVE REPAIR       GA ABLATE HEART DYSRHYTHM FOCUS      Description: Catheter Ablation Atrial Flutter;  Recorded: 10/30/2012;  Comments: Typical RA CTI flutter Sept 2007; ; Multiple atypical flutter Oct 2012 (Roof line + reinforce TIAN line + RA scar line)     GA ABLATE HEART DYSRHYTHM FOCUS      Description: Catheter Ablation Atrial Fibrillation;  Recorded: 10/24/2013;  Comments: May 2011 (PVI > 90% recurrence post MAZE + TIAN line); ; Re do PVI Oct 2012 (Cryo PVI + roof line + TIAN touch up + RA scar flutter); ; No recurrence by device check at 12 months     GA ABLATE HEART DYSRHYTHM FOCUS      Description: Catheter Ablation Atrial Fibrillation;  Recorded: 10/30/2014;  Comments: May 2011 (PVI > 90% recurrence post MAZE + TIAN line); ; Re do PVI Oct 2012 (Cryo PVI + roof line + TIAN touch up + RA scar flutter); ; No recurrence by device check at 12 months     GA ABLATE HEART DYSRHYTHM FOCUS      Description: Catheter Ablation Atrial Flutter;  Recorded: 10/30/2014;  Comments: Typical RA CTI flutter Sept 2007; ; Multiple atypical flutter Oct 2012 (Roof line + reinforce TIAN line + RA scar line)     GA ABLATE/ RECONSTUCT ATRIA, LIMITED      Description: Maze Procedure;  Proc Date: 05/01/2007;  Comments: May 2007 at time of valve repair     GA ICAR CATHETER ABLATION ATRIOVENTR NODE FUNCTION      Description: Catheter Ablation Atrioventricular Node;  Recorded: 08/13/2012;  Comments: Mar 2008 performed due to inability to biventricular with conduction abnormality     GA ICAR CATHETER ABLATION ATRIOVENTR NODE FUNCTION      Description: Catheter Ablation Atrioventricular Node;  Recorded: 10/30/2014;  Comments: Mar 2008 performed due to inability to biventricular with conduction abnormality     ZZC CABG, VEIN, SINGLE      Description: CABG (CABG);  Recorded: 01/18/2012;   Comments: ONE VESSEL RCA     Family History   Problem Relation Age of Onset     No Known Problems Mother      No Known Problems Father      No Known Problems Sister      No Known Problems Brother      No Known Problems Daughter      No Known Problems Son      Acute Myocardial Infarction No family hx of      Alcoholism No family hx of      Alzheimer Disease No family hx of      Amputation of Leg Below Knee No family hx of      Aortic Valve Replacement No family hx of      Arrhythmogenic Right Ventricular Cardiomyopathy No family hx of      Atrial fibrillation No family hx of      Atrial Flutter No family hx of      Breast Cancer No family hx of      CABG No family hx of      Cancer No family hx of      Cardiomyopathy No family hx of      Carotid Endarterectomy No family hx of      Cerebral aneurysm No family hx of      Chronic Kidney Disease No family hx of      Chronic Obstructive Pulmonary Disease No family hx of      Colon Cancer No family hx of      Congenital heart disease No family hx of      Coronary Artery Disease No family hx of      Coronary Stenting No family hx of      Dementia No family hx of      Diabetes Type 1 No family hx of      Diabetes Type 2  No family hx of      Dialysis No family hx of      Dyslipidemia No family hx of      Heart Failure No family hx of      Hypertension No family hx of      Hypertrophic cardiomyopathy No family hx of      ICD - Implantable Cardioverter Defibrillator No family hx of      Long QT syndrome No family hx of      Mitral Regurgitation No family hx of      Mitral Valve Replacement No family hx of      Morbid Obesity No family hx of      Obstructive Sleep Apnea No family hx of      Ovarian Cancer No family hx of      Pacemaker No family hx of      Pancreatic Cancer No family hx of      Peripheral Vascular Disease No family hx of      Pulmonary Embolism No family hx of      Pulmonary Hypertension No family hx of      Rheumatic Heart Disease No family hx of       Cerebrovascular Disease No family hx of      Sudden Death No family hx of      Transient ischemic attack No family hx of      Valvular heart disease No family hx of         Social History     Socioeconomic History     Marital status:      Spouse name: Not on file     Number of children: Not on file     Years of education: Not on file     Highest education level: Not on file   Occupational History     Not on file   Tobacco Use     Smoking status: Former Smoker     Packs/day: 1.00     Quit date: 1990     Years since quittin.5     Smokeless tobacco: Never Used   Substance and Sexual Activity     Alcohol use: Yes     Alcohol/week: 2.0 standard drinks     Drug use: No     Sexual activity: Not on file   Other Topics Concern     Not on file   Social History Narrative     Not on file     Social Determinants of Health     Financial Resource Strain: Not on file   Food Insecurity: Not on file   Transportation Needs: Not on file   Physical Activity: Not on file   Stress: Not on file   Social Connections: Not on file   Intimate Partner Violence: Not on file   Housing Stability: Not on file           Medications  Allergies   Current Outpatient Medications   Medication Sig Dispense Refill     amLODIPine (NORVASC) 5 MG tablet Take 1 tablet (5 mg) by mouth daily 90 tablet 3     aspirin 81 MG EC tablet [ASPIRIN 81 MG EC TABLET] Take 81 mg by mouth daily.       atorvastatin (LIPITOR) 20 MG tablet Take 1 tablet (20 mg) by mouth At Bedtime 90 tablet 3     fish oil-omega-3 fatty acids 1000 MG capsule        furosemide (LASIX) 20 MG tablet Take 1 tablet (20 mg) by mouth daily 120 tablet 3     levothyroxine (SYNTHROID, LEVOTHROID) 175 MCG tablet [LEVOTHYROXINE (SYNTHROID, LEVOTHROID) 175 MCG TABLET] Take 175 mcg by mouth daily before breakfast.       losartan (COZAAR) 50 MG tablet Take 1 tablet (50 mg) by mouth daily 90 tablet 3     MULTIVITAMIN ORAL [MULTIVITAMIN ORAL] Take 1 tablet by mouth daily.       spironolactone  (ALDACTONE) 25 MG tablet Take 1 tablet (25 mg) by mouth daily 90 tablet 3     warfarin ANTICOAGULANT (COUMADIN) 1 MG tablet TAKE 1 TABLET WITH 3 MG TABLET ( TOTAL OF 4 MG) ON TUESDAY AND SATURDAY THEN 3 MG ALL OTHER DAYS 90 tablet 3     warfarin ANTICOAGULANT (COUMADIN) 3 MG tablet Take 1 tablet ( 3 mg) with 1 mg tablet ( 4 mg dose) on Thur, Sat then 3 mg on all other days 90 tablet 1       Allergies   Allergen Reactions     Carvedilol Shortness Of Breath     Lisinopril Cough     Metoprolol      Shortness of breath          Lab Results    Chemistry/lipid CBC Cardiac Enzymes/BNP/TSH/INR   Recent Labs   Lab Test 12/22/21  0956   CHOL 131   HDL 41   LDL 77   TRIG 66     Recent Labs   Lab Test 12/22/21  0956 12/18/20  0903 12/16/19  0835   LDL 77 76 86     Recent Labs   Lab Test 06/17/22  0938      POTASSIUM 4.7   CHLORIDE 107   CO2 24   GLC 94   BUN 15   CR 1.33*   GFRESTIMATED 54*   OBIE 9.3     Recent Labs   Lab Test 06/17/22  0938 05/23/22  1411 04/29/22  1416   CR 1.33* 1.32* 1.32*     No results for input(s): A1C in the last 64919 hours.       Recent Labs   Lab Test 02/10/19  0754   WBC 9.1   HGB 13.7*   HCT 41.3   MCV 95        Recent Labs   Lab Test 02/10/19  0754   HGB 13.7*    Recent Labs   Lab Test 02/10/19  2348 02/10/19  1808 02/10/19  0754   TROPONINI 0.03 0.03 0.03     Recent Labs   Lab Test 02/10/19  0810   *     Recent Labs   Lab Test 12/18/20  0903   TSH 1.24     Recent Labs   Lab Test 07/13/22  1023 05/23/22  1423 03/30/22  0850   INR 1.8* 2.3* 2.1*        Janis Townsend MD

## 2022-07-13 NOTE — PROGRESS NOTES
ANTICOAGULATION MANAGEMENT     Ady Farley 82 year old male is on warfarin with subtherapeutic INR result. (Goal INR 2.0-3.0)    Recent labs: (last 7 days)     07/13/22  1023   INR 1.8*       ASSESSMENT       Source(s): Chart Review and Template       Warfarin doses taken: Warfarin taken as instructed    Diet: Increased greens/vitamin K in diet; plans to resume previous intake    New illness, injury, or hospitalization: No    Medication/supplement changes: None noted    Signs or symptoms of bleeding or clotting: No    Previous INR: Therapeutic last 2(+) visits    Additional findings: patient reported he lost about 15 pounds since he was prescribed water pill spironolactone 4-5 months ago.       PLAN     Recommended plan for temporary change(s) affecting INR     Dosing Instructions: booster dose then continue your current warfarin dose with next INR in 3 weeks       Summary  As of 7/13/2022    Full warfarin instructions:  7/13: 4 mg; Otherwise 4 mg every Thu, Sat; 3 mg all other days   Next INR check:  8/3/2022             Telephone call with Ady who verbalizes understanding and agrees to plan    Lab visit scheduled    Education provided: Impact of vitamin K foods on INR, Goal range and significance of current result and Importance of therapeutic range    Plan made per [facility] anticoagulation protocol    Salma Marques RN  Anticoagulation Clinic  7/13/2022    _______________________________________________________________________     Anticoagulation Episode Summary     Current INR goal:  2.0-3.0   TTR:  94.1 % (11.4 mo)   Target end date:  Indefinite   Send INR reminders to:  [facility]    Indications    Paroxysmal Atrial Fibrillation [I48.91]  Atrial flutter  unspecified type (H) [I48.92]  Chronic atrial fibrillation (H) [I48.20]           Comments:           Anticoagulation Care Providers     Provider Role Specialty Phone number    Janis Townsend MD Referring Cardiovascular Disease  [phone]

## 2022-08-02 NOTE — PROGRESS NOTES
ANTICOAGULATION MANAGEMENT     Ady DÍAZ Martine 82 year old male is on warfarin with therapeutic INR result. (Goal INR 2.0-3.0)    Recent labs: (last 7 days)     08/02/22  0842   INR 2.1*       ASSESSMENT       Source(s): Chart Review, Patient/Caregiver Call and Template       Warfarin doses taken: Warfarin taken as instructed    Diet: No new diet changes identified    New illness, injury, or hospitalization: No    Medication/supplement changes: None noted    Signs or symptoms of bleeding or clotting: No    Previous INR: Subtherapeutic  Per patient, he noticed that he missed his a dose prior to inr appt. But forgot to mention to ACN    Additional findings: Would like to go back to every 8 weeks INRcheck if next INR is therapeutic.       PLAN     Recommended plan for no diet, medication or health factor changes affecting INR     Dosing Instructions: Continue your current warfarin dose with next INR in 3-4 weeks       Summary  As of 8/2/2022    Full warfarin instructions:  4 mg every Thu, Sat; 3 mg all other days   Next INR check:  8/30/2022             Telephone call with Ady who verbalizes understanding and agrees to plan    Lab visit scheduled    Education provided: Goal range and significance of current result, Importance of therapeutic range, Importance of following up at instructed interval, Importance of taking warfarin as instructed and Contact [phone] with any changes, questions or concerns.     Plan made per [facility] anticoagulation protocol    Salma Marques RN  Anticoagulation Clinic  8/2/2022    _______________________________________________________________________     Anticoagulation Episode Summary     Current INR goal:  2.0-3.0   TTR:  90.7 % (1 y)   Target end date:  Indefinite   Send INR reminders to:  [facility]ART [facility]    Indications    Paroxysmal Atrial Fibrillation [I48.91]  Atrial flutter  unspecified type (H) [I48.92]  Chronic atrial fibrillation (H) [I48.20]            Comments:           Anticoagulation Care Providers     Provider Role Specialty Phone number    Janis Townsend MD Referring Cardiovascular Disease [phone]

## 2022-08-11 NOTE — TELEPHONE ENCOUNTER
8/11/22 Called patient to explain that his CRT-P battery has reached TANESHA as of 7/30/22, needs Generator change before 10/30/2022. Explained that EP nurse will call him and help get him scheduled.  Ericka Bates Device RN    Heart Care Device Change-Out Checklist (TANESHA Checklist)    Device Data  Biventricular Pacemaker    :  Medtronic  Model:  Viva CRT-P  Serial Number:  [id number]  Implant location: Left chest    Implant Date: 12/1/2015  TANESHA Date:  7/30/2022  Device Diagnosis:  Afib, AVNA, CHB    Device Alert(s):  No             Lead Data  (Print Device History and attach to this document. Enter each lead  and model number.)  Last Interrogation Date: 12/20/2021      Atrium: CPI 4471   Lead Imp:  380Ohms, Pacing Threshold:  0.5V@0.4ms and Sensing Threshold:  0.3mV      Right Ventricle: 3830 Mdt   Lead Imp:  532Ohms, Pacing Threshold:  0.75V@0.4ms and Sensing Threshold:  dependent         Left Ventricle: Mdt 4194 Attain   Lead Imp:  798Ohms, Pacing Threshold:  1.5V@1.5ms and Sensing Threshold:  dependent    Old Leads Present/Abandoned: No    Lead Alert(s):  No    Diagnostic Information  Intrinsic Rhythm:  SB, CHB w/rare R wave at VVI 30    Atrial Fibrillation:  Paroxysmal Atrial-Fib  Takes Anticoagulant? Yes  Description:  warfarin    Pacing Percentages  Atrial Pacing 96% and Ventricle Pacing 99.2%  Pacemaker Dependent? Yes      Ejection Fraction  Last EF Date:  3/30/2022    By Echocardiogram  Last EF Measurement:  45-50%      Special Instructions/Timeframe for change-out:  Due before 10/30/2022    Routed to EP:  Yes: Dr Gage    Device RN: Ericka Bates Device RN

## 2022-08-17 NOTE — PROGRESS NOTES
EP MD/EP NC TANESHA Review  Dr Mirza reviewed TANESHA checklist  Reviewed by Lili Fuentes, RN 8/17/2022 10:06 AM    Pt aware of above orders and Order for procedure placed in EPIC and  aware     Celeste Mirza MD Helppi, Connie L, RN; [facility] Ep Support Pool - [facility] 19 minutes ago (9:46 AM)     AW    Thanks Ericka,     We can proceed with MDT CRT-P generator replacement, continue warfarin to goal INR 2-3.  Can be with any available EP provider.     Thanks,   Severino    Message text

## 2022-08-17 NOTE — PROGRESS NOTES
H&P  PMD [x]  Received [] OV: []  Date: Teach  []   Orders  I [x] P  [x]  Letter []   COVID  FV/EPIC []  Date:    Home []  External  []  Date: AC: Warfarin  None []  Continue  [x]   Hold:  AM Meds: Take all []   Hold:  Lasix     INR ___________ Goal 2-3    1940  Home:[phone] (home) Cell:[phone] (mobile)  Emergency Contact: Yulia Farley [phone]  PCP: Martín Ross, [phone]      Important patient information for CSC/Cath Lab staff : None    [facility] EP Cath Lab Procedure Order     Device Implant/Revision:  Procedure: Generator Change Out  Device Type: BIV Pacemaker  Device Company/Device Rep Needed for Procedure: [employer]  Ordering Provider: Dr Mirza  Ordering Date: 8/17/2022    Diagnosis:  Generator Change- Device at TANESHA  Scheduling Timeframe:  Device hit TANESHA on 7/30/22, needs change out within 3 months  Scheduling Restrictions: Will need INR 3-4 days prior to procedure  Scheduling Contact: Please contact pt to schedule, if you are unable to schedule date within the next 24 hours please contact pt to update on scheduling process  Scheduling Follow-up apt for NEW HIS/CRT Implants: NA  Pre-Procedural Testing needed: Home COVID testing  Anesthesia:  Conscious Sedation- CV RN to administer    [facility] EP Cath Lab Prep   H&P:  Pt to schedule with PMD to complete    Pre-op Labs: Ordered AM of procedure    Medical Records Pertinent for Procedure:  Echo 3/30/22 EF 40-45% and Device Implant Record 12/1/15  Most Recent Lab Results: BMP- Within Acceptable Limits for Procedure Heme- Within Acceptable Limits for Procedure    Patient Education:    Teach with Patient: Will be completed via phone prior to procedure.    Risks Reviewed:   Pacemaker Insertion    <1% for each of the following:  infection, bleeding, hematoma, pneumothorax, subclavian vein thrombosis, cardiac perforation, cardiac tamponade, arrhythmias, pectoral or diaphragmatic stimulation, air embolus, pocket erosion, device  interactions.    <4% lead dislodgment, <1% lead fracture or generator  malfunction.    <0.5% CVA or MI.    <0.1% death.    If external defibrillation or CV is needed, 25% risk for superficial burn.    For patients on anticoagulation, the risk of bleeding, hematoma and tamponade are increased.    Biventricular Implants  In addition to standard risks for ICD or pacemaker implant, there is:    90% success of LV lead placement.    10%  dislodgment (LV lead)    <3% risk venous rupture, cardiac tamponade    Patient counseled re: options if LV lead placement is not feasible transvenously.      Pre-Procedure Instruction:  NPO after midnight, Remove all jewelry prior to coming in for procedure, Shower prior to arrival, Notified patient of time and date of procedure by CV , Transportation arrangements needed s/p procedure, Post-procedure follow up process, Sedation plan/orders and Pre-procedure letter was sent to pt by CV     Pre-Procedure Medication Instructions:  Instructions given to pt regarding anticoagulants: Coumadin- instructed to continue anticoagulation uninterrupted through their procedure  Instructions given to pt regarding antiarrhythmic medication: N/A for this type of procedure; N/A  Instructions for medication, other than anticoagulants/antiarrhythmics listed above, given to pt: to hold Lasix the morning of procedure, and to take remaining medications with small sips of water  Allergies: Reviewed allergies, no concerns regarding orders for procedure    Allergies   Allergen Reactions     Carvedilol Shortness Of Breath     Lisinopril Cough     Metoprolol      Shortness of breath       Current Outpatient Medications:      amLODIPine (NORVASC) 5 MG tablet, Take 1 tablet (5 mg) by mouth daily, Disp: 90 tablet, Rfl: 3     aspirin 81 MG EC tablet, [ASPIRIN 81 MG EC TABLET] Take 81 mg by mouth daily., Disp: , Rfl:      atorvastatin (LIPITOR) 20 MG tablet, Take 1 tablet (20 mg) by mouth At  Bedtime, Disp: 90 tablet, Rfl: 3     fish oil-omega-3 fatty acids 1000 MG capsule, , Disp: , Rfl:      furosemide (LASIX) 20 MG tablet, Take 1 tablet (20 mg) by mouth daily, Disp: 120 tablet, Rfl: 3     levothyroxine (SYNTHROID, LEVOTHROID) 175 MCG tablet, [LEVOTHYROXINE (SYNTHROID, LEVOTHROID) 175 MCG TABLET] Take 175 mcg by mouth daily before breakfast., Disp: , Rfl:      losartan (COZAAR) 50 MG tablet, Take 1 tablet (50 mg) by mouth daily, Disp: 90 tablet, Rfl: 3     MULTIVITAMIN ORAL, [MULTIVITAMIN ORAL] Take 1 tablet by mouth daily., Disp: , Rfl:      spironolactone (ALDACTONE) 25 MG tablet, Take 1 tablet (25 mg) by mouth daily, Disp: 90 tablet, Rfl: 3     warfarin ANTICOAGULANT (COUMADIN) 1 MG tablet, TAKE 1 TABLET WITH 3 MG TABLET ( TOTAL OF 4 MG) ON TUESDAY AND SATURDAY THEN 3 MG ALL OTHER DAYS, Disp: 90 tablet, Rfl: 3     warfarin ANTICOAGULANT (COUMADIN) 3 MG tablet, Take 1 tablet ( 3 mg) with 1 mg tablet ( 4 mg dose) on Thur, Sat then 3 mg on all other days, Disp: 90 tablet, Rfl: 1    Documentation Date:8/17/2022 9:59 AM  Lili Fuentes RN

## 2022-08-30 NOTE — PROGRESS NOTES
ANTICOAGULATION MANAGEMENT     Ady Farley 82 year old male is on warfarin with therapeutic INR result. (Goal INR 2.0-3.0)    Recent labs: (last 7 days)     08/30/22  0836   INR 2.2*       ASSESSMENT       Source(s): Chart Review and Template       Warfarin doses taken: Warfarin taken as instructed    Diet: No new diet changes identified    New illness, injury, or hospitalization: No    Medication/supplement changes: None noted    Signs or symptoms of bleeding or clotting: No    Previous INR: Therapeutic last visit; previously outside of goal range    Additional findings: Upcoming surgery/procedure 10/12 Pacemake Generator replacement needs INR 3-4 days prior to procedure- no warfarin interruption needed per [facility] ( 8/17/2022 encounter)       PLAN     Recommended plan for no diet, medication or health factor changes affecting INR     Dosing Instructions: Continue your current warfarin dose with next INR in 5 weeks       Summary  As of 8/30/2022    Full warfarin instructions:  4 mg every Thu, Sat; 3 mg all other days   Next INR check:  10/10/2022             Telephone call with Ady who verbalizes understanding and agrees to plan    Lab visit scheduled    Education provided: Importance of therapeutic range, Importance of following up at instructed interval and Contact [phone] with any changes, questions or concerns.     Plan made per [facility] anticoagulation protocol    Salma Marques RN  Anticoagulation Clinic  8/30/2022    _______________________________________________________________________     Anticoagulation Episode Summary     Current INR goal:  2.0-3.0   TTR:  90.8 % (1 y)   Target end date:  Indefinite   Send INR reminders to:  [facility]    Indications    Paroxysmal Atrial Fibrillation [I48.91]  Atrial flutter  unspecified type (H) [I48.92]  Chronic atrial fibrillation (H) [I48.20]           Comments:           Anticoagulation Care Providers     Provider Role Specialty Phone  number    Janis Townsend MD Referring Cardiovascular Disease [phone]

## 2022-09-12 NOTE — TELEPHONE ENCOUNTER
ANTICOAGULATION MANAGEMENT:  Medication Refill    Anticoagulation Summary  As of 8/30/2022    Warfarin maintenance plan:  4 mg (1 mg x 1 and 3 mg x 1) every Thu, Sat; 3 mg (3 mg x 1) all other days   Next INR check:  10/10/2022   Target end date:  Indefinite    Indications    Paroxysmal Atrial Fibrillation [I48.91]  Atrial flutter  unspecified type (H) [I48.92]  Chronic atrial fibrillation (H) [I48.20]             Anticoagulation Care Providers     Provider Role Specialty Phone number    Janis Townsend MD Referring Cardiovascular Disease [phone]          Visit with referring provider/group within last year: Yes    [facility] referral signed within last year: Yes    Ady meets all criteria for refill (current [facility] referral, office visit with referring provider/group in last year, lab monitoring up to date or not exceeding 2 weeks overdue). Rx instructions and quantity supplied updated to match patient's current dosing plan. Warfarin 90 day supply with 1 refill granted per [facility] protocol     Salma Marques RN  Anticoagulation Clinic

## 2022-10-04 NOTE — TELEPHONE ENCOUNTER
Pre-Procedure Education Phone Call    Procedure: CRT-P Generator Change with with Dr Mirza on 10/12/22    Education: Attempted to contact pt to review Pre-Intra-Post education and instructions, VM was left with request that pt return call    COIVD: pt has chosen to bring in photo/copy of at home rapid antigen test, 1-2 days prior to procedure    Pre-Op: Will need to verify that pt has pre-op scheduled with PMD, as apt/record of pre-op not listed in EPIC    Medications: Will review medication instructions with pt upon CB from pt. Pt to continue taking his warfarin and to hold his Lasix the morning of his procedure.    Important patient information for staff: None    10/4/2022 12:13 PM  Zachary Hernandez RN

## 2022-10-04 NOTE — TELEPHONE ENCOUNTER
Return call from patient to review pre-procedure education for CRT-P Generator Change with Dr. Mirza on 10/12/22.    Education: Reviewed Pre-Intra-Post education and instructions reviewed via phone- refer to procedure prep for instructions that were reviewed    COIVD: pt has chosen to bring in photo/copy of at home rapid antigen test, 1-2 days prior to procedure    Pre-Op: Pt had his pre-op completed on 9/19/22 through [facility] clinic. PC to [facility] Medical Records on 10/4/22 to fax over pt's pre-op records, awaiting fax.    Medications: Pt instructed to hold his Lasix, along with any multivitamins or supplements, the morning of his procedure. Pt to continue taking his Warfarin. Pt stated he has an INR check on 10/10/22.   Pt verbalized understanding of medication instructions pre procedure    Important patient information for staff: None    10/4/2022 1:02 PM  Zachary Hernandez RN

## 2022-10-10 NOTE — PROGRESS NOTES
ANTICOAGULATION MANAGEMENT     Ady Farley 82 year old male is on warfarin with therapeutic INR result. (Goal INR 2.0-3.0)    Recent labs: (last 7 days)     10/10/22  0840   INR 2.5*       ASSESSMENT       Source(s): Chart Review and Template       Warfarin doses taken: Warfarin taken as instructed    Diet: No new diet changes identified    New illness, injury, or hospitalization: No    Medication/supplement changes: None noted    Signs or symptoms of bleeding or clotting: No    Previous INR: Therapeutic last 2(+) visits    Additional findings: Upcoming surgery/procedure pacemaker generator replacement 10/12, no warfarin interruption needed       PLAN     Recommended plan for ongoing change(s) affecting INR     Dosing Instructions: Continue your current warfarin dose with next INR in 8 weeks       Summary  As of 10/10/2022    Full warfarin instructions:  4 mg every Thu, Sat; 3 mg all other days   Next INR check:  12/5/2022             Detailed voice message left for Ady with dosing instructions and follow up date.     Contact [phone] to schedule and with any changes, questions or concerns.     Education provided: Please call back if any changes to your diet, medications or how you've been taking warfarin    Plan made per [facility] anticoagulation protocol    Cindy Hardin RN  Anticoagulation Clinic  10/10/2022    _______________________________________________________________________     Anticoagulation Episode Summary     Current INR goal:  2.0-3.0   TTR:  90.8 % (1 y)   Target end date:  Indefinite   Send INR reminders to:  [facility] HEART [facility]    Indications    Paroxysmal Atrial Fibrillation [I48.91]  Atrial flutter  unspecified type (H) [I48.92]  Chronic atrial fibrillation (H) [I48.20]           Comments:           Anticoagulation Care Providers     Provider Role Specialty Phone number    Janis Townsend MD Referring Cardiovascular Disease [phone]

## 2022-10-12 NOTE — Clinical Note
Generator attached  Med"[employer] Percepta CRT-P [facility] Noah  SN:  [id number]  Exp:  12/14/2022

## 2022-10-12 NOTE — Clinical Note
Grounding pads are removed from the patient. The skin is clean, dry, intact, and free from redness.

## 2022-10-12 NOTE — PRE-PROCEDURE
GENERAL PRE-PROCEDURE:   Procedure:  PPM generator change  Date/Time:  10/12/2022 11:50 AM    Written consent obtained?: Yes    Risks and benefits: Risks, benefits and alternatives were discussed    Consent given by:  Patient  Patient states understanding of procedure being performed: Yes    Patient's understanding of procedure matches consent: Yes    Procedure consent matches procedure scheduled: Yes    Expected level of sedation:  Moderate  Appropriately NPO:  Yes  ASA Class:  3 (PPM in situ; at TANESHA/EOL, HFrEF, AF/AFL; s/p PVI + RA CTI line, HTN, CAD; s/p 1v CABG, MVP; s/p repair and annuloplasty, pHTN)  Mallampati  :  Grade 2- soft palate, base of uvula, tonsillar pillars, and portion of posterior pharyngeal wall visible  Lungs:  Lungs clear with good breath sounds bilaterally  Heart:  Normal heart sounds and rate  History & Physical reviewed:  History and physical reviewed and no updates needed  Statement of review:  I have reviewed the lab findings, diagnostic data, medications, and the plan for sedation

## 2022-10-12 NOTE — ANESTHESIA CARE TRANSFER NOTE
Patient: Ady Farley    Procedure: Procedure(s):  Pacemaker Generator Replacement Biventricular       Diagnosis: TANESHA  Diagnosis Additional Information: No value filed.    Anesthesia Type:   MAC     Note:    Oropharynx: oropharynx clear of all foreign objects  Level of Consciousness: awake  Oxygen Supplementation: room air    Independent Airway: airway patency satisfactory and stable  Dentition: dentition unchanged  Vital Signs Stable: post-procedure vital signs reviewed and stable  Report to RN Given: handoff report given  Patient transferred to: Cardiac Special Care  Comments: Patient transferred to cart.  Taken to [facility] on [facility].  In [facility] monitors on, vital signs stable.  SBAR report given to RN on floor per institutional policy and procedure. Transfer of care.        Vitals:  Vitals Value Taken Time   /60 10/12/22 1632   Temp 36.6  C (97.8  F) 10/12/22 1632   Pulse     Resp 16 10/12/22 1632   SpO2 97 % 10/12/22 1632       Electronically Signed By: STACY Ocampo CRNA  October 12, 2022  4:32 PM

## 2022-10-12 NOTE — ANESTHESIA POSTPROCEDURE EVALUATION
Patient: Ady Farley    Procedure: Procedure(s):  Pacemaker Generator Replacement Biventricular       Anesthesia Type:  MAC    Note:     Postop Pain Control: Uneventful            Sign Out: Well controlled pain   PONV: No   Neuro/Psych: Uneventful            Sign Out: Acceptable/Baseline neuro status   Airway/Respiratory: Uneventful            Sign Out: Acceptable/Baseline resp. status   CV/Hemodynamics: Uneventful            Sign Out: Acceptable CV status; No obvious hypovolemia; No obvious fluid overload   Other NRE: NONE   DID A NON-ROUTINE EVENT OCCUR? No           Last vitals:  Vitals Value Taken Time   /58 10/12/22 1730   Temp     Pulse 72 10/12/22 1738   Resp 26 10/12/22 1737   SpO2 99 % 10/12/22 1738   Vitals shown include unvalidated device data.    Electronically Signed By: Alice Myrick MD  October 12, 2022  6:06 PM

## 2022-10-12 NOTE — DISCHARGE INSTRUCTIONS
[facility]  Cardiac Electrophysiology  [address]  [place] [address]   Office: [phone]  Fax: [phone]     Cardiac Electrophysiology - Post Pacemaker or Defibrillator Generator Replacement Discharge Instructions      PROCEDURE   CRT-pacemaker generator replacement         MEDICATION INSTRUCTIONS   Continue taking your prior to procedure medications.         WOUND CARE   Leave the large overlying dressing in place until 2 days after discharge - this dressing can thereafter be removed by gently pulling off.  Underneath the large dressing will be steri-strips. DO NOT REMOVE these strips; please leave these in place until you are seen in clinic.  DO NOT COVER the site with any bandages or dressings. The site should be kept dry for 7 days - please avoid traditional showers or baths during this time.  Keep the site clean and dry.  No swimming, sauna, or hot tub use until incision is completely healed.         ACTIVITY   It takes approximately 1-2 weeks for your incision to heal. During this time use extra precautions.  Avoid the following:  Raising your arm over your head or stretching behind your back (no hyperflexion)  Pushing or pulling (mowing the lawn, vacuuming)  Lifting anything heavier than 10 pounds or a gallon of milk  Sudden or extreme motions  Be physically active every day.  Moving your arm is important       REMOTE MONITORING   You will receive a remote transmission station to monitor your device from home  Please set the transmitter up as soon as you get home from the hospital.  Directions are included in the box, and you may call our office or the company technical support line if you need assistance connecting your transmitter.  Please send a transmission the day after you go home  Automated transmissions will be sent every 3 months or sooner if device issues are detected.  You may manually send in transmissions if you are having arrhythmia symptoms or  think there may be a problem with your device.  Please call our office at [phone] if you send in a transmission off-schedule         WHAT TO WATCH OUT FOR   Contact our office or seek emergency care for any of the following:  Drainage, bleeding or oozing from the site  Redness, increased swelling, or warmth around the device site  Pain not treated with prescribed pain medication  Temperature greater than 100.4F  Chest pain, shortness of breath, dizziness/fainting         FOLLOW-UP   Our office will coordinate a follow-up visit visit in clinic for a device interrogation and an incision check 7-14 days after your procedure.   Please contact us at [phone] with any issues during your recovery.

## 2022-10-12 NOTE — ANESTHESIA PREPROCEDURE EVALUATION
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     Coronary artery disease      Disease of thyroid gland      Disorder of phrenic nerve      Hyperlipidemia      Hypertension      Malfunction of biventricular cardiac pacemaker battery 2015    GT [employer] device recall     Non-rheumatic mitral regurgitation     MVP s/p Mitral valve repair May 15, 2007 MAZE  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  Echo  LVEF 45% and mild MR Echo  LVEF 40% mild MS, Mod MR  NELY 2013 mild MS and mild/mod MR Echo 2015 Mild MS/ mild MR NELY  small/mod cuff remains (recommend OAC) Replacement Utility updated for latest IMO load      PVC (premature ventricular contraction)      PVD (peripheral vascular disease) (H)      Status post ablation of atrial fibrillation 2015    MAZE May 2007 PVI May 31, 2011 (RF-PVI + TIAN line) PVI Oct 30, 2012 (Cryo-PVI + redo TIAN line + roof line + RA-scar flutter line)      Past Surgical History:   Procedure Laterality Date     CARDIOVERSION  2018     INSERT / REPLACE / REMOVE PACEMAKER       MITRAL VALVE REPAIR       WI ABLATE HEART DYSRHYTHM FOCUS      Description: Catheter Ablation Atrial Flutter;  Recorded: 10/30/2012;  Comments: Typical RA CTI flutter 2007; ; Multiple atypical flutter Oct 2012 (Roof line + reinforce TIAN line + RA scar line)     WI ABLATE HEART DYSRHYTHM FOCUS      Description: Catheter Ablation Atrial Fibrillation;  Recorded: 10/24/2013;  Comments: May 2011 (PVI > 90% recurrence post MAZE + TIAN line); ; Re do PVI Oct 2012 (Cryo PVI + roof line + TIAN touch up + RA scar flutter); ; No recurrence by device  check at 12 months     SD ABLATE HEART DYSRHYTHM FOCUS      Description: Catheter Ablation Atrial Fibrillation;  Recorded: 10/30/2014;  Comments: May 2011 (PVI > 90% recurrence post MAZE + TIAN line); ; Re do PVI Oct 2012 (Cryo PVI + roof line + TIAN touch up + RA scar flutter); ; No recurrence by device check at 12 months     SD ABLATE HEART DYSRHYTHM FOCUS      Description: Catheter Ablation Atrial Flutter;  Recorded: 10/30/2014;  Comments: Typical RA CTI flutter 2007; ; Multiple atypical flutter Oct 2012 (Roof line + reinforce TIAN line + RA scar line)     SD ABLATE/ RECONSTUCT ATRIA, LIMITED      Description: Maze Procedure;  Proc Date: 2007;  Comments: May 2007 at time of valve repair     SD ICAR CATHETER ABLATION ATRIOVENTR NODE FUNCTION      Description: Catheter Ablation Atrioventricular Node;  Recorded: 2012;  Comments: Mar 2008 performed due to inability to biventricular with conduction abnormality     SD ICAR CATHETER ABLATION ATRIOVENTR NODE FUNCTION      Description: Catheter Ablation Atrioventricular Node;  Recorded: 10/30/2014;  Comments: Mar 2008 performed due to inability to biventricular with conduction abnormality     ZZC CABG, VEIN, SINGLE      Description: CABG (CABG);  Recorded: 2012;  Comments: ONE VESSEL RCA      Allergies   Allergen Reactions     Carvedilol Shortness Of Breath     Lisinopril Cough     Metoprolol      Shortness of breath      Social History     Tobacco Use     Smoking status: Former     Packs/day: 1.00     Types: Cigarettes     Quit date: 1990     Years since quittin.8     Smokeless tobacco: Never   Substance Use Topics     Alcohol use: Yes     Alcohol/week: 2.0 standard drinks      Wt Readings from Last 1 Encounters:   22 94.4 kg (208 lb 3.2 oz)        Anesthesia Evaluation   Pt has had prior anesthetic.     No history of anesthetic complications       ROS/MED HX  ENT/Pulmonary:     (+) sleep apnea, mild,     Neurologic:  - neg neurologic  ROS     Cardiovascular: Comment: Echo 3/22/22-  1.Left ventricular function is decreased. The ejection fraction is 45-50%  (mildly reduced).  2.Moderately decreased right ventricular systolic function  3.Moderate biatrial chamber enlargement.  4.An annuloplasty ring is noted in the mitral position. There is mild mitral  stenosis. Mean gradient is 7 mmHg at rate of 70 bpm. There is mild to moderate  (1-2+) mitral regurgitation.  5.There is moderate (2+) tricuspid regurgitation.  6.Right ventricular systolic pressure is elevated, consistent with moderate to  severe pulmonary hypertension.  7.IVC diameter >2.1 cm collapsing <50% with sniff suggests a high RA pressure  estimated at 15 mmHg or greater.  Compared to the prior study dated 5/5/2021, ther LVEF is slightly lower,  mitral and tricuspid and insufficiency are greater, pulmonic pressures are  higher.    (+) Dyslipidemia hypertension--CAD -CABG--Taking blood thinners CHF pacemaker (s/f generator replacement), type: Bi-v, cRT, dysrhythmias (s/p PVI/ablation, AV node ablation), a-fib, valvular problems/murmurs s/p MVR, mild residual MS. 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),     METS/Exercise Tolerance:     Hematologic:    (-) anemia (hgb 13.2)   Musculoskeletal:  - neg musculoskeletal ROS     GI/Hepatic:    (-) GERD   Renal/Genitourinary:     (+) renal disease, type: CRI,     Endo:     (+) thyroid problem,     Psychiatric/Substance Use:  - neg psychiatric ROS     Infectious Disease:  - neg infectious disease ROS     Malignancy:  - neg malignancy ROS     Other:            Physical Exam    Airway        Mallampati: II   TM distance: > 3 FB   Neck ROM: full   Mouth opening: > 3 cm    Respiratory Devices and Support         Dental  no notable dental history         Cardiovascular   cardiovascular exam normal          Pulmonary   pulmonary exam normal                OUTSIDE LABS:  CBC:   Lab Results   Component Value Date    WBC 6.8 10/12/2022    WBC 9.1 02/10/2019     HGB 13.2 (L) 10/12/2022    HGB 13.3 09/19/2022    HCT 39.7 (L) 10/12/2022    HCT 41.3 02/10/2019     10/12/2022     02/10/2019     BMP:   Lab Results   Component Value Date     09/19/2022     06/17/2022    POTASSIUM 4.4 09/19/2022    POTASSIUM 4.7 06/17/2022    CHLORIDE 102 09/19/2022    CHLORIDE 107 06/17/2022    CO2 25 09/19/2022    CO2 24 06/17/2022    BUN 24.5 (H) 09/19/2022    BUN 15 06/17/2022    CR 1.36 (H) 09/19/2022    CR 1.33 (H) 06/17/2022     (H) 09/19/2022    GLC 94 06/17/2022     COAGS:   Lab Results   Component Value Date    INR 2.5 (A) 10/10/2022     POC: No results found for: BGM, HCG, HCGS  HEPATIC:   Lab Results   Component Value Date    ALBUMIN 4.1 12/22/2021    PROTTOTAL 7.3 12/22/2021    ALT 17 12/22/2021    AST 17 12/22/2021    ALKPHOS 102 12/22/2021    BILITOTAL 1.5 (H) 12/22/2021     OTHER:   Lab Results   Component Value Date    OBIE 9.4 09/19/2022    MAG 2.4 08/20/2021    TSH 1.24 12/18/2020       Anesthesia Plan    ASA Status:  3   NPO Status:  NPO Appropriate    Anesthesia Type: MAC.     - Reason for MAC: straight local not clinically adequate   Induction: Intravenous.   Maintenance: TIVA.        Consents    Anesthesia Plan(s) and associated risks, benefits, and realistic alternatives discussed. Questions answered and patient/representative(s) expressed understanding.    - Discussed:     - Discussed with:  Patient, Spouse         Postoperative Care    Post procedure pain management: local per surgeon.        Comments:    Other Comments: Local per surgeon  Plan for low dose propofol gtt w/ ketamine - avoid hypoventilation/hypoxia given PulmHTN. Low threshold to lower dose, or transition to precedex prn.   Fire precautions.   Discussed potential need to convert to GA w/ ETT vs LMA if not tolerating.  Explained that it is possible to remember certain aspects of the OR experience during MAC dependent on the depth of sedation and patient understands  this.  zofran for PONV ppx.            Carola Duenas MD

## 2022-10-13 NOTE — PROGRESS NOTES
ANTICOAGULATION  MANAGEMENT: Discharge Review    Ady Farley chart reviewed for anticoagulation continuity of care    Hospital Admission on 10/12 for Pacemaker Generator Replacement Biventricular.    Discharge disposition: Home    Results:    Recent labs: (last 7 days)     10/10/22  0840 10/12/22  1142   INR 2.5* 2.12*     Anticoagulation inpatient management:     not applicable     Anticoagulation discharge instructions:     Warfarin dosing: home regimen continued   Bridging: No   INR goal change: No      Medication changes affecting anticoagulation: No    Additional factors affecting anticoagulation: No     PLAN     No adjustment to anticoagulation plan needed    Patient not contacted    No adjustment to Anticoagulation Calendar was required    Salma Marques RN

## 2022-12-06 NOTE — PROGRESS NOTES
ANTICOAGULATION MANAGEMENT     Ady Farley 82 year old male is on warfarin with therapeutic INR result. (Goal INR 2.0-3.0)    Recent labs: (last 7 days)     12/06/22  0845   INR 2.2*       ASSESSMENT       Source(s): Chart Review, Patient/Caregiver Call and Template       Warfarin doses taken: Warfarin taken as instructed    Diet: No new diet changes identified    New illness, injury, or hospitalization: No    Medication/supplement changes: None noted    Signs or symptoms of bleeding or clotting: No    Previous INR: Therapeutic last 2(+) visits    Additional findings: None       PLAN     Recommended plan for no diet, medication or health factor changes affecting INR     Dosing Instructions: Continue your current warfarin dose with next INR in 8 weeks       Summary  As of 12/6/2022    Full warfarin instructions:  4 mg every Thu, Sat; 3 mg all other days; Starting 12/6/2022   Next INR check:  1/31/2023             Telephone call with Ady who verbalizes understanding and agrees to plan    Lab visit scheduled    Education provided:     Contact [phone] with any changes, questions or concerns.     Plan made per [facility] anticoagulation protocol    Danielle Jordan RN  Anticoagulation Clinic  12/6/2022    _______________________________________________________________________     Anticoagulation Episode Summary     Current INR goal:  2.0-3.0   TTR:  90.8 % (1 y)   Target end date:  Indefinite   Send INR reminders to:  [facility]    Indications    Paroxysmal Atrial Fibrillation [I48.91]  Atrial flutter  unspecified type (H) [I48.92]  Chronic atrial fibrillation (H) [I48.20]           Comments:  12/8/21 Dr Townsend approved to extend INR to 8 weeks         Anticoagulation Care Providers     Provider Role Specialty Phone number    Janis Townsend MD Referring Cardiovascular Disease [phone]

## 2023-01-01 ENCOUNTER — ANCILLARY PROCEDURE (OUTPATIENT)
Dept: CARDIOLOGY | Facility: CLINIC | Age: 83
End: 2023-01-01
Attending: INTERNAL MEDICINE
Payer: COMMERCIAL

## 2023-01-01 ENCOUNTER — OFFICE VISIT (OUTPATIENT)
Dept: CARDIOLOGY | Facility: CLINIC | Age: 83
End: 2023-01-01
Payer: COMMERCIAL

## 2023-01-01 ENCOUNTER — MYC MEDICAL ADVICE (OUTPATIENT)
Dept: CARDIOLOGY | Facility: CLINIC | Age: 83
End: 2023-01-01
Payer: COMMERCIAL

## 2023-01-01 ENCOUNTER — TELEPHONE (OUTPATIENT)
Dept: CARDIOLOGY | Facility: CLINIC | Age: 83
End: 2023-01-01

## 2023-01-01 ENCOUNTER — HEALTH MAINTENANCE LETTER (OUTPATIENT)
Age: 83
End: 2023-01-01

## 2023-01-01 ENCOUNTER — ANTICOAGULATION THERAPY VISIT (OUTPATIENT)
Dept: ANTICOAGULATION | Facility: CLINIC | Age: 83
End: 2023-01-01

## 2023-01-01 ENCOUNTER — LAB (OUTPATIENT)
Dept: CARDIOLOGY | Facility: CLINIC | Age: 83
End: 2023-01-01
Payer: COMMERCIAL

## 2023-01-01 VITALS
WEIGHT: 213 LBS | DIASTOLIC BLOOD PRESSURE: 52 MMHG | HEART RATE: 90 BPM | BODY MASS INDEX: 30.13 KG/M2 | RESPIRATION RATE: 16 BRPM | SYSTOLIC BLOOD PRESSURE: 146 MMHG

## 2023-01-01 VITALS
SYSTOLIC BLOOD PRESSURE: 130 MMHG | RESPIRATION RATE: 16 BRPM | WEIGHT: 211 LBS | HEART RATE: 96 BPM | BODY MASS INDEX: 29.85 KG/M2 | OXYGEN SATURATION: 95 % | DIASTOLIC BLOOD PRESSURE: 66 MMHG

## 2023-01-01 VITALS
DIASTOLIC BLOOD PRESSURE: 62 MMHG | BODY MASS INDEX: 29.71 KG/M2 | WEIGHT: 210 LBS | HEART RATE: 68 BPM | SYSTOLIC BLOOD PRESSURE: 144 MMHG | RESPIRATION RATE: 14 BRPM

## 2023-01-01 DIAGNOSIS — I48.20 CHRONIC ATRIAL FIBRILLATION (H): Primary | ICD-10-CM

## 2023-01-01 DIAGNOSIS — I44.2 CHB (COMPLETE HEART BLOCK) (H): Primary | ICD-10-CM

## 2023-01-01 DIAGNOSIS — Z95.0 BIVENTRICULAR CARDIAC PACEMAKER IN SITU: ICD-10-CM

## 2023-01-01 DIAGNOSIS — I48.92 ATRIAL FLUTTER, UNSPECIFIED TYPE (H): ICD-10-CM

## 2023-01-01 DIAGNOSIS — I48.0 PAROXYSMAL ATRIAL FIBRILLATION (H): Primary | ICD-10-CM

## 2023-01-01 DIAGNOSIS — I49.5 SSS (SICK SINUS SYNDROME) (H): ICD-10-CM

## 2023-01-01 DIAGNOSIS — I10 BENIGN ESSENTIAL HYPERTENSION: ICD-10-CM

## 2023-01-01 DIAGNOSIS — I48.20 CHRONIC ATRIAL FIBRILLATION (H): ICD-10-CM

## 2023-01-01 DIAGNOSIS — I48.0 PAROXYSMAL ATRIAL FIBRILLATION (H): ICD-10-CM

## 2023-01-01 DIAGNOSIS — I50.20 HEART FAILURE WITH REDUCED EJECTION FRACTION, NYHA CLASS II (H): ICD-10-CM

## 2023-01-01 DIAGNOSIS — I50.32 CHRONIC HEART FAILURE WITH PRESERVED EJECTION FRACTION (H): ICD-10-CM

## 2023-01-01 DIAGNOSIS — I27.20 PULMONARY HYPERTENSION (H): ICD-10-CM

## 2023-01-01 DIAGNOSIS — I50.30 (HFPEF) HEART FAILURE WITH PRESERVED EJECTION FRACTION (H): ICD-10-CM

## 2023-01-01 DIAGNOSIS — Z95.0 BIVENTRICULAR CARDIAC PACEMAKER IN SITU: Primary | ICD-10-CM

## 2023-01-01 LAB
INR POINT OF CARE: 1.4 (ref 0.9–1.1)
MDC_IDC_EPISODE_DTM: NORMAL
MDC_IDC_EPISODE_DURATION: 1 S
MDC_IDC_EPISODE_DURATION: 103 S
MDC_IDC_EPISODE_DURATION: 104 S
MDC_IDC_EPISODE_DURATION: 105 S
MDC_IDC_EPISODE_DURATION: 1095 S
MDC_IDC_EPISODE_DURATION: 114 S
MDC_IDC_EPISODE_DURATION: 124 S
MDC_IDC_EPISODE_DURATION: 146 S
MDC_IDC_EPISODE_DURATION: 154 S
MDC_IDC_EPISODE_DURATION: 1601 S
MDC_IDC_EPISODE_DURATION: 170 S
MDC_IDC_EPISODE_DURATION: 176 S
MDC_IDC_EPISODE_DURATION: 183 S
MDC_IDC_EPISODE_DURATION: 1835 S
MDC_IDC_EPISODE_DURATION: 20 S
MDC_IDC_EPISODE_DURATION: 261 S
MDC_IDC_EPISODE_DURATION: 261 S
MDC_IDC_EPISODE_DURATION: 274 S
MDC_IDC_EPISODE_DURATION: 297 S
MDC_IDC_EPISODE_DURATION: 3 S
MDC_IDC_EPISODE_DURATION: 310 S
MDC_IDC_EPISODE_DURATION: 3158 S
MDC_IDC_EPISODE_DURATION: 32 S
MDC_IDC_EPISODE_DURATION: 320 S
MDC_IDC_EPISODE_DURATION: 35 S
MDC_IDC_EPISODE_DURATION: 36 S
MDC_IDC_EPISODE_DURATION: 38 S
MDC_IDC_EPISODE_DURATION: 4 S
MDC_IDC_EPISODE_DURATION: 4 S
MDC_IDC_EPISODE_DURATION: 413 S
MDC_IDC_EPISODE_DURATION: 4239 S
MDC_IDC_EPISODE_DURATION: 45 S
MDC_IDC_EPISODE_DURATION: 45 S
MDC_IDC_EPISODE_DURATION: 456 S
MDC_IDC_EPISODE_DURATION: 458 S
MDC_IDC_EPISODE_DURATION: 459 S
MDC_IDC_EPISODE_DURATION: 468 S
MDC_IDC_EPISODE_DURATION: 5 S
MDC_IDC_EPISODE_DURATION: 51 S
MDC_IDC_EPISODE_DURATION: 55 S
MDC_IDC_EPISODE_DURATION: 57 S
MDC_IDC_EPISODE_DURATION: 57 S
MDC_IDC_EPISODE_DURATION: 58 S
MDC_IDC_EPISODE_DURATION: 597 S
MDC_IDC_EPISODE_DURATION: 6 S
MDC_IDC_EPISODE_DURATION: 6 S
MDC_IDC_EPISODE_DURATION: 62 S
MDC_IDC_EPISODE_DURATION: 66 S
MDC_IDC_EPISODE_DURATION: 683 S
MDC_IDC_EPISODE_DURATION: 7 S
MDC_IDC_EPISODE_DURATION: 74 S
MDC_IDC_EPISODE_DURATION: 77 S
MDC_IDC_EPISODE_DURATION: 8 S
MDC_IDC_EPISODE_DURATION: 83 S
MDC_IDC_EPISODE_DURATION: 887 S
MDC_IDC_EPISODE_DURATION: 890 S
MDC_IDC_EPISODE_DURATION: 9 S
MDC_IDC_EPISODE_DURATION: 907 S
MDC_IDC_EPISODE_DURATION: 92 S
MDC_IDC_EPISODE_DURATION: 9359 S
MDC_IDC_EPISODE_DURATION: NORMAL S
MDC_IDC_EPISODE_ID: 100
MDC_IDC_EPISODE_ID: 101
MDC_IDC_EPISODE_ID: 17
MDC_IDC_EPISODE_ID: 18
MDC_IDC_EPISODE_ID: 22
MDC_IDC_EPISODE_ID: 23
MDC_IDC_EPISODE_ID: 24
MDC_IDC_EPISODE_ID: 24
MDC_IDC_EPISODE_ID: 25
MDC_IDC_EPISODE_ID: 25
MDC_IDC_EPISODE_ID: 26
MDC_IDC_EPISODE_ID: 26
MDC_IDC_EPISODE_ID: 27
MDC_IDC_EPISODE_ID: 27
MDC_IDC_EPISODE_ID: 28
MDC_IDC_EPISODE_ID: 28
MDC_IDC_EPISODE_ID: 29
MDC_IDC_EPISODE_ID: 29
MDC_IDC_EPISODE_ID: 30
MDC_IDC_EPISODE_ID: 30
MDC_IDC_EPISODE_ID: 31
MDC_IDC_EPISODE_ID: 32
MDC_IDC_EPISODE_ID: 32
MDC_IDC_EPISODE_ID: 33
MDC_IDC_EPISODE_ID: 33
MDC_IDC_EPISODE_ID: 34
MDC_IDC_EPISODE_ID: 34
MDC_IDC_EPISODE_ID: 35
MDC_IDC_EPISODE_ID: 35
MDC_IDC_EPISODE_ID: 36
MDC_IDC_EPISODE_ID: 37
MDC_IDC_EPISODE_ID: 38
MDC_IDC_EPISODE_ID: 39
MDC_IDC_EPISODE_ID: 40
MDC_IDC_EPISODE_ID: 41
MDC_IDC_EPISODE_ID: 42
MDC_IDC_EPISODE_ID: 43
MDC_IDC_EPISODE_ID: 44
MDC_IDC_EPISODE_ID: 45
MDC_IDC_EPISODE_ID: 46
MDC_IDC_EPISODE_ID: 47
MDC_IDC_EPISODE_ID: 48
MDC_IDC_EPISODE_ID: 49
MDC_IDC_EPISODE_ID: 50
MDC_IDC_EPISODE_ID: 51
MDC_IDC_EPISODE_ID: 52
MDC_IDC_EPISODE_ID: 53
MDC_IDC_EPISODE_ID: 54
MDC_IDC_EPISODE_ID: 55
MDC_IDC_EPISODE_ID: 56
MDC_IDC_EPISODE_ID: 57
MDC_IDC_EPISODE_ID: 58
MDC_IDC_EPISODE_ID: 59
MDC_IDC_EPISODE_ID: 6
MDC_IDC_EPISODE_ID: 60
MDC_IDC_EPISODE_ID: 61
MDC_IDC_EPISODE_ID: 62
MDC_IDC_EPISODE_ID: 63
MDC_IDC_EPISODE_ID: 64
MDC_IDC_EPISODE_ID: 65
MDC_IDC_EPISODE_ID: 66
MDC_IDC_EPISODE_ID: 67
MDC_IDC_EPISODE_ID: 68
MDC_IDC_EPISODE_ID: 69
MDC_IDC_EPISODE_ID: 70
MDC_IDC_EPISODE_ID: 71
MDC_IDC_EPISODE_ID: 72
MDC_IDC_EPISODE_ID: 73
MDC_IDC_EPISODE_ID: 74
MDC_IDC_EPISODE_ID: 75
MDC_IDC_EPISODE_ID: 76
MDC_IDC_EPISODE_ID: 77
MDC_IDC_EPISODE_ID: 78
MDC_IDC_EPISODE_ID: 79
MDC_IDC_EPISODE_ID: 80
MDC_IDC_EPISODE_ID: 81
MDC_IDC_EPISODE_ID: 82
MDC_IDC_EPISODE_ID: 83
MDC_IDC_EPISODE_ID: 84
MDC_IDC_EPISODE_ID: 85
MDC_IDC_EPISODE_ID: 86
MDC_IDC_EPISODE_ID: 87
MDC_IDC_EPISODE_ID: 88
MDC_IDC_EPISODE_ID: 89
MDC_IDC_EPISODE_ID: 90
MDC_IDC_EPISODE_ID: 91
MDC_IDC_EPISODE_ID: 92
MDC_IDC_EPISODE_ID: 93
MDC_IDC_EPISODE_ID: 94
MDC_IDC_EPISODE_ID: 95
MDC_IDC_EPISODE_ID: 96
MDC_IDC_EPISODE_ID: 97
MDC_IDC_EPISODE_ID: 98
MDC_IDC_EPISODE_ID: 99
MDC_IDC_EPISODE_TYPE: NORMAL
MDC_IDC_LEAD_IMPLANT_DT: NORMAL
MDC_IDC_LEAD_LOCATION: NORMAL
MDC_IDC_LEAD_LOCATION_DETAIL_1: NORMAL
MDC_IDC_LEAD_MFG: NORMAL
MDC_IDC_LEAD_MODEL: NORMAL
MDC_IDC_LEAD_POLARITY_TYPE: NORMAL
MDC_IDC_LEAD_SERIAL: NORMAL
MDC_IDC_MSMT_BATTERY_DTM: NORMAL
MDC_IDC_MSMT_BATTERY_REMAINING_LONGEVITY: 104 MO
MDC_IDC_MSMT_BATTERY_REMAINING_LONGEVITY: 98 MO
MDC_IDC_MSMT_BATTERY_REMAINING_LONGEVITY: 99 MO
MDC_IDC_MSMT_BATTERY_RRT_TRIGGER: 2.6
MDC_IDC_MSMT_BATTERY_STATUS: NORMAL
MDC_IDC_MSMT_BATTERY_VOLTAGE: 2.99 V
MDC_IDC_MSMT_BATTERY_VOLTAGE: 3 V
MDC_IDC_MSMT_BATTERY_VOLTAGE: 3.01 V
MDC_IDC_MSMT_LEADCHNL_LV_IMPEDANCE_VALUE: 304 OHM
MDC_IDC_MSMT_LEADCHNL_LV_IMPEDANCE_VALUE: 304 OHM
MDC_IDC_MSMT_LEADCHNL_LV_IMPEDANCE_VALUE: 342 OHM
MDC_IDC_MSMT_LEADCHNL_LV_IMPEDANCE_VALUE: 342 OHM
MDC_IDC_MSMT_LEADCHNL_LV_IMPEDANCE_VALUE: 494 OHM
MDC_IDC_MSMT_LEADCHNL_LV_IMPEDANCE_VALUE: 513 OHM
MDC_IDC_MSMT_LEADCHNL_LV_IMPEDANCE_VALUE: 551 OHM
MDC_IDC_MSMT_LEADCHNL_LV_IMPEDANCE_VALUE: 570 OHM
MDC_IDC_MSMT_LEADCHNL_LV_IMPEDANCE_VALUE: 570 OHM
MDC_IDC_MSMT_LEADCHNL_LV_IMPEDANCE_VALUE: 608 OHM
MDC_IDC_MSMT_LEADCHNL_LV_IMPEDANCE_VALUE: 646 OHM
MDC_IDC_MSMT_LEADCHNL_LV_IMPEDANCE_VALUE: 646 OHM
MDC_IDC_MSMT_LEADCHNL_LV_IMPEDANCE_VALUE: 741 OHM
MDC_IDC_MSMT_LEADCHNL_LV_IMPEDANCE_VALUE: 741 OHM
MDC_IDC_MSMT_LEADCHNL_LV_IMPEDANCE_VALUE: 760 OHM
MDC_IDC_MSMT_LEADCHNL_LV_IMPEDANCE_VALUE: 817 OHM
MDC_IDC_MSMT_LEADCHNL_LV_IMPEDANCE_VALUE: 874 OHM
MDC_IDC_MSMT_LEADCHNL_LV_PACING_THRESHOLD_AMPLITUDE: 1.5 V
MDC_IDC_MSMT_LEADCHNL_LV_PACING_THRESHOLD_AMPLITUDE: 1.75 V
MDC_IDC_MSMT_LEADCHNL_LV_PACING_THRESHOLD_AMPLITUDE: 2.12 V
MDC_IDC_MSMT_LEADCHNL_LV_PACING_THRESHOLD_AMPLITUDE: 2.12 V
MDC_IDC_MSMT_LEADCHNL_LV_PACING_THRESHOLD_AMPLITUDE: 2.25 V
MDC_IDC_MSMT_LEADCHNL_LV_PACING_THRESHOLD_PULSEWIDTH: 1 MS
MDC_IDC_MSMT_LEADCHNL_RA_IMPEDANCE_VALUE: 247 OHM
MDC_IDC_MSMT_LEADCHNL_RA_IMPEDANCE_VALUE: 266 OHM
MDC_IDC_MSMT_LEADCHNL_RA_IMPEDANCE_VALUE: 285 OHM
MDC_IDC_MSMT_LEADCHNL_RA_IMPEDANCE_VALUE: 285 OHM
MDC_IDC_MSMT_LEADCHNL_RA_IMPEDANCE_VALUE: 418 OHM
MDC_IDC_MSMT_LEADCHNL_RA_IMPEDANCE_VALUE: 418 OHM
MDC_IDC_MSMT_LEADCHNL_RA_IMPEDANCE_VALUE: 437 OHM
MDC_IDC_MSMT_LEADCHNL_RA_SENSING_INTR_AMPL: 0.25 MV
MDC_IDC_MSMT_LEADCHNL_RA_SENSING_INTR_AMPL: 0.5 MV
MDC_IDC_MSMT_LEADCHNL_RA_SENSING_INTR_AMPL: 0.62 MV
MDC_IDC_MSMT_LEADCHNL_RV_IMPEDANCE_VALUE: 304 OHM
MDC_IDC_MSMT_LEADCHNL_RV_IMPEDANCE_VALUE: 323 OHM
MDC_IDC_MSMT_LEADCHNL_RV_IMPEDANCE_VALUE: 342 OHM
MDC_IDC_MSMT_LEADCHNL_RV_IMPEDANCE_VALUE: 342 OHM
MDC_IDC_MSMT_LEADCHNL_RV_IMPEDANCE_VALUE: 494 OHM
MDC_IDC_MSMT_LEADCHNL_RV_IMPEDANCE_VALUE: 494 OHM
MDC_IDC_MSMT_LEADCHNL_RV_IMPEDANCE_VALUE: 551 OHM
MDC_IDC_MSMT_LEADCHNL_RV_PACING_THRESHOLD_AMPLITUDE: 0.5 V
MDC_IDC_MSMT_LEADCHNL_RV_PACING_THRESHOLD_PULSEWIDTH: 0.4 MS
MDC_IDC_MSMT_LEADCHNL_RV_SENSING_INTR_AMPL: 11.25 MV
MDC_IDC_MSMT_LEADCHNL_RV_SENSING_INTR_AMPL: 11.38 MV
MDC_IDC_MSMT_LEADCHNL_RV_SENSING_INTR_AMPL: 11.38 MV
MDC_IDC_MSMT_LEADCHNL_RV_SENSING_INTR_AMPL: 5.62 MV
MDC_IDC_MSMT_LEADCHNL_RV_SENSING_INTR_AMPL: 9.3 MV
MDC_IDC_MSMT_LEADCHNL_RV_SENSING_INTR_AMPL: 9.38 MV
MDC_IDC_MSMT_LEADCHNL_RV_SENSING_INTR_AMPL: 9.38 MV
MDC_IDC_PG_IMPLANT_DTM: NORMAL
MDC_IDC_PG_MFG: NORMAL
MDC_IDC_PG_MODEL: NORMAL
MDC_IDC_PG_SERIAL: NORMAL
MDC_IDC_PG_TYPE: NORMAL
MDC_IDC_SESS_CLINIC_NAME: NORMAL
MDC_IDC_SESS_DTM: NORMAL
MDC_IDC_SESS_TYPE: NORMAL
MDC_IDC_SET_BRADY_AT_MODE_SWITCH_RATE: 150 {BEATS}/MIN
MDC_IDC_SET_BRADY_LOWRATE: 70 {BEATS}/MIN
MDC_IDC_SET_BRADY_MAX_SENSOR_RATE: 140 {BEATS}/MIN
MDC_IDC_SET_BRADY_MAX_TRACKING_RATE: 140 {BEATS}/MIN
MDC_IDC_SET_BRADY_MODE: NORMAL
MDC_IDC_SET_BRADY_PAV_DELAY_LOW: 170 MS
MDC_IDC_SET_BRADY_SAV_DELAY_LOW: 110 MS
MDC_IDC_SET_CRT_LVRV_DELAY: 0 MS
MDC_IDC_SET_CRT_PACED_CHAMBERS: NORMAL
MDC_IDC_SET_LEADCHNL_LV_PACING_AMPLITUDE: 2.25 V
MDC_IDC_SET_LEADCHNL_LV_PACING_AMPLITUDE: 2.25 V
MDC_IDC_SET_LEADCHNL_LV_PACING_AMPLITUDE: 2.5 V
MDC_IDC_SET_LEADCHNL_LV_PACING_ANODE_ELECTRODE_1: NORMAL
MDC_IDC_SET_LEADCHNL_LV_PACING_ANODE_LOCATION_1: NORMAL
MDC_IDC_SET_LEADCHNL_LV_PACING_CAPTURE_MODE: NORMAL
MDC_IDC_SET_LEADCHNL_LV_PACING_CATHODE_ELECTRODE_1: NORMAL
MDC_IDC_SET_LEADCHNL_LV_PACING_CATHODE_LOCATION_1: NORMAL
MDC_IDC_SET_LEADCHNL_LV_PACING_POLARITY: NORMAL
MDC_IDC_SET_LEADCHNL_LV_PACING_PULSEWIDTH: 1 MS
MDC_IDC_SET_LEADCHNL_RA_PACING_AMPLITUDE: 1.5 V
MDC_IDC_SET_LEADCHNL_RA_PACING_AMPLITUDE: 1.5 V
MDC_IDC_SET_LEADCHNL_RA_PACING_AMPLITUDE: NORMAL
MDC_IDC_SET_LEADCHNL_RA_PACING_ANODE_ELECTRODE_1: NORMAL
MDC_IDC_SET_LEADCHNL_RA_PACING_ANODE_LOCATION_1: NORMAL
MDC_IDC_SET_LEADCHNL_RA_PACING_CAPTURE_MODE: NORMAL
MDC_IDC_SET_LEADCHNL_RA_PACING_CATHODE_ELECTRODE_1: NORMAL
MDC_IDC_SET_LEADCHNL_RA_PACING_CATHODE_LOCATION_1: NORMAL
MDC_IDC_SET_LEADCHNL_RA_PACING_POLARITY: NORMAL
MDC_IDC_SET_LEADCHNL_RA_PACING_PULSEWIDTH: 0.4 MS
MDC_IDC_SET_LEADCHNL_RA_SENSING_ANODE_ELECTRODE_1: NORMAL
MDC_IDC_SET_LEADCHNL_RA_SENSING_ANODE_LOCATION_1: NORMAL
MDC_IDC_SET_LEADCHNL_RA_SENSING_CATHODE_ELECTRODE_1: NORMAL
MDC_IDC_SET_LEADCHNL_RA_SENSING_CATHODE_LOCATION_1: NORMAL
MDC_IDC_SET_LEADCHNL_RA_SENSING_POLARITY: NORMAL
MDC_IDC_SET_LEADCHNL_RA_SENSING_SENSITIVITY: 0.15 MV
MDC_IDC_SET_LEADCHNL_RV_PACING_AMPLITUDE: 1.5 V
MDC_IDC_SET_LEADCHNL_RV_PACING_AMPLITUDE: 1.5 V
MDC_IDC_SET_LEADCHNL_RV_PACING_AMPLITUDE: NORMAL
MDC_IDC_SET_LEADCHNL_RV_PACING_ANODE_ELECTRODE_1: NORMAL
MDC_IDC_SET_LEADCHNL_RV_PACING_ANODE_LOCATION_1: NORMAL
MDC_IDC_SET_LEADCHNL_RV_PACING_CAPTURE_MODE: NORMAL
MDC_IDC_SET_LEADCHNL_RV_PACING_CATHODE_ELECTRODE_1: NORMAL
MDC_IDC_SET_LEADCHNL_RV_PACING_CATHODE_LOCATION_1: NORMAL
MDC_IDC_SET_LEADCHNL_RV_PACING_POLARITY: NORMAL
MDC_IDC_SET_LEADCHNL_RV_PACING_PULSEWIDTH: 0.4 MS
MDC_IDC_SET_LEADCHNL_RV_SENSING_ANODE_ELECTRODE_1: NORMAL
MDC_IDC_SET_LEADCHNL_RV_SENSING_ANODE_LOCATION_1: NORMAL
MDC_IDC_SET_LEADCHNL_RV_SENSING_CATHODE_ELECTRODE_1: NORMAL
MDC_IDC_SET_LEADCHNL_RV_SENSING_CATHODE_LOCATION_1: NORMAL
MDC_IDC_SET_LEADCHNL_RV_SENSING_POLARITY: NORMAL
MDC_IDC_SET_LEADCHNL_RV_SENSING_SENSITIVITY: 0.9 MV
MDC_IDC_SET_ZONE_DETECTION_INTERVAL: 200 MS
MDC_IDC_SET_ZONE_DETECTION_INTERVAL: 400 MS
MDC_IDC_SET_ZONE_TYPE: NORMAL
MDC_IDC_STAT_AT_BURDEN_PERCENT: 100 %
MDC_IDC_STAT_AT_BURDEN_PERCENT: 77 %
MDC_IDC_STAT_AT_BURDEN_PERCENT: 80.2 %
MDC_IDC_STAT_AT_DTM_END: NORMAL
MDC_IDC_STAT_AT_DTM_START: NORMAL
MDC_IDC_STAT_AT_MODE_SW_COUNT: 17
MDC_IDC_STAT_BRADY_AP_VP_PERCENT: 96.03 %
MDC_IDC_STAT_BRADY_AP_VP_PERCENT: 96.06 %
MDC_IDC_STAT_BRADY_AP_VS_PERCENT: 0.75 %
MDC_IDC_STAT_BRADY_AP_VS_PERCENT: 0.75 %
MDC_IDC_STAT_BRADY_AS_VP_PERCENT: 0.48 %
MDC_IDC_STAT_BRADY_AS_VP_PERCENT: 0.48 %
MDC_IDC_STAT_BRADY_AS_VS_PERCENT: 3.04 %
MDC_IDC_STAT_BRADY_AS_VS_PERCENT: 3.04 %
MDC_IDC_STAT_BRADY_DTM_END: NORMAL
MDC_IDC_STAT_BRADY_DTM_START: NORMAL
MDC_IDC_STAT_BRADY_RA_PERCENT_PACED: 0.11 %
MDC_IDC_STAT_BRADY_RA_PERCENT_PACED: 20 %
MDC_IDC_STAT_BRADY_RA_PERCENT_PACED: 23.3 %
MDC_IDC_STAT_BRADY_RV_PERCENT_PACED: 92.22 %
MDC_IDC_STAT_BRADY_RV_PERCENT_PACED: 92.27 %
MDC_IDC_STAT_BRADY_RV_PERCENT_PACED: 92.33 %
MDC_IDC_STAT_CRT_DTM_END: NORMAL
MDC_IDC_STAT_CRT_DTM_START: NORMAL
MDC_IDC_STAT_CRT_LV_PERCENT_PACED: 92.2 %
MDC_IDC_STAT_CRT_LV_PERCENT_PACED: 92.25 %
MDC_IDC_STAT_CRT_LV_PERCENT_PACED: 92.31 %
MDC_IDC_STAT_CRT_PERCENT_PACED: 92.2 %
MDC_IDC_STAT_CRT_PERCENT_PACED: 92.25 %
MDC_IDC_STAT_CRT_PERCENT_PACED: 92.31 %
MDC_IDC_STAT_EPISODE_RECENT_COUNT: 0
MDC_IDC_STAT_EPISODE_RECENT_COUNT: 1
MDC_IDC_STAT_EPISODE_RECENT_COUNT: 1
MDC_IDC_STAT_EPISODE_RECENT_COUNT: 17
MDC_IDC_STAT_EPISODE_RECENT_COUNT: 76
MDC_IDC_STAT_EPISODE_RECENT_COUNT: 76
MDC_IDC_STAT_EPISODE_RECENT_COUNT_DTM_END: NORMAL
MDC_IDC_STAT_EPISODE_RECENT_COUNT_DTM_START: NORMAL
MDC_IDC_STAT_EPISODE_TOTAL_COUNT: 0
MDC_IDC_STAT_EPISODE_TOTAL_COUNT: 3
MDC_IDC_STAT_EPISODE_TOTAL_COUNT: 79
MDC_IDC_STAT_EPISODE_TOTAL_COUNT: 79
MDC_IDC_STAT_EPISODE_TOTAL_COUNT: 96
MDC_IDC_STAT_EPISODE_TOTAL_COUNT_DTM_END: NORMAL
MDC_IDC_STAT_EPISODE_TOTAL_COUNT_DTM_START: NORMAL
MDC_IDC_STAT_EPISODE_TYPE: NORMAL

## 2023-01-01 PROCEDURE — 93296 REM INTERROG EVL PM/IDS: CPT | Performed by: INTERNAL MEDICINE

## 2023-01-01 PROCEDURE — 99215 OFFICE O/P EST HI 40 MIN: CPT | Performed by: NURSE PRACTITIONER

## 2023-01-01 PROCEDURE — 99207 PR NO CHARGE LOS: CPT | Performed by: NURSE PRACTITIONER

## 2023-01-01 PROCEDURE — 99214 OFFICE O/P EST MOD 30 MIN: CPT | Performed by: INTERNAL MEDICINE

## 2023-01-01 PROCEDURE — 93281 PM DEVICE PROGR EVAL MULTI: CPT | Performed by: INTERNAL MEDICINE

## 2023-01-01 PROCEDURE — 85610 PROTHROMBIN TIME: CPT

## 2023-01-01 PROCEDURE — 93294 REM INTERROG EVL PM/LDLS PM: CPT | Performed by: INTERNAL MEDICINE

## 2023-01-01 PROCEDURE — 93288 INTERROG EVL PM/LDLS PM IP: CPT | Performed by: INTERNAL MEDICINE

## 2023-01-01 PROCEDURE — 36416 COLLJ CAPILLARY BLOOD SPEC: CPT

## 2023-01-01 RX ORDER — LOSARTAN POTASSIUM 100 MG/1
100 TABLET ORAL DAILY
Qty: 90 TABLET | Refills: 3 | Status: SHIPPED | OUTPATIENT
Start: 2023-01-01

## 2023-01-01 RX ORDER — LEVOTHYROXINE SODIUM 150 UG/1
TABLET ORAL
COMMUNITY
Start: 2022-01-01

## 2023-01-01 RX ORDER — AMLODIPINE BESYLATE 5 MG/1
TABLET ORAL
Qty: 90 TABLET | Refills: 3 | Status: SHIPPED | OUTPATIENT
Start: 2023-01-01 | End: 2023-01-01

## 2023-01-01 RX ORDER — SPIRONOLACTONE 25 MG/1
TABLET ORAL
Qty: 90 TABLET | Refills: 3 | Status: SHIPPED | OUTPATIENT
Start: 2023-01-01

## 2023-01-01 RX ORDER — DILTIAZEM HYDROCHLORIDE 120 MG/1
120 CAPSULE, EXTENDED RELEASE ORAL DAILY
Qty: 90 CAPSULE | Refills: 3 | Status: SHIPPED | OUTPATIENT
Start: 2023-01-01 | End: 2023-01-01

## 2023-01-01 RX ORDER — DILTIAZEM HYDROCHLORIDE 180 MG/1
180 CAPSULE, EXTENDED RELEASE ORAL EVERY EVENING
Qty: 90 CAPSULE | Refills: 3 | Status: SHIPPED | OUTPATIENT
Start: 2023-01-01

## 2023-01-01 RX ORDER — FUROSEMIDE 20 MG
20 TABLET ORAL DAILY
Qty: 120 TABLET | Refills: 3 | Status: SHIPPED | OUTPATIENT
Start: 2023-01-01

## 2023-01-13 NOTE — TELEPHONE ENCOUNTER
Noted.    Salma Marques RN     Patient came in for scheduled Bicillin 2.4 millionunits injection. Given IM to both buttocks without difficulty. Patient tolerated well.

## 2023-01-20 NOTE — LETTER
1/20/2023    Veronica Schumacher MD  [address] [place] [address]    RE: Ady Marcosnidiajohnathan       Dear Colleague,     I had the pleasure of seeing Ady Farley in the [facility].    HEART CARE ENCOUNTER CONSULTATON NOTE      [facility]  [phone]      Assessment/Recommendations   Assessment:    1.    Coronary artery disease: History of coronary artery disease status post SVG to RCA when he underwent mitral valve repair in 2007.  No anginal complaints.  2.  Atrial fibrillation/atypical flutter status post PVI and flutter ablation in 2007, 2011 and 2012.  Status post AV node ablation status post CRT-P device placement in 2012 after another recurrence.    3.  Anticoagulation: Continue on Coumadin for anticoagulation.  4.  Chronic heart failure with reduced ejection fraction LVEF of 45-50%   5.    Valvular heart disease: Status post mitral valve repair for mitral regurgitation in 2007 with residual mild to moderate mitral stenosis mild to moderate mitral regurgitation.  6.  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 Continue spironolactone 25 mg daily with lasix 20 mg daily with an extra dose as needed  2. Continue Coumadin for anticoagulation.  Patient is considering switching to Eliquis  3.  Follow-up with me in 6 months       History of Present Illness/Subjective    HPI: Ady Farley is a 82 year old male  with a known history of coronary artery disease and mitral regurgitation status post SVG to his RCA and mitral valve repair in 2007, atrial fibrillation/atypical flutter status post PVI and flutter ablation with right CTI flutter ablation 2007, PVI 2011 and 2012 with recurrence status post AV node ablation with CRT-P placement in 2012 and chronic heart failure with reduced ejection fraction LVEF 45-50% who I am seeing today for a follow-up.  Recent echocardiogram in March  indicated mildly reduced left ventricular systolic function with moderately reduced RV systolic function mitral valve repair with mild stenosis and mild to moderate regurgitation with elevated RVSP suggestive of moderate to severe pulmonary hypertension.  After his Lasix dose was doubled and he was started on spironolactone he did notice significant improvement in how he felt.  Weight declined by about 20 pounds.  It has remained stable around 206 to 208 pounds.  No complaints of chest discomfort.  Stable shortness of breath with exertion.  I was sorry to hear that his daughter suddenly passed away a few months ago.         Physical Examination  Review of Systems   Vitals: /66 (BP Location: Left arm, Patient Position: Sitting, Cuff Size: Adult Regular)   Pulse 96   Resp 16   Wt 95.7 kg (211 lb)   SpO2 95%   BMI 29.85 kg/m    BMI= Body mass index is 29.85 kg/m .  Wt Readings from Last 3 Encounters:   01/20/23 95.7 kg (211 lb)   07/13/22 94.4 kg (208 lb 3.2 oz)   06/17/22 95.7 kg (211 lb)       General Appearance:   no distress, normal body habitus   ENT/Mouth: membranes moist, no oral lesions or bleeding gums.      EYES:  no scleral icterus, normal conjunctivae   Neck: no carotid bruits or thyromegaly   Chest/Lungs:   lungs are clear to auscultation   Cardiovascular:   Regular. Normal first and second heart sounds with no murmurs  no edema bilaterally    Abdomen:  no organomegaly, masses, bruits, or tenderness; bowel sounds are present   Extremities: no cyanosis or clubbing   Skin: no xanthelasma, warm.    Neurologic: normal  bilateral, no tremors     Psychiatric: alert and oriented x3, calm        Please refer above for cardiac ROS details.        Medical History  Surgical History Family History Social History   Past Medical History:   Diagnosis Date     Atrial fibrillation (H)      Atrial flutter (H)      Cardiomyopathy (H)      CHF (congestive heart failure) (H)      Complete AV block due to AV  allison ablation (H) 12/1/2015     Coronary artery disease      Disease of thyroid gland      Disorder of phrenic nerve      Hyperlipidemia      Hypertension      Malfunction of biventricular cardiac pacemaker battery 12/1/2015    [employer]tronic device recall     Non-rheumatic mitral regurgitation     MVP s/p Mitral valve repair May 15, 2007 MAZE  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  Echo 2011 LVEF 45% and mild MR Echo 2012 LVEF 40% mild MS, Mod MR  NELY Jan 2013 mild MS and mild/mod MR Echo Jan 2015 Mild MS/ mild MR NELY 2016 small/mod cuff remains (recommend OAC) Replacement Utility updated for latest IMO load      PVC (premature ventricular contraction)      PVD (peripheral vascular disease) (H)      Status post ablation of atrial fibrillation 11/18/2015    MAZE May 2007 PVI May 31, 2011 (RF-PVI + TIAN line) PVI Oct 30, 2012 (Cryo-PVI + redo TIAN line + roof line + RA-scar flutter line)     Past Surgical History:   Procedure Laterality Date     CARDIOVERSION  07/24/2018     EP PACEMAKER GENERATOR REPLACEMENT- BI-VENTRICULAR N/A 10/12/2022    Procedure: Pacemaker Generator Replacement Biventricular;  Surgeon: Celeste Mirza MD;  [facility]: [facility] CATH LAB CV     INSERT / REPLACE / REMOVE PACEMAKER       MITRAL VALVE REPAIR       IL ABLATE HEART DYSRHYTHM FOCUS      Description: Catheter Ablation Atrial Flutter;  Recorded: 10/30/2012;  Comments: Typical RA CTI flutter Sept 2007; ; Multiple atypical flutter Oct 2012 (Roof line + reinforce TIAN line + RA scar line)     IL ABLATE HEART DYSRHYTHM FOCUS      Description: Catheter Ablation Atrial Fibrillation;  Recorded: 10/24/2013;  Comments: May 2011 (PVI > 90% recurrence post MAZE + TIAN line); ; Re do PVI Oct 2012 (Cryo PVI + roof line + TIAN touch up + RA scar flutter); ; No recurrence by device check at 12 months     IL ABLATE HEART DYSRHYTHM FOCUS      Description: Catheter Ablation Atrial Fibrillation;  Recorded: 10/30/2014;  Comments: May 2011 (PVI > 90% recurrence  post MAZE + TIAN line); ; Re do PVI Oct 2012 (Cryo PVI + roof line + TIAN touch up + RA scar flutter); ; No recurrence by device check at 12 months     KS ABLATE HEART DYSRHYTHM FOCUS      Description: Catheter Ablation Atrial Flutter;  Recorded: 10/30/2014;  Comments: Typical RA CTI flutter Sept 2007; ; Multiple atypical flutter Oct 2012 (Roof line + reinforce TIAN line + RA scar line)     KS ABLATE/ RECONSTUCT ATRIA, LIMITED      Description: Maze Procedure;  Proc Date: 05/01/2007;  Comments: May 2007 at time of valve repair     KS ICAR CATHETER ABLATION ATRIOVENTR NODE FUNCTION      Description: Catheter Ablation Atrioventricular Node;  Recorded: 08/13/2012;  Comments: Mar 2008 performed due to inability to biventricular with conduction abnormality     KS ICAR CATHETER ABLATION ATRIOVENTR NODE FUNCTION      Description: Catheter Ablation Atrioventricular Node;  Recorded: 10/30/2014;  Comments: Mar 2008 performed due to inability to biventricular with conduction abnormality     ZZC CABG, VEIN, SINGLE      Description: CABG (CABG);  Recorded: 01/18/2012;  Comments: ONE VESSEL RCA     Family History   Problem Relation Age of Onset     No Known Problems Mother      No Known Problems Father      No Known Problems Sister      No Known Problems Brother      No Known Problems Daughter      No Known Problems Son      Acute Myocardial Infarction No family hx of      Alcoholism No family hx of      Alzheimer Disease No family hx of      Amputation of Leg Below Knee No family hx of      Aortic Valve Replacement No family hx of      Arrhythmogenic Right Ventricular Cardiomyopathy No family hx of      Atrial fibrillation No family hx of      Atrial Flutter No family hx of      Breast Cancer No family hx of      CABG No family hx of      Cancer No family hx of      Cardiomyopathy No family hx of      Carotid Endarterectomy No family hx of      Cerebral aneurysm No family hx of      Chronic Kidney Disease No family hx of       Chronic Obstructive Pulmonary Disease No family hx of      Colon Cancer No family hx of      Congenital heart disease No family hx of      Coronary Artery Disease No family hx of      Coronary Stenting No family hx of      Dementia No family hx of      Diabetes Type 1 No family hx of      Diabetes Type 2  No family hx of      Dialysis No family hx of      Dyslipidemia No family hx of      Heart Failure No family hx of      Hypertension No family hx of      Hypertrophic cardiomyopathy No family hx of      ICD - Implantable Cardioverter Defibrillator No family hx of      Long QT syndrome No family hx of      Mitral Regurgitation No family hx of      Mitral Valve Replacement No family hx of      Morbid Obesity No family hx of      Obstructive Sleep Apnea No family hx of      Ovarian Cancer No family hx of      Pacemaker No family hx of      Pancreatic Cancer No family hx of      Peripheral Vascular Disease No family hx of      Pulmonary Embolism No family hx of      Pulmonary Hypertension No family hx of      Rheumatic Heart Disease No family hx of      Cerebrovascular Disease No family hx of      Sudden Death No family hx of      Transient ischemic attack No family hx of      Valvular heart disease No family hx of         Social History     Socioeconomic History     Marital status:      Spouse name: Not on file     Number of children: Not on file     Years of education: Not on file     Highest education level: Not on file   Occupational History     Not on file   Tobacco Use     Smoking status: Former     Packs/day: 1.00     Types: Cigarettes     Quit date: 1990     Years since quittin.0     Smokeless tobacco: Never   Substance and Sexual Activity     Alcohol use: Yes     Alcohol/week: 2.0 standard drinks     Drug use: No     Sexual activity: Not Currently   Other Topics Concern     Not on file   Social History Narrative     Not on file     Social Determinants of Health     Financial Resource Strain:  Not on file   Food Insecurity: Not on file   Transportation Needs: Not on file   Physical Activity: Not on file   Stress: Not on file   Social Connections: Not on file   Intimate Partner Violence: Not on file   Housing Stability: Not on file           Medications  Allergies   Current Outpatient Medications   Medication Sig Dispense Refill     amLODIPine (NORVASC) 5 MG tablet Take 1 tablet (5 mg) by mouth daily 90 tablet 3     aspirin 81 MG EC tablet [ASPIRIN 81 MG EC TABLET] Take 81 mg by mouth daily.       atorvastatin (LIPITOR) 20 MG tablet TAKE 1 TABLET AT BEDTIME 90 tablet 3     fish oil-omega-3 fatty acids 1000 MG capsule        furosemide (LASIX) 20 MG tablet Take 1 tablet (20 mg) by mouth daily 120 tablet 3     levothyroxine (SYNTHROID, LEVOTHROID) 175 MCG tablet [LEVOTHYROXINE (SYNTHROID, LEVOTHROID) 175 MCG TABLET] Take 175 mcg by mouth daily before breakfast.       levothyroxine (SYNTHROID/LEVOTHROID) 150 MCG tablet Take one tab daily in am on empty stomach on Saturday and Sunday       losartan (COZAAR) 50 MG tablet TAKE 1 TABLET DAILY 90 tablet 3     MULTIVITAMIN ORAL [MULTIVITAMIN ORAL] Take 1 tablet by mouth daily.       spironolactone (ALDACTONE) 25 MG tablet Take 1 tablet (25 mg) by mouth daily 90 tablet 3     warfarin ANTICOAGULANT (COUMADIN) 1 MG tablet TAKE 1 TABLET WITH 3 MG TABLET ( TOTAL OF 4 MG) ON TUESDAY AND SATURDAY THEN 3 MG ALL OTHER DAYS 90 tablet 3     warfarin ANTICOAGULANT (COUMADIN) 3 MG tablet TAKE 1 TABLET WITH 1 MG TABLET (4 MG DOSE) ON THURSDAY AND SATURDAY THEN 1 TABLET ON ALL OTHER DAYS 90 tablet 1       Allergies   Allergen Reactions     Carvedilol Shortness Of Breath     Lisinopril Cough     Metoprolol      Shortness of breath          Lab Results    Chemistry/lipid CBC Cardiac Enzymes/BNP/TSH/INR   Recent Labs   Lab Test 12/23/22  1017   CHOL 122   HDL 42   LDL 68   TRIG 61     Recent Labs   Lab Test 12/23/22  1017 12/22/21  0956 12/18/20  0903   LDL 68 77 76     Recent Labs    Lab Test 12/23/22  1017      POTASSIUM 4.6   CHLORIDE 103   CO2 21*   GLC 98   BUN 27.5*   CR 1.40*   GFRESTIMATED 50*   OBIE 9.4     Recent Labs   Lab Test 12/23/22  1017 10/12/22  1142 09/19/22  1130   CR 1.40* 1.35* 1.36*     No results for input(s): A1C in the last 14553 hours.       Recent Labs   Lab Test 10/12/22  1142   WBC 6.8   HGB 13.2*   HCT 39.7*   MCV 96        Recent Labs   Lab Test 10/12/22  1142 09/19/22  1130 02/10/19  0754   HGB 13.2* 13.3 13.7*    Recent Labs   Lab Test 02/10/19  2348 02/10/19  1808 02/10/19  0754   TROPONINI 0.03 0.03 0.03     Recent Labs   Lab Test 02/10/19  0810   *     Recent Labs   Lab Test 12/18/20  0903   TSH 1.24     Recent Labs   Lab Test 12/06/22  0845 10/12/22  1142 10/10/22  0840   INR 2.2* 2.12* 2.5*        Janis Townsend MD    Thank you for allowing me to participate in the care of your patient.      Sincerely,     Janis Townsend MD     [facility]    cc:   Janis Townsend MD  [address]  [address]  [place] [address]

## 2023-01-20 NOTE — PATIENT INSTRUCTIONS
Adydarryl Farley,     It was a pleasure to see you in the office today. My recommendations for you include:   Consider changing spironolactone to eplerenone  2.  Follow up 6 months     Please do not hesitate to call the [facility] clinic with any questions or concerns at [phone].    Sincerely,     Janis Townsend MD

## 2023-01-20 NOTE — PROGRESS NOTES
HEART CARE ENCOUNTER CONSULTATON MARIA C      [facility]  [phone]      Assessment/Recommendations   Assessment:    1.    Coronary artery disease: History of coronary artery disease status post SVG to RCA when he underwent mitral valve repair in 2007.  No anginal complaints.  2.  Atrial fibrillation/atypical flutter status post PVI and flutter ablation in 2007, 2011 and 2012.  Status post AV node ablation status post CRT-P device placement in 2012 after another recurrence.    3.  Anticoagulation: Continue on Coumadin for anticoagulation.  4.  Chronic heart failure with reduced ejection fraction LVEF of 45-50%   5.    Valvular heart disease: Status post mitral valve repair for mitral regurgitation in 2007 with residual mild to moderate mitral stenosis mild to moderate mitral regurgitation.  6.  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 Continue spironolactone 25 mg daily with lasix 20 mg daily with an extra dose as needed  2. Continue Coumadin for anticoagulation.  Patient is considering switching to Eliquis  3.  Follow-up with me in 6 months       History of Present Illness/Subjective    HPI: Ady Farley is a 82 year old male  with a known history of coronary artery disease and mitral regurgitation status post SVG to his RCA and mitral valve repair in 2007, atrial fibrillation/atypical flutter status post PVI and flutter ablation with right CTI flutter ablation 2007, PVI 2011 and 2012 with recurrence status post AV node ablation with CRT-P placement in 2012 and chronic heart failure with reduced ejection fraction LVEF 45-50% who I am seeing today for a follow-up.  Recent echocardiogram in March indicated mildly reduced left ventricular systolic function with moderately reduced RV systolic function mitral valve repair with mild stenosis and mild to moderate regurgitation with elevated RVSP  suggestive of moderate to severe pulmonary hypertension.  After his Lasix dose was doubled and he was started on spironolactone he did notice significant improvement in how he felt.  Weight declined by about 20 pounds.  It has remained stable around 206 to 208 pounds.  No complaints of chest discomfort.  Stable shortness of breath with exertion.  I was sorry to hear that his daughter suddenly passed away a few months ago.         Physical Examination  Review of Systems   Vitals: /66 (BP Location: Left arm, Patient Position: Sitting, Cuff Size: Adult Regular)   Pulse 96   Resp 16   Wt 95.7 kg (211 lb)   SpO2 95%   BMI 29.85 kg/m    BMI= Body mass index is 29.85 kg/m .  Wt Readings from Last 3 Encounters:   01/20/23 95.7 kg (211 lb)   07/13/22 94.4 kg (208 lb 3.2 oz)   06/17/22 95.7 kg (211 lb)       General Appearance:   no distress, normal body habitus   ENT/Mouth: membranes moist, no oral lesions or bleeding gums.      EYES:  no scleral icterus, normal conjunctivae   Neck: no carotid bruits or thyromegaly   Chest/Lungs:   lungs are clear to auscultation   Cardiovascular:   Regular. Normal first and second heart sounds with no murmurs  no edema bilaterally    Abdomen:  no organomegaly, masses, bruits, or tenderness; bowel sounds are present   Extremities: no cyanosis or clubbing   Skin: no xanthelasma, warm.    Neurologic: normal  bilateral, no tremors     Psychiatric: alert and oriented x3, calm        Please refer above for cardiac ROS details.        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     Coronary artery disease      Disease of thyroid gland      Disorder of phrenic nerve      Hyperlipidemia      Hypertension      Malfunction of biventricular cardiac  pacemaker battery 12/1/2015    [employer] device recall     Non-rheumatic mitral regurgitation     MVP s/p Mitral valve repair May 15, 2007 MAZE  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  Echo 2011 LVEF 45% and mild MR Echo 2012 LVEF 40% mild MS, Mod MR  NELY Jan 2013 mild MS and mild/mod MR Echo Jan 2015 Mild MS/ mild MR NELY 2016 small/mod cuff remains (recommend OAC) Replacement Utility updated for latest IMO load      PVC (premature ventricular contraction)      PVD (peripheral vascular disease) (H)      Status post ablation of atrial fibrillation 11/18/2015    MAZE May 2007 PVI May 31, 2011 (RF-PVI + TIAN line) PVI Oct 30, 2012 (Cryo-PVI + redo TIAN line + roof line + RA-scar flutter line)     Past Surgical History:   Procedure Laterality Date     CARDIOVERSION  07/24/2018     EP PACEMAKER GENERATOR REPLACEMENT- BI-VENTRICULAR N/A 10/12/2022    Procedure: Pacemaker Generator Replacement Biventricular;  Surgeon: Celeste Mirza MD;  Location: [facility] CATH LAB CV     INSERT / REPLACE / REMOVE PACEMAKER       MITRAL VALVE REPAIR       [place] ABLATE HEART DYSRHYTHM FOCUS      Description: Catheter Ablation Atrial Flutter;  Recorded: 10/30/2012;  Comments: Typical RA CTI flutter Sept 2007; ; Multiple atypical flutter Oct 2012 (Roof line + reinforce TIAN line + RA scar line)     [place] ABLATE HEART DYSRHYTHM FOCUS      Description: Catheter Ablation Atrial Fibrillation;  Recorded: 10/24/2013;  Comments: May 2011 (PVI > 90% recurrence post MAZE + TIAN line); ; Re do PVI Oct 2012 (Cryo PVI + roof line + TIAN touch up + RA scar flutter); ; No recurrence by device check at 12 months     [place] ABLATE HEART DYSRHYTHM FOCUS      Description: Catheter Ablation Atrial Fibrillation;  Recorded: 10/30/2014;  Comments: May 2011 (PVI > 90% recurrence post MAZE + TIAN line); ; Re do PVI Oct 2012 (Cryo PVI + roof line + TIAN touch up + RA scar flutter); ; No recurrence by device check at 12 months     [place] ABLATE HEART DYSRHYTHM FOCUS       Description: Catheter Ablation Atrial Flutter;  Recorded: 10/30/2014;  Comments: Typical RA CTI flutter Sept 2007; ; Multiple atypical flutter Oct 2012 (Roof line + reinforce TIAN line + RA scar line)     [place] ABLATE/ RECONSTUCT ATRIA, LIMITED      Description: Maze Procedure;  Proc Date: 05/01/2007;  Comments: May 2007 at time of valve repair     [place] ICAR CATHETER ABLATION ATRIOVENTR NODE FUNCTION      Description: Catheter Ablation Atrioventricular Node;  Recorded: 08/13/2012;  Comments: Mar 2008 performed due to inability to biventricular with conduction abnormality     [place] ICAR CATHETER ABLATION ATRIOVENTR NODE FUNCTION      Description: Catheter Ablation Atrioventricular Node;  Recorded: 10/30/2014;  Comments: Mar 2008 performed due to inability to biventricular with conduction abnormality     ZZC CABG, VEIN, SINGLE      Description: CABG (CABG);  Recorded: 01/18/2012;  Comments: ONE VESSEL RCA     Family History   Problem Relation Age of Onset     No Known Problems Mother      No Known Problems Father      No Known Problems Sister      No Known Problems Brother      No Known Problems Daughter      No Known Problems Son      Acute Myocardial Infarction No family hx of      Alcoholism No family hx of      Alzheimer Disease No family hx of      Amputation of Leg Below Knee No family hx of      Aortic Valve Replacement No family hx of      Arrhythmogenic Right Ventricular Cardiomyopathy No family hx of      Atrial fibrillation No family hx of      Atrial Flutter No family hx of      Breast Cancer No family hx of      CABG No family hx of      Cancer No family hx of      Cardiomyopathy No family hx of      Carotid Endarterectomy No family hx of      Cerebral aneurysm No family hx of      Chronic Kidney Disease No family hx of      Chronic Obstructive Pulmonary Disease No family hx of      Colon Cancer No family hx of      Congenital heart disease No family hx of      Coronary Artery Disease No family hx of      Coronary  Stenting No family hx of      Dementia No family hx of      Diabetes Type 1 No family hx of      Diabetes Type 2  No family hx of      Dialysis No family hx of      Dyslipidemia No family hx of      Heart Failure No family hx of      Hypertension No family hx of      Hypertrophic cardiomyopathy No family hx of      ICD - Implantable Cardioverter Defibrillator No family hx of      Long QT syndrome No family hx of      Mitral Regurgitation No family hx of      Mitral Valve Replacement No family hx of      Morbid Obesity No family hx of      Obstructive Sleep Apnea No family hx of      Ovarian Cancer No family hx of      Pacemaker No family hx of      Pancreatic Cancer No family hx of      Peripheral Vascular Disease No family hx of      Pulmonary Embolism No family hx of      Pulmonary Hypertension No family hx of      Rheumatic Heart Disease No family hx of      Cerebrovascular Disease No family hx of      Sudden Death No family hx of      Transient ischemic attack No family hx of      Valvular heart disease No family hx of         Social History     Socioeconomic History     Marital status:      Spouse name: Not on file     Number of children: Not on file     Years of education: Not on file     Highest education level: Not on file   Occupational History     Not on file   Tobacco Use     Smoking status: Former     Packs/day: 1.00     Types: Cigarettes     Quit date: 1990     Years since quittin.0     Smokeless tobacco: Never   Substance and Sexual Activity     Alcohol use: Yes     Alcohol/week: 2.0 standard drinks     Drug use: No     Sexual activity: Not Currently   Other Topics Concern     Not on file   Social History Narrative     Not on file     Social Determinants of Health     Financial Resource Strain: Not on file   Food Insecurity: Not on file   Transportation Needs: Not on file   Physical Activity: Not on file   Stress: Not on file   Social Connections: Not on file   Intimate Partner  Violence: Not on file   Housing Stability: Not on file           Medications  Allergies   Current Outpatient Medications   Medication Sig Dispense Refill     amLODIPine (NORVASC) 5 MG tablet Take 1 tablet (5 mg) by mouth daily 90 tablet 3     aspirin 81 MG EC tablet [ASPIRIN 81 MG EC TABLET] Take 81 mg by mouth daily.       atorvastatin (LIPITOR) 20 MG tablet TAKE 1 TABLET AT BEDTIME 90 tablet 3     fish oil-omega-3 fatty acids 1000 MG capsule        furosemide (LASIX) 20 MG tablet Take 1 tablet (20 mg) by mouth daily 120 tablet 3     levothyroxine (SYNTHROID, LEVOTHROID) 175 MCG tablet [LEVOTHYROXINE (SYNTHROID, LEVOTHROID) 175 MCG TABLET] Take 175 mcg by mouth daily before breakfast.       levothyroxine (SYNTHROID/LEVOTHROID) 150 MCG tablet Take one tab daily in am on empty stomach on Saturday and Sunday       losartan (COZAAR) 50 MG tablet TAKE 1 TABLET DAILY 90 tablet 3     MULTIVITAMIN ORAL [MULTIVITAMIN ORAL] Take 1 tablet by mouth daily.       spironolactone (ALDACTONE) 25 MG tablet Take 1 tablet (25 mg) by mouth daily 90 tablet 3     warfarin ANTICOAGULANT (COUMADIN) 1 MG tablet TAKE 1 TABLET WITH 3 MG TABLET ( TOTAL OF 4 MG) ON TUESDAY AND SATURDAY THEN 3 MG ALL OTHER DAYS 90 tablet 3     warfarin ANTICOAGULANT (COUMADIN) 3 MG tablet TAKE 1 TABLET WITH 1 MG TABLET (4 MG DOSE) ON THURSDAY AND SATURDAY THEN 1 TABLET ON ALL OTHER DAYS 90 tablet 1       Allergies   Allergen Reactions     Carvedilol Shortness Of Breath     Lisinopril Cough     Metoprolol      Shortness of breath          Lab Results    Chemistry/lipid CBC Cardiac Enzymes/BNP/TSH/INR   Recent Labs   Lab Test 12/23/22  1017   CHOL 122   HDL 42   LDL 68   TRIG 61     Recent Labs   Lab Test 12/23/22  1017 12/22/21  0956 12/18/20  0903   LDL 68 77 76     Recent Labs   Lab Test 12/23/22  1017      POTASSIUM 4.6   CHLORIDE 103   CO2 21*   GLC 98   BUN 27.5*   CR 1.40*   GFRESTIMATED 50*   OBIE 9.4     Recent Labs   Lab Test 12/23/22  1017  10/12/22  1142 09/19/22  1130   CR 1.40* 1.35* 1.36*     No results for input(s): A1C in the last 74806 hours.       Recent Labs   Lab Test 10/12/22  1142   WBC 6.8   HGB 13.2*   HCT 39.7*   MCV 96        Recent Labs   Lab Test 10/12/22  1142 09/19/22  1130 02/10/19  0754   HGB 13.2* 13.3 13.7*    Recent Labs   Lab Test 02/10/19  2348 02/10/19  1808 02/10/19  0754   TROPONINI 0.03 0.03 0.03     Recent Labs   Lab Test 02/10/19  0810   *     Recent Labs   Lab Test 12/18/20  0903   TSH 1.24     Recent Labs   Lab Test 12/06/22  0845 10/12/22  1142 10/10/22  0840   INR 2.2* 2.12* 2.5*        Janis Townsend MD

## 2023-01-31 NOTE — NURSING NOTE
Msg rec'd 1-31-23 @ 1429:  Janis Townsend MD Miron, Wendy, ERROL; Jayla Collins, RN  We have him evaluated in A. fib clinic?  He has been followed by EP/AF clinic in the past

## 2023-01-31 NOTE — TELEPHONE ENCOUNTER
Encounter Type: Routine remote CRT-P transmission.  Device: Medtronic Percepta.  Pacing % /Programmed: AP 23%, biVP 92% at DDDR 70/140.  Lead(s): Stable.  Battery longevity: 8yrs, 2mo.  Presenting: Atrial fibrillation with biVP 95 bpm. Frequent PVCs.  Atrial arrhythmia: since 10/19/22; AF appears persistent since mid November 2022, v-rates >/=120bpm ~5%, AF burden 77%.   Anticoagulant: warfarin.   Ventricular arrhythmia: since 10/19/22; One ventricular high rate episode, appears to be NSVT 8bts 150-165 bpm.  Device/Lead alerts: Percepta device on advisory.   Comments: Normal pacemaker function. Routed to device RN for review.  Plan: Next remote check scheduled for 5/8/23. DAVON Field, Device Specialist  ADD: Transmission reviewed and agree with above. Reviewed results with patient and patient denies symptoms. Confirmed that he is taking warfarin and switching to Xarelto. Routed to Dr. Townsend. Daniela Granado, RN

## 2023-02-02 NOTE — TELEPHONE ENCOUNTER
Left detailed msg for patient infoming him of Dr. Townsend's response / recommendations which would also be posted to [facility] - requested call back with questions.      Order placed per protocol - update sent to Anticoag RN pool.  mg

## 2023-02-02 NOTE — TELEPHONE ENCOUNTER
Msg rec'd 2-2-23 @ 1055:  Janis Townsend MD Gorshe, Maureen, RN  He is making a switch from coumadin to xarelto.   Need inr < 2 before starting.  Can have him hold coumadin this weekend and get INR Monday

## 2023-02-06 NOTE — PROGRESS NOTES
ANTICOAGULATION  MANAGEMENT    Ady Farley is being discharged from the [facility] ([facility]).    Reason for discharge: warfarin replaced by alternate therapy, Xarelto per Dr. Townsend.     Confirmed pt held warfarin over the weekend. He will start Xarelto tonight with dinner.     Anticoagulation episode resolved, ACC referral closed and Standing order discontinued   Warfarin removed from med list.     If patient needs warfarin management in the future, please send a new referral    Cindy Hardin RN

## 2023-02-16 NOTE — LETTER
2/16/2023    Veronica Schumacher MD  [address] [place] [address]    RE: Ady Farley       Dear Colleague,     I had the pleasure of seeing Ady Farley in the [facility].  Pt seen in tandem with Solange David CNP.  Agree with assessment and plan      Thank you for allowing me to participate in the care of your patient.      Sincerely,     STACY Mott CNP     [facility]  cc:   Celeste Mirza MD  [address]  [place] [address]

## 2023-02-16 NOTE — LETTER
2023    Veronica Schumacher MD  [address] [place] [address]    RE: Ady Farley       Dear Colleague,     I had the pleasure of seeing Ady Farley in the [facility].    [facility]   [address], [place] [address]   [email or web address]   OFFICE: [phone]      Consultation Note: STACY HUDDLESTON CNP    Primary Care: Veronica Schumacher    REASON FOR VISIT: Device check and follow-up pacemaker generator replacement    Assessment/Recommendations   Assessment:  Ady Farley is a 82 year old male who was seen today for follow-up regardin. Persistent atrial fibrillation and atypical flutter status post PVI in 2007 and . On device check today, presenting rhythm atrial fibrillation and biventricular paced at 100 bpm, ventricular paced 91.3% since last device check.  No episodes of high ventricular rate since last device check.  He has no significant symptoms of atrial fibrillation including palpitations shortness of breath or fatigue.  Goal of continuing rate control strategy for subsequent increase in ventricular pacing.  Noted side effect of beta-blockers (shortness of breath). Plan to initiate diltiazem 120 mg daily today for improved heart rate control. [id number] score of 5 for age, congestive heart failure, hypertension, and vascular disease. Anticoagulated on Xarelto.   2. Biventricular cardiac pacemaker inserted  status post AV node ablation.  Generator replaced 10/20/2022 with Dr. Mirza. Uneventful recovery and stable device function.  3. Heart failure with preserved ejection fraction. Compensated. Lost 20 pounds since recent addition of Aldactone.  4. Coronary artery disease status post CABG. No anginal complaints  5. Essential hypertension: Modestly elevated today.  In light of starting diltiazem, discontinue amlodipine and increase losartan from 50 mg to 100 mg every day.  6. SUBHASH.  Noted on  problem list.  Patient reports he does not currently utilize CPAP as he was unable to complete his sleep study. We discussed the correlation between atrial fibrillation and sleep apnea.  7. Hyperlipidemia    Plan:  -Start diltiazem 120 mg daily today for improved heart rate control and goal of increasing ventricular pacing  -Increase losartan to 100 mg every day  -Stop amlodipine  -Continue all other medications    Follow up: In 6 to 8 weeks with myself to reassess heart rates with remote device check prior to appointment     History of Present Illness/Subjective    Mr. Ady Farley is a 82 year old male, with past cardiac history significant for atrial fibrillation and atypical flutter status post PVI in 2007 2011 and 2012, status post AV node ablation with CRT-P device placement 2012, heart failure with preserved ejection fraction, coronary artery disease-status post CABG 2007, pulmonary hypertension, status post mitral valve repair for mitral regurgitation 2007 with residual mild to moderate regurgitation, hyperlipidemia, and essential hypertension, seen today for device check and follow-up. He additionally has a past medical history significant for SUBHASH and hypothyroidism.    He underwent CRT-pacemaker generator replacement in October 2022 with Dr. Mirza. His last remote device check 1/30/2023 noted BiV pacing 92% with presenting rhythm atrial fibrillation with biVP 95 bpm; AF burden 77% appearing persistent since approximately November 2022. 5% ventricular rates greater than or equal to 120 bpm. 1 ventricular high rate episode appearing NSVT x8 beats at 150 to 165 bpm. Upon device check in clinic today presenting rhythm AF/BiVP @ 100 bpm; since last device check 80.2% atrial fibrillation burden with 32.8% of paced terminated episodes. Ventricular paced 91.3% since last device check.    Since his generator replacement he reports feeling well. He does report his endocrinologist has been adjusting his  Synthroid over the last several months.  He did switch anticoagulation from Coumadin to Xarelto which she is taking with meals. Since addition of spironolactone to his Lasix he has lost nearly 20 pounds per his home scale. Regarding his SUBHASH he does report he had a sleep study several years ago but he was unable to fall asleep so he does not wear his CPAP regularly.  Spouse reports no episodes of apnea overnight.    He denies chest discomfort, palpitations, abdominal fullness/bloating or peripheral edema, shortness of breath, paroxysmal nocturnal dyspnea, orthopnea, lightheadedness, dizziness, pre-syncope, or syncope.     Data Review     ECHOCARDIOGRAM:   Performed 3/30/2022, with the following results:   1.Left ventricular function is decreased. The ejection fraction is 45-50%  (mildly reduced).  2.Moderately decreased right ventricular systolic function  3.Moderate biatrial chamber enlargement.  4.An annuloplasty ring is noted in the mitral position. There is mild mitral  stenosis. Mean gradient is 7 mmHg at rate of 70 bpm. There is mild to moderate  (1-2+) mitral regurgitation.  5.There is moderate (2+) tricuspid regurgitation.  6.Right ventricular systolic pressure is elevated, consistent with moderate to  severe pulmonary hypertension.  7.IVC diameter >2.1 cm collapsing <50% with sniff suggests a high RA pressure  estimated at 15 mmHg or greater.  Compared to the prior study dated 5/5/2021, the LVEF is slightly lower,  mitral and tricuspid and insufficiency are greater, pulmonic pressures are  higher.    DEVICE:   CRT-P Medtronic Percepta routine CRT-P clinic interrogation 2/16/2023  Pacing %/Programmed: AP- 20%, BiVP- 92.3%, DDDR 70/140  Lead(s): Stable  Battery longevity: Estimating 8.1 years remaining.  Presenting rhythm: AF/BiVP @ 100 bpm  Underlying rhythm: AF, w/V-rates @  bpm  Atrial arrhythmias: Since 10/19/2022- 65 mode switches logged; EGMs show AT/AF, latest AF EGM currently in progress since  Jan. 30th, 2022. AF burden- 80.2%. V-rates >= 120 bpm ~ 2-3%. AF- 80.2%. Device counters log successful treatment of AF via  times. However, cardiac compass show presistant on going AF since mid November, 2022.   Anticoagulant: Xarelto   Ventricular arrhythmias: Since 10/19/22- 1 VHR logged; EGM shows 9 beats of NSVT @ 167 bpm. EF- 40-45% (Echo; 3/30/22). Patient cannot correlates symptoms.   Comments: Normal device function. Changes made: LV output changed from 2.5 V to 2.25 V. Alert for V rates during AF at or above 120 bpm for 6 hours in duration was turned ON.     CRT-P Medtronic Percepta routine remote interrogation 1/30/2023  Pacing % /Programmed: AP 23%, biVP 92% at DDDR 70/140.  Lead(s): Stable.  Battery longevity: 8yrs, 2mo.  Presenting: Atrial fibrillation with biVP 95 bpm. Frequent PVCs.  Atrial arrhythmia: since 10/19/22; AF appears persistent since mid November 2022, v-rates >/=120bpm ~5%, AF burden 77%.   Anticoagulant: warfarin.  Ventricular arrhythmia: since 10/19/22; One ventricular high rate episode, appears to be NSVT 8bts 150-165 bpm.  Device/Lead alerts: Percepta device on advisory.   Comments: Normal pacemaker function.    60 minutes spent on the date of the encounter doing chart review, history and exam, documentation and further activities as noted above.     I have reviewed and updated the patient's past medical history, social history, family history, and medication list.                Physical Examination Review of Systems   Vitals: There were no vitals taken for this visit.  BMI= There is no height or weight on file to calculate BMI.  Wt Readings from Last 3 Encounters:   01/20/23 95.7 kg (211 lb)   07/13/22 94.4 kg (208 lb 3.2 oz)   06/17/22 95.7 kg (211 lb)     BP (!) 144/62 (BP Location: Left arm, Patient Position: Sitting, Cuff Size: Adult Regular)   Pulse 68   Resp 14   Wt 95.3 kg (210 lb)   BMI 29.71 kg/m       General   Appearance:   Alert and oriented, in no acute  distress.    HEENT:  Normocephalic and atraumatic. Wearing a mask. Conjunctiva and sclera are clear. Moist oral mucosa.    Neck: No JVP, carotid bruit or obvious thyromegaly.   Lungs:   Respirations unlabored. Clear bilaterally with no rales, rhonchi, or wheezes.     Cardiovascular:   Rhythm is regular/ventricular paced. S1 and S2 are normal. No significant murmur is present. Lower extremities demonstrate minimal edema bilaterally. Posterior tibial pulses are intact bilaterally.   Extremities: No cyanosis or clubbing   Skin: Skin is warm, dry, and otherwise intact.   Neurologic: Gait is normal. Mood and affect appropriate.    A 12 point comprehensive review of systems was negative except as noted.      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     Coronary artery disease      Disease of thyroid gland      Disorder of phrenic nerve      Hyperlipidemia      Hypertension      Malfunction of biventricular cardiac pacemaker battery 12/1/2015    ICEdottronic device recall     Non-rheumatic mitral regurgitation     MVP s/p Mitral valve repair May 15, 2007 MAZE  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  Echo 2011 LVEF 45% and mild MR Echo 2012 LVEF 40% mild MS, Mod MR  NELY Jan 2013 mild MS and mild/mod MR Echo Jan 2015 Mild MS/ mild MR NELY 2016 small/mod cuff remains (recommend OAC) Replacement Utility updated for latest IMO load      PVC (premature ventricular contraction)      PVD (peripheral vascular disease) (H)      Status post ablation of atrial fibrillation 11/18/2015    MAZE May 2007 PVI May 31, 2011 (RF-PVI + TIAN line) PVI Oct 30, 2012 (Cryo-PVI + redo TIAN line + roof line + RA-scar flutter line)    Past Surgical History:   Procedure Laterality Date     CARDIOVERSION  07/24/2018     EP  PACEMAKER GENERATOR REPLACEMENT- BI-VENTRICULAR N/A 10/12/2022    Procedure: Pacemaker Generator Replacement Biventricular;  Surgeon: Celeste Mirza MD;  Location: [facility] CV     INSERT / REPLACE / REMOVE PACEMAKER       MITRAL VALVE REPAIR       AR ABLATE HEART DYSRHYTHM FOCUS      Description: Catheter Ablation Atrial Flutter;  Recorded: 10/30/2012;  Comments: Typical RA CTI flutter Sept 2007; ; Multiple atypical flutter Oct 2012 (Roof line + reinforce TIAN line + RA scar line)     AR ABLATE HEART DYSRHYTHM FOCUS      Description: Catheter Ablation Atrial Fibrillation;  Recorded: 10/24/2013;  Comments: May 2011 (PVI > 90% recurrence post MAZE + TIAN line); ; Re do PVI Oct 2012 (Cryo PVI + roof line + TIAN touch up + RA scar flutter); ; No recurrence by device check at 12 months     AR ABLATE HEART DYSRHYTHM FOCUS      Description: Catheter Ablation Atrial Fibrillation;  Recorded: 10/30/2014;  Comments: May 2011 (PVI > 90% recurrence post MAZE + TIAN line); ; Re do PVI Oct 2012 (Cryo PVI + roof line + TIAN touch up + RA scar flutter); ; No recurrence by device check at 12 months     AR ABLATE HEART DYSRHYTHM FOCUS      Description: Catheter Ablation Atrial Flutter;  Recorded: 10/30/2014;  Comments: Typical RA CTI flutter Sept 2007; ; Multiple atypical flutter Oct 2012 (Roof line + reinforce TIAN line + RA scar line)     AR ABLATE/ RECONSTUCT ATRIA, LIMITED      Description: Maze Procedure;  Proc Date: 05/01/2007;  Comments: May 2007 at time of valve repair     AR ICAR CATHETER ABLATION ATRIOVENTR NODE FUNCTION      Description: Catheter Ablation Atrioventricular Node;  Recorded: 08/13/2012;  Comments: Mar 2008 performed due to inability to biventricular with conduction abnormality     AR ICAR CATHETER ABLATION ATRIOVENTR NODE FUNCTION      Description: Catheter Ablation Atrioventricular Node;  Recorded: 10/30/2014;  Comments: Mar 2008 performed due to inability to biventricular with conduction  abnormality     ZZC CABG, VEIN, SINGLE      Description: CABG (CABG);  Recorded: 01/18/2012;  Comments: ONE VESSEL RCA    Family History   Problem Relation Age of Onset     No Known Problems Mother      No Known Problems Father      No Known Problems Sister      No Known Problems Brother      No Known Problems Daughter      No Known Problems Son      Acute Myocardial Infarction No family hx of      Alcoholism No family hx of      Alzheimer Disease No family hx of      Amputation of Leg Below Knee No family hx of      Aortic Valve Replacement No family hx of      Arrhythmogenic Right Ventricular Cardiomyopathy No family hx of      Atrial fibrillation No family hx of      Atrial Flutter No family hx of      Breast Cancer No family hx of      CABG No family hx of      Cancer No family hx of      Cardiomyopathy No family hx of      Carotid Endarterectomy No family hx of      Cerebral aneurysm No family hx of      Chronic Kidney Disease No family hx of      Chronic Obstructive Pulmonary Disease No family hx of      Colon Cancer No family hx of      Congenital heart disease No family hx of      Coronary Artery Disease No family hx of      Coronary Stenting No family hx of      Dementia No family hx of      Diabetes Type 1 No family hx of      Diabetes Type 2  No family hx of      Dialysis No family hx of      Dyslipidemia No family hx of      Heart Failure No family hx of      Hypertension No family hx of      Hypertrophic cardiomyopathy No family hx of      ICD - Implantable Cardioverter Defibrillator No family hx of      Long QT syndrome No family hx of      Mitral Regurgitation No family hx of      Mitral Valve Replacement No family hx of      Morbid Obesity No family hx of      Obstructive Sleep Apnea No family hx of      Ovarian Cancer No family hx of      Pacemaker No family hx of      Pancreatic Cancer No family hx of      Peripheral Vascular Disease No family hx of      Pulmonary Embolism No family hx of       Pulmonary Hypertension No family hx of      Rheumatic Heart Disease No family hx of      Cerebrovascular Disease No family hx of      Sudden Death No family hx of      Transient ischemic attack No family hx of      Valvular heart disease No family hx of     Social History     Socioeconomic History     Marital status:      Spouse name: Not on file     Number of children: Not on file     Years of education: Not on file     Highest education level: Not on file   Occupational History     Not on file   Tobacco Use     Smoking status: Former     Packs/day: 1.00     Types: Cigarettes     Quit date: 1990     Years since quittin.1     Smokeless tobacco: Never   Substance and Sexual Activity     Alcohol use: Yes     Alcohol/week: 2.0 standard drinks     Drug use: No     Sexual activity: Not Currently   Other Topics Concern     Not on file   Social History Narrative     Not on file     Social Determinants of Health     Financial Resource Strain: Not on file   Food Insecurity: Not on file   Transportation Needs: Not on file   Physical Activity: Not on file   Stress: Not on file   Social Connections: Not on file   Intimate Partner Violence: Not on file   Housing Stability: Not on file          Medications  Allergies   Scheduled Meds:  Current Outpatient Medications   Medication Sig Dispense Refill     amLODIPine (NORVASC) 5 MG tablet TAKE 1 TABLET DAILY 90 tablet 3     aspirin 81 MG EC tablet [ASPIRIN 81 MG EC TABLET] Take 81 mg by mouth daily.       atorvastatin (LIPITOR) 20 MG tablet TAKE 1 TABLET AT BEDTIME 90 tablet 3     fish oil-omega-3 fatty acids 1000 MG capsule        furosemide (LASIX) 20 MG tablet Take 1 tablet (20 mg) by mouth daily 120 tablet 3     levothyroxine (SYNTHROID, LEVOTHROID) 175 MCG tablet [LEVOTHYROXINE (SYNTHROID, LEVOTHROID) 175 MCG TABLET] Take 175 mcg by mouth daily before breakfast.       levothyroxine (SYNTHROID/LEVOTHROID) 150 MCG tablet Take one tab daily in am on empty stomach on  Saturday and Sunday       losartan (COZAAR) 50 MG tablet TAKE 1 TABLET DAILY 90 tablet 3     MULTIVITAMIN ORAL [MULTIVITAMIN ORAL] Take 1 tablet by mouth daily.       rivaroxaban ANTICOAGULANT (XARELTO) 20 MG TABS tablet Take 1 tablet (20 mg) by mouth daily (with dinner) 90 tablet 3     spironolactone (ALDACTONE) 25 MG tablet TAKE 1 TABLET DAILY 90 tablet 3    Allergies   Allergen Reactions     Carvedilol Shortness Of Breath     Lisinopril Cough     Metoprolol      Shortness of breath         Lab Results    Chemistry/lipid CBC Cardiac Enzymes/BNP/TSH/INR   Lab Results   Component Value Date    CHOL 122 12/23/2022    HDL 42 12/23/2022    TRIG 61 12/23/2022    BUN 27.5 (H) 12/23/2022     12/23/2022    CO2 21 (L) 12/23/2022    Lab Results   Component Value Date    WBC 6.8 10/12/2022    HGB 13.2 (L) 10/12/2022    HCT 39.7 (L) 10/12/2022    MCV 96 10/12/2022     10/12/2022    @RESUFAST(BMP,CBC,BNP,TSH,  INR)@      Solange David CNP  Cardiac Electrophysiology  [facility]      Thank you for allowing me to participate in the care of your patient.      Sincerely,     STACY HUDDLESTON CNP     [facility]  cc:   Celeste Mirza MD  [address]  [place] [address]

## 2023-02-16 NOTE — PATIENT INSTRUCTIONS
Ady Farley,    It was a pleasure to see you today at the [facility].     My recommendations after this visit include:  -Your heart rates are slightly too high today. They are affecting the pacing function of the lower chamber (the left ventricle).   -Start diltiazem as below to decrease your heart rate We will stop amlodipine today. We will also increase your losartan for your blood pressure.     Medication changes made today:   -Start diltiazem 120mg every day for heart rate.  Prescription sent to mail order.  -Stop amlodipine.   -Increase losartan to 100mg every day. Prescription sent to mail order.    Follow up with me in 6-8 weeks.     Please do not hesitate to call with additional questions or concerns.     Solange David, CNP  Clinical Cardiac Electrophysiology  [facility]  Clinic and Scheduling [phone]  Electrophysiology Nurses [phone]

## 2023-02-16 NOTE — PROGRESS NOTES
[facility] [address], [place] [address]   [email or web address]   OFFICE: [phone]      Consultation Note: STACY HUDDLESTON CNP    Primary Care: Veronica Schumacher    REASON FOR VISIT: Device check and follow-up pacemaker generator replacement    Assessment/Recommendations   Assessment:  Ady Farley is a 82 year old male who was seen today for follow-up regardin. Persistent atrial fibrillation and atypical flutter status post PVI in 2007 and . On device check today, presenting rhythm atrial fibrillation and biventricular paced at 100 bpm, ventricular paced 91.3% since last device check.  No episodes of high ventricular rate since last device check.  He has no significant symptoms of atrial fibrillation including palpitations shortness of breath or fatigue.  Goal of continuing rate control strategy for subsequent increase in ventricular pacing.  Noted side effect of beta-blockers (shortness of breath). Plan to initiate diltiazem 120 mg daily today for improved heart rate control. [id number] score of 5 for age, congestive heart failure, hypertension, and vascular disease. Anticoagulated on Xarelto.   2. Biventricular cardiac pacemaker inserted  status post AV node ablation.  Generator replaced 10/20/2022 with Dr. Mirza. Uneventful recovery and stable device function.  3. Heart failure with preserved ejection fraction. Compensated. Lost 20 pounds since recent addition of Aldactone.  4. Coronary artery disease status post CABG. No anginal complaints  5. Essential hypertension: Modestly elevated today.  In light of starting diltiazem, discontinue amlodipine and increase losartan from 50 mg to 100 mg every day.  6. SUBHASH.  Noted on problem list.  Patient reports he does not currently utilize CPAP as he was unable to complete his sleep study. We discussed the correlation between atrial fibrillation and sleep  apnea.  7. Hyperlipidemia    Plan:  -Start diltiazem 120 mg daily today for improved heart rate control and goal of increasing ventricular pacing  -Increase losartan to 100 mg every day  -Stop amlodipine  -Continue all other medications    Follow up: In 6 to 8 weeks with myself to reassess heart rates with remote device check prior to appointment     History of Present Illness/Subjective    Mr. Ady Farley is a 82 year old male, with past cardiac history significant for atrial fibrillation and atypical flutter status post PVI in 2007 2011 and 2012, status post AV node ablation with CRT-P device placement 2012, heart failure with preserved ejection fraction, coronary artery disease-status post CABG 2007, pulmonary hypertension, status post mitral valve repair for mitral regurgitation 2007 with residual mild to moderate regurgitation, hyperlipidemia, and essential hypertension, seen today for device check and follow-up. He additionally has a past medical history significant for SUBHASH and hypothyroidism.    He underwent CRT-pacemaker generator replacement in October 2022 with Dr. Mirza. His last remote device check 1/30/2023 noted BiV pacing 92% with presenting rhythm atrial fibrillation with biVP 95 bpm; AF burden 77% appearing persistent since approximately November 2022. 5% ventricular rates greater than or equal to 120 bpm. 1 ventricular high rate episode appearing NSVT x8 beats at 150 to 165 bpm. Upon device check in clinic today presenting rhythm AF/BiVP @ 100 bpm; since last device check 80.2% atrial fibrillation burden with 32.8% of paced terminated episodes. Ventricular paced 91.3% since last device check.    Since his generator replacement he reports feeling well. He does report his endocrinologist has been adjusting his Synthroid over the last several months.  He did switch anticoagulation from Coumadin to Xarelto which she is taking with meals. Since addition of spironolactone to his Lasix he has lost  nearly 20 pounds per his home scale. Regarding his SUBHASH he does report he had a sleep study several years ago but he was unable to fall asleep so he does not wear his CPAP regularly.  Spouse reports no episodes of apnea overnight.    He denies chest discomfort, palpitations, abdominal fullness/bloating or peripheral edema, shortness of breath, paroxysmal nocturnal dyspnea, orthopnea, lightheadedness, dizziness, pre-syncope, or syncope.     Data Review     ECHOCARDIOGRAM:   Performed 3/30/2022, with the following results:   1.Left ventricular function is decreased. The ejection fraction is 45-50%  (mildly reduced).  2.Moderately decreased right ventricular systolic function  3.Moderate biatrial chamber enlargement.  4.An annuloplasty ring is noted in the mitral position. There is mild mitral  stenosis. Mean gradient is 7 mmHg at rate of 70 bpm. There is mild to moderate  (1-2+) mitral regurgitation.  5.There is moderate (2+) tricuspid regurgitation.  6.Right ventricular systolic pressure is elevated, consistent with moderate to  severe pulmonary hypertension.  7.IVC diameter >2.1 cm collapsing <50% with sniff suggests a high RA pressure  estimated at 15 mmHg or greater.  Compared to the prior study dated 5/5/2021, the LVEF is slightly lower,  mitral and tricuspid and insufficiency are greater, pulmonic pressures are  higher.    DEVICE:   CRT-P Medtronic Percepta routine CRT-P clinic interrogation 2/16/2023  Pacing %/Programmed: AP- 20%, BiVP- 92.3%, DDDR 70/140  Lead(s): Stable  Battery longevity: Estimating 8.1 years remaining.  Presenting rhythm: AF/BiVP @ 100 bpm  Underlying rhythm: AF, w/V-rates @  bpm  Atrial arrhythmias: Since 10/19/2022- 65 mode switches logged; EGMs show AT/AF, latest AF EGM currently in progress since Jan. 30th, 2022. AF burden- 80.2%. V-rates >= 120 bpm ~ 2-3%. AF- 80.2%. Device counters log successful treatment of AF via  times. However, cardiac compass show presistant on going  AF since mid November, 2022.   Anticoagulant: Xarelto   Ventricular arrhythmias: Since 10/19/22- 1 VHR logged; EGM shows 9 beats of NSVT @ 167 bpm. EF- 40-45% (Echo; 3/30/22). Patient cannot correlates symptoms.   Comments: Normal device function. Changes made: LV output changed from 2.5 V to 2.25 V. Alert for V rates during AF at or above 120 bpm for 6 hours in duration was turned ON.     CRT-P Medtronic Percepta routine remote interrogation 1/30/2023  Pacing % /Programmed: AP 23%, biVP 92% at DDDR 70/140.  Lead(s): Stable.  Battery longevity: 8yrs, 2mo.  Presenting: Atrial fibrillation with biVP 95 bpm. Frequent PVCs.  Atrial arrhythmia: since 10/19/22; AF appears persistent since mid November 2022, v-rates >/=120bpm ~5%, AF burden 77%.   Anticoagulant: warfarin.  Ventricular arrhythmia: since 10/19/22; One ventricular high rate episode, appears to be NSVT 8bts 150-165 bpm.  Device/Lead alerts: Percepta device on advisory.   Comments: Normal pacemaker function.    60 minutes spent on the date of the encounter doing chart review, history and exam, documentation and further activities as noted above.     I have reviewed and updated the patient's past medical history, social history, family history, and medication list.                Physical Examination Review of Systems   Vitals: There were no vitals taken for this visit.  BMI= There is no height or weight on file to calculate BMI.  Wt Readings from Last 3 Encounters:   01/20/23 95.7 kg (211 lb)   07/13/22 94.4 kg (208 lb 3.2 oz)   06/17/22 95.7 kg (211 lb)     BP (!) 144/62 (BP Location: Left arm, Patient Position: Sitting, Cuff Size: Adult Regular)   Pulse 68   Resp 14   Wt 95.3 kg (210 lb)   BMI 29.71 kg/m       General   Appearance:   Alert and oriented, in no acute distress.    HEENT:  Normocephalic and atraumatic. Wearing a mask. Conjunctiva and sclera are clear. Moist oral mucosa.    Neck: No JVP, carotid bruit or obvious thyromegaly.   Lungs:    Respirations unlabored. Clear bilaterally with no rales, rhonchi, or wheezes.     Cardiovascular:   Rhythm is regular/ventricular paced. S1 and S2 are normal. No significant murmur is present. Lower extremities demonstrate minimal edema bilaterally. Posterior tibial pulses are intact bilaterally.   Extremities: No cyanosis or clubbing   Skin: Skin is warm, dry, and otherwise intact.   Neurologic: Gait is normal. Mood and affect appropriate.    A 12 point comprehensive review of systems was negative except as noted.      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     Coronary artery disease      Disease of thyroid gland      Disorder of phrenic nerve      Hyperlipidemia      Hypertension      Malfunction of biventricular cardiac pacemaker battery 12/1/2015    Medtronic device recall     Non-rheumatic mitral regurgitation     MVP s/p Mitral valve repair May 15, 2007 MAZE  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  Echo 2011 LVEF 45% and mild MR Echo 2012 LVEF 40% mild MS, Mod MR  NELY Jan 2013 mild MS and mild/mod MR Echo Jan 2015 Mild MS/ mild MR NELY 2016 small/mod cuff remains (recommend OAC) Replacement Utility updated for latest IMO load      PVC (premature ventricular contraction)      PVD (peripheral vascular disease) (H)      Status post ablation of atrial fibrillation 11/18/2015    MAZE May 2007 PVI May 31, 2011 (RF-PVI + TIAN line) PVI Oct 30, 2012 (Cryo-PVI + redo TIAN line + roof line + RA-scar flutter line)    Past Surgical History:   Procedure Laterality Date     CARDIOVERSION  07/24/2018     EP PACEMAKER GENERATOR REPLACEMENT- BI-VENTRICULAR N/A 10/12/2022    Procedure: Pacemaker Generator Replacement Biventricular;  Surgeon: Celeste Mirza MD;  Location: [facility]  CATH LAB CV     INSERT / REPLACE / REMOVE PACEMAKER       MITRAL VALVE REPAIR       MI ABLATE HEART DYSRHYTHM FOCUS      Description: Catheter Ablation Atrial Flutter;  Recorded: 10/30/2012;  Comments: Typical RA CTI flutter Sept 2007; ; Multiple atypical flutter Oct 2012 (Roof line + reinforce TIAN line + RA scar line)     MI ABLATE HEART DYSRHYTHM FOCUS      Description: Catheter Ablation Atrial Fibrillation;  Recorded: 10/24/2013;  Comments: May 2011 (PVI > 90% recurrence post MAZE + TIAN line); ; Re do PVI Oct 2012 (Cryo PVI + roof line + TIAN touch up + RA scar flutter); ; No recurrence by device check at 12 months     MI ABLATE HEART DYSRHYTHM FOCUS      Description: Catheter Ablation Atrial Fibrillation;  Recorded: 10/30/2014;  Comments: May 2011 (PVI > 90% recurrence post MAZE + TIAN line); ; Re do PVI Oct 2012 (Cryo PVI + roof line + TIAN touch up + RA scar flutter); ; No recurrence by device check at 12 months     MI ABLATE HEART DYSRHYTHM FOCUS      Description: Catheter Ablation Atrial Flutter;  Recorded: 10/30/2014;  Comments: Typical RA CTI flutter Sept 2007; ; Multiple atypical flutter Oct 2012 (Roof line + reinforce TIAN line + RA scar line)     MI ABLATE/ RECONSTUCT ATRIA, LIMITED      Description: Maze Procedure;  Proc Date: 05/01/2007;  Comments: May 2007 at time of valve repair     MI ICAR CATHETER ABLATION ATRIOVENTR NODE FUNCTION      Description: Catheter Ablation Atrioventricular Node;  Recorded: 08/13/2012;  Comments: Mar 2008 performed due to inability to biventricular with conduction abnormality     MI ICAR CATHETER ABLATION ATRIOVENTR NODE FUNCTION      Description: Catheter Ablation Atrioventricular Node;  Recorded: 10/30/2014;  Comments: Mar 2008 performed due to inability to biventricular with conduction abnormality     ZZC CABG, VEIN, SINGLE      Description: CABG (CABG);  Recorded: 01/18/2012;  Comments: ONE VESSEL RCA    Family History   Problem Relation Age of Onset     No Known Problems  Mother      No Known Problems Father      No Known Problems Sister      No Known Problems Brother      No Known Problems Daughter      No Known Problems Son      Acute Myocardial Infarction No family hx of      Alcoholism No family hx of      Alzheimer Disease No family hx of      Amputation of Leg Below Knee No family hx of      Aortic Valve Replacement No family hx of      Arrhythmogenic Right Ventricular Cardiomyopathy No family hx of      Atrial fibrillation No family hx of      Atrial Flutter No family hx of      Breast Cancer No family hx of      CABG No family hx of      Cancer No family hx of      Cardiomyopathy No family hx of      Carotid Endarterectomy No family hx of      Cerebral aneurysm No family hx of      Chronic Kidney Disease No family hx of      Chronic Obstructive Pulmonary Disease No family hx of      Colon Cancer No family hx of      Congenital heart disease No family hx of      Coronary Artery Disease No family hx of      Coronary Stenting No family hx of      Dementia No family hx of      Diabetes Type 1 No family hx of      Diabetes Type 2  No family hx of      Dialysis No family hx of      Dyslipidemia No family hx of      Heart Failure No family hx of      Hypertension No family hx of      Hypertrophic cardiomyopathy No family hx of      ICD - Implantable Cardioverter Defibrillator No family hx of      Long QT syndrome No family hx of      Mitral Regurgitation No family hx of      Mitral Valve Replacement No family hx of      Morbid Obesity No family hx of      Obstructive Sleep Apnea No family hx of      Ovarian Cancer No family hx of      Pacemaker No family hx of      Pancreatic Cancer No family hx of      Peripheral Vascular Disease No family hx of      Pulmonary Embolism No family hx of      Pulmonary Hypertension No family hx of      Rheumatic Heart Disease No family hx of      Cerebrovascular Disease No family hx of      Sudden Death No family hx of      Transient ischemic attack  No family hx of      Valvular heart disease No family hx of     Social History     Socioeconomic History     Marital status:      Spouse name: Not on file     Number of children: Not on file     Years of education: Not on file     Highest education level: Not on file   Occupational History     Not on file   Tobacco Use     Smoking status: Former     Packs/day: 1.00     Types: Cigarettes     Quit date: 1990     Years since quittin.1     Smokeless tobacco: Never   Substance and Sexual Activity     Alcohol use: Yes     Alcohol/week: 2.0 standard drinks     Drug use: No     Sexual activity: Not Currently   Other Topics Concern     Not on file   Social History Narrative     Not on file     Social Determinants of Health     Financial Resource Strain: Not on file   Food Insecurity: Not on file   Transportation Needs: Not on file   Physical Activity: Not on file   Stress: Not on file   Social Connections: Not on file   Intimate Partner Violence: Not on file   Housing Stability: Not on file          Medications  Allergies   Scheduled Meds:  Current Outpatient Medications   Medication Sig Dispense Refill     amLODIPine (NORVASC) 5 MG tablet TAKE 1 TABLET DAILY 90 tablet 3     aspirin 81 MG EC tablet [ASPIRIN 81 MG EC TABLET] Take 81 mg by mouth daily.       atorvastatin (LIPITOR) 20 MG tablet TAKE 1 TABLET AT BEDTIME 90 tablet 3     fish oil-omega-3 fatty acids 1000 MG capsule        furosemide (LASIX) 20 MG tablet Take 1 tablet (20 mg) by mouth daily 120 tablet 3     levothyroxine (SYNTHROID, LEVOTHROID) 175 MCG tablet [LEVOTHYROXINE (SYNTHROID, LEVOTHROID) 175 MCG TABLET] Take 175 mcg by mouth daily before breakfast.       levothyroxine (SYNTHROID/LEVOTHROID) 150 MCG tablet Take one tab daily in am on empty stomach on Saturday and        losartan (COZAAR) 50 MG tablet TAKE 1 TABLET DAILY 90 tablet 3     MULTIVITAMIN ORAL [MULTIVITAMIN ORAL] Take 1 tablet by mouth daily.       rivaroxaban ANTICOAGULANT  (XARELTO) 20 MG TABS tablet Take 1 tablet (20 mg) by mouth daily (with dinner) 90 tablet 3     spironolactone (ALDACTONE) 25 MG tablet TAKE 1 TABLET DAILY 90 tablet 3    Allergies   Allergen Reactions     Carvedilol Shortness Of Breath     Lisinopril Cough     Metoprolol      Shortness of breath         Lab Results    Chemistry/lipid CBC Cardiac Enzymes/BNP/TSH/INR   Lab Results   Component Value Date    CHOL 122 12/23/2022    HDL 42 12/23/2022    TRIG 61 12/23/2022    BUN 27.5 (H) 12/23/2022     12/23/2022    CO2 21 (L) 12/23/2022    Lab Results   Component Value Date    WBC 6.8 10/12/2022    HGB 13.2 (L) 10/12/2022    HCT 39.7 (L) 10/12/2022    MCV 96 10/12/2022     10/12/2022    @RESUFAST(BMP,CBC,BNP,TSH,  INR)@      Solange David, CNP  Cardiac Electrophysiology  [facility]

## 2023-04-11 NOTE — PROGRESS NOTES
Thank you, Dr. Schumacher, Veronica HUSSEIN, for asking the [facility] team to see Mr. Ady Farley to evaluate chronic atrial fibrillation.    Assessment/Recommendations     Assessment/Plan:    Diagnoses and all orders for this visit:  Chronic atrial fibrillation (H) since November 2022 and discussed he will likely remain in this rhythm.  He is asymptomatic but concerned about being in A-fib.  I think it is chronic at this point and recommended to continue with rate control.  To increase diltiazem from 1 20 to 1 80 orally every evening and call if ankle swelling.  Benign essential hypertension and systolic blood pressure elevated today.  Increased calcium channel blocker today and needs reassessment on follow-up.  Chronic heart failure with preserved ejection fraction (H) and on very low-dose furosemide and spironolactone.  He denies dyspnea or ankle swelling.  His weight is stable.  He complains of breast tenderness on spironolactone.  I offered him an alternative and he will consider and discuss with Dr. Townsend on follow-up.  Biventricular cardiac pacemaker in situ and CRT pacing still at 92% despite addition of diltiazem 120 mg p.o. daily.  Complaining of occasional lightheadedness when he first takes his meds in the morning.  I recommend that he switch diltiazem to the evening and to try to increase dose from 120 to 180 mg orally every evening.  To call if ankle swelling.  He is adamant that he had increased shortness of breath on carvedilol and metoprolol and does not want to consider retrialing beta-blocker.  This was before his heart failure and pulmonary hypertension was treated.  Discussed it could be different.  He wants to stick with diltiazem for now.      [id number] score of 4 and on Xarelto.  Follow up in clinic with Dr. Townsend in 2 to 3 months with complete echo 1 to 2 weeks prior to visit and remote device check also before visit.  To follow-up with in clinic device check and see me in  November as he will likely need mode switch on device at that time.     History of Present Illness/Subjective     Ady Farley is a very pleasant 82 year old male who comes in today for EP follow-up of permanent atrial fibrillation.  Ady Farley has a known history of coronary artery disease and mitral regurgitation status post SVG to his RCA and mitral valve repair in 2007, atrial fibrillation/atypical flutter status post PVI and flutter ablation with right CTI flutter ablation 2007, PVI 2011 and 2012 with recurrence status post AV node ablation with CRT-P placement in 2012 and chronic heart failure with reduced ejection fraction LVEF 45-50% who I am seeing today for a follow-up.  Recent echocardiogram in March indicated mildly reduced left ventricular systolic function with moderately reduced RV systolic function mitral valve repair with mild stenosis and mild to moderate regurgitation with elevated RVSP suggestive of moderate to severe pulmonary hypertension.  After his Lasix dose was doubled and he was started on spironolactone he did notice significant improvement in how he felt.  Weight declined by about 20 pounds.  It has remained stable around 206 to 208 pounds.    He has been in atrial fibrillation since November 2022 and on rate control as asymptomatic.  He follows with Dr. Townsend in our clinic.  He saw Solange David NP in clinic on 2/16/2023 and started on diltiazem 120 mg orally every day to try to increase V pacing.  Ady denies any signs or symptoms of heart failure or angina today on questioning.  Weight stable no edema.  No dyspnea.  He is walking a trail which she created in his basement every day and will switch to walking outside as the weather improves.  He generally gets 6-7000 steps per day.  He has been doing yard work.  He denies any cardiac symptoms.  Complaining of occasional breast tenderness and see above.    Cardiographics (reviewed):  ECHOCARDIOGRAM:   Performed 3/30/2022, with  the following results:   1.Left ventricular function is decreased. The ejection fraction is 45-50%  (mildly reduced).  2.Moderately decreased right ventricular systolic function  3.Moderate biatrial chamber enlargement.  4.An annuloplasty ring is noted in the mitral position. There is mild mitral  stenosis. Mean gradient is 7 mmHg at rate of 70 bpm. There is mild to moderate  (1-2+) mitral regurgitation.  5.There is moderate (2+) tricuspid regurgitation.  6.Right ventricular systolic pressure is elevated, consistent with moderate to  severe pulmonary hypertension.  7.IVC diameter >2.1 cm collapsing <50% with sniff suggests a high RA pressure  estimated at 15 mmHg or greater.  Compared to the prior study dated 5/5/2021, the LVEF is slightly lower,  mitral and tricuspid and insufficiency are greater, pulmonic pressures are  higher.  Cardiac testing personally reviewed:  In clinic device check shows leads stable and battery ok.  Device functioning appropriately.   Atrial pacing 17% due to under sensing and BiVentricular pacing 92%.  AT/AFib burden 100% since November 2022 with some under sensing too fast a and V rates.  There is a question of 1 possible nonsustained VT.          Problem List:  Patient Active Problem List   Diagnosis     Hypothyroidism     Non-rheumatic mitral regurgitation     Paroxysmal Atrial Fibrillation     Premature Ventricular Contractions     Dyslipidemia     Primary hypertension     Coronary Artery Disease     Atrial flutter, unspecified type (H)     Peripheral Vascular Disease     Status post ablation of atrial fibrillation     Biventricular cardiac pacemaker in situ     Mitral regurgitation and mitral stenosis     Postablative hypothyroidism     Status post aorto-coronary artery bypass graft     Heart failure with preserved ejection fraction (H)     SUBHASH (obstructive sleep apnea)     NSVT (nonsustained ventricular tachycardia) (H)     Benign essential hypertension     Chronic atrial  fibrillation (H)     Revi  e  Physical Examination Review of Systems   w [facility]  There were no vitals taken for this visit.  There is no height or weight on file to calculate BMI.  Wt Readings from Last 3 Encounters:   02/16/23 95.3 kg (210 lb)   01/20/23 95.7 kg (211 lb)   07/13/22 94.4 kg (208 lb 3.2 oz)     General Appearance:   Alert, well-appearing and in no acute distress.   HEENT: Atraumatic, normocephalic.  No scleral icterus, normal conjunctivae.     Chest: Chest symmetric, spine straight.   Lungs:   Respirations unlabored.   Cardiovascular:   Normal first and second heart sounds with no murmurs, rubs, or gallops.  Regular, regular.   Normal JVD, no edema.       Extremities: No cyanosis or clubbing   Musculoskeletal: Moves all extremities   Skin: Warm, dry, intact.    Neurologic: Mood and affect are appropriate, alert and oriented to person, place, time, and situation     ROS: 10 point ROS neg other than the symptoms noted above in the HPI.     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     Coronary artery disease      Disease of thyroid gland      Disorder of phrenic nerve      Hyperlipidemia      Hypertension      Malfunction of biventricular cardiac pacemaker battery 12/1/2015    [employer] device recall     Non-rheumatic mitral regurgitation     MVP s/p Mitral valve repair May 15, 2007 MAZE  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  Echo 2011 LVEF 45% and mild MR Echo 2012 LVEF 40% mild MS, Mod MR  NELY Jan 2013 mild MS and mild/mod MR Echo Jan 2015 Mild MS/ mild MR NELY 2016 small/mod cuff remains (recommend OAC) Replacement Utility updated for latest IMO load      PVC (premature ventricular contraction)      PVD (peripheral vascular disease) (H)      Status post  ablation of atrial fibrillation 11/18/2015    MAZE May 2007 PVI May 31, 2011 (RF-PVI + TIAN line) PVI Oct 30, 2012 (Cryo-PVI + redo TIAN line + roof line + RA-scar flutter line)    Past Surgical History:   Procedure Laterality Date     CARDIOVERSION  07/24/2018     EP PACEMAKER GENERATOR REPLACEMENT- BI-VENTRICULAR N/A 10/12/2022    Procedure: Pacemaker Generator Replacement Biventricular;  Surgeon: Celeste Mirza MD;  Location: [facility]     INSERT / REPLACE / REMOVE PACEMAKER       MITRAL VALVE REPAIR       AK ABLATE HEART DYSRHYTHM FOCUS      Description: Catheter Ablation Atrial Flutter;  Recorded: 10/30/2012;  Comments: Typical RA CTI flutter Sept 2007; ; Multiple atypical flutter Oct 2012 (Roof line + reinforce TIAN line + RA scar line)     AK ABLATE HEART DYSRHYTHM FOCUS      Description: Catheter Ablation Atrial Fibrillation;  Recorded: 10/24/2013;  Comments: May 2011 (PVI > 90% recurrence post MAZE + TIAN line); ; Re do PVI Oct 2012 (Cryo PVI + roof line + TIAN touch up + RA scar flutter); ; No recurrence by device check at 12 months     AK ABLATE HEART DYSRHYTHM FOCUS      Description: Catheter Ablation Atrial Fibrillation;  Recorded: 10/30/2014;  Comments: May 2011 (PVI > 90% recurrence post MAZE + TIAN line); ; Re do PVI Oct 2012 (Cryo PVI + roof line + TIAN touch up + RA scar flutter); ; No recurrence by device check at 12 months     AK ABLATE HEART DYSRHYTHM FOCUS      Description: Catheter Ablation Atrial Flutter;  Recorded: 10/30/2014;  Comments: Typical RA CTI flutter Sept 2007; ; Multiple atypical flutter Oct 2012 (Roof line + reinforce TIAN line + RA scar line)     AK ABLATE/ RECONSTUCT ATRIA, LIMITED      Description: Maze Procedure;  Proc Date: 05/01/2007;  Comments: May 2007 at time of valve repair     AK ICAR CATHETER ABLATION ATRIOVENTR NODE FUNCTION      Description: Catheter Ablation Atrioventricular Node;  Recorded: 08/13/2012;  Comments: Mar 2008 performed due to inability to  biventricular with conduction abnormality     FL ICAR CATHETER ABLATION ATRIOVENTR NODE FUNCTION      Description: Catheter Ablation Atrioventricular Node;  Recorded: 10/30/2014;  Comments: Mar 2008 performed due to inability to biventricular with conduction abnormality     ZZC CABG, VEIN, SINGLE      Description: CABG (CABG);  Recorded: 01/18/2012;  Comments: ONE VESSEL RCA    Family History   Problem Relation Age of Onset     No Known Problems Mother      No Known Problems Father      No Known Problems Sister      No Known Problems Brother      No Known Problems Daughter      No Known Problems Son      Acute Myocardial Infarction No family hx of      Alcoholism No family hx of      Alzheimer Disease No family hx of      Amputation of Leg Below Knee No family hx of      Aortic Valve Replacement No family hx of      Arrhythmogenic Right Ventricular Cardiomyopathy No family hx of      Atrial fibrillation No family hx of      Atrial Flutter No family hx of      Breast Cancer No family hx of      CABG No family hx of      Cancer No family hx of      Cardiomyopathy No family hx of      Carotid Endarterectomy No family hx of      Cerebral aneurysm No family hx of      Chronic Kidney Disease No family hx of      Chronic Obstructive Pulmonary Disease No family hx of      Colon Cancer No family hx of      Congenital heart disease No family hx of      Coronary Artery Disease No family hx of      Coronary Stenting No family hx of      Dementia No family hx of      Diabetes Type 1 No family hx of      Diabetes Type 2  No family hx of      Dialysis No family hx of      Dyslipidemia No family hx of      Heart Failure No family hx of      Hypertension No family hx of      Hypertrophic cardiomyopathy No family hx of      ICD - Implantable Cardioverter Defibrillator No family hx of      Long QT syndrome No family hx of      Mitral Regurgitation No family hx of      Mitral Valve Replacement No family hx of      Morbid Obesity No  family hx of      Obstructive Sleep Apnea No family hx of      Ovarian Cancer No family hx of      Pacemaker No family hx of      Pancreatic Cancer No family hx of      Peripheral Vascular Disease No family hx of      Pulmonary Embolism No family hx of      Pulmonary Hypertension No family hx of      Rheumatic Heart Disease No family hx of      Cerebrovascular Disease No family hx of      Sudden Death No family hx of      Transient ischemic attack No family hx of      Valvular heart disease No family hx of     History   Smoking Status     Former     Packs/day: 1.00     Types: Cigarettes     Quit date: 1/1/1990   Smokeless Tobacco     Never     Social History    Substance and Sexual Activity      Alcohol use: Yes        Alcohol/week: 2.0 standard drinks of alcohol       Medications  Allergies     Current Outpatient Medications   Medication Sig Dispense Refill     aspirin 81 MG EC tablet [ASPIRIN 81 MG EC TABLET] Take 81 mg by mouth daily.       atorvastatin (LIPITOR) 20 MG tablet TAKE 1 TABLET AT BEDTIME 90 tablet 3     diltiazem ER (DILT-XR) 120 MG 24 hr capsule Take 1 capsule (120 mg) by mouth daily 90 capsule 3     fish oil-omega-3 fatty acids 1000 MG capsule Take 1 g by mouth daily       furosemide (LASIX) 20 MG tablet Take 1 tablet (20 mg) by mouth daily 120 tablet 3     levothyroxine (SYNTHROID, LEVOTHROID) 175 MCG tablet Take 175 mcg by mouth five times a week       levothyroxine (SYNTHROID/LEVOTHROID) 150 MCG tablet Take one tab daily in am on empty stomach on Saturday and Sunday       losartan (COZAAR) 100 MG tablet Take 1 tablet (100 mg) by mouth daily 90 tablet 3     MULTIVITAMIN ORAL [MULTIVITAMIN ORAL] Take 1 tablet by mouth daily.       rivaroxaban ANTICOAGULANT (XARELTO) 20 MG TABS tablet Take 1 tablet (20 mg) by mouth daily (with dinner) 90 tablet 3     spironolactone (ALDACTONE) 25 MG tablet TAKE 1 TABLET DAILY 90 tablet 3      Allergies   Allergen Reactions     Carvedilol Shortness Of Breath      Lisinopril Cough     Metoprolol      Shortness of breath      Medical, surgical, family, social history, and medications were all reviewed and updated as necessary.   Lab Results    Chemistry/lipid CBC Cardiac Enzymes/BNP/TSH/INR   Recent Labs   Lab Test 12/23/22  1017   CHOL 122   HDL 42   LDL 68   TRIG 61     Recent Labs   Lab Test 12/23/22  1017 12/22/21  0956 12/18/20  0903   LDL 68 77 76     Recent Labs   Lab Test 12/23/22  1017      POTASSIUM 4.6   CHLORIDE 103   CO2 21*   GLC 98   BUN 27.5*   CR 1.40*   GFRESTIMATED 50*   OBIE 9.4     Recent Labs   Lab Test 12/23/22  1017 10/12/22  1142 09/19/22  1130   CR 1.40* 1.35* 1.36*     No results for input(s): A1C in the last 98880 hours.       Recent Labs   Lab Test 10/12/22  1142   WBC 6.8   HGB 13.2*   HCT 39.7*   MCV 96        Recent Labs   Lab Test 10/12/22  1142 09/19/22  1130 02/10/19  0754   HGB 13.2* 13.3 13.7*    Recent Labs   Lab Test 02/10/19  2348 02/10/19  1808 02/10/19  0754   TROPONINI 0.03 0.03 0.03     Recent Labs   Lab Test 02/10/19  0810   *     Recent Labs   Lab Test 12/18/20  0903   TSH 1.24     Recent Labs   Lab Test 02/06/23  0844 12/06/22  0845 10/12/22  1142   INR 1.4* 2.2* 2.12*          Total Time- 45 minutes spent on date of encounter doing chart review, history and exam, documentation and further activities as noted above.  This note has been dictated using voice recognition software. Any grammatical, typographical, or context distortions are unintentional and inherent to the software.

## 2023-04-11 NOTE — PATIENT INSTRUCTIONS
Ady Farley,    It was a pleasure to see you today at the [facility].     My recommendations after this visit include:    Switch diltiazem to taking it in the evening.    Increase diltiazem 180 mg 1 capsule orally every evening..  Call if ankle swelling.    To followup with Dr. Townsend in 2-3 months and remote device check before visit and ECHO 2-3 weeks before visit.  Follow up in 7 months with device check in clinic before appointment.      My contact information:  Cisco Perez CNP  After Hours or Scheduling  [phone]  My Nurse---nAa Hatfield [phone]

## 2023-04-11 NOTE — LETTER
4/11/2023    Veronica Schumacher MD  [address] [place] [address]    RE: Ady Farley       Dear Colleague,     I had the pleasure of seeing Ady Farley in the [facility].    Thank you, Veronica Dillon, for asking the [facility] team to see Mr. Ady Farley to evaluate chronic atrial fibrillation.    Assessment/Recommendations     Assessment/Plan:    Diagnoses and all orders for this visit:  Chronic atrial fibrillation (H) since November 2022 and discussed he will likely remain in this rhythm.  He is asymptomatic but concerned about being in A-fib.  I think it is chronic at this point and recommended to continue with rate control.  To increase diltiazem from 1 20 to 1 80 orally every evening and call if ankle swelling.  Benign essential hypertension and systolic blood pressure elevated today.  Increased calcium channel blocker today and needs reassessment on follow-up.  Chronic heart failure with preserved ejection fraction (H) and on very low-dose furosemide and spironolactone.  He denies dyspnea or ankle swelling.  His weight is stable.  He complains of breast tenderness on spironolactone.  I offered him an alternative and he will consider and discuss with Dr. Townesnd on follow-up.  Biventricular cardiac pacemaker in situ and CRT pacing still at 92% despite addition of diltiazem 120 mg p.o. daily.  Complaining of occasional lightheadedness when he first takes his meds in the morning.  I recommend that he switch diltiazem to the evening and to try to increase dose from 120 to 180 mg orally every evening.  To call if ankle swelling.  He is adamant that he had increased shortness of breath on carvedilol and metoprolol and does not want to consider retrialing beta-blocker.  This was before his heart failure and pulmonary hypertension was treated.  Discussed it could be different.  He wants to stick with diltiazem for now.      [id number] score of 4 and on  Xarelto.  Follow up in clinic with Dr. Townsend in 2 to 3 months with complete echo 1 to 2 weeks prior to visit and remote device check also before visit.  To follow-up with in clinic device check and see me in November as he will likely need mode switch on device at that time.     History of Present Illness/Subjective     Ady Farley is a very pleasant 82 year old male who comes in today for EP follow-up of permanent atrial fibrillation.  Ady Farley has a known history of coronary artery disease and mitral regurgitation status post SVG to his RCA and mitral valve repair in 2007, atrial fibrillation/atypical flutter status post PVI and flutter ablation with right CTI flutter ablation 2007, PVI 2011 and 2012 with recurrence status post AV node ablation with CRT-P placement in 2012 and chronic heart failure with reduced ejection fraction LVEF 45-50% who I am seeing today for a follow-up.  Recent echocardiogram in March indicated mildly reduced left ventricular systolic function with moderately reduced RV systolic function mitral valve repair with mild stenosis and mild to moderate regurgitation with elevated RVSP suggestive of moderate to severe pulmonary hypertension.  After his Lasix dose was doubled and he was started on spironolactone he did notice significant improvement in how he felt.  Weight declined by about 20 pounds.  It has remained stable around 206 to 208 pounds.    He has been in atrial fibrillation since November 2022 and on rate control as asymptomatic.  He follows with Dr. Townsend in our clinic.  He saw Solange David NP in clinic on 2/16/2023 and started on diltiazem 120 mg orally every day to try to increase V pacing.  Ady denies any signs or symptoms of heart failure or angina today on questioning.  Weight stable no edema.  No dyspnea.  He is walking a trail which she created in his basement every day and will switch to walking outside as the weather improves.  He generally gets 6-7000 steps  per day.  He has been doing yard work.  He denies any cardiac symptoms.  Complaining of occasional breast tenderness and see above.    Cardiographics (reviewed):  ECHOCARDIOGRAM:   Performed 3/30/2022, with the following results:   1.Left ventricular function is decreased. The ejection fraction is 45-50%  (mildly reduced).  2.Moderately decreased right ventricular systolic function  3.Moderate biatrial chamber enlargement.  4.An annuloplasty ring is noted in the mitral position. There is mild mitral  stenosis. Mean gradient is 7 mmHg at rate of 70 bpm. There is mild to moderate  (1-2+) mitral regurgitation.  5.There is moderate (2+) tricuspid regurgitation.  6.Right ventricular systolic pressure is elevated, consistent with moderate to  severe pulmonary hypertension.  7.IVC diameter >2.1 cm collapsing <50% with sniff suggests a high RA pressure  estimated at 15 mmHg or greater.  Compared to the prior study dated 5/5/2021, the LVEF is slightly lower,  mitral and tricuspid and insufficiency are greater, pulmonic pressures are  higher.  Cardiac testing personally reviewed:  In clinic device check shows leads stable and battery ok.  Device functioning appropriately.   Atrial pacing 17% due to under sensing and BiVentricular pacing 92%.  AT/AFib burden 100% since November 2022 with some under sensing too fast a and V rates.  There is a question of 1 possible nonsustained VT.          Problem List:  Patient Active Problem List   Diagnosis    Hypothyroidism    Non-rheumatic mitral regurgitation    Paroxysmal Atrial Fibrillation    Premature Ventricular Contractions    Dyslipidemia    Primary hypertension    Coronary Artery Disease    Atrial flutter, unspecified type (H)    Peripheral Vascular Disease    Status post ablation of atrial fibrillation    Biventricular cardiac pacemaker in situ    Mitral regurgitation and mitral stenosis    Postablative hypothyroidism    Status post aorto-coronary artery bypass graft    Heart  failure with preserved ejection fraction (H)    SUBHASH (obstructive sleep apnea)    NSVT (nonsustained ventricular tachycardia) (H)    Benign essential hypertension    Chronic atrial fibrillation (H)     Revi  e  Physical Examination Review of Systems   w stems  There were no vitals taken for this visit.  There is no height or weight on file to calculate BMI.  Wt Readings from Last 3 Encounters:   02/16/23 95.3 kg (210 lb)   01/20/23 95.7 kg (211 lb)   07/13/22 94.4 kg (208 lb 3.2 oz)     General Appearance:   Alert, well-appearing and in no acute distress.   HEENT: Atraumatic, normocephalic.  No scleral icterus, normal conjunctivae.     Chest: Chest symmetric, spine straight.   Lungs:   Respirations unlabored.   Cardiovascular:   Normal first and second heart sounds with no murmurs, rubs, or gallops.  Regular, regular.   Normal JVD, no edema.       Extremities: No cyanosis or clubbing   Musculoskeletal: Moves all extremities   Skin: Warm, dry, intact.    Neurologic: Mood and affect are appropriate, alert and oriented to person, place, time, and situation     ROS: 10 point ROS neg other than the symptoms noted above in the HPI.     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  Echo 2011 LVEF 45% and mild MR Echo 2012 LVEF 40% mild MS, Mod MR  NELY Jan 2013 mild MS and mild/mod MR Echo Jan 2015 Mild MS/ mild MR NELY 2016 small/mod cuff remains  (recommend OAC) Replacement Utility updated for latest IMO load     PVC (premature ventricular contraction)     PVD (peripheral vascular disease) (H)     Status post ablation of atrial fibrillation 11/18/2015    MAZE May 2007 PVI May 31, 2011 (RF-PVI + TIAN line) PVI Oct 30, 2012 (Cryo-PVI + redo TIAN line + roof line + RA-scar flutter line)    Past Surgical History:   Procedure Laterality Date    CARDIOVERSION  07/24/2018    EP PACEMAKER GENERATOR REPLACEMENT- BI-VENTRICULAR N/A 10/12/2022    Procedure: Pacemaker Generator Replacement Biventricular;  Surgeon: Celeste Mirza MD;  Location: [facility] CATH LAB CV    INSERT / REPLACE / REMOVE PACEMAKER      MITRAL VALVE REPAIR      HI ABLATE HEART DYSRHYTHM FOCUS      Description: Catheter Ablation Atrial Flutter;  Recorded: 10/30/2012;  Comments: Typical RA CTI flutter Sept 2007; ; Multiple atypical flutter Oct 2012 (Roof line + reinforce TIAN line + RA scar line)    HI ABLATE HEART DYSRHYTHM FOCUS      Description: Catheter Ablation Atrial Fibrillation;  Recorded: 10/24/2013;  Comments: May 2011 (PVI > 90% recurrence post MAZE + TIAN line); ; Re do PVI Oct 2012 (Cryo PVI + roof line + TIAN touch up + RA scar flutter); ; No recurrence by device check at 12 months    HI ABLATE HEART DYSRHYTHM FOCUS      Description: Catheter Ablation Atrial Fibrillation;  Recorded: 10/30/2014;  Comments: May 2011 (PVI > 90% recurrence post MAZE + TIAN line); ; Re do PVI Oct 2012 (Cryo PVI + roof line + TIAN touch up + RA scar flutter); ; No recurrence by device check at 12 months    HI ABLATE HEART DYSRHYTHM FOCUS      Description: Catheter Ablation Atrial Flutter;  Recorded: 10/30/2014;  Comments: Typical RA CTI flutter Sept 2007; ; Multiple atypical flutter Oct 2012 (Roof line + reinforce TIAN line + RA scar line)    HI ABLATE/ RECONSTUCT ATRIA, LIMITED      Description: Maze Procedure;  Proc Date: 05/01/2007;  Comments: May 2007 at time of valve repair    HI ICAR CATHETER ABLATION  ATRIOVENTR NODE FUNCTION      Description: Catheter Ablation Atrioventricular Node;  Recorded: 08/13/2012;  Comments: Mar 2008 performed due to inability to biventricular with conduction abnormality    TX ICAR CATHETER ABLATION ATRIOVENTR NODE FUNCTION      Description: Catheter Ablation Atrioventricular Node;  Recorded: 10/30/2014;  Comments: Mar 2008 performed due to inability to biventricular with conduction abnormality    ZZC CABG, VEIN, SINGLE      Description: CABG (CABG);  Recorded: 01/18/2012;  Comments: ONE VESSEL RCA    Family History   Problem Relation Age of Onset    No Known Problems Mother     No Known Problems Father     No Known Problems Sister     No Known Problems Brother     No Known Problems Daughter     No Known Problems Son     Acute Myocardial Infarction No family hx of     Alcoholism No family hx of     Alzheimer Disease No family hx of     Amputation of Leg Below Knee No family hx of     Aortic Valve Replacement No family hx of     Arrhythmogenic Right Ventricular Cardiomyopathy No family hx of     Atrial fibrillation No family hx of     Atrial Flutter No family hx of     Breast Cancer No family hx of     CABG No family hx of     Cancer No family hx of     Cardiomyopathy No family hx of     Carotid Endarterectomy No family hx of     Cerebral aneurysm No family hx of     Chronic Kidney Disease No family hx of     Chronic Obstructive Pulmonary Disease No family hx of     Colon Cancer No family hx of     Congenital heart disease No family hx of     Coronary Artery Disease No family hx of     Coronary Stenting No family hx of     Dementia No family hx of     Diabetes Type 1 No family hx of     Diabetes Type 2  No family hx of     Dialysis No family hx of     Dyslipidemia No family hx of     Heart Failure No family hx of     Hypertension No family hx of     Hypertrophic cardiomyopathy No family hx of     ICD - Implantable Cardioverter Defibrillator No family hx of     Long QT syndrome No family  hx of     Mitral Regurgitation No family hx of     Mitral Valve Replacement No family hx of     Morbid Obesity No family hx of     Obstructive Sleep Apnea No family hx of     Ovarian Cancer No family hx of     Pacemaker No family hx of     Pancreatic Cancer No family hx of     Peripheral Vascular Disease No family hx of     Pulmonary Embolism No family hx of     Pulmonary Hypertension No family hx of     Rheumatic Heart Disease No family hx of     Cerebrovascular Disease No family hx of     Sudden Death No family hx of     Transient ischemic attack No family hx of     Valvular heart disease No family hx of     History   Smoking Status    Former    Packs/day: 1.00    Types: Cigarettes    Quit date: 1/1/1990   Smokeless Tobacco    Never     Social History    Substance and Sexual Activity      Alcohol use: Yes        Alcohol/week: 2.0 standard drinks of alcohol       Medications  Allergies     Current Outpatient Medications   Medication Sig Dispense Refill    aspirin 81 MG EC tablet [ASPIRIN 81 MG EC TABLET] Take 81 mg by mouth daily.      atorvastatin (LIPITOR) 20 MG tablet TAKE 1 TABLET AT BEDTIME 90 tablet 3    diltiazem ER (DILT-XR) 120 MG 24 hr capsule Take 1 capsule (120 mg) by mouth daily 90 capsule 3    fish oil-omega-3 fatty acids 1000 MG capsule Take 1 g by mouth daily      furosemide (LASIX) 20 MG tablet Take 1 tablet (20 mg) by mouth daily 120 tablet 3    levothyroxine (SYNTHROID, LEVOTHROID) 175 MCG tablet Take 175 mcg by mouth five times a week      levothyroxine (SYNTHROID/LEVOTHROID) 150 MCG tablet Take one tab daily in am on empty stomach on Saturday and Sunday      losartan (COZAAR) 100 MG tablet Take 1 tablet (100 mg) by mouth daily 90 tablet 3    MULTIVITAMIN ORAL [MULTIVITAMIN ORAL] Take 1 tablet by mouth daily.      rivaroxaban ANTICOAGULANT (XARELTO) 20 MG TABS tablet Take 1 tablet (20 mg) by mouth daily (with dinner) 90 tablet 3    spironolactone (ALDACTONE) 25 MG tablet TAKE 1 TABLET DAILY 90  tablet 3      Allergies   Allergen Reactions    Carvedilol Shortness Of Breath    Lisinopril Cough    Metoprolol      Shortness of breath      Medical, surgical, family, social history, and medications were all reviewed and updated as necessary.   Lab Results    Chemistry/lipid CBC Cardiac Enzymes/BNP/TSH/INR   Recent Labs   Lab Test 12/23/22  1017   CHOL 122   HDL 42   LDL 68   TRIG 61     Recent Labs   Lab Test 12/23/22  1017 12/22/21  0956 12/18/20  0903   LDL 68 77 76     Recent Labs   Lab Test 12/23/22  1017      POTASSIUM 4.6   CHLORIDE 103   CO2 21*   GLC 98   BUN 27.5*   CR 1.40*   GFRESTIMATED 50*   OBIE 9.4     Recent Labs   Lab Test 12/23/22  1017 10/12/22  1142 09/19/22  1130   CR 1.40* 1.35* 1.36*     No results for input(s): A1C in the last 17149 hours.       Recent Labs   Lab Test 10/12/22  1142   WBC 6.8   HGB 13.2*   HCT 39.7*   MCV 96        Recent Labs   Lab Test 10/12/22  1142 09/19/22  1130 02/10/19  0754   HGB 13.2* 13.3 13.7*    Recent Labs   Lab Test 02/10/19  2348 02/10/19  1808 02/10/19  0754   TROPONINI 0.03 0.03 0.03     Recent Labs   Lab Test 02/10/19  0810   *     Recent Labs   Lab Test 12/18/20  0903   TSH 1.24     Recent Labs   Lab Test 02/06/23  0844 12/06/22  0845 10/12/22  1142   INR 1.4* 2.2* 2.12*          Total Time- 45 minutes spent on date of encounter doing chart review, history and exam, documentation and further activities as noted above.  This note has been dictated using voice recognition software. Any grammatical, typographical, or context distortions are unintentional and inherent to the software.              Thank you for allowing me to participate in the care of your patient.      Sincerely,     STACY Jesus [facility]  cc:   Celeste Mirza MD  [address]  [place] [address]

## 2024-03-05 NOTE — LETTER
Letter by Yessy Lainez RDCS at      Author: Yessy Lainez RDCS Service: -- Author Type: --    Filed:  Encounter Date: 4/9/2021 Status: (Other)         Ady Farley  [address]  [place] [address]      April 9, 2021      Dear Mr. Farley,    RE: Remote Results    We are writing to you regarding your recent Remote Pacemaker check from home. Your transmission was received successfully. Battery status is satisfactory at this time.     Your results are showing no significant changes.    Your next device appointment will be a remote check on June 14, 2021; this will occur automatically.    To schedule or reschedule, please call [phone] and press 1.    NOTE: If you would like to do an extra transmission, please call [phone] and press 3 to speak to a nurse BEFORE transmitting. This ensures that the Device Clinic staff is aware of the reason you are sending a transmission, and can follow-up with you after it has been reviewed.    We will be checking your implanted device from home (remotely) every two months unless otherwise instructed. We will need to see you in the clinic at least once a year. You may need to be seen in the clinic sooner depending on the results of your check.    Please be aware:    The follow-up schedule is like a Physician prescription.    Your remote monitor is paired to your specific implanted device.      Sincerely,    [facility]        CONSTITUTIONAL: NAD  SKIN: Warm dry  HEAD: NCAT  EYES: NL inspection  ENT: MMM  CARD: RRR  RESP: CTAB  ABD: Soft, non tender, no rebound or guarding   EXT: no pedal edema  NEURO: Grossly unremarkable  PSYCH: Cooperative, appropriate.

## 2024-08-12 NOTE — PROGRESS NOTES
INR stable. Discussed continuing management of dose of Warfarin and returning in one month . No changes to diet needed at this time. Continue moderate intake of Vitamin K and call if increase bruising or unexplained bleeding. Call with medication changes or upcoming procedures.    
Render In Strict Bullet Format?: No
Initiate Treatment: Mupirocin bid \\nDoxycycline 100mg bid x 10 days
Detail Level: Zone

## (undated) DEVICE — ESU BLADE PEAK PLASMA 3.0S PS210-030S

## (undated) DEVICE — POUCH TYRX ABSORB ANTIBACTERIAL LG

## (undated) DEVICE — CUSTOM PACK PACER ICD SAN5BPCHEA

## (undated) RX ORDER — KETAMINE HYDROCHLORIDE 10 MG/ML
INJECTION INTRAMUSCULAR; INTRAVENOUS
Status: DISPENSED
Start: 2022-01-01